# Patient Record
Sex: MALE | Race: BLACK OR AFRICAN AMERICAN | Employment: FULL TIME | URBAN - METROPOLITAN AREA
[De-identification: names, ages, dates, MRNs, and addresses within clinical notes are randomized per-mention and may not be internally consistent; named-entity substitution may affect disease eponyms.]

---

## 2020-11-03 ENCOUNTER — HOSPITAL ENCOUNTER (EMERGENCY)
Age: 45
Discharge: HOME | End: 2020-11-03
Attending: EMERGENCY MEDICINE

## 2020-11-03 ENCOUNTER — APPOINTMENT (OUTPATIENT)
Dept: GENERAL RADIOLOGY | Age: 45
End: 2020-11-03

## 2020-11-03 VITALS
WEIGHT: 150 LBS | OXYGEN SATURATION: 100 % | BODY MASS INDEX: 20.32 KG/M2 | HEIGHT: 72 IN | SYSTOLIC BLOOD PRESSURE: 160 MMHG | RESPIRATION RATE: 21 BRPM | HEART RATE: 68 BPM | TEMPERATURE: 98.4 F | DIASTOLIC BLOOD PRESSURE: 111 MMHG

## 2020-11-03 LAB
A/G RATIO: 1.8 (ref 1.1–2.2)
ALBUMIN SERPL-MCNC: 4.6 G/DL (ref 3.4–5)
ALP BLD-CCNC: 74 U/L (ref 40–129)
ALT SERPL-CCNC: 9 U/L (ref 10–40)
ANION GAP SERPL CALCULATED.3IONS-SCNC: 7 MMOL/L (ref 3–16)
AST SERPL-CCNC: 14 U/L (ref 15–37)
BASOPHILS ABSOLUTE: 0 K/UL (ref 0–0.2)
BASOPHILS RELATIVE PERCENT: 0.6 %
BILIRUB SERPL-MCNC: 0.9 MG/DL (ref 0–1)
BILIRUBIN URINE: NEGATIVE
BLOOD, URINE: NEGATIVE
BUN BLDV-MCNC: 17 MG/DL (ref 7–20)
CALCIUM SERPL-MCNC: 9.1 MG/DL (ref 8.3–10.6)
CHLORIDE BLD-SCNC: 103 MMOL/L (ref 99–110)
CLARITY: CLEAR
CO2: 24 MMOL/L (ref 21–32)
COLOR: YELLOW
CREAT SERPL-MCNC: 0.9 MG/DL (ref 0.9–1.3)
EOSINOPHILS ABSOLUTE: 0.1 K/UL (ref 0–0.6)
EOSINOPHILS RELATIVE PERCENT: 1.8 %
GFR AFRICAN AMERICAN: >60
GFR NON-AFRICAN AMERICAN: >60
GLOBULIN: 2.5 G/DL
GLUCOSE BLD-MCNC: 103 MG/DL (ref 70–99)
GLUCOSE URINE: NEGATIVE MG/DL
HCT VFR BLD CALC: 40.6 % (ref 40.5–52.5)
HEMOGLOBIN: 13.4 G/DL (ref 13.5–17.5)
KETONES, URINE: NEGATIVE MG/DL
LEUKOCYTE ESTERASE, URINE: NEGATIVE
LYMPHOCYTES ABSOLUTE: 1.2 K/UL (ref 1–5.1)
LYMPHOCYTES RELATIVE PERCENT: 26.5 %
MCH RBC QN AUTO: 30.7 PG (ref 26–34)
MCHC RBC AUTO-ENTMCNC: 33.1 G/DL (ref 31–36)
MCV RBC AUTO: 92.7 FL (ref 80–100)
MICROSCOPIC EXAMINATION: NORMAL
MONOCYTES ABSOLUTE: 0.4 K/UL (ref 0–1.3)
MONOCYTES RELATIVE PERCENT: 8.4 %
NEUTROPHILS ABSOLUTE: 2.8 K/UL (ref 1.7–7.7)
NEUTROPHILS RELATIVE PERCENT: 62.7 %
NITRITE, URINE: NEGATIVE
PDW BLD-RTO: 12.8 % (ref 12.4–15.4)
PH UA: 7 (ref 5–8)
PLATELET # BLD: 200 K/UL (ref 135–450)
PMV BLD AUTO: 9.2 FL (ref 5–10.5)
POTASSIUM SERPL-SCNC: 4.5 MMOL/L (ref 3.5–5.1)
PROTEIN UA: NEGATIVE MG/DL
RBC # BLD: 4.38 M/UL (ref 4.2–5.9)
SODIUM BLD-SCNC: 134 MMOL/L (ref 136–145)
SPECIFIC GRAVITY UA: 1.02 (ref 1–1.03)
TOTAL PROTEIN: 7.1 G/DL (ref 6.4–8.2)
TROPONIN: <0.01 NG/ML
TROPONIN: <0.01 NG/ML
URINE REFLEX TO CULTURE: NORMAL
URINE TYPE: NORMAL
UROBILINOGEN, URINE: 0.2 E.U./DL
WBC # BLD: 4.5 K/UL (ref 4–11)

## 2020-11-03 PROCEDURE — 85025 COMPLETE CBC W/AUTO DIFF WBC: CPT

## 2020-11-03 PROCEDURE — 80053 COMPREHEN METABOLIC PANEL: CPT

## 2020-11-03 PROCEDURE — 71045 X-RAY EXAM CHEST 1 VIEW: CPT

## 2020-11-03 PROCEDURE — 99285 EMERGENCY DEPT VISIT HI MDM: CPT

## 2020-11-03 PROCEDURE — 81003 URINALYSIS AUTO W/O SCOPE: CPT

## 2020-11-03 PROCEDURE — 84484 ASSAY OF TROPONIN QUANT: CPT

## 2020-11-03 PROCEDURE — 93005 ELECTROCARDIOGRAM TRACING: CPT | Performed by: EMERGENCY MEDICINE

## 2020-11-03 ASSESSMENT — PAIN DESCRIPTION - DESCRIPTORS: DESCRIPTORS: ACHING

## 2020-11-03 ASSESSMENT — PAIN DESCRIPTION - DIRECTION: RADIATING_TOWARDS: NON RADIATING

## 2020-11-03 ASSESSMENT — ENCOUNTER SYMPTOMS
RHINORRHEA: 0
WHEEZING: 0
COUGH: 0
PHOTOPHOBIA: 0
BACK PAIN: 0
DIARRHEA: 0
VOMITING: 0
NAUSEA: 0
ABDOMINAL PAIN: 0
SHORTNESS OF BREATH: 0

## 2020-11-03 ASSESSMENT — PAIN DESCRIPTION - FREQUENCY: FREQUENCY: CONTINUOUS

## 2020-11-03 ASSESSMENT — PAIN SCALES - GENERAL
PAINLEVEL_OUTOF10: 3
PAINLEVEL_OUTOF10: 4
PAINLEVEL_OUTOF10: 5
PAINLEVEL_OUTOF10: 5

## 2020-11-03 ASSESSMENT — PAIN DESCRIPTION - LOCATION: LOCATION: CHEST

## 2020-11-03 ASSESSMENT — PAIN DESCRIPTION - ORIENTATION: ORIENTATION: RIGHT;UPPER

## 2020-11-03 ASSESSMENT — PAIN DESCRIPTION - PAIN TYPE: TYPE: ACUTE PAIN

## 2020-11-03 NOTE — ED NOTES
Findings and plan of care discussed at bedside by provider. Pt verbalized understanding of plan of care, pt discharged with all instructions reviewed and any prescriptions as applicable. All belongings in hand including discharge instructions, prescriptions, and personal belongings. Pt in no acute distress.      Junaid Davidson RN  11/03/20 8015

## 2020-11-03 NOTE — ED PROVIDER NOTES
Emergency Department Provider Note  Location: [address] [place] [address]  ED  11/3/2020     Patient Identification  Yaquelin Joyner is a 39 y.o. male    Chief Complaint  Chest Pain (right sided chest pain at 0700, constant aching, nonradiating 3/10, worse with breathing)          HPI  (History provided by patient)  Patient is a 61-year-old male smoker no medical history who presents with right-sided chest pain episode started around 7 AM now resolved. Patient states he felt that he may have slept weird on his side and woke up with symptoms. Feels like \"pain in the wall of my chest\". No exacerbating or alleviating symptoms. Is not associated with any shoulder pain shortness of breath lightheadedness diaphoresis nausea or other autonomic symptoms. No cough sputum reduction fever chills known sick contacts or exposures to COVID-19. I have reviewed the following nursing documentation:  Allergies: No Known Allergies    Past medical history:  has no past medical history on file. Past surgical history:  has no past surgical history on file. Home medications:   Prior to Admission medications    Not on File       Social history:      Family history:  No family history on file. ROS  Review of Systems   Constitutional: Negative for chills and fever. HENT: Negative for congestion and rhinorrhea. Eyes: Negative for photophobia and visual disturbance. Respiratory: Negative for cough, shortness of breath and wheezing. Cardiovascular: Positive for chest pain. Negative for palpitations. Gastrointestinal: Negative for abdominal pain, diarrhea, nausea and vomiting. Genitourinary: Negative for dysuria and hematuria. Musculoskeletal: Negative for back pain and neck pain. Skin: Negative for rash and wound. Neurological: Negative for syncope and weakness. Psychiatric/Behavioral: Negative for agitation and confusion.          Exam  ED Triage Vitals [11/03/20 1010]   BP Temp Temp src Pulse Resp SpO2 Height Weight   (!) 152/100 98.4 °F (36.9 °C) -- 62 15 100 % 6' (1.829 m) 150 lb (68 kg)       Physical Exam  Vitals signs and nursing note reviewed. Constitutional:       General: He is not in acute distress. Appearance: He is well-developed. HENT:      Head: Normocephalic and atraumatic. Nose: Nose normal. No congestion. Eyes:      Extraocular Movements: Extraocular movements intact. Pupils: Pupils are equal, round, and reactive to light. Neck:      Musculoskeletal: Normal range of motion and neck supple. Cardiovascular:      Rate and Rhythm: Normal rate and regular rhythm. Heart sounds: No murmur. Comments: Equal radial pulses  Pulmonary:      Effort: Pulmonary effort is normal.      Breath sounds: Normal breath sounds. Comments: No chest wall tenderness or crepitus  Abdominal:      General: There is no distension. Palpations: Abdomen is soft. Tenderness: There is no abdominal tenderness. Musculoskeletal: Normal range of motion. General: No deformity. Skin:     General: Skin is warm. Findings: No rash. Neurological:      Mental Status: He is alert and oriented to person, place, and time. Motor: No abnormal muscle tone. Coordination: Coordination normal.   Psychiatric:         Mood and Affect: Mood normal.         Behavior: Behavior normal.           ED Course    ED Medication Orders (From admission, onward)    None          EKG  Sinus bradycardic rhythm rate 55, normal axis, normal intervals, no evidence of conduction abnormalities, LVH criteria, no diagnostic ischemic changes noted, J-point elevation noted septal and lateral leads consistent with early repolarization. QTc 369. Radiology  Xr Chest Portable    Result Date: 11/3/2020  EXAMINATION: ONE XRAY VIEW OF THE CHEST 11/3/2020 10:47 am COMPARISON: None.  HISTORY: ORDERING SYSTEM PROVIDED HISTORY: chest pain TECHNOLOGIST PROVIDED HISTORY: Reason for exam:->chest pain Reason for Exam: CHEST PAIN Acuity: Acute Type of Exam: Initial FINDINGS: The cardiomediastinal silhouette is unremarkable. The lungs are clear. No infiltrate, pleural fluid or focal process is identified. No acute osseous findings. No acute cardiopulmonary disease.          Labs  Results for orders placed or performed during the hospital encounter of 11/03/20   Comprehensive metabolic panel   Result Value Ref Range    Sodium 134 (L) 136 - 145 mmol/L    Potassium 4.5 3.5 - 5.1 mmol/L    Chloride 103 99 - 110 mmol/L    CO2 24 21 - 32 mmol/L    Anion Gap 7 3 - 16    Glucose 103 (H) 70 - 99 mg/dL    BUN 17 7 - 20 mg/dL    CREATININE 0.9 0.9 - 1.3 mg/dL    GFR Non-African American >60 >60    GFR African American >60 >60    Calcium 9.1 8.3 - 10.6 mg/dL    Total Protein 7.1 6.4 - 8.2 g/dL    Alb 4.6 3.4 - 5.0 g/dL    Albumin/Globulin Ratio 1.8 1.1 - 2.2    Total Bilirubin 0.9 0.0 - 1.0 mg/dL    Alkaline Phosphatase 74 40 - 129 U/L    ALT 9 (L) 10 - 40 U/L    AST 14 (L) 15 - 37 U/L    Globulin 2.5 g/dL   CBC auto differential   Result Value Ref Range    WBC 4.5 4.0 - 11.0 K/uL    RBC 4.38 4.20 - 5.90 M/uL    Hemoglobin 13.4 (L) 13.5 - 17.5 g/dL    Hematocrit 40.6 40.5 - 52.5 %    MCV 92.7 80.0 - 100.0 fL    MCH 30.7 26.0 - 34.0 pg    MCHC 33.1 31.0 - 36.0 g/dL    RDW 12.8 12.4 - 15.4 %    Platelets 031 579 - 959 K/uL    MPV 9.2 5.0 - 10.5 fL    Neutrophils % 62.7 %    Lymphocytes % 26.5 %    Monocytes % 8.4 %    Eosinophils % 1.8 %    Basophils % 0.6 %    Neutrophils Absolute 2.8 1.7 - 7.7 K/uL    Lymphocytes Absolute 1.2 1.0 - 5.1 K/uL    Monocytes Absolute 0.4 0.0 - 1.3 K/uL    Eosinophils Absolute 0.1 0.0 - 0.6 K/uL    Basophils Absolute 0.0 0.0 - 0.2 K/uL   Troponin   Result Value Ref Range    Troponin <0.01 <0.01 ng/mL   Urinalysis Reflex to Culture    Specimen: Urine, clean catch   Result Value Ref Range    Color, UA Yellow Straw/Yellow    Clarity, UA Clear Clear    Glucose, Ur Negative Negative mg/dL Bilirubin Urine Negative Negative    Ketones, Urine Negative Negative mg/dL    Specific Gravity, UA 1.025 1.005 - 1.030    Blood, Urine Negative Negative    pH, UA 7.0 5.0 - 8.0    Protein, UA Negative Negative mg/dL    Urobilinogen, Urine 0.2 <2.0 E.U./dL    Nitrite, Urine Negative Negative    Leukocyte Esterase, Urine Negative Negative    Microscopic Examination Not Indicated     Urine Type NotGiven     Urine Reflex to Culture Not Indicated    Troponin   Result Value Ref Range    Troponin <0.01 <0.01 ng/mL   EKG 12 Lead   Result Value Ref Range    Ventricular Rate 54 BPM    Atrial Rate 54 BPM    P-R Interval 188 ms    QRS Duration 86 ms    Q-T Interval 390 ms    QTc Calculation (Bazett) 369 ms    P Axis 71 degrees    R Axis 13 degrees    T Axis 39 degrees    Diagnosis       Sinus bradycardiaMinimal voltage criteria for LVH, may be normal variantBorderline ECGNo previous ECGs available   EKG 12 Lead   Result Value Ref Range    Ventricular Rate 55 BPM    Atrial Rate 55 BPM    P-R Interval 196 ms    QRS Duration 72 ms    Q-T Interval 386 ms    QTc Calculation (Bazett) 369 ms    P Axis 68 degrees    R Axis -4 degrees    T Axis 45 degrees    Diagnosis       Sinus bradycardiaRSR' or QR pattern in V1 suggests right ventricular conduction delayNonspecific ST abnormalityAbnormal ECGWhen compared with ECG of 03-NOV-2020 10:04,RSR' pattern in V1 is now PresentT wave amplitude has decreased in Anterior leads         MDM  Patient seen and evaluated. Relevant records reviewed. 44-year-old male presents with episode of right-sided chest pain now resolved. On exam is well-appearing no acute distress, vitals notable for hypertension otherwise within normal limits. He has a reassuring and unremarkable physical exam.  His EKG shows evidence of LVH though no clear ischemic pattern noted. Serial troponins are negative. He is a low heart score, 2. At this point low concern for ACS.     I completed a structured, evidence-based clinical evaluation to screen for acute emergencies for the above complaints. Available evidence indicate that the patient is low risk and this is consistent with my clinical intuition. The risk of further workup or hospitalization is likely higher than the likelihood of the patient having a dangerous emergent condition. It is, therefore, in the patients best interest not to do additional emergent testing. At this point I do not feel the patient requires further work up and it is reasonable to discharge the patient. I had a discussion with the patient and/or their surrogate regarding diagnosis, diagnostic testing results, treatment/ plan of care, and follow up. There was shared decision-making between myself as well as the patient and/or their surrogate and we are all in agreement with discharge home. There was an opportunity for questions and all questions were answered to the best of my ability and to the satisfaction of the patient and/or patient family. Patient agreed to follow up with PCP for further evaluation/treatment. The patient was given strict return precautions as we discussed symptoms that would necessitate return to the ED. Patient will return to ED for new/worsening symptoms. The patient verbalized their understanding and agreement with the above plan. Please refer to AVS for further details regarding discharge instructions. Clinical Impression:  1. Chest wall pain    2. Atypical chest pain          Disposition:  Discharge to home in good condition. Blood pressure 124/83, pulse 61, temperature 98.4 °F (36.9 °C), resp. rate 21, height 6' (1.829 m), weight 150 lb (68 kg), SpO2 100 %. Patient was given scripts for the following medications. I counseled patient how to take these medications.    New Prescriptions    No medications on file       Disposition referral (if applicable):  [address] [place] [address]. Tasia  [address] [place] [address]  [phone]    Schedule an

## 2020-11-04 LAB
EKG ATRIAL RATE: 54 BPM
EKG ATRIAL RATE: 55 BPM
EKG DIAGNOSIS: NORMAL
EKG DIAGNOSIS: NORMAL
EKG P AXIS: 68 DEGREES
EKG P AXIS: 71 DEGREES
EKG P-R INTERVAL: 188 MS
EKG P-R INTERVAL: 196 MS
EKG Q-T INTERVAL: 386 MS
EKG Q-T INTERVAL: 390 MS
EKG QRS DURATION: 72 MS
EKG QRS DURATION: 86 MS
EKG QTC CALCULATION (BAZETT): 369 MS
EKG QTC CALCULATION (BAZETT): 369 MS
EKG R AXIS: -4 DEGREES
EKG R AXIS: 13 DEGREES
EKG T AXIS: 39 DEGREES
EKG T AXIS: 45 DEGREES
EKG VENTRICULAR RATE: 54 BPM
EKG VENTRICULAR RATE: 55 BPM

## 2020-11-04 PROCEDURE — 93010 ELECTROCARDIOGRAM REPORT: CPT | Performed by: INTERNAL MEDICINE

## 2021-07-29 ENCOUNTER — APPOINTMENT (OUTPATIENT)
Dept: GENERAL RADIOLOGY | Age: 46
End: 2021-07-29

## 2021-07-29 ENCOUNTER — HOSPITAL ENCOUNTER (EMERGENCY)
Age: 46
Discharge: HOME | End: 2021-07-29

## 2021-07-29 VITALS
SYSTOLIC BLOOD PRESSURE: 130 MMHG | RESPIRATION RATE: 18 BRPM | WEIGHT: 160 LBS | HEART RATE: 60 BPM | BODY MASS INDEX: 22.4 KG/M2 | TEMPERATURE: 98.5 F | OXYGEN SATURATION: 98 % | DIASTOLIC BLOOD PRESSURE: 70 MMHG | HEIGHT: 71 IN

## 2021-07-29 DIAGNOSIS — R42 DIZZINESS: Primary | ICD-10-CM

## 2021-07-29 LAB
A/G RATIO: 1.6 (ref 1.1–2.2)
ALBUMIN SERPL-MCNC: 4.7 G/DL (ref 3.4–5)
ALP BLD-CCNC: 80 U/L (ref 40–129)
ALT SERPL-CCNC: 10 U/L (ref 10–40)
ANION GAP SERPL CALCULATED.3IONS-SCNC: 25 MMOL/L (ref 3–16)
AST SERPL-CCNC: 17 U/L (ref 15–37)
BASOPHILS ABSOLUTE: 0 K/UL (ref 0–0.2)
BASOPHILS RELATIVE PERCENT: 0.5 %
BILIRUB SERPL-MCNC: 0.4 MG/DL (ref 0–1)
BUN BLDV-MCNC: 12 MG/DL (ref 7–20)
CALCIUM SERPL-MCNC: 9.7 MG/DL (ref 8.3–10.6)
CHLORIDE BLD-SCNC: 103 MMOL/L (ref 99–110)
CO2: 14 MMOL/L (ref 21–32)
CREAT SERPL-MCNC: 1.2 MG/DL (ref 0.9–1.3)
EOSINOPHILS ABSOLUTE: 0 K/UL (ref 0–0.6)
EOSINOPHILS RELATIVE PERCENT: 0.8 %
GFR AFRICAN AMERICAN: >60
GFR NON-AFRICAN AMERICAN: >60
GLOBULIN: 3 G/DL
GLUCOSE BLD-MCNC: 70 MG/DL (ref 70–99)
HCT VFR BLD CALC: 43.4 % (ref 40.5–52.5)
HEMOGLOBIN: 14.3 G/DL (ref 13.5–17.5)
LYMPHOCYTES ABSOLUTE: 2.6 K/UL (ref 1–5.1)
LYMPHOCYTES RELATIVE PERCENT: 42.6 %
MCH RBC QN AUTO: 31.4 PG (ref 26–34)
MCHC RBC AUTO-ENTMCNC: 32.9 G/DL (ref 31–36)
MCV RBC AUTO: 95.4 FL (ref 80–100)
MONOCYTES ABSOLUTE: 0.2 K/UL (ref 0–1.3)
MONOCYTES RELATIVE PERCENT: 4.1 %
NEUTROPHILS ABSOLUTE: 3.1 K/UL (ref 1.7–7.7)
NEUTROPHILS RELATIVE PERCENT: 52 %
PDW BLD-RTO: 12.9 % (ref 12.4–15.4)
PLATELET # BLD: 203 K/UL (ref 135–450)
PMV BLD AUTO: 9.7 FL (ref 5–10.5)
POTASSIUM SERPL-SCNC: 4.2 MMOL/L (ref 3.5–5.1)
RBC # BLD: 4.55 M/UL (ref 4.2–5.9)
SODIUM BLD-SCNC: 142 MMOL/L (ref 136–145)
TOTAL PROTEIN: 7.7 G/DL (ref 6.4–8.2)
TROPONIN: <0.01 NG/ML
WBC # BLD: 6 K/UL (ref 4–11)

## 2021-07-29 PROCEDURE — 93005 ELECTROCARDIOGRAM TRACING: CPT | Performed by: EMERGENCY MEDICINE

## 2021-07-29 PROCEDURE — 80053 COMPREHEN METABOLIC PANEL: CPT

## 2021-07-29 PROCEDURE — 85025 COMPLETE CBC W/AUTO DIFF WBC: CPT

## 2021-07-29 PROCEDURE — 84484 ASSAY OF TROPONIN QUANT: CPT

## 2021-07-29 PROCEDURE — 71046 X-RAY EXAM CHEST 2 VIEWS: CPT

## 2021-07-29 PROCEDURE — 99284 EMERGENCY DEPT VISIT MOD MDM: CPT

## 2021-07-29 ASSESSMENT — PAIN DESCRIPTION - DESCRIPTORS: DESCRIPTORS: HEADACHE

## 2021-07-29 ASSESSMENT — PAIN SCALES - GENERAL: PAINLEVEL_OUTOF10: 7

## 2021-07-29 ASSESSMENT — PAIN DESCRIPTION - PAIN TYPE: TYPE: ACUTE PAIN

## 2021-07-29 ASSESSMENT — PAIN DESCRIPTION - LOCATION: LOCATION: HEAD

## 2021-07-29 NOTE — ED PROVIDER NOTES
EKG interpretation:  Normal sinus rhythm, rate 81, normal axis, QTC within normal limits, probable LVH with nonspecific anterolateral ST repolarization abnormality, overall no significant changes from prior on 11/3/2020     Jessica Miller MD  07/29/21 2985

## 2021-07-29 NOTE — ED NOTES
Bed: 09  Expected date:   Expected time:   Means of arrival:   Comments:  [id number] [place] Martin, RN  07/29/21 1735

## 2021-07-30 LAB
EKG ATRIAL RATE: 81 BPM
EKG DIAGNOSIS: NORMAL
EKG P AXIS: 79 DEGREES
EKG P-R INTERVAL: 186 MS
EKG Q-T INTERVAL: 354 MS
EKG QRS DURATION: 78 MS
EKG QTC CALCULATION (BAZETT): 411 MS
EKG R AXIS: 23 DEGREES
EKG T AXIS: 60 DEGREES
EKG VENTRICULAR RATE: 81 BPM

## 2021-07-30 PROCEDURE — 93010 ELECTROCARDIOGRAM REPORT: CPT | Performed by: INTERNAL MEDICINE

## 2021-08-01 NOTE — ED PROVIDER NOTES
[address] [place] [address]  ED  EMERGENCY DEPARTMENT ENCOUNTER      This patient was not seen and evaluated by the attending physician. Pt Name: Paige Oliveira  MRN: [id number]  [place] 1975  Date of evaluation: 7/29/2021  Provider: Saint Carbo, APRN - CNP-C  PCP: No primary care provider on file. History provided by the patient. CHIEFCOMPLAINT:     Chief Complaint   Patient presents with    Dizziness     attempted to have a BM and became dizzy and laid down on the floor. Pt states he has been constipated and was trying to hurry       HISTORY OF PRESENT ILLNESS:      Paige Oliveira is a 42 y.o. male who presents to [address] [place] [address]  ED with complaints of dizziness. Patient states that he was trying to have a bowel movement and became really dizzy, states that he had to lay down on the floor, states that he feels much better currently. Patient states that he lives in a house with 7 people so when he has to go have a bowel movement he has to hurry up so he was straining to go. Denies any chest pain. Never had any shortness of breath. Is here for the evaluation. LOCATION:-  QUALITY:-  SEVERITY:-  DURATION:-  MODIFYING FACTORS:-    Nursing Notes were reviewed     REVIEW OF SYSTEMS:     Review of Systems  All systems, a total of 10, are reviewed and negative except for those that were just noted in history present illness. PAST MEDICAL HISTORY:   History reviewed. No pertinent past medical history. SURGICAL HISTORY:      Past Surgical History:   Procedure Laterality Date    FRACTURE SURGERY           CURRENT MEDICATIONS:     There are no discharge medications for this patient. ALLERGIES:    Patient has no known allergies. FAMILY HISTORY:     History reviewed. No pertinent family history.        SOCIAL HISTORY:     Social History     Socioeconomic History    Marital status: Single     Spouse name: None    Number of children: None    Years of education: None    Highest education level: None   Occupational History    None   Tobacco Use    Smoking status: Current Every Day Smoker     Packs/day: 1.00     Types: Cigarettes   Substance and Sexual Activity    Alcohol use: None    Drug use: None    Sexual activity: None   Other Topics Concern    None   Social History Narrative    None     Social Determinants of Health     Financial Resource Strain:     Difficulty of Paying Living Expenses:    Food Insecurity:     Worried About Running Out of Food in the Last Year:     Ran Out of Food in the Last Year:    Transportation Needs:     Lack of Transportation (Medical):  Lack of Transportation (Non-Medical):    Physical Activity:     Days of Exercise per Week:     Minutes of Exercise per Session:    Stress:     Feeling of Stress :    Social Connections:     Frequency of Communication with Friends and Family:     Frequency of Social Gatherings with Friends and Family:     Attends Denominational Services:     Active Member of Clubs or Organizations:     Attends Club or Organization Meetings:     Marital Status:    Intimate Partner Violence:     Fear of Current or Ex-Partner:     Emotionally Abused:     Physically Abused:     Sexually Abused:        SCREENINGS:             PHYSICAL EXAM:       ED Triage Vitals   BP Temp Temp Source Pulse Resp SpO2 Height Weight   07/29/21 1736 07/29/21 1853 07/29/21 1736 07/29/21 1736 07/29/21 1736 07/29/21 1736 07/29/21 1736 07/29/21 1736   (!) 152/89 98.5 °F (36.9 °C) Oral 82 16 100 % 5' 11\" (1.803 m) 160 lb (72.6 kg)       Physical Exam    CONSTITUTIONAL: Awake and alert. Cooperative. Well-developed. Well-nourished. Vitals:    07/29/21 1836 07/29/21 1853 07/29/21 1907 07/29/21 2128   BP: (!) 142/94  132/76 130/70   Pulse: 73  64 60   Resp: 19  17 18   Temp:  98.5 °F (36.9 °C)     TempSrc:  Oral     SpO2: 99%  100% 98%   Weight:       Height:         HENT: Normocephalic. Atraumatic.  External ears normal, without discharge. TMs clear bilaterally. Nonasal discharge. Oropharynx clear, no erythema. Mucous membranes moist.  EYES: Conjunctiva non-injected, nolid abnormalities noted. No scleral icterus. PERRL. EOM's grossly intact. Anterior chambers clear. NECK: Supple. Normal ROM. No meningismus. No thyroid tenderness or swelling noted. CARDIOVASCULAR: RRR. No Murmer. No carotid bruits. PULMONARY/CHEST WALL: Effort normal. No tachypnea. Lungs clear to ausculation. ABDOMEN: Normal BS. Soft. Nondistended. No tenderness to palpation. No guarding. No hernias noted. No splenomegaly. Back: Spine is midline. No ecchymosis. No crepituson palpation. No obvious subluxation of vertebral column. No saddle anesthesia or evidence of cauda equina. /ANORECTAL: Not assessed  MUSKULOSKELETAL: Normal ROM. No acute deformities. No edema. No tenderness to palpate. SKIN: Warm and dry. NEUROLOGICAL:  GCS 15. CN II-XII grossly intact. Strength is 5/5 in all extremities and sensation is intact. PSYCHIATRIC: Normal affect, normal insight and judgement. Alert and oriented x 3.         DIAGNOSTIC RESULTS:     LABS:    Results for orders placed or performed during the hospital encounter of 07/29/21   CBC auto differential   Result Value Ref Range    WBC 6.0 4.0 - 11.0 K/uL    RBC 4.55 4.20 - 5.90 M/uL    Hemoglobin 14.3 13.5 - 17.5 g/dL    Hematocrit 43.4 40.5 - 52.5 %    MCV 95.4 80.0 - 100.0 fL    MCH 31.4 26.0 - 34.0 pg    MCHC 32.9 31.0 - 36.0 g/dL    RDW 12.9 12.4 - 15.4 %    Platelets 041 083 - 100 K/uL    MPV 9.7 5.0 - 10.5 fL    Neutrophils % 52.0 %    Lymphocytes % 42.6 %    Monocytes % 4.1 %    Eosinophils % 0.8 %    Basophils % 0.5 %    Neutrophils Absolute 3.1 1.7 - 7.7 K/uL    Lymphocytes Absolute 2.6 1.0 - 5.1 K/uL    Monocytes Absolute 0.2 0.0 - 1.3 K/uL    Eosinophils Absolute 0.0 0.0 - 0.6 K/uL    Basophils Absolute 0.0 0.0 - 0.2 K/uL   Comprehensive metabolic panel   Result Value Ref Range    Sodium 142 136 - 145

## 2021-08-13 ENCOUNTER — HOSPITAL ENCOUNTER (EMERGENCY)
Facility: CLINIC | Age: 46
Discharge: HOME | End: 2021-08-13
Attending: EMERGENCY MEDICINE | Admitting: EMERGENCY MEDICINE
Payer: MEDICAID

## 2021-08-13 ENCOUNTER — TELEPHONE (OUTPATIENT)
Dept: NEUROSURGERY | Facility: CLINIC | Age: 46
End: 2021-08-13

## 2021-08-13 VITALS
BODY MASS INDEX: 21.7 KG/M2 | DIASTOLIC BLOOD PRESSURE: 118 MMHG | TEMPERATURE: 98.4 F | RESPIRATION RATE: 16 BRPM | OXYGEN SATURATION: 99 % | SYSTOLIC BLOOD PRESSURE: 135 MMHG | WEIGHT: 155 LBS | HEART RATE: 76 BPM | HEIGHT: 71 IN

## 2021-08-13 DIAGNOSIS — D49.6 BRAIN TUMOR (H): ICD-10-CM

## 2021-08-13 DIAGNOSIS — Z87.898 HISTORY OF SEIZURE: ICD-10-CM

## 2021-08-13 DIAGNOSIS — U07.1 2019 NOVEL CORONAVIRUS DISEASE (COVID-19): ICD-10-CM

## 2021-08-13 LAB
ALBUMIN SERPL-MCNC: 3.7 G/DL (ref 3.4–5)
ALP SERPL-CCNC: 69 U/L (ref 40–150)
ALT SERPL W P-5'-P-CCNC: 26 U/L (ref 0–70)
ANION GAP SERPL CALCULATED.3IONS-SCNC: 4 MMOL/L (ref 3–14)
AST SERPL W P-5'-P-CCNC: 22 U/L (ref 0–45)
BILIRUB SERPL-MCNC: 0.8 MG/DL (ref 0.2–1.3)
BUN SERPL-MCNC: 10 MG/DL (ref 7–30)
CALCIUM SERPL-MCNC: 9.2 MG/DL (ref 8.5–10.1)
CHLORIDE BLD-SCNC: 108 MMOL/L (ref 94–109)
CO2 SERPL-SCNC: 28 MMOL/L (ref 20–32)
CREAT SERPL-MCNC: 0.94 MG/DL (ref 0.66–1.25)
ERYTHROCYTE [DISTWIDTH] IN BLOOD BY AUTOMATED COUNT: 11.7 % (ref 10–15)
GFR SERPL CREATININE-BSD FRML MDRD: >90 ML/MIN/1.73M2
GLUCOSE BLD-MCNC: 101 MG/DL (ref 70–99)
HCT VFR BLD AUTO: 43 % (ref 40–53)
HGB BLD-MCNC: 14.1 G/DL (ref 13.3–17.7)
MCH RBC QN AUTO: 30 PG (ref 26.5–33)
MCHC RBC AUTO-ENTMCNC: 32.8 G/DL (ref 31.5–36.5)
MCV RBC AUTO: 92 FL (ref 78–100)
NRBC # BLD AUTO: 0 10E3/UL
NRBC BLD AUTO-RTO: 0 /100
PLATELET # BLD AUTO: 171 10E3/UL (ref 150–450)
POTASSIUM BLD-SCNC: 4.3 MMOL/L (ref 3.4–5.3)
PROT SERPL-MCNC: 7.4 G/DL (ref 6.8–8.8)
RBC # BLD AUTO: 4.7 10E6/UL (ref 4.4–5.9)
SODIUM SERPL-SCNC: 140 MMOL/L (ref 133–144)
WBC # BLD AUTO: 4.2 10E3/UL (ref 4–11)

## 2021-08-13 PROCEDURE — 82040 ASSAY OF SERUM ALBUMIN: CPT | Performed by: EMERGENCY MEDICINE

## 2021-08-13 PROCEDURE — 85027 COMPLETE CBC AUTOMATED: CPT | Performed by: EMERGENCY MEDICINE

## 2021-08-13 PROCEDURE — 99283 EMERGENCY DEPT VISIT LOW MDM: CPT

## 2021-08-13 PROCEDURE — 36415 COLL VENOUS BLD VENIPUNCTURE: CPT | Performed by: EMERGENCY MEDICINE

## 2021-08-13 RX ORDER — LEVETIRACETAM 500 MG/1
500 TABLET ORAL 2 TIMES DAILY
Status: ON HOLD | COMMUNITY
Start: 2021-08-10 | End: 2021-11-19

## 2021-08-13 ASSESSMENT — MIFFLIN-ST. JEOR: SCORE: 1615.21

## 2021-08-13 NOTE — ED PROVIDER NOTES
"  History   Chief Complaint:  \"they told me to come\"       HPI history limited by unavailability of outside records, and supplemented by review of his MRI pictures  Lang Collins is a 46 year old male who presents for evaluation of recent seizure and recent diagnosis of a brain tumor.  Patient states that he was in [facility], seen at the [facility] this past weekend, at which time he had had his first ever generalized tonic-clonic seizure, with work-up ultimately revealed a brain tumor.  He was started on Keppra 500 mg twice daily, which she has been taking as prescribed, and has not had recurrent seizure activity.  He intended to move up to [place], and he arrived here several days ago.  His team in [place] had told him to come to the emergency department to find a team to take care of his brain tumor and manage his seizures.  He denies a history of hypertension.  No vomiting or diarrhea.  He has no fever or cough, though states that he was diagnosed with Covid while in [place].  He does not feel short of breath and has no chest pain.  No recent syncope.  He had a headache last week around the time of his seizure, and still does not feel completely normal, but has not had issues or new symptoms.    Review of the few printed records that he brings with him gives the suggestion that he left the hospital AMA, though incomplete documentation is available to me at this time, and despite repeated efforts, we were unable to obtain electronic records from the outside facilities during his emergency department course today.    Review of Systems  All other systems are reviewed negative except as above in HPI    Allergies:  The patient has no known allergies.     Medications:  The patient is not currently taking any prescribed medications.    Past Medical History:    The patient denies past medical history.     Social History:  He smokes both tobacco and marijuana.  He denies other " "illicit drug use.    Physical Exam     Patient Vitals for the past 24 hrs:   BP Temp Temp src Pulse Resp SpO2 Height Weight   08/13/21 1322 (!) 135/118 -- -- 76 -- -- -- --   08/13/21 0946 (!) 151/118 98.4  F (36.9  C) Oral 84 16 99 % 1.803 m (5' 11\") 70.3 kg (155 lb)       Physical Exam  General: Nontoxic-appearing male recumbent in room 2  HENT: mucous membranes moist, OP clear, TMs clear  Eyes: pupils normal without nystagmus, no photophobia  CV: regular rate as above, regular rhythm, no lower extremity edema   Resp: normal effort, speaks in full phrases, no stridor, no cough observed  GI: abdomen soft and nontender  MSK: no bony tenderness to face, skull, or cervical spine  Skin: appropriately warm and dry, no petechiae, no vesicles  Neuro: awake, alert, clear speech, fully oriented, face symmetric,  normal, finger-nose normal bilaterally, strength and sensation intact in all extr, no nuchal rigidity, ambulatory without ataxia  Psych: cooperative, pleasant    Emergency Department Course   Laboratory:  CMP: Glucose 101 (H) o/w WNL (Creatinine 0.94)   CBC: WBC 4.2, HGB 14.1,      Emergency Department Course:    Reviewed:  I reviewed nursing notes and vitals    Assessments:  0957 I obtained history and examined the patient as noted above.   1314 I rechecked the patient and explained findings.     Consults:   1239 I spoke with Chelsi Styles PA-C of neurosurgery regarding patient's presentation, findings, and plan of care.    Disposition:  The patient was discharged to home.       Impression & Plan   Medical Decision Making:  He brought the CD containing MRI images of the brain with him, and after significant effort, this was uploaded to our radiology program and reviewed by both me and the consulting neurosurgical team.  He has a nonfocal neurologic exam at this time.  Basic laboratory studies are reassuring.  He is noted to have somewhat elevated blood pressure of uncertain chronicity, but does not have " corresponding symptoms to suggest hypertensive emergency.  I do not think he requires hospitalization, given relative clinical stability in recent days, though do think it would be important for him to establish care with the neurosurgical service and primary care.  I spoke with the on-call neurosurgical team, who was able to review his images and agreed that outpatient follow-up was appropriate.  They will call him today to arrange an outpatient appointment next week.  With regard to his report of recent positive Covid diagnosis, he does not have signs or symptoms of serious illness at this time, though should maintain precautions according to standard protocols.  He should continue the Keppra recently prescribed.  He was discharged home to his satisfaction after discussion of and agreement upon this plan.    Covid-19  Lang Collins was evaluated during a global COVID-19 pandemic, which necessitated consideration that the patient might be at risk for infection with the SARS-CoV-2 virus that causes COVID-19.   Applicable protocols for evaluation were followed during the patient's care.   COVID-19 was considered as part of the patient's evaluation. The plan for testing is:  a test was obtained at a previous visit and considered today.    Diagnosis:    ICD-10-CM    1. Brain tumor (H)  D49.6    2. History of seizure  Z87.898    3. 2019 novel coronavirus disease (COVID-19)  U07.1      Scribe Disclosure:  I, Yanet Javed, am serving as a scribe at 9:57 AM on 8/13/2021 to document services personally performed by Arturo Bergman, based on my observations and the provider's statements to me.     This note was completed in part using Dragon voice recognition software. Although reviewed after completion, some word and grammatical errors may occur.       Arturo Bergman MD  08/13/21 2049

## 2021-08-13 NOTE — PROGRESS NOTES
46 year old first time seizure evaluated in [place] with CT and MRI w/wo contrast demonstrating brain lesion. He was sent with Keppra and told to follow up with NSGY team sooner rather than later    Presents to the ED to establish care. Has no new symptoms at this time. 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 No enhancement. Measures 2.3x1.5cm    Clinical history, imaging and plans reviewed myself as well as with Dr. Beltrán and EDMD Dr. Bergman    PLAN  - Discharge to home as there are no new neurologic symptoms   - Our office will call patient today to set up appointment next week with Dr. Beltrán   - Continue Keppra BID as instructed   - Advised to present sooner should there be new symptoms   - All in agreement with plans

## 2021-08-13 NOTE — TELEPHONE ENCOUNTER
Attempted to reach out to patient to schedule appointment with  for next week per Chelsi Styles, pt has new brain lesion, no answer. Left voice message for patient to call clinic back to further discuss.        Graft Cartilage Fenestration Text: The cartilage was fenestrated with a 2mm punch biopsy to help facilitate graft survival and healing.

## 2021-08-13 NOTE — ED TRIAGE NOTES
Pt is covid positive. Pt has seizure last Friday and is having a headache still. Pt has tumor in brain

## 2021-08-13 NOTE — DISCHARGE INSTRUCTIONS
Continue the medicine you were previously prescribed to minimize the risk of having more seizures.  I spoke with the neurosurgery team, who should call you today to make an appointment for recheck in clinic next week.  I recommend calling them, and mention that you were seen in the ER and your case was discussed with the on-call neurosurgeon, and they will get you in soon.

## 2021-08-16 ENCOUNTER — OFFICE VISIT (OUTPATIENT)
Dept: NEUROSURGERY | Facility: CLINIC | Age: 46
End: 2021-08-16
Attending: NEUROLOGICAL SURGERY
Payer: MEDICAID

## 2021-08-16 VITALS
DIASTOLIC BLOOD PRESSURE: 89 MMHG | TEMPERATURE: 98.6 F | OXYGEN SATURATION: 98 % | SYSTOLIC BLOOD PRESSURE: 131 MMHG | HEART RATE: 73 BPM

## 2021-08-16 DIAGNOSIS — C71.9 BRAIN TUMOR, GLIOMA (H): Primary | ICD-10-CM

## 2021-08-16 PROCEDURE — G0463 HOSPITAL OUTPT CLINIC VISIT: HCPCS

## 2021-08-16 PROCEDURE — 99203 OFFICE O/P NEW LOW 30 MIN: CPT | Performed by: NEUROLOGICAL SURGERY

## 2021-08-16 ASSESSMENT — PAIN SCALES - GENERAL: PAINLEVEL: MILD PAIN (2)

## 2021-08-16 NOTE — LETTER
8/16/2021         RE: Lang Collins  [address] [facility] [address]        Dear Colleague,    Thank you for referring your patient, Lang Collins, to the [facility] [place]. Please see a copy of my visit note below.    I was asked by Dr. 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  Suffered a new onset grand mal seizure on 8/8 in [place].  Was started on keppra, and has not suffered seizure since.  Low grade headaches, but he thinks this may be related to being tired.  No neurologic symptoms.  MR Brain shows a non-enhancing lesion in the left posterior superior frontal gyrus.       Past Medical History:   Diagnosis Date     Brain tumor (H)      Past Surgical History:   Procedure Laterality Date     ORTHOPEDIC SURGERY       Social History     Socioeconomic History     Marital status: Single     Spouse name: Not on file     Number of children: Not on file     Years of education: Not on file     Highest education level: Not on file   Occupational History     Not on file   Tobacco Use     Smoking status: Current Every Day Smoker     Packs/day: 1.00     Smokeless tobacco: Never Used   Substance and Sexual Activity     Alcohol use: Yes     Comment: socially     Drug use: Yes     Types: Marijuana     Sexual activity: Not on file   Other Topics Concern     Not on file   Social History Narrative     Not on file     Social Determinants of Health     Financial Resource Strain:      Difficulty of Paying Living Expenses:    Food Insecurity:      Worried About Running Out of Food in the Last Year:      Ran Out of Food in the Last Year:    Transportation Needs:      Lack of Transportation (Medical):      Lack of Transportation (Non-Medical):    Physical Activity:      Days of Exercise per Week:      Minutes of Exercise per Session:    Stress:      Feeling of Stress :    Social Connections:      Frequency  of Communication with Friends and Family:      Frequency of Social Gatherings with Friends and Family:      Attends Faith Services:      Active Member of Clubs or Organizations:      Attends Club or Organization Meetings:      Marital Status:    Intimate Partner Violence:      Fear of Current or Ex-Partner:      Emotionally Abused:      Physically Abused:      Sexually Abused:      No family history on file.     ROS: 10 point ROS neg other than the symptoms noted above in the HPI.    Physical Exam  /89   Pulse 73   Temp 98.6  F (37  C) (Oral)   SpO2 98%   HEENT:  Normocephalic, atraumatic.  PERRLA.  EOM s intact.  Visual fields full to gross exam  Neck:  Supple, non-tender, without lymphadenopathy.  Heart:  No peripheral edema  Lungs:  No SOB  Abdomen:  Non-distended.   Skin:  Warm and dry.  Extremities:  No edema, cyanosis or clubbing.  Psychiatric:  No apparent distress  Musculoskeletal:  Normal bulk and tone    NEUROLOGICAL EXAMINATION:     Mental status:  Alert and Oriented x 3, speech is fluent.  Cranial nerves:  II-XII intact.   Motor:    Shoulder Abduction:  Right:  5/5   Left:  5/5  Biceps:                      Right:  5/5   Left:  5/5  Triceps:                     Right:  5/5   Left:  5/5  Wrist Extensors:       Right:  5/5   Left:  5/5  Wrist Flexors:           Right:  5/5   Left:  5/5  interosseus :            Right:  5/5   Left:  5/5  Hip Flexion:                Right: 5/5  Left:  5/5  Quadriceps:             Right:  5/5  Left:  5/5  Hamstrings:             Right:  5/5  Left:  5/5  Gastroc Soleus:        Right:  5/5  Left:  5/5  Tib/Ant:                      Right:  5/5  Left:  5/5  EHL:                     Right:  5/5  Left:  5/5  Sensation:  Intact  Reflexes:  Negative Babinski.  Negative Clonus.  Negative Thompson's.  Coordination:  Smooth finger to nose testing.   Negative pronator drift.  Smooth tandem walking.    A/P:  46M w/ seizure, left frontal non-enhancing lesion concerning for low  grade glioma    I had a discussion with the patient, reviewing the history, symptoms, and imaging  Will have financial office help get the patient coverage  Will obtain MR Functional  Follow up after MR complete         Again, thank you for allowing me to participate in the care of your patient.        Sincerely,        Kuldeep Beltrán MD

## 2021-08-16 NOTE — PROGRESS NOTES
I was asked by Dr. 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  Suffered a new onset grand mal seizure on 8/8 in [place].  Was started on keppra, and has not suffered seizure since.  Low grade headaches, but he thinks this may be related to being tired.  No neurologic symptoms.  MR Brain shows a non-enhancing lesion in the left posterior superior frontal gyrus.       Past Medical History:   Diagnosis Date     Brain tumor (H)      Past Surgical History:   Procedure Laterality Date     ORTHOPEDIC SURGERY       Social History     Socioeconomic History     Marital status: Single     Spouse name: Not on file     Number of children: Not on file     Years of education: Not on file     Highest education level: Not on file   Occupational History     Not on file   Tobacco Use     Smoking status: Current Every Day Smoker     Packs/day: 1.00     Smokeless tobacco: Never Used   Substance and Sexual Activity     Alcohol use: Yes     Comment: socially     Drug use: Yes     Types: Marijuana     Sexual activity: Not on file   Other Topics Concern     Not on file   Social History Narrative     Not on file     Social Determinants of Health     Financial Resource Strain:      Difficulty of Paying Living Expenses:    Food Insecurity:      Worried About Running Out of Food in the Last Year:      Ran Out of Food in the Last Year:    Transportation Needs:      Lack of Transportation (Medical):      Lack of Transportation (Non-Medical):    Physical Activity:      Days of Exercise per Week:      Minutes of Exercise per Session:    Stress:      Feeling of Stress :    Social Connections:      Frequency of Communication with Friends and Family:      Frequency of Social Gatherings with Friends and Family:      Attends Oriental orthodox Services:      Active Member of Clubs or Organizations:      Attends Club or Organization Meetings:      Marital Status:     Intimate Partner Violence:      Fear of Current or Ex-Partner:      Emotionally Abused:      Physically Abused:      Sexually Abused:      No family history on file.     ROS: 10 point ROS neg other than the symptoms noted above in the HPI.    Physical Exam  /89   Pulse 73   Temp 98.6  F (37  C) (Oral)   SpO2 98%   HEENT:  Normocephalic, atraumatic.  PERRLA.  EOM s intact.  Visual fields full to gross exam  Neck:  Supple, non-tender, without lymphadenopathy.  Heart:  No peripheral edema  Lungs:  No SOB  Abdomen:  Non-distended.   Skin:  Warm and dry.  Extremities:  No edema, cyanosis or clubbing.  Psychiatric:  No apparent distress  Musculoskeletal:  Normal bulk and tone    NEUROLOGICAL EXAMINATION:     Mental status:  Alert and Oriented x 3, speech is fluent.  Cranial nerves:  II-XII intact.   Motor:    Shoulder Abduction:  Right:  5/5   Left:  5/5  Biceps:                      Right:  5/5   Left:  5/5  Triceps:                     Right:  5/5   Left:  5/5  Wrist Extensors:       Right:  5/5   Left:  5/5  Wrist Flexors:           Right:  5/5   Left:  5/5  interosseus :            Right:  5/5   Left:  5/5  Hip Flexion:                Right: 5/5  Left:  5/5  Quadriceps:             Right:  5/5  Left:  5/5  Hamstrings:             Right:  5/5  Left:  5/5  Gastroc Soleus:        Right:  5/5  Left:  5/5  Tib/Ant:                      Right:  5/5  Left:  5/5  EHL:                     Right:  5/5  Left:  5/5  Sensation:  Intact  Reflexes:  Negative Babinski.  Negative Clonus.  Negative Thompson's.  Coordination:  Smooth finger to nose testing.   Negative pronator drift.  Smooth tandem walking.    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

## 2021-08-16 NOTE — PATIENT INSTRUCTIONS
Patient Next Steps:            Order placed for MRI. You can schedule at our  today, or you can call [place] at   [phone] ([facility])  We will call you with the results and next steps once imaging is completed.    Please call  [facility] [phone]          Please call us if you have any further questions or concerns.    [facility]   Phone: [phone]  Fax: [phone]

## 2021-09-08 ENCOUNTER — HOSPITAL ENCOUNTER (OUTPATIENT)
Dept: MRI IMAGING | Facility: CLINIC | Age: 46
End: 2021-09-08
Attending: NEUROLOGICAL SURGERY
Payer: MEDICAID

## 2021-09-08 DIAGNOSIS — C71.9 BRAIN TUMOR, GLIOMA (H): ICD-10-CM

## 2021-09-08 PROCEDURE — 255N000002 HC RX 255 OP 636: Performed by: NEUROLOGICAL SURGERY

## 2021-09-08 PROCEDURE — A9585 GADOBUTROL INJECTION: HCPCS | Performed by: NEUROLOGICAL SURGERY

## 2021-09-08 PROCEDURE — 70553 MRI BRAIN STEM W/O & W/DYE: CPT | Mod: 26 | Performed by: RADIOLOGY

## 2021-09-08 PROCEDURE — 70554 FMRI BRAIN BY TECH: CPT

## 2021-09-08 PROCEDURE — 70554 FMRI BRAIN BY TECH: CPT | Mod: 26 | Performed by: RADIOLOGY

## 2021-09-08 PROCEDURE — 70553 MRI BRAIN STEM W/O & W/DYE: CPT

## 2021-09-08 RX ORDER — GADOBUTROL 604.72 MG/ML
0.1 INJECTION INTRAVENOUS ONCE
Status: COMPLETED | OUTPATIENT
Start: 2021-09-08 | End: 2021-09-08

## 2021-09-08 RX ADMIN — GADOBUTROL 7 ML: 604.72 INJECTION INTRAVENOUS at 09:24

## 2021-09-15 ENCOUNTER — HOSPITAL ENCOUNTER (OUTPATIENT)
Dept: MRI IMAGING | Facility: CLINIC | Age: 46
Discharge: HOME | End: 2021-09-15
Attending: NEUROLOGICAL SURGERY | Admitting: NEUROLOGICAL SURGERY
Payer: MEDICAID

## 2021-09-15 DIAGNOSIS — C71.9 BRAIN TUMOR, GLIOMA (H): ICD-10-CM

## 2021-09-15 PROCEDURE — 70554 FMRI BRAIN BY TECH: CPT | Mod: 52

## 2021-09-15 PROCEDURE — 70551 MRI BRAIN STEM W/O DYE: CPT | Mod: 26 | Performed by: RADIOLOGY

## 2021-09-15 PROCEDURE — 70554 FMRI BRAIN BY TECH: CPT | Mod: 26 | Performed by: RADIOLOGY

## 2021-09-16 ENCOUNTER — TELEPHONE (OUTPATIENT)
Dept: NEUROSURGERY | Facility: CLINIC | Age: 46
End: 2021-09-16

## 2021-09-16 NOTE — TELEPHONE ENCOUNTER
would like to see pt in clinic to discuss MRI results.    Attempted to reach out to patient, no answer. Left voice message for patient to call clinic back to further discuss.

## 2021-09-20 ENCOUNTER — VIRTUAL VISIT (OUTPATIENT)
Dept: NEUROSURGERY | Facility: CLINIC | Age: 46
End: 2021-09-20
Attending: NEUROLOGICAL SURGERY
Payer: MEDICAID

## 2021-09-20 DIAGNOSIS — C71.9 GLIOMA (H): Primary | ICD-10-CM

## 2021-09-20 PROCEDURE — 99442 PR PHYSICIAN TELEPHONE EVALUATION 11-20 MIN: CPT | Mod: 95 | Performed by: NEUROLOGICAL SURGERY

## 2021-09-20 NOTE — LETTER
9/20/2021         RE: Lagn Collins  [address] [facility] [address]        Dear Colleague,    Thank you for referring your patient, Lang Collins, to the [facility] [place]. Please see a copy of my visit note below.    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  Suffered a new onset grand mal seizure on 8/8 in [place].  Was started on keppra, and has not suffered seizure since.  Low grade headaches, but he thinks this may be related to being tired.  No neurologic symptoms.  MR Brain shows a non-enhancing lesion in the left posterior superior frontal gyrus.    Telephone encounter for follow up.  MR Functional shows the lesion to be in the left supplementary motor area, a gyrus in front of the motor strip.       Past Medical History:   Diagnosis Date     Brain tumor (H)      Past Surgical History:   Procedure Laterality Date     ORTHOPEDIC SURGERY       Social History     Socioeconomic History     Marital status: Single     Spouse name: Not on file     Number of children: Not on file     Years of education: Not on file     Highest education level: Not on file   Occupational History     Not on file   Tobacco Use     Smoking status: Current Every Day Smoker     Packs/day: 1.00     Smokeless tobacco: Never Used   Substance and Sexual Activity     Alcohol use: Yes     Comment: socially     Drug use: Yes     Types: Marijuana     Sexual activity: Not on file   Other Topics Concern     Not on file   Social History Narrative     Not on file     Social Determinants of Health     Financial Resource Strain:      Difficulty of Paying Living Expenses:    Food Insecurity:      Worried About Running Out of Food in the Last Year:      Ran Out of Food in the Last Year:    Transportation Needs:      Lack of Transportation (Medical):      Lack of Transportation (Non-Medical):    Physical Activity:      Days of Exercise per Week:      Minutes of Exercise per  Session:    Stress:      Feeling of Stress :    Social Connections:      Frequency of Communication with Friends and Family:      Frequency of Social Gatherings with Friends and Family:      Attends Zoroastrian Services:      Active Member of Clubs or Organizations:      Attends Club or Organization Meetings:      Marital Status:    Intimate Partner Violence:      Fear of Current or Ex-Partner:      Emotionally Abused:      Physically Abused:      Sexually Abused:      No family history on file.     ROS: 10 point ROS neg other than the symptoms noted above in the HPI.    Physical Exam  There were no vitals taken for this visit.  HEENT:  Normocephalic, atraumatic.  PERRLA.  EOM s intact.  Visual fields full to gross exam  Neck:  Supple, non-tender, without lymphadenopathy.  Heart:  No peripheral edema  Lungs:  No SOB  Abdomen:  Non-distended.   Skin:  Warm and dry.  Extremities:  No edema, cyanosis or clubbing.  Psychiatric:  No apparent distress  Musculoskeletal:  Normal bulk and tone    NEUROLOGICAL EXAMINATION:     Mental status:  Alert and Oriented x 3, speech is fluent.  Cranial nerves:  II-XII intact.   Motor:    Shoulder Abduction:  Right:  5/5   Left:  5/5  Biceps:                      Right:  5/5   Left:  5/5  Triceps:                     Right:  5/5   Left:  5/5  Wrist Extensors:       Right:  5/5   Left:  5/5  Wrist Flexors:           Right:  5/5   Left:  5/5  interosseus :            Right:  5/5   Left:  5/5  Hip Flexion:                Right: 5/5  Left:  5/5  Quadriceps:             Right:  5/5  Left:  5/5  Hamstrings:             Right:  5/5  Left:  5/5  Gastroc Soleus:        Right:  5/5  Left:  5/5  Tib/Ant:                      Right:  5/5  Left:  5/5  EHL:                     Right:  5/5  Left:  5/5  Sensation:  Intact  Reflexes:  Negative Babinski.  Negative Clonus.  Negative Thompson's.  Coordination:  Smooth finger to nose testing.   Negative pronator drift.  Smooth tandem walking.    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        Sincerely,        Kuldeep Beltrán MD

## 2021-09-20 NOTE — PROGRESS NOTES
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  Suffered a new onset grand mal seizure on 8/8 in [place].  Was started on keppra, and has not suffered seizure since.  Low grade headaches, but he thinks this may be related to being tired.  No neurologic symptoms.  MR Brain shows a non-enhancing lesion in the left posterior superior frontal gyrus.    Telephone encounter for follow up.  MR Functional shows the lesion to be in the left supplementary motor area, a gyrus in front of the motor strip.       Past Medical History:   Diagnosis Date     Brain tumor (H)      Past Surgical History:   Procedure Laterality Date     ORTHOPEDIC SURGERY       Social History     Socioeconomic History     Marital status: Single     Spouse name: Not on file     Number of children: Not on file     Years of education: Not on file     Highest education level: Not on file   Occupational History     Not on file   Tobacco Use     Smoking status: Current Every Day Smoker     Packs/day: 1.00     Smokeless tobacco: Never Used   Substance and Sexual Activity     Alcohol use: Yes     Comment: socially     Drug use: Yes     Types: Marijuana     Sexual activity: Not on file   Other Topics Concern     Not on file   Social History Narrative     Not on file     Social Determinants of Health     Financial Resource Strain:      Difficulty of Paying Living Expenses:    Food Insecurity:      Worried About Running Out of Food in the Last Year:      Ran Out of Food in the Last Year:    Transportation Needs:      Lack of Transportation (Medical):      Lack of Transportation (Non-Medical):    Physical Activity:      Days of Exercise per Week:      Minutes of Exercise per Session:    Stress:      Feeling of Stress :    Social Connections:      Frequency of Communication with Friends and Family:      Frequency of Social Gatherings with Friends and Family:      Attends Christian Services:      Active Member of Clubs or  Organizations:      Attends Club or Organization Meetings:      Marital Status:    Intimate Partner Violence:      Fear of Current or Ex-Partner:      Emotionally Abused:      Physically Abused:      Sexually Abused:      No family history on file.     ROS: 10 point ROS neg other than the symptoms noted above in the HPI.    Physical Exam  There were no vitals taken for this visit.  HEENT:  Normocephalic, atraumatic.  PERRLA.  EOM s intact.  Visual fields full to gross exam  Neck:  Supple, non-tender, without lymphadenopathy.  Heart:  No peripheral edema  Lungs:  No SOB  Abdomen:  Non-distended.   Skin:  Warm and dry.  Extremities:  No edema, cyanosis or clubbing.  Psychiatric:  No apparent distress  Musculoskeletal:  Normal bulk and tone    NEUROLOGICAL EXAMINATION:     Mental status:  Alert and Oriented x 3, speech is fluent.  Cranial nerves:  II-XII intact.   Motor:    Shoulder Abduction:  Right:  5/5   Left:  5/5  Biceps:                      Right:  5/5   Left:  5/5  Triceps:                     Right:  5/5   Left:  5/5  Wrist Extensors:       Right:  5/5   Left:  5/5  Wrist Flexors:           Right:  5/5   Left:  5/5  interosseus :            Right:  5/5   Left:  5/5  Hip Flexion:                Right: 5/5  Left:  5/5  Quadriceps:             Right:  5/5  Left:  5/5  Hamstrings:             Right:  5/5  Left:  5/5  Gastroc Soleus:        Right:  5/5  Left:  5/5  Tib/Ant:                      Right:  5/5  Left:  5/5  EHL:                     Right:  5/5  Left:  5/5  Sensation:  Intact  Reflexes:  Negative Babinski.  Negative Clonus.  Negative Thompson's.  Coordination:  Smooth finger to nose testing.   Negative pronator drift.  Smooth tandem walking.    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

## 2021-10-04 ENCOUNTER — OFFICE VISIT (OUTPATIENT)
Dept: NEUROSURGERY | Facility: CLINIC | Age: 46
End: 2021-10-04
Attending: NEUROLOGICAL SURGERY
Payer: COMMERCIAL

## 2021-10-04 VITALS
HEART RATE: 87 BPM | DIASTOLIC BLOOD PRESSURE: 92 MMHG | OXYGEN SATURATION: 100 % | SYSTOLIC BLOOD PRESSURE: 155 MMHG | WEIGHT: 155 LBS | HEIGHT: 71 IN | BODY MASS INDEX: 21.7 KG/M2

## 2021-10-04 DIAGNOSIS — C71.1: Primary | ICD-10-CM

## 2021-10-04 DIAGNOSIS — Z01.818 PREOP TESTING: ICD-10-CM

## 2021-10-04 DIAGNOSIS — C71.9 BRAIN TUMOR, GLIOMA (H): Primary | ICD-10-CM

## 2021-10-04 PROCEDURE — G0463 HOSPITAL OUTPT CLINIC VISIT: HCPCS

## 2021-10-04 PROCEDURE — 99214 OFFICE O/P EST MOD 30 MIN: CPT | Performed by: NEUROLOGICAL SURGERY

## 2021-10-04 ASSESSMENT — MIFFLIN-ST. JEOR: SCORE: 1615.21

## 2021-10-04 NOTE — PATIENT INSTRUCTIONS
Patient Instructions    Surgery scheduled at  [facility] for Left frontal craniotomy for tumor resection with asleep motor mapping with Dr. Beltrán    Pre-Operative    Ordered a Stealth MRI scan to be completed pre-op. Our Surgery scheduler, Perla will schedule this for you when she calls to schedule surgery. Please, do not schedule with central imaging scheduling.    Surgical risks: blood clots in the leg or lung, problems urinating, nerve damage, drainage from the incision, infection,stiffness    Pre-operative physical with primary care physician within 30 days of surgical date.     You will spend 2-3 days in hospital     Eating/Drinking  o Stop all solid foods 8 hours before surgery.  o Keep drinking clear liquids until 4 hours before surgery. Clear liquids include water, clear juice, black coffee, or clear tea without milk, Gatorade, clear soda    Medications  o Discontinue Aspirin, NSAIDs (Advil/Ibuprofen, Naproxen,Nuprin,Relafen/Nabumetone, Diclofenac,Meloxicam, Aleve, Celebrex) x 7 days prior to surgical date.  o May try tylenol for pain 1000 mg three times per day for pain    As part of preparation for your upcoming procedure you are required to have a test for the novel Coronavirus, COVID-19  o Please call the drive-up testing number to schedule your appointment. The test needs to be completed within 4 days (96) hours of surgery.   o Scheduling Number: [phone]   o You may NOT receive the COVID-19 vaccine 72 hours before or after surgery.    Post-Operative    Incision Care  o You may get your incision wet in the shower. Use a gentle shampoo with no fragrance, such as baby shampoo, until incision is completely healed. Allow soap and water to run over the incision, and gently pat it dry.   o Do not soak in a bath, hot tub, or pool until the incision is completely healed (4-6 weeks after surgery)    Pain Management    Dealing with pain  o As your body heals, you might feel a stabbing,  burning, or aching pain. You may also have some numbness.  o Everyone feels pain differently, we may ask you to rate your pain using a pain scale. This will let us know how much pain you feel.   o Keep in mind that medicine won't take away all of your pain. It helps to try other ways to relax and ease pain.     Things to help with pain  o After surgery, we will give you medicine for your pain. These medications work well, but they can make you drowsy, itchy, or sick to your stomach. If we give you narcotics for pain, try to take the pills with food.   o For mild to moderate pain, you can take medication such as Tylenol. These can be used with narcotics, but make sure that your narcotic does not contain Tylenol.   - Do NOT drive while taking narcotic pain medication  - Do NOT drink alcohol while using any pain medication  o You can utilize ice as needed (no longer than 20 minutes at one time)    Refills: please call a few days before you run out.    Activity/Restrictions  o We encourage short frequent walks, increasing as tolerated  o No driving until you are seen in clinic and cleared by your neurosurgeon or while on narcotics.  If you have had a seizure, you may not drive for at least 3 months according to [place] law.  o No strenuous activity  o Post operative activity limitations: 4 weeks, -No bending forward, no lifting anything greater than 10 pounds (a gallon of milk weighs approximately 8 pounds)     Follow-Up  o If you have not received the results of the pathology (diagnosis) at the time of discharge, our office will call you with these results as soon as they become available, or we will discuss them with you during your clinic visit, whichever is first.   o If you are on a steroid medication (decadron or dexamethasone) please follow the detailed instructions with the prescription to slowly decrease the amount you are taking.     Post-Op Follow Up Appointments    You will need to schedule an appointment  with one of our nurses for suture/staple removal at one of our clinic locations 2 weeks after your surgery. If you live far away, you may see your primary care doctor for a wound check at 2 weeks.     Follow up in clinic in 3 months. You will need to obtain imaging prior to appointment.    Please call our clinic at [phone] to schedule an appointment with the Neurosurgery teams.     Resources    If you are currently employed, you will need to be off work 4 weeks for recovery and healing. Please fax any FMLA/short term disability paperwork to [phone]. You may call our clinic when you'd like to return to work and we can provide a work letter.      Go to the nearest Emergency Room for Evaluation if you develop:  o Acute changes in the level of consciousness (increased confusion, memory loss, speech abnormalities)  o Any change in hearing or vision  o Increased headaches  o New onset of seizures.    Please call if you have:  o Increased pain, redness, drainage, swelling at your incision  o Fevers > 101.5 F degrees    Please call the clinic at [phone] and ask for the NURSE LINE    Follow up appointments  o 2 week post- op for staple removal with a nurse  o 4-6 weeks post-op with nurse practitioner or physician assistant  o 3 month post-op  o Will need MRI completed at 3 months post-op appointment  o Please call to schedule follow up appointment at [phone]    JJ Bah  --  [facility]  Phone: [phone]  Fax: [phone]    Ways to Quit Smoking     It's not too late to quit smoking.   Quitting smoking helps your circulation, your stamina, your skin, and your general health. Your risk for coronary heart disease, a common cause of death and disability, is halved after only a year without smoking. Quitting smoking also reduces the likelihood of your getting respiratory problems and lung cancer.   Studies have shown that your smoke affects others as well as yourself. Children  "of parents who smoke around the house are more prone to respiratory infections than children from nonsmoking homes.   Smoking is an addictive habit. Most former smokers make several attempts to quit before they are finally successful. So, never say, \"I can't.\" Just keep trying.   Set a quit date.   Set a date for when you will stop smoking. Don't buy cigarettes to carry you beyond your last day. Tell your family and friends you plan to quit, and ask for their support and encouragement. Ask them not to offer you cigarettes.   Throw your cigarettes away.   If you keep cigarettes around, sooner or later you'll break down and smoke one, then another, then another, and so on. Throw them away. Make it less easy to start again.   Try chewing gum is as a substitute for cigarettes.   Spend time with nonsmokers rather than with smokers.   Think of yourself and identify yourself as a nonsmoker (for example, in restaurants). Stay away from \"smokers' lo,\" such as bars. Avoid spending time with smokers. You can't tell others not to smoke, but you don't have to sit with them while they do. Old habits die hard and one of your old smoking buddies is sure to offer you a cigarette. Plan on walking away from cigarette smoke. Spend time with nonsmokers and sit in the nonsmoking section of restaurants.   Be prepared for relapse or difficult situations.   Most relapses occur within the first 3 months after quitting. Many people try 5 or more times before they successfully quit. Avoid drinking alcohol, because it lowers your chances of success. Don't be distracted by weight gain, which is usually less than 10 pounds. Learn new ways to improve your mood and overcome depression.   Start an exercise program.   As you become more fit, you will not want the nicotine effects in your body. Regular exercise will also help keep you from gaining weight when you quit smoking.   Keep your hands busy.   You may not know what to do with your hands " for a while.  a book or a magazine. Try knitting, needlework, pottery, drawing, making a plastic model, or doing a jigsaw puzzle. Join special interest groups that keep you involved in your hobby.   Take on new activities.   Take on new activities that don't include smoking. Join an exercise group and work out regularly. Sign up for an evening class or a join a study group at your place of Confucianism. Go on more outings with your family or friends. Learn ways to relax and manage stress.   Join quit-smoking programs if it helps.   Some people do better in groups, or with a set of instructions to follow. That's fine, too. Remember, the aim is to quit smoking. It doesn't matter how you do it.   Consider using nicotine replacement therapy.   Nicotine is the drug that is in tobacco. You can use nicotine patches or gum, available without a prescription at your local pharmacy, to quit smoking. It is a two-step process. First you learn to live without smoking, but not without nicotine. Then, as you graduate to patches with less nicotine, or chew less of the nicotine gum, you wean yourself off the nicotine.   Your health care provider can prescribe nicotine substitutes that can almost double your chances of quitting for good. They are:   Zyban (bupropion HCL)   nicotine inhaler   nicotine lozenge   nicotine nasal spray   nicotine patch.   You may prefer to be involved in an organized quit-smoking program while you are using nicotine patches or gum or other medicine to help you quit. None of these treatments is a miracle cure. You still need to learn to live without cigarettes in your daily life.     Developed by Gabrielle Charles MD, for [facility].   Published by [facility].   This content is reviewed periodically and is subject to change as new health information becomes available. The information is intended to inform and educate and is not a replacement for medical evaluation, advice,  diagnosis or treatment by a healthcare professional.   Adult Health Advisor 2003.2 Index  Adult Health Advisor 2003.2 Credits   Copyright   2003 [employer]. All rights reserved.     More information can also be obtained from the [employer]:  Telephone: 9-290-8-CANCER (1-448.921.1805)   TTY: 1-373.880.1221   (for deaf or hard of hearing callers)

## 2021-10-04 NOTE — PROGRESS NOTES
"Lang Collins is a 46 year old male who presents for:  Chief Complaint   Patient presents with     Consult     discuss surgery for glioma        Vitals:    Vitals:    10/04/21 1442   BP: (!) 155/92   Pulse: 87   SpO2: 100%   Weight: 155 lb (70.3 kg)   Height: 5' 11\" (1.803 m)       BMI:  Estimated body mass index is 21.62 kg/m  as calculated from the following:    Height as of this encounter: 5' 11\" (1.803 m).    Weight as of this encounter: 155 lb (70.3 kg).    Pain Score:  Data Unavailable        Amendo Phorn      "

## 2021-10-04 NOTE — PROGRESS NOTES
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  Suffered a new onset grand mal seizure on 8/8 in [place].  Was started on keppra, and has not suffered seizure since.  Low grade headaches, but he thinks this may be related to being tired.  No neurologic symptoms.  MR Brain shows a non-enhancing lesion in the left posterior superior frontal gyrus.    Returns for follow up.  MR Functional shows the lesion to be in the left supplementary motor area, a gyrus in front of the motor strip.  Clinically stable.       Past Medical History:   Diagnosis Date     Brain tumor (H)      Past Surgical History:   Procedure Laterality Date     ORTHOPEDIC SURGERY       Social History     Socioeconomic History     Marital status: Single     Spouse name: Not on file     Number of children: Not on file     Years of education: Not on file     Highest education level: Not on file   Occupational History     Not on file   Tobacco Use     Smoking status: Current Every Day Smoker     Packs/day: 1.00     Smokeless tobacco: Never Used   Substance and Sexual Activity     Alcohol use: Yes     Comment: socially     Drug use: Yes     Types: Marijuana     Sexual activity: Not on file   Other Topics Concern     Not on file   Social History Narrative     Not on file     Social Determinants of Health     Financial Resource Strain:      Difficulty of Paying Living Expenses:    Food Insecurity:      Worried About Running Out of Food in the Last Year:      Ran Out of Food in the Last Year:    Transportation Needs:      Lack of Transportation (Medical):      Lack of Transportation (Non-Medical):    Physical Activity:      Days of Exercise per Week:      Minutes of Exercise per Session:    Stress:      Feeling of Stress :    Social Connections:      Frequency of Communication with Friends and Family:      Frequency of Social Gatherings with Friends and Family:      Attends Alevism Services:      Active Member of Clubs  "or Organizations:      Attends Club or Organization Meetings:      Marital Status:    Intimate Partner Violence:      Fear of Current or Ex-Partner:      Emotionally Abused:      Physically Abused:      Sexually Abused:      No family history on file.     ROS: 10 point ROS neg other than the symptoms noted above in the HPI.    Physical Exam  BP (!) 155/92   Pulse 87   Ht 1.803 m (5' 11\")   Wt 70.3 kg (155 lb)   SpO2 100%   BMI 21.62 kg/m    HEENT:  Normocephalic, atraumatic.  PERRLA.  EOM s intact.  Visual fields full to gross exam  Neck:  Supple, non-tender, without lymphadenopathy.  Heart:  No peripheral edema  Lungs:  No SOB  Abdomen:  Non-distended.   Skin:  Warm and dry.  Extremities:  No edema, cyanosis or clubbing.  Psychiatric:  No apparent distress  Musculoskeletal:  Normal bulk and tone    NEUROLOGICAL EXAMINATION:     Mental status:  Alert and Oriented x 3, speech is fluent.  Cranial nerves:  II-XII intact.   Motor:    Shoulder Abduction:  Right:  5/5   Left:  5/5  Biceps:                      Right:  5/5   Left:  5/5  Triceps:                     Right:  5/5   Left:  5/5  Wrist Extensors:       Right:  5/5   Left:  5/5  Wrist Flexors:           Right:  5/5   Left:  5/5  interosseus :            Right:  5/5   Left:  5/5  Hip Flexion:                Right: 5/5  Left:  5/5  Quadriceps:             Right:  5/5  Left:  5/5  Hamstrings:             Right:  5/5  Left:  5/5  Gastroc Soleus:        Right:  5/5  Left:  5/5  Tib/Ant:                      Right:  5/5  Left:  5/5  EHL:                     Right:  5/5  Left:  5/5  Sensation:  Intact  Reflexes:  Negative Babinski.  Negative Clonus.  Negative Thompson's.  Coordination:  Smooth finger to nose testing.   Negative pronator drift.  Smooth tandem walking.    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

## 2021-10-04 NOTE — LETTER
"    10/4/2021         RE: Lang Collins  [address] [facility] [address]        Dear Colleague,    Thank you for referring your patient, Lang Collins, to the [facility] [place]. Please see a copy of my visit note below.    Lang Collins is a 46 year old male who presents for:  Chief Complaint   Patient presents with     Consult     discuss surgery for glioma        Vitals:    Vitals:    10/04/21 1442   BP: (!) 155/92   Pulse: 87   SpO2: 100%   Weight: 155 lb (70.3 kg)   Height: 5' 11\" (1.803 m)       BMI:  Estimated body mass index is 21.62 kg/m  as calculated from the following:    Height as of this encounter: 5' 11\" (1.803 m).    Weight as of this encounter: 155 lb (70.3 kg).    Pain Score:  Data Unavailable        Dannielle Tirado        Reviewed pre- and post-operative instructions as outlined in the After Visit Summary/Patient Instructions with patient.   Surgery folder was given to patient    Patient Education Topic: Procedure with Dr. Beltrán     Learner(s): Patient    Knowledge Level: Basic    Readiness to Learn: Ready    Method:  Verbal explanation and Written material     Outcome: Able to verbalize instructions    Barriers to Learning: No barrier    Covid Testing: patient educated they will need to have a test for the novel Coronavirus, COVID-19.The test needs to be completed within 4 days (96) hours of surgery. Order Placed.    Scheduling Number: [phone]    Patient had the opportunity for questions to be answered. Advised Patient to call clinic with any questions/concerns. Gave patient antibacterial soap for pre-surgery skin preparation.     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  Suffered a new onset grand mal seizure on 8/8 in [place].  Was started on keppra, and has not suffered seizure since.  Low grade headaches, but he thinks this may be related to being tired.  No " neurologic symptoms.  MR Brain shows a non-enhancing lesion in the left posterior superior frontal gyrus.    Returns for follow up.  MR Functional shows the lesion to be in the left supplementary motor area, a gyrus in front of the motor strip.  Clinically stable.       Past Medical History:   Diagnosis Date     Brain tumor (H)      Past Surgical History:   Procedure Laterality Date     ORTHOPEDIC SURGERY       Social History     Socioeconomic History     Marital status: Single     Spouse name: Not on file     Number of children: Not on file     Years of education: Not on file     Highest education level: Not on file   Occupational History     Not on file   Tobacco Use     Smoking status: Current Every Day Smoker     Packs/day: 1.00     Smokeless tobacco: Never Used   Substance and Sexual Activity     Alcohol use: Yes     Comment: socially     Drug use: Yes     Types: Marijuana     Sexual activity: Not on file   Other Topics Concern     Not on file   Social History Narrative     Not on file     Social Determinants of Health     Financial Resource Strain:      Difficulty of Paying Living Expenses:    Food Insecurity:      Worried About Running Out of Food in the Last Year:      Ran Out of Food in the Last Year:    Transportation Needs:      Lack of Transportation (Medical):      Lack of Transportation (Non-Medical):    Physical Activity:      Days of Exercise per Week:      Minutes of Exercise per Session:    Stress:      Feeling of Stress :    Social Connections:      Frequency of Communication with Friends and Family:      Frequency of Social Gatherings with Friends and Family:      Attends Voodoo Services:      Active Member of Clubs or Organizations:      Attends Club or Organization Meetings:      Marital Status:    Intimate Partner Violence:      Fear of Current or Ex-Partner:      Emotionally Abused:      Physically Abused:      Sexually Abused:      No family history on file.     ROS: 10 point ROS neg  "other than the symptoms noted above in the HPI.    Physical Exam  BP (!) 155/92   Pulse 87   Ht 1.803 m (5' 11\")   Wt 70.3 kg (155 lb)   SpO2 100%   BMI 21.62 kg/m    HEENT:  Normocephalic, atraumatic.  PERRLA.  EOM s intact.  Visual fields full to gross exam  Neck:  Supple, non-tender, without lymphadenopathy.  Heart:  No peripheral edema  Lungs:  No SOB  Abdomen:  Non-distended.   Skin:  Warm and dry.  Extremities:  No edema, cyanosis or clubbing.  Psychiatric:  No apparent distress  Musculoskeletal:  Normal bulk and tone    NEUROLOGICAL EXAMINATION:     Mental status:  Alert and Oriented x 3, speech is fluent.  Cranial nerves:  II-XII intact.   Motor:    Shoulder Abduction:  Right:  5/5   Left:  5/5  Biceps:                      Right:  5/5   Left:  5/5  Triceps:                     Right:  5/5   Left:  5/5  Wrist Extensors:       Right:  5/5   Left:  5/5  Wrist Flexors:           Right:  5/5   Left:  5/5  interosseus :            Right:  5/5   Left:  5/5  Hip Flexion:                Right: 5/5  Left:  5/5  Quadriceps:             Right:  5/5  Left:  5/5  Hamstrings:             Right:  5/5  Left:  5/5  Gastroc Soleus:        Right:  5/5  Left:  5/5  Tib/Ant:                      Right:  5/5  Left:  5/5  EHL:                     Right:  5/5  Left:  5/5  Sensation:  Intact  Reflexes:  Negative Babinski.  Negative Clonus.  Negative Thompson's.  Coordination:  Smooth finger to nose testing.   Negative pronator drift.  Smooth tandem walking.    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        Sincerely,        Kuldeep Beltrán MD

## 2021-10-04 NOTE — PROGRESS NOTES
Reviewed pre- and post-operative instructions as outlined in the After Visit Summary/Patient Instructions with patient.   Surgery folder was given to patient    Patient Education Topic: Procedure with Dr. Beltrán     Learner(s): Patient    Knowledge Level: Basic    Readiness to Learn: Ready    Method:  Verbal explanation and Written material     Outcome: Able to verbalize instructions    Barriers to Learning: No barrier    Covid Testing: patient educated they will need to have a test for the novel Coronavirus, COVID-19.The test needs to be completed within 4 days (96) hours of surgery. Order Placed.    Scheduling Number: [phone]    Patient had the opportunity for questions to be answered. Advised Patient to call clinic with any questions/concerns. Gave patient antibacterial soap for pre-surgery skin preparation.     Niurka Kee RN on 10/4/2021 at 3:33 PM

## 2021-10-13 DIAGNOSIS — Z11.59 ENCOUNTER FOR SCREENING FOR OTHER VIRAL DISEASES: ICD-10-CM

## 2021-10-25 ENCOUNTER — TELEPHONE (OUTPATIENT)
Dept: NEUROSURGERY | Facility: CLINIC | Age: 46
End: 2021-10-25

## 2021-10-25 NOTE — LETTER
[facility]   Neurosurgery Clinic   [address] [place] [address]  [id number]      October 25, 2021    To Whom it May Concern,      Lang Collins is being seen at our clinic. Due to necessary post-operative healing and recovery, please excuse him from work for 6 weeks following his surgery on 11/12/21.     He will be re-evaluated at the next follow up visit and further restrictions will be determined at that time. Please call our clinic with questions or concerns: [phone]      Sincerely,        Dr. Kuldeep Beltrán   [facility] Neurosurgery  [address] [place] [address]  Tel [phone]

## 2021-10-25 NOTE — TELEPHONE ENCOUNTER
Patient called, requesting paperwork for his employer that states surgery date (11/12) and expectations for recovery.

## 2021-10-25 NOTE — TELEPHONE ENCOUNTER
Called and spoke to the patient. He is scheduled for surgery with Dr. Beltrán on 11/12. He is requesting a letter stating that he will need to be off work for 6 weeks post surgery. 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 He is going to come and  the form today.     Letter left at the  for patient to .

## 2021-10-25 NOTE — TELEPHONE ENCOUNTER
Lang called in to request a letter for his employer for his upcoming surgery. Please reach him at: [phone]

## 2021-11-09 ENCOUNTER — LAB (OUTPATIENT)
Dept: URGENT CARE | Facility: URGENT CARE | Age: 46
End: 2021-11-09
Payer: COMMERCIAL

## 2021-11-09 DIAGNOSIS — Z11.59 ENCOUNTER FOR SCREENING FOR OTHER VIRAL DISEASES: ICD-10-CM

## 2021-11-09 LAB — SARS-COV-2 RNA RESP QL NAA+PROBE: NEGATIVE

## 2021-11-09 PROCEDURE — U0003 INFECTIOUS AGENT DETECTION BY NUCLEIC ACID (DNA OR RNA); SEVERE ACUTE RESPIRATORY SYNDROME CORONAVIRUS 2 (SARS-COV-2) (CORONAVIRUS DISEASE [COVID-19]), AMPLIFIED PROBE TECHNIQUE, MAKING USE OF HIGH THROUGHPUT TECHNOLOGIES AS DESCRIBED BY CMS-2020-01-R: HCPCS

## 2021-11-09 PROCEDURE — U0005 INFEC AGEN DETEC AMPLI PROBE: HCPCS

## 2021-11-11 ENCOUNTER — TELEPHONE (OUTPATIENT)
Dept: NEUROSURGERY | Facility: CLINIC | Age: 46
End: 2021-11-11
Payer: COMMERCIAL

## 2021-11-11 ENCOUNTER — HOSPITAL ENCOUNTER (OUTPATIENT)
Dept: MRI IMAGING | Facility: CLINIC | Age: 46
Discharge: HOME | End: 2021-11-11
Attending: NEUROLOGICAL SURGERY | Admitting: NEUROLOGICAL SURGERY
Payer: COMMERCIAL

## 2021-11-11 DIAGNOSIS — Z01.818 PREOP TESTING: ICD-10-CM

## 2021-11-11 DIAGNOSIS — C71.9 BRAIN TUMOR, GLIOMA (H): ICD-10-CM

## 2021-11-11 PROCEDURE — A9585 GADOBUTROL INJECTION: HCPCS

## 2021-11-11 PROCEDURE — 70552 MRI BRAIN STEM W/DYE: CPT | Mod: 26 | Performed by: RADIOLOGY

## 2021-11-11 PROCEDURE — 255N000002 HC RX 255 OP 636

## 2021-11-11 PROCEDURE — 70552 MRI BRAIN STEM W/DYE: CPT

## 2021-11-11 RX ORDER — LISINOPRIL 10 MG/1
10 TABLET ORAL EVERY MORNING
Status: ON HOLD | COMMUNITY
End: 2021-11-19

## 2021-11-11 RX ORDER — GADOBUTROL 604.72 MG/ML
0.1 INJECTION INTRAVENOUS ONCE
Status: COMPLETED | OUTPATIENT
Start: 2021-11-11 | End: 2021-11-11

## 2021-11-11 RX ADMIN — GADOBUTROL 7 ML: 604.72 INJECTION INTRAVENOUS at 13:09

## 2021-11-11 NOTE — PROGRESS NOTES
PTA medications updated by Medication Scribe prior to surgery via phone call with patient (last doses completed by Nurse)     Medication history sources: Patient, Surescripts and H&P  In the past week, patient estimated taking medication this percent of the time: Greater than 90%  Adherence assessment: N/A Not Observed    Significant changes made to the medication list:  None      Additional medication history information:   None    Medication reconciliation completed by provider prior to medication history? No    Time spent in this activity: 20 minutes    The information provided in this note is only as accurate as the sources available at the time of update(s)      Prior to Admission medications    Medication Sig Last Dose Taking? Auth Provider   levETIRAcetam (KEPPRA) 500 MG tablet Take 500 mg by mouth 2 times daily  at AM Yes Reported, Patient   lisinopril (ZESTRIL) 10 MG tablet Take 10 mg by mouth every morning 11/11/2021 at AM Yes Reported, Patient   nicotine (NICODERM CQ) 7 MG/24HR 24 hr patch Place 1 patch onto the skin 11/11/2021 at AM Yes Reported, Patient   nicotine (NICORETTE) 2 MG gum Take 2 mg by mouth daily as needed for smoking cessation   at PRN Yes Reported, Patient       Medication history completed by:    Van Jack CPhT  Medication Scribe  [facility]

## 2021-11-11 NOTE — TELEPHONE ENCOUNTER
Connected with patient. Correct phone number updated in chart. Patient ready for a call back to get stealth MRI today.    Patient scheduled with [facility] imaging for the scan. Perla will update patient.    Niurka Kee RN on 11/11/2021 at 9:19 AM

## 2021-11-11 NOTE — TELEPHONE ENCOUNTER
PREM with patient to coordinate pre-surgical Stealth MRI (surgery tomorrow).  Niurka Kee RN on 11/11/2021 at 9:13 AM

## 2021-11-12 ENCOUNTER — APPOINTMENT (OUTPATIENT)
Dept: CT IMAGING | Facility: CLINIC | Age: 46
End: 2021-11-12
Attending: PHYSICIAN ASSISTANT
Payer: COMMERCIAL

## 2021-11-12 ENCOUNTER — ANESTHESIA (OUTPATIENT)
Dept: SURGERY | Facility: CLINIC | Age: 46
End: 2021-11-12
Payer: COMMERCIAL

## 2021-11-12 ENCOUNTER — ANESTHESIA EVENT (OUTPATIENT)
Dept: SURGERY | Facility: CLINIC | Age: 46
End: 2021-11-12
Payer: COMMERCIAL

## 2021-11-12 ENCOUNTER — HOSPITAL ENCOUNTER (INPATIENT)
Facility: CLINIC | Age: 46
LOS: 4 days | Discharge: NURSING, REHAB OR LONG-TERM CARE | End: 2021-11-16
Attending: NEUROLOGICAL SURGERY | Admitting: NEUROLOGICAL SURGERY
Payer: COMMERCIAL

## 2021-11-12 DIAGNOSIS — Z98.890 S/P CRANIOTOMY: Primary | ICD-10-CM

## 2021-11-12 DIAGNOSIS — C71.1: ICD-10-CM

## 2021-11-12 LAB
ABO/RH(D): NORMAL
ANTIBODY SCREEN: NEGATIVE
CREAT SERPL-MCNC: 0.94 MG/DL (ref 0.66–1.25)
GFR SERPL CREATININE-BSD FRML MDRD: >90 ML/MIN/1.73M2
GLUCOSE BLDC GLUCOMTR-MCNC: 149 MG/DL (ref 70–99)
HGB BLD-MCNC: 11.7 G/DL (ref 13.3–17.7)
POTASSIUM BLD-SCNC: 4.3 MMOL/L (ref 3.4–5.3)
SPECIMEN EXPIRATION DATE: NORMAL

## 2021-11-12 PROCEDURE — 250N000013 HC RX MED GY IP 250 OP 250 PS 637: Performed by: NURSE ANESTHETIST, CERTIFIED REGISTERED

## 2021-11-12 PROCEDURE — 250N000011 HC RX IP 250 OP 636: Performed by: NEUROLOGICAL SURGERY

## 2021-11-12 PROCEDURE — 999N000141 HC STATISTIC PRE-PROCEDURE NURSING ASSESSMENT: Performed by: NEUROLOGICAL SURGERY

## 2021-11-12 PROCEDURE — 36415 COLL VENOUS BLD VENIPUNCTURE: CPT | Performed by: ANESTHESIOLOGY

## 2021-11-12 PROCEDURE — 250N000011 HC RX IP 250 OP 636: Performed by: NURSE PRACTITIONER

## 2021-11-12 PROCEDURE — 84132 ASSAY OF SERUM POTASSIUM: CPT | Performed by: ANESTHESIOLOGY

## 2021-11-12 PROCEDURE — 250N000011 HC RX IP 250 OP 636: Performed by: NURSE ANESTHETIST, CERTIFIED REGISTERED

## 2021-11-12 PROCEDURE — C1713 ANCHOR/SCREW BN/BN,TIS/BN: HCPCS | Performed by: NEUROLOGICAL SURGERY

## 2021-11-12 PROCEDURE — 88331 PATH CONSLTJ SURG 1 BLK 1SPC: CPT | Mod: TC | Performed by: NEUROLOGICAL SURGERY

## 2021-11-12 PROCEDURE — 88275 CYTOGENETICS 100-300: CPT | Performed by: NEUROLOGICAL SURGERY

## 2021-11-12 PROCEDURE — 00D70ZZ EXTRACTION OF CEREBRAL HEMISPHERE, OPEN APPROACH: ICD-10-PCS | Performed by: NEUROLOGICAL SURGERY

## 2021-11-12 PROCEDURE — 8E09XBH COMPUTER ASSISTED PROCEDURE OF HEAD AND NECK REGION, WITH MAGNETIC RESONANCE IMAGING: ICD-10-PCS | Performed by: NEUROLOGICAL SURGERY

## 2021-11-12 PROCEDURE — 272N000282 HC OR IOM SUPPLIES OPNP: Performed by: NEUROLOGICAL SURGERY

## 2021-11-12 PROCEDURE — 250N000009 HC RX 250: Performed by: NEUROLOGICAL SURGERY

## 2021-11-12 PROCEDURE — 922N000001 HC NEURO MONITORING SERVICE, UP TO 7 HOURS (T1FEE): Performed by: NEUROLOGICAL SURGERY

## 2021-11-12 PROCEDURE — 4A1034Z MONITORING OF CENTRAL NERVOUS ELECTRICAL ACTIVITY, PERCUTANEOUS APPROACH: ICD-10-PCS | Performed by: NEUROLOGICAL SURGERY

## 2021-11-12 PROCEDURE — 85018 HEMOGLOBIN: CPT | Performed by: ANESTHESIOLOGY

## 2021-11-12 PROCEDURE — 200N000001 HC R&B ICU

## 2021-11-12 PROCEDURE — 88341 IMHCHEM/IMCYTCHM EA ADD ANTB: CPT | Mod: 26 | Performed by: PATHOLOGY

## 2021-11-12 PROCEDURE — 86900 BLOOD TYPING SEROLOGIC ABO: CPT | Performed by: ANESTHESIOLOGY

## 2021-11-12 PROCEDURE — 250N000025 HC SEVOFLURANE, PER MIN: Performed by: NEUROLOGICAL SURGERY

## 2021-11-12 PROCEDURE — 360N000079 HC SURGERY LEVEL 6, PER MIN: Performed by: NEUROLOGICAL SURGERY

## 2021-11-12 PROCEDURE — 370N000017 HC ANESTHESIA TECHNICAL FEE, PER MIN: Performed by: NEUROLOGICAL SURGERY

## 2021-11-12 PROCEDURE — 250N000009 HC RX 250: Performed by: NURSE ANESTHETIST, CERTIFIED REGISTERED

## 2021-11-12 PROCEDURE — 250N000011 HC RX IP 250 OP 636: Performed by: PHYSICIAN ASSISTANT

## 2021-11-12 PROCEDURE — 272N000002 HC OR SUPPLY OTHER OPNP: Performed by: NEUROLOGICAL SURGERY

## 2021-11-12 PROCEDURE — 00B70ZZ EXCISION OF CEREBRAL HEMISPHERE, OPEN APPROACH: ICD-10-PCS | Performed by: NEUROLOGICAL SURGERY

## 2021-11-12 PROCEDURE — 61510 CRNEC TREPH EXC BRN TUM STTL: CPT | Performed by: NEUROLOGICAL SURGERY

## 2021-11-12 PROCEDURE — 710N000009 HC RECOVERY PHASE 1, LEVEL 1, PER MIN: Performed by: NEUROLOGICAL SURGERY

## 2021-11-12 PROCEDURE — 258N000003 HC RX IP 258 OP 636: Performed by: NURSE ANESTHETIST, CERTIFIED REGISTERED

## 2021-11-12 PROCEDURE — 61510 CRNEC TREPH EXC BRN TUM STTL: CPT | Mod: AS | Performed by: NURSE PRACTITIONER

## 2021-11-12 PROCEDURE — 272N000004 HC RX 272: Performed by: NEUROLOGICAL SURGERY

## 2021-11-12 PROCEDURE — 88305 TISSUE EXAM BY PATHOLOGIST: CPT | Mod: 26 | Performed by: PATHOLOGY

## 2021-11-12 PROCEDURE — 250N000011 HC RX IP 250 OP 636: Performed by: ANESTHESIOLOGY

## 2021-11-12 PROCEDURE — 88271 CYTOGENETICS DNA PROBE: CPT | Performed by: NEUROLOGICAL SURGERY

## 2021-11-12 PROCEDURE — 70450 CT HEAD/BRAIN W/O DYE: CPT

## 2021-11-12 PROCEDURE — 88331 PATH CONSLTJ SURG 1 BLK 1SPC: CPT | Mod: 26 | Performed by: PATHOLOGY

## 2021-11-12 PROCEDURE — 258N000003 HC RX IP 258 OP 636: Performed by: ANESTHESIOLOGY

## 2021-11-12 PROCEDURE — 250N000013 HC RX MED GY IP 250 OP 250 PS 637: Performed by: NURSE PRACTITIONER

## 2021-11-12 PROCEDURE — 61781 SCAN PROC CRANIAL INTRA: CPT | Performed by: NEUROLOGICAL SURGERY

## 2021-11-12 PROCEDURE — 88342 IMHCHEM/IMCYTCHM 1ST ANTB: CPT | Mod: 26 | Performed by: PATHOLOGY

## 2021-11-12 PROCEDURE — 88360 TUMOR IMMUNOHISTOCHEM/MANUAL: CPT | Mod: 26 | Performed by: PATHOLOGY

## 2021-11-12 PROCEDURE — 272N000001 HC OR GENERAL SUPPLY STERILE: Performed by: NEUROLOGICAL SURGERY

## 2021-11-12 PROCEDURE — 258N000003 HC RX IP 258 OP 636: Performed by: NURSE PRACTITIONER

## 2021-11-12 PROCEDURE — 88307 TISSUE EXAM BY PATHOLOGIST: CPT | Mod: 26 | Performed by: PATHOLOGY

## 2021-11-12 PROCEDURE — 82565 ASSAY OF CREATININE: CPT | Performed by: ANESTHESIOLOGY

## 2021-11-12 PROCEDURE — 258N000003 HC RX IP 258 OP 636: Performed by: NEUROLOGICAL SURGERY

## 2021-11-12 DEVICE — IMP PLATE SYN MATRIXNEURO STR 2 HOLE 12MM  04.503.062: Type: IMPLANTABLE DEVICE | Site: CRANIAL | Status: FUNCTIONAL

## 2021-11-12 DEVICE — IMP PLATE SYN BURR HOLE COVER 17MM 04.503.023: Type: IMPLANTABLE DEVICE | Site: CRANIAL | Status: FUNCTIONAL

## 2021-11-12 DEVICE — IMP SCR SYN MATRIX LOW PRO 1.5X04MM SELF DRILL 04.503.104.01: Type: IMPLANTABLE DEVICE | Site: CRANIAL | Status: FUNCTIONAL

## 2021-11-12 RX ORDER — HYDROMORPHONE HCL IN WATER/PF 6 MG/30 ML
0.4 PATIENT CONTROLLED ANALGESIA SYRINGE INTRAVENOUS
Status: DISCONTINUED | OUTPATIENT
Start: 2021-11-12 | End: 2021-11-16 | Stop reason: HOSPADM

## 2021-11-12 RX ORDER — SODIUM CHLORIDE, SODIUM LACTATE, POTASSIUM CHLORIDE, CALCIUM CHLORIDE 600; 310; 30; 20 MG/100ML; MG/100ML; MG/100ML; MG/100ML
INJECTION, SOLUTION INTRAVENOUS CONTINUOUS PRN
Status: DISCONTINUED | OUTPATIENT
Start: 2021-11-12 | End: 2021-11-12

## 2021-11-12 RX ORDER — ONDANSETRON 2 MG/ML
4 INJECTION INTRAMUSCULAR; INTRAVENOUS EVERY 6 HOURS PRN
Status: DISCONTINUED | OUTPATIENT
Start: 2021-11-12 | End: 2021-11-16 | Stop reason: HOSPADM

## 2021-11-12 RX ORDER — ONDANSETRON 2 MG/ML
4 INJECTION INTRAMUSCULAR; INTRAVENOUS EVERY 30 MIN PRN
Status: DISCONTINUED | OUTPATIENT
Start: 2021-11-12 | End: 2021-11-12 | Stop reason: HOSPADM

## 2021-11-12 RX ORDER — MEPERIDINE HYDROCHLORIDE 25 MG/ML
12.5 INJECTION INTRAMUSCULAR; INTRAVENOUS; SUBCUTANEOUS EVERY 5 MIN PRN
Status: DISCONTINUED | OUTPATIENT
Start: 2021-11-12 | End: 2021-11-12 | Stop reason: HOSPADM

## 2021-11-12 RX ORDER — ONDANSETRON 4 MG/1
4 TABLET, ORALLY DISINTEGRATING ORAL EVERY 30 MIN PRN
Status: DISCONTINUED | OUTPATIENT
Start: 2021-11-12 | End: 2021-11-12 | Stop reason: HOSPADM

## 2021-11-12 RX ORDER — DEXAMETHASONE SODIUM PHOSPHATE 4 MG/ML
4 INJECTION, SOLUTION INTRA-ARTICULAR; INTRALESIONAL; INTRAMUSCULAR; INTRAVENOUS; SOFT TISSUE EVERY 6 HOURS SCHEDULED
Status: DISCONTINUED | OUTPATIENT
Start: 2021-11-13 | End: 2021-11-15

## 2021-11-12 RX ORDER — NALOXONE HYDROCHLORIDE 0.4 MG/ML
0.2 INJECTION, SOLUTION INTRAMUSCULAR; INTRAVENOUS; SUBCUTANEOUS
Status: DISCONTINUED | OUTPATIENT
Start: 2021-11-12 | End: 2021-11-16 | Stop reason: HOSPADM

## 2021-11-12 RX ORDER — HYDROMORPHONE HCL IN WATER/PF 6 MG/30 ML
0.2 PATIENT CONTROLLED ANALGESIA SYRINGE INTRAVENOUS
Status: DISCONTINUED | OUTPATIENT
Start: 2021-11-12 | End: 2021-11-16 | Stop reason: HOSPADM

## 2021-11-12 RX ORDER — DEXAMETHASONE SODIUM PHOSPHATE 4 MG/ML
INJECTION, SOLUTION INTRA-ARTICULAR; INTRALESIONAL; INTRAMUSCULAR; INTRAVENOUS; SOFT TISSUE PRN
Status: DISCONTINUED | OUTPATIENT
Start: 2021-11-12 | End: 2021-11-12

## 2021-11-12 RX ORDER — PROCHLORPERAZINE MALEATE 10 MG
10 TABLET ORAL EVERY 6 HOURS PRN
Status: DISCONTINUED | OUTPATIENT
Start: 2021-11-12 | End: 2021-11-16 | Stop reason: HOSPADM

## 2021-11-12 RX ORDER — CEFAZOLIN SODIUM 2 G/100ML
2 INJECTION, SOLUTION INTRAVENOUS
Status: COMPLETED | OUTPATIENT
Start: 2021-11-12 | End: 2021-11-12

## 2021-11-12 RX ORDER — LEVETIRACETAM 100 MG/ML
SOLUTION ORAL PRN
Status: DISCONTINUED | OUTPATIENT
Start: 2021-11-12 | End: 2021-11-12

## 2021-11-12 RX ORDER — POLYETHYLENE GLYCOL 3350 17 G/17G
17 POWDER, FOR SOLUTION ORAL DAILY
Status: DISCONTINUED | OUTPATIENT
Start: 2021-11-13 | End: 2021-11-16 | Stop reason: HOSPADM

## 2021-11-12 RX ORDER — CALCIUM CARBONATE 500 MG/1
500-1000 TABLET, CHEWABLE ORAL 4 TIMES DAILY PRN
Status: DISCONTINUED | OUTPATIENT
Start: 2021-11-12 | End: 2021-11-16 | Stop reason: HOSPADM

## 2021-11-12 RX ORDER — NICARDIPINE HYDROCHLORIDE 0.2 MG/ML
.5-15 INJECTION INTRAVENOUS CONTINUOUS
Status: DISCONTINUED | OUTPATIENT
Start: 2021-11-12 | End: 2021-11-16 | Stop reason: HOSPADM

## 2021-11-12 RX ORDER — NALOXONE HYDROCHLORIDE 0.4 MG/ML
0.4 INJECTION, SOLUTION INTRAMUSCULAR; INTRAVENOUS; SUBCUTANEOUS
Status: DISCONTINUED | OUTPATIENT
Start: 2021-11-12 | End: 2021-11-16 | Stop reason: HOSPADM

## 2021-11-12 RX ORDER — BISACODYL 10 MG
10 SUPPOSITORY, RECTAL RECTAL DAILY PRN
Status: DISCONTINUED | OUTPATIENT
Start: 2021-11-12 | End: 2021-11-16 | Stop reason: HOSPADM

## 2021-11-12 RX ORDER — ONDANSETRON 4 MG/1
4 TABLET, ORALLY DISINTEGRATING ORAL EVERY 6 HOURS PRN
Status: DISCONTINUED | OUTPATIENT
Start: 2021-11-12 | End: 2021-11-16 | Stop reason: HOSPADM

## 2021-11-12 RX ORDER — MAGNESIUM HYDROXIDE 1200 MG/15ML
LIQUID ORAL PRN
Status: DISCONTINUED | OUTPATIENT
Start: 2021-11-12 | End: 2021-11-12 | Stop reason: HOSPADM

## 2021-11-12 RX ORDER — LABETALOL HYDROCHLORIDE 5 MG/ML
10 INJECTION, SOLUTION INTRAVENOUS
Status: DISCONTINUED | OUTPATIENT
Start: 2021-11-12 | End: 2021-11-12

## 2021-11-12 RX ORDER — FENTANYL CITRATE 50 UG/ML
INJECTION, SOLUTION INTRAMUSCULAR; INTRAVENOUS PRN
Status: DISCONTINUED | OUTPATIENT
Start: 2021-11-12 | End: 2021-11-12

## 2021-11-12 RX ORDER — CEFAZOLIN SODIUM 1 G/3ML
INJECTION, POWDER, FOR SOLUTION INTRAMUSCULAR; INTRAVENOUS PRN
Status: DISCONTINUED | OUTPATIENT
Start: 2021-11-12 | End: 2021-11-12

## 2021-11-12 RX ORDER — ACETAMINOPHEN 325 MG/1
650 TABLET ORAL EVERY 4 HOURS PRN
Status: DISCONTINUED | OUTPATIENT
Start: 2021-11-15 | End: 2021-11-16 | Stop reason: HOSPADM

## 2021-11-12 RX ORDER — LABETALOL HYDROCHLORIDE 5 MG/ML
5-10 INJECTION, SOLUTION INTRAVENOUS
Status: COMPLETED | OUTPATIENT
Start: 2021-11-12 | End: 2021-11-12

## 2021-11-12 RX ORDER — OXYCODONE HYDROCHLORIDE 5 MG/1
5 TABLET ORAL EVERY 4 HOURS PRN
Status: DISCONTINUED | OUTPATIENT
Start: 2021-11-12 | End: 2021-11-16 | Stop reason: HOSPADM

## 2021-11-12 RX ORDER — METHOCARBAMOL 500 MG/1
1000 TABLET, FILM COATED ORAL EVERY 6 HOURS PRN
Status: DISCONTINUED | OUTPATIENT
Start: 2021-11-12 | End: 2021-11-16 | Stop reason: HOSPADM

## 2021-11-12 RX ORDER — PROPOFOL 10 MG/ML
INJECTION, EMULSION INTRAVENOUS PRN
Status: DISCONTINUED | OUTPATIENT
Start: 2021-11-12 | End: 2021-11-12

## 2021-11-12 RX ORDER — LIDOCAINE 40 MG/G
CREAM TOPICAL
Status: DISCONTINUED | OUTPATIENT
Start: 2021-11-12 | End: 2021-11-16 | Stop reason: HOSPADM

## 2021-11-12 RX ORDER — CALCIUM CARBONATE 500 MG/1
500 TABLET, CHEWABLE ORAL 4 TIMES DAILY PRN
Status: DISCONTINUED | OUTPATIENT
Start: 2021-11-12 | End: 2021-11-12

## 2021-11-12 RX ORDER — OXYCODONE HYDROCHLORIDE 5 MG/1
10 TABLET ORAL EVERY 4 HOURS PRN
Status: DISCONTINUED | OUTPATIENT
Start: 2021-11-12 | End: 2021-11-16 | Stop reason: HOSPADM

## 2021-11-12 RX ORDER — SODIUM CHLORIDE 9 MG/ML
INJECTION, SOLUTION INTRAVENOUS CONTINUOUS
Status: DISCONTINUED | OUTPATIENT
Start: 2021-11-12 | End: 2021-11-16 | Stop reason: HOSPADM

## 2021-11-12 RX ORDER — LEVETIRACETAM 500 MG/1
500 TABLET ORAL 2 TIMES DAILY
Status: DISCONTINUED | OUTPATIENT
Start: 2021-11-13 | End: 2021-11-16 | Stop reason: HOSPADM

## 2021-11-12 RX ORDER — MANNITOL 20 G/100ML
INJECTION, SOLUTION INTRAVENOUS PRN
Status: DISCONTINUED | OUTPATIENT
Start: 2021-11-12 | End: 2021-11-12

## 2021-11-12 RX ORDER — LISINOPRIL 10 MG/1
10 TABLET ORAL EVERY MORNING
Status: DISCONTINUED | OUTPATIENT
Start: 2021-11-13 | End: 2021-11-16 | Stop reason: HOSPADM

## 2021-11-12 RX ORDER — SODIUM CHLORIDE, SODIUM LACTATE, POTASSIUM CHLORIDE, CALCIUM CHLORIDE 600; 310; 30; 20 MG/100ML; MG/100ML; MG/100ML; MG/100ML
INJECTION, SOLUTION INTRAVENOUS CONTINUOUS
Status: DISCONTINUED | OUTPATIENT
Start: 2021-11-12 | End: 2021-11-12 | Stop reason: HOSPADM

## 2021-11-12 RX ORDER — FENTANYL CITRATE 0.05 MG/ML
50 INJECTION, SOLUTION INTRAMUSCULAR; INTRAVENOUS EVERY 5 MIN PRN
Status: DISCONTINUED | OUTPATIENT
Start: 2021-11-12 | End: 2021-11-12 | Stop reason: HOSPADM

## 2021-11-12 RX ORDER — NITROGLYCERIN 10 MG/100ML
INJECTION INTRAVENOUS PRN
Status: DISCONTINUED | OUTPATIENT
Start: 2021-11-12 | End: 2021-11-12

## 2021-11-12 RX ORDER — LABETALOL HYDROCHLORIDE 5 MG/ML
10-40 INJECTION, SOLUTION INTRAVENOUS EVERY 10 MIN PRN
Status: DISCONTINUED | OUTPATIENT
Start: 2021-11-12 | End: 2021-11-16 | Stop reason: HOSPADM

## 2021-11-12 RX ORDER — ONDANSETRON 2 MG/ML
INJECTION INTRAMUSCULAR; INTRAVENOUS PRN
Status: DISCONTINUED | OUTPATIENT
Start: 2021-11-12 | End: 2021-11-12

## 2021-11-12 RX ORDER — DIPHENHYDRAMINE HYDROCHLORIDE 50 MG/ML
25 INJECTION INTRAMUSCULAR; INTRAVENOUS EVERY 6 HOURS PRN
Status: DISCONTINUED | OUTPATIENT
Start: 2021-11-12 | End: 2021-11-16 | Stop reason: HOSPADM

## 2021-11-12 RX ORDER — OXYCODONE HYDROCHLORIDE 5 MG/1
5 TABLET ORAL EVERY 4 HOURS PRN
Status: DISCONTINUED | OUTPATIENT
Start: 2021-11-12 | End: 2021-11-12

## 2021-11-12 RX ORDER — HYDRALAZINE HYDROCHLORIDE 20 MG/ML
10-20 INJECTION INTRAMUSCULAR; INTRAVENOUS EVERY 30 MIN PRN
Status: DISCONTINUED | OUTPATIENT
Start: 2021-11-12 | End: 2021-11-16 | Stop reason: HOSPADM

## 2021-11-12 RX ORDER — HYDRALAZINE HYDROCHLORIDE 20 MG/ML
2.5-5 INJECTION INTRAMUSCULAR; INTRAVENOUS EVERY 10 MIN PRN
Status: DISCONTINUED | OUTPATIENT
Start: 2021-11-12 | End: 2021-11-12 | Stop reason: HOSPADM

## 2021-11-12 RX ORDER — CEFAZOLIN SODIUM 2 G/100ML
2 INJECTION, SOLUTION INTRAVENOUS SEE ADMIN INSTRUCTIONS
Status: DISCONTINUED | OUTPATIENT
Start: 2021-11-12 | End: 2021-11-12

## 2021-11-12 RX ORDER — AMOXICILLIN 250 MG
1 CAPSULE ORAL 2 TIMES DAILY
Status: DISCONTINUED | OUTPATIENT
Start: 2021-11-12 | End: 2021-11-16 | Stop reason: HOSPADM

## 2021-11-12 RX ORDER — LIDOCAINE HYDROCHLORIDE 20 MG/ML
INJECTION, SOLUTION INFILTRATION; PERINEURAL PRN
Status: DISCONTINUED | OUTPATIENT
Start: 2021-11-12 | End: 2021-11-12

## 2021-11-12 RX ORDER — PROPOFOL 10 MG/ML
INJECTION, EMULSION INTRAVENOUS CONTINUOUS PRN
Status: DISCONTINUED | OUTPATIENT
Start: 2021-11-12 | End: 2021-11-12

## 2021-11-12 RX ORDER — LEVETIRACETAM 10 MG/ML
1000 INJECTION INTRAVASCULAR ONCE
Status: COMPLETED | OUTPATIENT
Start: 2021-11-12 | End: 2021-11-12

## 2021-11-12 RX ORDER — ACETAMINOPHEN 325 MG/1
975 TABLET ORAL EVERY 8 HOURS
Status: COMPLETED | OUTPATIENT
Start: 2021-11-12 | End: 2021-11-15

## 2021-11-12 RX ORDER — SODIUM CHLORIDE, SODIUM LACTATE, POTASSIUM CHLORIDE, CALCIUM CHLORIDE 600; 310; 30; 20 MG/100ML; MG/100ML; MG/100ML; MG/100ML
INJECTION, SOLUTION INTRAVENOUS CONTINUOUS
Status: DISCONTINUED | OUTPATIENT
Start: 2021-11-12 | End: 2021-11-12 | Stop reason: ALTCHOICE

## 2021-11-12 RX ORDER — CEFAZOLIN SODIUM 1 G/3ML
1 INJECTION, POWDER, FOR SOLUTION INTRAMUSCULAR; INTRAVENOUS EVERY 8 HOURS
Status: COMPLETED | OUTPATIENT
Start: 2021-11-13 | End: 2021-11-13

## 2021-11-12 RX ORDER — EPHEDRINE SULFATE 50 MG/ML
INJECTION, SOLUTION INTRAMUSCULAR; INTRAVENOUS; SUBCUTANEOUS PRN
Status: DISCONTINUED | OUTPATIENT
Start: 2021-11-12 | End: 2021-11-12

## 2021-11-12 RX ORDER — BUPIVACAINE HYDROCHLORIDE AND EPINEPHRINE 5; 5 MG/ML; UG/ML
INJECTION, SOLUTION EPIDURAL; INTRACAUDAL; PERINEURAL PRN
Status: DISCONTINUED | OUTPATIENT
Start: 2021-11-12 | End: 2021-11-12 | Stop reason: HOSPADM

## 2021-11-12 RX ORDER — HYDROMORPHONE HCL IN WATER/PF 6 MG/30 ML
0.4 PATIENT CONTROLLED ANALGESIA SYRINGE INTRAVENOUS EVERY 5 MIN PRN
Status: DISCONTINUED | OUTPATIENT
Start: 2021-11-12 | End: 2021-11-12 | Stop reason: HOSPADM

## 2021-11-12 RX ORDER — DEXAMETHASONE 4 MG/1
4 TABLET ORAL EVERY 6 HOURS SCHEDULED
Status: DISCONTINUED | OUTPATIENT
Start: 2021-11-13 | End: 2021-11-15

## 2021-11-12 RX ORDER — DIPHENHYDRAMINE HCL 25 MG
25 CAPSULE ORAL EVERY 6 HOURS PRN
Status: DISCONTINUED | OUTPATIENT
Start: 2021-11-12 | End: 2021-11-16 | Stop reason: HOSPADM

## 2021-11-12 RX ORDER — BACITRACIN ZINC 500 [USP'U]/G
OINTMENT TOPICAL PRN
Status: DISCONTINUED | OUTPATIENT
Start: 2021-11-12 | End: 2021-11-12 | Stop reason: HOSPADM

## 2021-11-12 RX ADMIN — PHENYLEPHRINE HYDROCHLORIDE 100 MCG: 10 INJECTION INTRAVENOUS at 16:36

## 2021-11-12 RX ADMIN — FENTANYL CITRATE 50 MCG: 50 INJECTION, SOLUTION INTRAMUSCULAR; INTRAVENOUS at 13:30

## 2021-11-12 RX ADMIN — Medication 5 MG: at 16:45

## 2021-11-12 RX ADMIN — PHENYLEPHRINE HYDROCHLORIDE 100 MCG: 10 INJECTION INTRAVENOUS at 16:23

## 2021-11-12 RX ADMIN — FENTANYL CITRATE 100 MCG: 50 INJECTION, SOLUTION INTRAMUSCULAR; INTRAVENOUS at 13:22

## 2021-11-12 RX ADMIN — Medication 5 MG: at 16:44

## 2021-11-12 RX ADMIN — ONDANSETRON 4 MG: 2 INJECTION INTRAMUSCULAR; INTRAVENOUS at 14:28

## 2021-11-12 RX ADMIN — PROPOFOL 250 MG: 10 INJECTION, EMULSION INTRAVENOUS at 13:24

## 2021-11-12 RX ADMIN — Medication 5 MG: at 16:57

## 2021-11-12 RX ADMIN — MANNITOL 30 G: 20 INJECTION, SOLUTION INTRAVENOUS at 14:45

## 2021-11-12 RX ADMIN — CEFAZOLIN SODIUM 2 G: 2 INJECTION, SOLUTION INTRAVENOUS at 13:14

## 2021-11-12 RX ADMIN — SUCCINYLCHOLINE CHLORIDE 100 MG: 20 INJECTION, SOLUTION INTRAMUSCULAR; INTRAVENOUS; PARENTERAL at 13:24

## 2021-11-12 RX ADMIN — DEXMEDETOMIDINE HYDROCHLORIDE 8 MCG: 100 INJECTION, SOLUTION INTRAVENOUS at 14:19

## 2021-11-12 RX ADMIN — PHENYLEPHRINE HYDROCHLORIDE 100 MCG: 10 INJECTION INTRAVENOUS at 16:45

## 2021-11-12 RX ADMIN — REMIFENTANIL HYDROCHLORIDE 0.2 MCG/KG/MIN: 1 INJECTION, POWDER, LYOPHILIZED, FOR SOLUTION INTRAVENOUS at 13:29

## 2021-11-12 RX ADMIN — PROPOFOL 50 MG: 10 INJECTION, EMULSION INTRAVENOUS at 13:54

## 2021-11-12 RX ADMIN — NITROGLYCERIN 20 MCG: 10 INJECTION INTRAVENOUS at 14:32

## 2021-11-12 RX ADMIN — NITROGLYCERIN 20 MCG: 10 INJECTION INTRAVENOUS at 14:40

## 2021-11-12 RX ADMIN — NITROGLYCERIN 10 MCG: 10 INJECTION INTRAVENOUS at 14:21

## 2021-11-12 RX ADMIN — PROPOFOL 50 MG: 10 INJECTION, EMULSION INTRAVENOUS at 14:30

## 2021-11-12 RX ADMIN — SODIUM CHLORIDE, POTASSIUM CHLORIDE, SODIUM LACTATE AND CALCIUM CHLORIDE: 600; 310; 30; 20 INJECTION, SOLUTION INTRAVENOUS at 11:32

## 2021-11-12 RX ADMIN — DEXAMETHASONE SODIUM PHOSPHATE 10 MG: 4 INJECTION, SOLUTION INTRA-ARTICULAR; INTRALESIONAL; INTRAMUSCULAR; INTRAVENOUS; SOFT TISSUE at 13:28

## 2021-11-12 RX ADMIN — PHENYLEPHRINE HYDROCHLORIDE 100 MCG: 10 INJECTION INTRAVENOUS at 16:34

## 2021-11-12 RX ADMIN — SENNOSIDES AND DOCUSATE SODIUM 1 TABLET: 8.6; 5 TABLET ORAL at 21:06

## 2021-11-12 RX ADMIN — SODIUM CHLORIDE 10 MG/HR: 0.9 INJECTION, SOLUTION INTRAVENOUS at 20:10

## 2021-11-12 RX ADMIN — SODIUM CHLORIDE, POTASSIUM CHLORIDE, SODIUM LACTATE AND CALCIUM CHLORIDE: 600; 310; 30; 20 INJECTION, SOLUTION INTRAVENOUS at 13:33

## 2021-11-12 RX ADMIN — FENTANYL CITRATE 50 MCG: 50 INJECTION, SOLUTION INTRAMUSCULAR; INTRAVENOUS at 14:35

## 2021-11-12 RX ADMIN — HYDROMORPHONE HYDROCHLORIDE 0.5 MG: 1 INJECTION, SOLUTION INTRAMUSCULAR; INTRAVENOUS; SUBCUTANEOUS at 14:24

## 2021-11-12 RX ADMIN — SODIUM CHLORIDE: 9 INJECTION, SOLUTION INTRAVENOUS at 20:54

## 2021-11-12 RX ADMIN — PROPOFOL 160 MCG/KG/MIN: 10 INJECTION, EMULSION INTRAVENOUS at 13:26

## 2021-11-12 RX ADMIN — LIDOCAINE HYDROCHLORIDE 80 MG: 20 INJECTION, SOLUTION INFILTRATION; PERINEURAL at 13:24

## 2021-11-12 RX ADMIN — Medication 10 MG: at 13:40

## 2021-11-12 RX ADMIN — PHENYLEPHRINE HYDROCHLORIDE 0.2 MCG/KG/MIN: 10 INJECTION INTRAVENOUS at 15:00

## 2021-11-12 RX ADMIN — Medication 5 MG: at 13:41

## 2021-11-12 RX ADMIN — Medication 5 MG: at 17:06

## 2021-11-12 RX ADMIN — NICARDIPINE HYDROCHLORIDE 5 MCG/KG/MIN: 0.2 INJECTION, SOLUTION INTRAVENOUS at 14:39

## 2021-11-12 RX ADMIN — DEXMEDETOMIDINE HYDROCHLORIDE 12 MCG: 100 INJECTION, SOLUTION INTRAVENOUS at 14:42

## 2021-11-12 RX ADMIN — LABETALOL HYDROCHLORIDE 5 MG: 5 INJECTION, SOLUTION INTRAVENOUS at 18:26

## 2021-11-12 RX ADMIN — ONDANSETRON 4 MG: 2 INJECTION INTRAMUSCULAR; INTRAVENOUS at 16:52

## 2021-11-12 RX ADMIN — NITROGLYCERIN 20 MCG: 10 INJECTION INTRAVENOUS at 14:29

## 2021-11-12 RX ADMIN — LEVETIRACETAM 1000 MG: 100 SOLUTION ORAL at 13:26

## 2021-11-12 RX ADMIN — Medication 10 MG: at 16:24

## 2021-11-12 RX ADMIN — NITROGLYCERIN 10 MCG: 10 INJECTION INTRAVENOUS at 14:27

## 2021-11-12 RX ADMIN — NITROGLYCERIN 10 MCG: 10 INJECTION INTRAVENOUS at 14:24

## 2021-11-12 RX ADMIN — LEVETIRACETAM 1000 MG: 10 INJECTION INTRAVENOUS at 19:31

## 2021-11-12 RX ADMIN — ACETAMINOPHEN 975 MG: 325 TABLET, FILM COATED ORAL at 21:05

## 2021-11-12 RX ADMIN — CEFAZOLIN 2 G: 1 INJECTION, POWDER, FOR SOLUTION INTRAMUSCULAR; INTRAVENOUS at 17:05

## 2021-11-12 RX ADMIN — FENTANYL CITRATE 50 MCG: 50 INJECTION, SOLUTION INTRAMUSCULAR; INTRAVENOUS at 13:24

## 2021-11-12 RX ADMIN — HYDROMORPHONE HYDROCHLORIDE 0.5 MG: 1 INJECTION, SOLUTION INTRAMUSCULAR; INTRAVENOUS; SUBCUTANEOUS at 15:42

## 2021-11-12 ASSESSMENT — ACTIVITIES OF DAILY LIVING (ADL)
ADLS_ACUITY_SCORE: 5
ADLS_ACUITY_SCORE: 3
ADLS_ACUITY_SCORE: 5
ADLS_ACUITY_SCORE: 10
ADLS_ACUITY_SCORE: 3

## 2021-11-12 ASSESSMENT — MIFFLIN-ST. JEOR: SCORE: 1574.38

## 2021-11-12 ASSESSMENT — VISUAL ACUITY: OU: NORMAL ACUITY;BASELINE

## 2021-11-12 NOTE — ANESTHESIA PREPROCEDURE EVALUATION
Anesthesia Pre-Procedure Evaluation    Patient: Lang Collins Jr.   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]    Procedure : Procedure(s):  Left frontal craniotomy for tumor resection with asleep motor mapping          Past Medical History:   Diagnosis Date     Brain tumor (H)      Glioma (H)      Hypertension       Past Surgical History:   Procedure Laterality Date     ORTHOPEDIC SURGERY        No Known Allergies   Social History     Tobacco Use     Smoking status: Current Every Day Smoker     Packs/day: 1.00     Smokeless tobacco: Never Used   Substance Use Topics     Alcohol use: Yes     Comment: socially      Wt Readings from Last 1 Encounters:   21 66.2 kg (146 lb)        Anesthesia Evaluation   Pt has had prior anesthetic.     No history of anesthetic complications       ROS/MED HX  ENT/Pulmonary:    (-) sleep apnea   Neurologic: Comment: Glioma   (-) no CVA   Cardiovascular:     (+) hypertension----- (-) CAD   METS/Exercise Tolerance:     Hematologic:       Musculoskeletal:       GI/Hepatic:    (-) GERD   Renal/Genitourinary:       Endo:    (-) Type II DM   Psychiatric/Substance Use:     (+) psychiatric history anxiety     Infectious Disease:       Malignancy:       Other:               OUTSIDE LABS:  CBC:   Lab Results   Component Value Date    WBC 4.2 2021    HGB 14.1 2021    HCT 43.0 2021     2021     BMP:   Lab Results   Component Value Date     2021    POTASSIUM 4.3 2021    CHLORIDE 108 2021    CO2 28 2021    BUN 10 2021    CR 0.94 2021     (H) 2021     COAGS: No results found for: PTT, INR, FIBR  POC: No results found for: BGM, HCG, HCGS  HEPATIC:   Lab Results   Component Value Date    ALBUMIN 3.7 2021    PROTTOTAL 7.4 2021    ALT 26 2021    AST 22 2021    ALKPHOS 69 2021    BILITOTAL 0.8 2021     OTHER:   Lab Results   Component  Value Date    MARCY 9.2 08/13/2021       Anesthesia Plan    ASA Status:  2   NPO Status:  NPO Appropriate    Anesthesia Type: General.     - Airway: ETT   Induction: Propofol, Intravenous.   Maintenance: Balanced.   Techniques and Equipment:     - Lines/Monitors: Arterial Line, 2nd IV     - Drips/Meds: Remifentanil     Consents    Anesthesia Plan(s) and associated risks, benefits, and realistic alternatives discussed. Questions answered and patient/representative(s) expressed understanding.     - Discussed with:  Patient         Postoperative Care    Pain management: Multi-modal analgesia.   PONV prophylaxis: Ondansetron (or other 5HT-3), Dexamethasone or Solumedrol, Background Propofol Infusion     Comments:                Margarita Raymundo MD, MD

## 2021-11-12 NOTE — BRIEF OP NOTE
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

## 2021-11-12 NOTE — ANESTHESIA PROCEDURE NOTES
Airway       Patient location during procedure: OR       Procedure Start/Stop Times: 11/12/2021 1:25 PM  Staff -        Anesthesiologist:  Margarita Raymundo MD       CRNA: Peggy Walker APRN CRNA       Other Anesthesia Staff: Aranza Ramírez       Performed By: THERESA  Consent for Airway        Urgency: elective  Indications and Patient Condition       Indications for airway management: ntahan-procedural       Induction type:intravenous       Mask difficulty assessment: 1 - vent by mask    Final Airway Details       Final airway type: endotracheal airway       Successful airway: ETT - single and Oral  Endotracheal Airway Details        ETT size (mm): 8.0       Cuffed: yes       Successful intubation technique: video laryngoscopy       VL Blade Size: Glidescope 3       Grade View of Cords: 1       Adjucts: stylet       Position: Right       Measured from: lips       Secured at (cm): 24       Bite block used: Soft    Post intubation assessment        Placement verified by: capnometry, equal breath sounds and chest rise        Number of attempts at approach: 3       Number of other approaches attempted: 0       Secured with: pink tape       Ease of procedure: difficult       Dentition: Intact and Unchanged    Additional Comments       Pt neck stiff, anterior glottis. Initial attempts at DL by THERESA and TIM unsuccessful. Glidescope intubation with grade 1 view

## 2021-11-12 NOTE — OP NOTE
Date of surgery: 11/12/2021  Surgeon: Kuldeep Beltrán MD  Assistant:  Clementina Jordan NP  Note: Clementina Jordan was present for and assisted with the entire surgery, and his/her role as an assistant was crucial for aid in positioning, exposure, suctioning, retraction, and closure.      Preoperative diagnosis: Left frontal brain tumor  Postoperative diagnosis: Left frontal brain tumor      Procedure:  1.  Left frontal-parietal craniotomy for resection of intra-axial brain tumor  2.  Asleep motor mapping and neuro-monitoring  3.  Use of Med[employer]point Stealth intra-operative neuro-navigation with MRI guidance       EBL: 100 mL      Indications: 46-year-old male presented with seizure.  MRI showed a 2.1 cm left frontal non-enhancing mass concerning for low grade primary brain tumor, in the superior frontal gyrus in the expected region of the supplementary motor area.  We discussed the role for surgery to establish pathology and to resect the mass.  We also discussed the likelihood of post-op weakness related to surgical manipulation in the supplementary motor area.  Risks, benefits, indications, and alternatives were discussed with the patient's family in detail.  All their questions were answered, and they wished to proceed with surgery.      Description of surgery: The patient was positioned supine in Rain pins.  [employer] Stealth navigation was registered with MRI guidance.  Sterile prepping and draping procedures were performed.  Antibiotics were administered and timeout was performed.  The 10 blade was used to perform a C-shaped incision in the convexity.  A left frontal-parietal craniotomy was performed with sarah holes and the craniotome.  The dura was opened with the geralds and metzenbaum scissors.  An electrode strip was used to establish phase reversal.  Motor mapping was performed and the motor strip was identified.  The lesion was visualized and it's location confirmed with navigation.  The lesion was  removed with use of bipolar, ultrasonic aspiration, suction, and tumor forceps.  Specimen was sent to pathology.  Irrigation was performed, the brain was noted to be decompressed, and hemostasis was achieved.  The dura was closed with 4-0 Nurolon.  The bone flap was fixed back into place with the cranial plating system.  Antibiotic irrigation was performed, the galea was closed with 2-0 Vicryls, and the skin was closed with staples.

## 2021-11-13 ENCOUNTER — APPOINTMENT (OUTPATIENT)
Dept: MRI IMAGING | Facility: CLINIC | Age: 46
End: 2021-11-13
Attending: NURSE PRACTITIONER
Payer: COMMERCIAL

## 2021-11-13 PROCEDURE — 250N000011 HC RX IP 250 OP 636: Performed by: NURSE PRACTITIONER

## 2021-11-13 PROCEDURE — 258N000003 HC RX IP 258 OP 636: Performed by: NURSE PRACTITIONER

## 2021-11-13 PROCEDURE — 70553 MRI BRAIN STEM W/O & W/DYE: CPT

## 2021-11-13 PROCEDURE — 120N000001 HC R&B MED SURG/OB

## 2021-11-13 PROCEDURE — 250N000012 HC RX MED GY IP 250 OP 636 PS 637: Performed by: NURSE PRACTITIONER

## 2021-11-13 PROCEDURE — 250N000013 HC RX MED GY IP 250 OP 250 PS 637: Performed by: NURSE PRACTITIONER

## 2021-11-13 PROCEDURE — 255N000002 HC RX 255 OP 636: Performed by: NEUROLOGICAL SURGERY

## 2021-11-13 PROCEDURE — A9585 GADOBUTROL INJECTION: HCPCS | Performed by: NEUROLOGICAL SURGERY

## 2021-11-13 RX ORDER — GADOBUTROL 604.72 MG/ML
7 INJECTION INTRAVENOUS ONCE
Status: COMPLETED | OUTPATIENT
Start: 2021-11-13 | End: 2021-11-13

## 2021-11-13 RX ADMIN — SENNOSIDES AND DOCUSATE SODIUM 1 TABLET: 8.6; 5 TABLET ORAL at 21:48

## 2021-11-13 RX ADMIN — LISINOPRIL 10 MG: 10 TABLET ORAL at 08:16

## 2021-11-13 RX ADMIN — SODIUM CHLORIDE: 9 INJECTION, SOLUTION INTRAVENOUS at 13:48

## 2021-11-13 RX ADMIN — LEVETIRACETAM 500 MG: 500 TABLET, FILM COATED ORAL at 08:16

## 2021-11-13 RX ADMIN — DEXAMETHASONE SODIUM PHOSPHATE 4 MG: 4 INJECTION, SOLUTION INTRAMUSCULAR; INTRAVENOUS at 05:10

## 2021-11-13 RX ADMIN — ACETAMINOPHEN 975 MG: 325 TABLET, FILM COATED ORAL at 21:48

## 2021-11-13 RX ADMIN — GADOBUTROL 7 ML: 604.72 INJECTION INTRAVENOUS at 06:28

## 2021-11-13 RX ADMIN — DEXAMETHASONE 4 MG: 4 TABLET ORAL at 18:31

## 2021-11-13 RX ADMIN — LABETALOL HYDROCHLORIDE 20 MG: 5 INJECTION, SOLUTION INTRAVENOUS at 08:30

## 2021-11-13 RX ADMIN — DEXAMETHASONE 4 MG: 4 TABLET ORAL at 12:23

## 2021-11-13 RX ADMIN — OXYCODONE HYDROCHLORIDE 10 MG: 5 TABLET ORAL at 18:27

## 2021-11-13 RX ADMIN — DEXAMETHASONE SODIUM PHOSPHATE 4 MG: 4 INJECTION, SOLUTION INTRAMUSCULAR; INTRAVENOUS at 00:15

## 2021-11-13 RX ADMIN — CEFAZOLIN 1 G: 1 INJECTION, POWDER, FOR SOLUTION INTRAMUSCULAR; INTRAVENOUS at 08:09

## 2021-11-13 RX ADMIN — LEVETIRACETAM 500 MG: 500 TABLET, FILM COATED ORAL at 21:48

## 2021-11-13 RX ADMIN — CEFAZOLIN 1 G: 1 INJECTION, POWDER, FOR SOLUTION INTRAMUSCULAR; INTRAVENOUS at 00:15

## 2021-11-13 RX ADMIN — METHOCARBAMOL 1000 MG: 500 TABLET ORAL at 21:48

## 2021-11-13 RX ADMIN — ACETAMINOPHEN 975 MG: 325 TABLET, FILM COATED ORAL at 05:10

## 2021-11-13 RX ADMIN — LABETALOL HYDROCHLORIDE 20 MG: 5 INJECTION, SOLUTION INTRAVENOUS at 13:58

## 2021-11-13 RX ADMIN — OXYCODONE HYDROCHLORIDE 5 MG: 5 TABLET ORAL at 08:15

## 2021-11-13 RX ADMIN — SENNOSIDES AND DOCUSATE SODIUM 1 TABLET: 8.6; 5 TABLET ORAL at 08:16

## 2021-11-13 RX ADMIN — CEFAZOLIN 1 G: 1 INJECTION, POWDER, FOR SOLUTION INTRAMUSCULAR; INTRAVENOUS at 18:35

## 2021-11-13 RX ADMIN — LABETALOL HYDROCHLORIDE 20 MG: 5 INJECTION, SOLUTION INTRAVENOUS at 08:48

## 2021-11-13 RX ADMIN — LABETALOL HYDROCHLORIDE 20 MG: 5 INJECTION, SOLUTION INTRAVENOUS at 11:27

## 2021-11-13 RX ADMIN — OXYCODONE HYDROCHLORIDE 5 MG: 5 TABLET ORAL at 12:33

## 2021-11-13 RX ADMIN — OXYCODONE HYDROCHLORIDE 10 MG: 5 TABLET ORAL at 21:48

## 2021-11-13 RX ADMIN — ACETAMINOPHEN 975 MG: 325 TABLET, FILM COATED ORAL at 12:23

## 2021-11-13 ASSESSMENT — ACTIVITIES OF DAILY LIVING (ADL)
ADLS_ACUITY_SCORE: 7
ADLS_ACUITY_SCORE: 5
ADLS_ACUITY_SCORE: 7
ADLS_ACUITY_SCORE: 5
ADLS_ACUITY_SCORE: 7
ADLS_ACUITY_SCORE: 7
ADLS_ACUITY_SCORE: 5
ADLS_ACUITY_SCORE: 7
ADLS_ACUITY_SCORE: 5
ADLS_ACUITY_SCORE: 7
ADLS_ACUITY_SCORE: 5
ADLS_ACUITY_SCORE: 7
ADLS_ACUITY_SCORE: 5
ADLS_ACUITY_SCORE: 7
ADLS_ACUITY_SCORE: 7

## 2021-11-13 ASSESSMENT — MIFFLIN-ST. JEOR: SCORE: 1557.13

## 2021-11-13 NOTE — ANESTHESIA CARE TRANSFER NOTE
Patient: Lang Collins Jr.    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    Anesthesia Type:   General     Note:    Oropharynx: oropharynx clear of all foreign objects and spontaneously breathing  Level of Consciousness: awake and drowsy  Oxygen Supplementation: face mask  Level of Supplemental Oxygen (L/min / FiO2): 4  Independent Airway: airway patency satisfactory and stable  Dentition: dentition unchanged  Vital Signs Stable: post-procedure vital signs reviewed and stable  Report to RN Given: handoff report given  Patient transferred to: PACU  Comments: Neuromuscular blockade not used after succinylcholine for intubation, spontaneous return of TOF 4/4 with sustained tetany, spontaneous respirations, adequate tidal volumes, followed commands to voice, oropharynx suctioned with soft flexible catheter, extubated atraumatically, extubated with suction, airway patent after extubation.  Oxygen via facemask at 4 liters per minute to PACU. Oxygen tubing connected to wall O2 in PACU, SpO2, NiBP, and EKG monitors and alarms on and functioning, report on patient's clinical status given to PACU RN, RN questions answered.     Handoff Report: Identifed the Patient, Identified the Reponsible Provider, Reviewed the pertinent medical history, Discussed the surgical course, Reviewed Intra-OP anesthesia mangement and issues during anesthesia, Set expectations for post-procedure period and Allowed opportunity for questions and acknowledgement of understanding      Vitals:  Vitals Value Taken Time   /90 11/12/21 1806   Temp     Pulse 106 11/12/21 1811   Resp 16 11/12/21 1811   SpO2 100 % 11/12/21 1811   Vitals shown include unvalidated device data.    Electronically Signed By: TITA Rene CRNA  November 12, 2021  6:12 PM

## 2021-11-13 NOTE — PROGRESS NOTES
[facility]    Neurosurgery  Daily Note    Assessment & Plan   Procedure(s):  Left frontal craniotomy for tumor resection with asleep motor mapping   1 Day Post-Op   Overnight, PACU paged with concerns patient was fully awake and non verbal. He was sent for head CT and given 1g Keppra. After Head CT and keppra, patient was fully verbal again. Head CT negative for acute changes.     Patient denies headache, vision changes, n/v. Admits to right sided weakness that has been stable since post op and expected. Patient admits he will occasionally have difficulty with word findings. Mri this AM with expected post operative changes.     EXAM: MR BRAIN W/O and W CONTRAST  LOCATION: [facility]  DATE/TIME: 11/13/2021 5:43 AM     INDICATION: Craniotomy, post-op  COMPARISON: MRI brain 11/11/2021.  CONTRAST: 7 mL Gadavist  TECHNIQUE: Routine multiplanar multisequence head MRI without and with intravenous contrast.     FINDINGS:  INTRACRANIAL CONTENTS: Postsurgical changes interval left frontal craniotomy for resection of abnormal lesion within the left parafalcine frontal lobe. Pneumocephalus is demonstrated within this region. Susceptibility is demonstrated overlying the   craniotomy. Signal abnormality is demonstrated along the margins of the resection cavity which likely reflect evolving posttreatment related change and/or vasogenic edema. Brain signal morphology are otherwise normal. The ventricles and sulci are normal   for age. Few foci of susceptibility along the margins of the cavity which likely reflects blood products. No evidence of acute intracranial hemorrhage, mass effect, or extra-axial collection. No other evidence of abnormal brain parenchymal or   leptomeningeal enhancement. No cerebellar tonsillar ectopia.      SELLA: No abnormality accounting for technique.     OSSEOUS STRUCTURES/SOFT TISSUES: The visualized skull base and calvarium are unremarkable. Expected  signal voids within the distal vertebral, basilar, and bilateral internal carotid arteries.     ORBITS: No abnormality accounting for technique.      SINUSES/MASTOIDS: The partially imaged paranasal sinuses and mastoid air cells are unremarkable.                                                                       IMPRESSION:  1.  Postsurgical changes interval left frontal craniotomy for resection of abnormal lesion within the left parafalcine frontal lobe.  2.  No evidence of acute intracranial hemorrhage, mass effect, or infarction.     Plan:  -Transfer to floor with q2H neuro checks  -Consult hospitalist service   -Advance activity as tolerated  -Continue supportive and symptomatic treatment  -Start or continue physical therapy  -Pain control measures  -Decadron x 48 hours post op then wean over 2 weeks time  -Advance diet as tolerated  -Routine wound care  -Plans reviewed with Dr. Belrtán who was in agreement     Active Problems:    Frontal glioma (H)      Chelsi HAYNES Hindt    Interval History   Stable.    Physical Exam   Temp: 98.5  F (36.9  C) Temp src: Temporal BP: 125/74 Pulse: 96   Resp: (!) 64 SpO2: 100 % O2 Device: None (Room air) Oxygen Delivery: 8 LPM  Vitals:    11/12/21 1119 11/13/21 0600   Weight: 146 lb (66.2 kg) 142 lb 3.2 oz (64.5 kg)     Vital Signs with Ranges  Temp:  [97.5  F (36.4  C)-98.5  F (36.9  C)] 98.5  F (36.9  C)  Pulse:  [] 96  Resp:  [9-64] 64  BP: (105-151)/() 125/74  MAP:  [63 mmHg-108 mmHg] 100 mmHg  Arterial Line BP: ()/(42-86) 114/86  SpO2:  [92 %-100 %] 100 %  I/O last 3 completed shifts:  In: 3282.5 [P.O.:500; I.V.:2782.5]  Out: 3620 [Urine:3620]    Awake, alert, oriented x 3  Name 3/3 objects   Incision c/d/i with staples intact   II-XII grossly intact   LUE and LLE 5/5 motor strength   R deltoid 3/5, R tricep 2/5, R bicep 2/5, R hand grasp 5/5  R hip flexion 2/5, 0/5 knee ext and flexion, 0/5 PF/DF  Negative clonus   Negative prontator drift   Sensation intact      Medications     niCARdipine Stopped (11/12/21 2225)     sodium chloride 75 mL/hr at 11/12/21 2054        acetaminophen  975 mg Oral Q8H     ceFAZolin  1 g Intravenous Q8H     dexamethasone  4 mg Intravenous Q6H LAYA    Or     dexamethasone  4 mg Oral Q6H LAYA     levETIRAcetam  500 mg Oral BID     lisinopril  10 mg Oral QAM     polyethylene glycol  17 g Oral Daily     senna-docusate  1 tablet Oral BID     sodium chloride (PF)  3 mL Intracatheter Q8H       Plans discussed with Dr. Beltrán who was in agreement with plans    Chelsi HILL [facility] Neurosurgery  [address]  [address] [place] [address], [place] [address]    Tel [phone]

## 2021-11-13 NOTE — PLAN OF CARE
Pt transferred to CT at 1949. Once head CT completed and IV Keppra completed for approx 15 mn pt began speaking, clearly and logically. Able to state name and , endorse a headache and stated that he was hungry. Handoff completed with Андрей GARDNER in ICU. Will notify Chelsi NP with neurosurgery of change in pts condition and completion of head CT.

## 2021-11-13 NOTE — PLAN OF CARE
The patient is alert and oriented. No deficit on left side. His right arm is weak, and takes some focusing/effort for him to move it. Right leg has sensation, but does not move at all. Pupils react briskly to light.  Patient having some saniya horse cramps in right leg despite some passive range of motion by RN overnight.   Sinus rhythm on monitor. BP goal less than 140. Weaned off of Nicardipine overnight.   CT done last night before arrival to ICU. MRI done early this morning. Still hourly neuro assessments.  Patient passed bedside swallow evaluation and was able to take pills without difficulty. Gave him clear liquids overnight. Advanced to regular diet this AM as patient asking for solid food and is doing well with liquids.   Basurto in place. Good urine output. Basurto cares done this AM.   Андрей Owen RN 7:42 AM 11/13/21

## 2021-11-13 NOTE — OR NURSING
Notified ISABELLE Gagnon, regarding pt talking after Keppra and CT finished.  Speech clear.  PA will notify Dr eBltrán.

## 2021-11-13 NOTE — ANESTHESIA POSTPROCEDURE EVALUATION
Patient: Lang Collins Jr.    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    Anesthesia Type:  General    Note:  Disposition: ICU            ICU Sign Out: Anesthesiologist/ICU physician sign out WAS performed   Postop Pain Control: Uneventful            Sign Out: Well controlled pain   PONV: No   Neuro/Psych: Uneventful            Sign Out: Acceptable/Baseline neuro status   Airway/Respiratory: Uneventful            Sign Out: Acceptable/Baseline resp. status   CV/Hemodynamics: Uneventful            Sign Out: Acceptable CV status; No obvious hypovolemia; No obvious fluid overload   Other NRE: NONE   DID A NON-ROUTINE EVENT OCCUR? No           Last vitals:  Vitals Value Taken Time   /81 11/12/21 1840   Temp     Pulse 93 11/12/21 1849   Resp 16 11/12/21 1849   SpO2 100 % 11/12/21 1849   Vitals shown include unvalidated device data.    Electronically Signed By: Bulmaro Prado MD  November 12, 2021  6:50 PM

## 2021-11-13 NOTE — PROVIDER NOTIFICATION
"Pt more alert, follows many commands but yet to verbalize. Nods head and blinks to yes/no questions. Will notify surgeon before transfer.     Spoke with Chelsi, she will notify Dr Beltrán, will await orders.    Pt able to write first name with R hand when prompted, many \"squiggles\"after name.      1918: 1,000mg IV Keppra and Head CT wo contrast orders received from Chelsi Styles. \"Head CT doesn't need to be STAT\". Still ok to transfer Please contact surgeon or NP when results are in.   "

## 2021-11-13 NOTE — PLAN OF CARE
"Neuro: No movement in right leg. Slow and deliberate finger to nose on right side. Weaker grasp on right side. Slight drift on right side. Tongue is slightly to the left. Work finding issues at times \"I am saying yes when I really mean no.\"   Cardio: Labetolol PRN for BP over 140  Resp: Room air  Gi: Passing gas. Tolerating diet.  : Voiding without issue  Pain: 5mg PRN oxy for pain  Transfer to station 73 this shift.   "

## 2021-11-14 ENCOUNTER — APPOINTMENT (OUTPATIENT)
Dept: PHYSICAL THERAPY | Facility: CLINIC | Age: 46
End: 2021-11-14
Attending: PHYSICIAN ASSISTANT
Payer: COMMERCIAL

## 2021-11-14 ENCOUNTER — APPOINTMENT (OUTPATIENT)
Dept: OCCUPATIONAL THERAPY | Facility: CLINIC | Age: 46
End: 2021-11-14
Attending: PHYSICIAN ASSISTANT
Payer: COMMERCIAL

## 2021-11-14 LAB — GLUCOSE BLDC GLUCOMTR-MCNC: 125 MG/DL (ref 70–99)

## 2021-11-14 PROCEDURE — 258N000003 HC RX IP 258 OP 636: Performed by: NURSE PRACTITIONER

## 2021-11-14 PROCEDURE — 120N000001 HC R&B MED SURG/OB

## 2021-11-14 PROCEDURE — 97530 THERAPEUTIC ACTIVITIES: CPT | Mod: GO

## 2021-11-14 PROCEDURE — 250N000012 HC RX MED GY IP 250 OP 636 PS 637: Performed by: NURSE PRACTITIONER

## 2021-11-14 PROCEDURE — 97166 OT EVAL MOD COMPLEX 45 MIN: CPT | Mod: GO

## 2021-11-14 PROCEDURE — 97530 THERAPEUTIC ACTIVITIES: CPT | Mod: GP

## 2021-11-14 PROCEDURE — 97162 PT EVAL MOD COMPLEX 30 MIN: CPT | Mod: GP

## 2021-11-14 PROCEDURE — 97110 THERAPEUTIC EXERCISES: CPT | Mod: GP

## 2021-11-14 PROCEDURE — 250N000013 HC RX MED GY IP 250 OP 250 PS 637: Performed by: NURSE PRACTITIONER

## 2021-11-14 PROCEDURE — 97112 NEUROMUSCULAR REEDUCATION: CPT | Mod: GP

## 2021-11-14 RX ADMIN — ACETAMINOPHEN 975 MG: 325 TABLET, FILM COATED ORAL at 13:28

## 2021-11-14 RX ADMIN — OXYCODONE HYDROCHLORIDE 10 MG: 5 TABLET ORAL at 17:28

## 2021-11-14 RX ADMIN — METHOCARBAMOL 1000 MG: 500 TABLET ORAL at 20:59

## 2021-11-14 RX ADMIN — DEXAMETHASONE 4 MG: 4 TABLET ORAL at 18:38

## 2021-11-14 RX ADMIN — SENNOSIDES AND DOCUSATE SODIUM 1 TABLET: 8.6; 5 TABLET ORAL at 09:36

## 2021-11-14 RX ADMIN — OXYCODONE HYDROCHLORIDE 10 MG: 5 TABLET ORAL at 13:36

## 2021-11-14 RX ADMIN — OXYCODONE HYDROCHLORIDE 10 MG: 5 TABLET ORAL at 21:00

## 2021-11-14 RX ADMIN — LEVETIRACETAM 500 MG: 500 TABLET, FILM COATED ORAL at 09:36

## 2021-11-14 RX ADMIN — POLYETHYLENE GLYCOL 3350 17 G: 17 POWDER, FOR SOLUTION ORAL at 09:35

## 2021-11-14 RX ADMIN — SENNOSIDES AND DOCUSATE SODIUM 1 TABLET: 8.6; 5 TABLET ORAL at 20:59

## 2021-11-14 RX ADMIN — DEXAMETHASONE 4 MG: 4 TABLET ORAL at 00:08

## 2021-11-14 RX ADMIN — LEVETIRACETAM 500 MG: 500 TABLET, FILM COATED ORAL at 20:59

## 2021-11-14 RX ADMIN — ACETAMINOPHEN 975 MG: 325 TABLET, FILM COATED ORAL at 05:15

## 2021-11-14 RX ADMIN — DEXAMETHASONE 4 MG: 4 TABLET ORAL at 13:29

## 2021-11-14 RX ADMIN — SODIUM CHLORIDE: 9 INJECTION, SOLUTION INTRAVENOUS at 05:15

## 2021-11-14 RX ADMIN — LISINOPRIL 10 MG: 10 TABLET ORAL at 09:36

## 2021-11-14 RX ADMIN — OXYCODONE HYDROCHLORIDE 10 MG: 5 TABLET ORAL at 09:36

## 2021-11-14 RX ADMIN — DEXAMETHASONE 4 MG: 4 TABLET ORAL at 05:14

## 2021-11-14 RX ADMIN — ACETAMINOPHEN 975 MG: 325 TABLET, FILM COATED ORAL at 20:59

## 2021-11-14 ASSESSMENT — ACTIVITIES OF DAILY LIVING (ADL)
ADLS_ACUITY_SCORE: 7
ADLS_ACUITY_SCORE: 5
ADLS_ACUITY_SCORE: 7
ADLS_ACUITY_SCORE: 5
ADLS_ACUITY_SCORE: 7

## 2021-11-14 NOTE — PROGRESS NOTES
"Neurosurgery Progress     Dx:     POD #2 s/p left frontal craniotomy for tumor resection with asleep motor mapping.    Neuro stable.     TODAY'S PLAN:     -May change to q4H neuro checks.  -Hospitalist service consult placed.   - consult placed - ARU upon discharge.   -Continue physical therapy.  -Decadron x 48 hours post-op then wean over 2 weeks time starting on 11/15/2021.     In the event that patient's symptoms worsen or change we would appreciate being contacted. We did discuss signs of a worsening problem that he should seek being evaluated.     We did review the above information with the patient whom agrees with the plan and did verbalize understanding.   ________________________________________________________________     Mr. Collins overall feels well and denies any significant discomfort.    BP (!) 140/83 (BP Location: Left arm, Patient Position: Supine)   Pulse 64   Temp 98.4  F (36.9  C) (Oral)   Resp 18   Ht 5' 11\" (1.803 m)   Wt 142 lb 3.2 oz (64.5 kg)   SpO2 99%   BMI 19.83 kg/m       Pt in bed. He appears comfortable and in no apparent distress, moving all extremities.   CN II-XII intact, alert and appropriate with conversation and following commands. Aphasic.   Able to name 3/3 objects.   Finger to nose slow and accurate.   Rapid hand movements intact.   Absent for upper and lower extremity drift.   Left upper and lower extremities 5/5. Right side remains diffusely weak.  Sensation intact throughout.  Incision CDI.   Calves soft and non-tender.     All pertinent labs and updated imaging reviewed in EPIC.     Christiano Olguin PA-C   Neurosurgery   [phone] (P)         "

## 2021-11-14 NOTE — PROGRESS NOTES
"POD #2 Left frontal crani for tumor resection. A/O x4 and able to make needs known. No neuro changes this shift. Neuro checks q2hrs. Left side remain stronger than right side. LUE 5/5, LLE4/5, RUE 3/5, RLE 0. Sensation intact, denies numbness/tingling. Continues to c/o slight headache of 4/10. Prn oxy and robaxin x1. Uses urinal appropriately. Regular diet w/thin liquids.  SBP goal <150. A2/lift.     /76 (BP Location: Right arm, Patient Position: Supine)   Pulse 61   Temp 98.5  F (36.9  C) (Oral)   Resp 18   Ht 1.803 m (5' 11\")   Wt 64.5 kg (142 lb 3.2 oz)   SpO2 99%   BMI 19.83 kg/m      Kel Vernon RN    "

## 2021-11-14 NOTE — PROGRESS NOTES
11/14/21 1453   Quick Adds   Type of Visit Initial PT Evaluation   Living Environment   Living Environment Comments Pt lives in apartment, 3rd floors, has stairs   Self-Care   Usual Activity Tolerance good   Current Activity Tolerance poor   Activity/Exercise/Self-Care Comment Pt IND prior to admission   Disability/Function   Fall history within last six months no   Change in Functional Status Since Onset of Current Illness/Injury yes   General Information   Onset of Illness/Injury or Date of Surgery 11/12/21   Referring Physician Jacobo Olguin PA-C   Pertinent History of Current Problem (include personal factors and/or comorbidities that impact the POC) POD #2 s/p left frontal craniotomy for tumor resection with asleep motor mapping.   Existing Precautions/Restrictions fall   Cognition   Orientation Status (Cognition) person;place   Follows Commands (Cognition) 50-74% accuracy   Safety Deficit (Cognition) impulsivity   Cognitive Status Comments OT to evaluate. Pt demonstrates impaired motor planning, increased processing time   Posture    Posture Forward head position   Range of Motion (ROM)   ROM Comment BLE WFL, initially increased tone R HS    Strength   Strength Comments RUE and LE weakness. Pt reports he feels strength  is improve daily, trace RLE ms activation    Bed Mobility   Comment (Bed Mobility) CGA supine > sitting    Transfers   Transfer Safety Comments STS mod A, pt able to place RUE on walker with extra time    Gait/Stairs (Locomotion)   Comment (Gait/Stairs) Pt unable to ambulate at this time due to RLE weakness and impaired coordination   Balance   Balance Comments Good static sitting balance. Improvement from yesterday per pt    Sensory Examination   Sensory Perception Comments Intact to light touch, same side to side   Coordination   Coordination Comments impaired RUE/LE coordination   Clinical Impression   Criteria for Skilled Therapeutic Intervention yes, treatment indicated   PT  Diagnosis (PT) impaired IND wiht mobility from baseline   Influenced by the following impairments weakness, balance, activity tolerance    Functional limitations due to impairments see above   Clinical Presentation Evolving/Changing   Clinical Presentation Rationale clinical judgement   Clinical Decision Making (Complexity) moderate complexity   Therapy Frequency (PT) 2x/day   Predicted Duration of Therapy Intervention (days/wks) 1 week   Planned Therapy Interventions (PT) balance training;bed mobility training;gait training;patient/family education;strengthening;ROM (range of motion);stretching;transfer training   Risk & Benefits of therapy have been explained evaluation/treatment results reviewed;care plan/treatment goals reviewed;risks/benefits reviewed;patient   PT Discharge Planning    PT Discharge Recommendation (DC Rec) Acute Rehab Center-Motivated patient will benefit from intensive, interdisciplinary therapy.  Anticipate will be able to tolerate 3 hours of therapy per day   PT Rationale for DC Rec Pt will require ARU at [place] as pt currently A x 2 for mobility at this time, RLE weakness > RUE. Pt eager to participate. Would benefit from ARU for intense therapies to improve strength, balance, functional mobility as pt below baseline    Total Evaluation Time   Total Evaluation Time (Minutes) 15

## 2021-11-14 NOTE — PROGRESS NOTES
Notified MD at 2235 PM regarding order clarification and consult.      Spoke with: Sharif Alvarado    Orders No code status and  clarify SBP goal. if <140 or <150.    Comments: Per MD SBP goal <150, Code status to be obtained in AM.

## 2021-11-14 NOTE — PLAN OF CARE
[id number]: Pt here POD1 of left frontal parietal crani for tumor (glioma) resection by Dr. Beltrán. A&O4. Neuros q2h: slight WFD, left tongue deviation, RUE drift, finger/nose slow/deliberate on RUE, difficulty raising right shoulder, and no movement to RLE. VSS; SBP <140. No PRNs needed this shift. Attempted to stand at bedside; RLE unable to support weight, unsteady when standing. Assisted back onto edge of bed. Leans to right side when sitting up in bed. Pillows utilized to support patient to sit straight. Tolerating regular diet. Ate 75% dinner. Patient reporting 10/10 pain to anterior head, radiating to bilateral temporal areas. Decreased to 6/10 after cold packs, repositoining, and PRN oxycodone 10mg. Takes meds orally. Discharge plan pending.

## 2021-11-14 NOTE — PROGRESS NOTES
11/14/21 1527   Quick Adds   Type of Visit Initial Occupational Therapy Evaluation   Living Environment   People in home sibling(s)   Living Environment Comments Visiting sister in [place], lives in [place] normally. Plans to stay in [place] post-recovery. Pt reports he has many family members that he can stay with post recovery if needed.    Self-Care   Usual Activity Tolerance excellent   Current Activity Tolerance poor   Activity/Exercise/Self-Care Comment Pt reports indep prior to admission. Working full time in retail.    Disability/Function   Fall history within last six months no   General Information   Onset of Illness/Injury or Date of Surgery 11/12/21   Referring Physician Jacobo Olguin PA-C   Patient/Family Therapy Goal Statement (OT) increase strength and return home   Additional Occupational Profile Info/Pertinent History of Current Problem POD #2 s/p left frontal craniotomy for tumor resection   Existing Precautions/Restrictions fall;seizures   Cognitive Status Examination   Orientation Status orientation to person, place and time   Follows Commands follows one-step commands;over 90% accuracy;delayed response/completion;increased processing time needed   Cognitive Status Comments Increased processing needed at times, flat affect, converses appropriately, pt reports some word finding difficulties.    Visual Perception   Visual Impairment/Limitations corrective lenses full-time   Sensory   Sensory Quick Adds No deficits were identified   Posture   Posture protracted shoulders   Range of Motion Comprehensive   General Range of Motion upper extremity range of motion deficits identified   Comment, General Range of Motion L UE WFL, R UE good AROM with slow and incoordinated movements   Strength Comprehensive (MMT)   General Manual Muscle Testing (MMT) Assessment upper extremity strength deficits identified   Comment, General Manual Muscle Testing (MMT) Assessment L UE 5/5; R UE variable responses depending  on initiation, cn have 5/5 strength and other times 3/5    Coordination   Upper Extremity Coordination Right UE impaired   Bed Mobility   Bed Mobility supine-sit   Supine-Sit [place] (Bed Mobility) supervision   Comment (Bed Mobility) SBA into long sittin gin bed, min A needed to move R LE off bed to sit on EOB   Transfers   Transfers bed-chair transfer   Transfer Comments mod A x1 with total block of R LE   Activities of Daily Living   BADL Assessment grooming;feeding   Grooming Assessment   [place] Level (Grooming) supervision;set up   Comment (Grooming) Only uses L UE   Eating/Self Feeding   [place] Level (Feeding) supervision;set up   Comment (Feeding) Only uses L UE   Clinical Impression   Criteria for Skilled Therapeutic Interventions Met (OT) yes;meets criteria;skilled treatment is necessary   OT Diagnosis impaired ADLs and funcitonal mobility   OT Problem List-Impairments impacting ADL problems related to;activity tolerance impaired;balance;communication;coordination;mobility;range of motion (ROM);motor control;strength   Assessment of Occupational Performance 3-5 Performance Deficits   Identified Performance Deficits dressing, toileting, grooming, functional mobility, home management   Planned Therapy Interventions (OT) ADL retraining;balance training;cognition;motor coordination training;neuromuscular re-education;ROM;strengthening;transfer training;home program guidelines;progressive activity/exercise   Clinical Decision Making Complexity (OT) moderate complexity   Therapy Frequency (OT) 2x/day   Predicted Duration of Therapy 5   Risk & Benefits of therapy have been explained evaluation/treatment results reviewed;care plan/treatment goals reviewed   OT Discharge Planning    OT Discharge Recommendation (DC Rec) Acute Rehab Center-Motivated patient will benefit from intensive, interdisciplinary therapy.  Anticipate will be able to tolerate 3 hours of therapy per day   OT Rationale for DC Rec  Pt very motivated to return to baseline indep. Pt currently needing mod-max A x1 for transfers, only trace movement in R LE. Pt reports has good family support in [place] and can stay with any of them at d/c. Pt would benefit from a skilled 3 hours of therapy a day and would tolerate well.    Total Evaluation Time (Minutes)   Total Evaluation Time (Minutes) 10

## 2021-11-14 NOTE — PROVIDER NOTIFICATION
"MD Notification    Notified Person: MD    Notified Person Name: Christiano gaona     Notification Date/Time: 11/14/21 at 0930    Notification Interaction: amcom    Purpose of Notification: \"741-2 SM - Can you please place order for Physical therapy eval and also Full Code status? thank you! Karol GARDNER *[id number]\"    Orders Received:    Comments:    "

## 2021-11-14 NOTE — PROVIDER NOTIFICATION
MD Notification    Notified Person: MD    Notified Person Name: Chelsi Styles    Notification Date/Time: 11/13/21 at 1846    Notification Interaction: amcom    Purpose of Notification: [facility] 741-2 - SM - Patient reporting 10/10 pain to anterior to temporal headache. given 10 oxy with cold packs/reposition and will re-eval. Neuros uncahnged VSS and SBP < 140. Thanks! Karol GARDNER [phone]/*[phone]    Orders Received:    Comments:

## 2021-11-15 ENCOUNTER — APPOINTMENT (OUTPATIENT)
Dept: OCCUPATIONAL THERAPY | Facility: CLINIC | Age: 46
End: 2021-11-15
Attending: NEUROLOGICAL SURGERY
Payer: COMMERCIAL

## 2021-11-15 ENCOUNTER — APPOINTMENT (OUTPATIENT)
Dept: PHYSICAL THERAPY | Facility: CLINIC | Age: 46
End: 2021-11-15
Attending: NEUROLOGICAL SURGERY
Payer: COMMERCIAL

## 2021-11-15 LAB — GLUCOSE BLDC GLUCOMTR-MCNC: 115 MG/DL (ref 70–99)

## 2021-11-15 PROCEDURE — 97530 THERAPEUTIC ACTIVITIES: CPT | Mod: GP

## 2021-11-15 PROCEDURE — 120N000001 HC R&B MED SURG/OB

## 2021-11-15 PROCEDURE — 97530 THERAPEUTIC ACTIVITIES: CPT | Mod: GO

## 2021-11-15 PROCEDURE — 97112 NEUROMUSCULAR REEDUCATION: CPT | Mod: GP

## 2021-11-15 PROCEDURE — 97110 THERAPEUTIC EXERCISES: CPT | Mod: GP

## 2021-11-15 PROCEDURE — 250N000013 HC RX MED GY IP 250 OP 250 PS 637: Performed by: NURSE PRACTITIONER

## 2021-11-15 PROCEDURE — 97112 NEUROMUSCULAR REEDUCATION: CPT | Mod: GO

## 2021-11-15 PROCEDURE — 250N000012 HC RX MED GY IP 250 OP 636 PS 637: Performed by: NURSE PRACTITIONER

## 2021-11-15 PROCEDURE — 97116 GAIT TRAINING THERAPY: CPT | Mod: GP

## 2021-11-15 RX ORDER — OXYCODONE HYDROCHLORIDE 10 MG/1
10 TABLET ORAL EVERY 4 HOURS PRN
Status: ON HOLD | DISCHARGE
Start: 2021-11-15 | End: 2021-11-19

## 2021-11-15 RX ORDER — ACETAMINOPHEN 325 MG/1
650 TABLET ORAL EVERY 4 HOURS PRN
Status: ON HOLD | DISCHARGE
Start: 2021-11-15 | End: 2021-11-19

## 2021-11-15 RX ORDER — DEXAMETHASONE 2 MG/1
2 TABLET ORAL DAILY
Status: DISCONTINUED | OUTPATIENT
Start: 2021-11-26 | End: 2021-11-16 | Stop reason: HOSPADM

## 2021-11-15 RX ORDER — DEXAMETHASONE 4 MG/1
4 TABLET ORAL EVERY 12 HOURS
Status: ON HOLD | DISCHARGE
Start: 2021-11-19 | End: 2021-11-19

## 2021-11-15 RX ORDER — OXYCODONE HYDROCHLORIDE 5 MG/1
5 TABLET ORAL EVERY 4 HOURS PRN
Status: ON HOLD | DISCHARGE
Start: 2021-11-15 | End: 2021-11-19

## 2021-11-15 RX ORDER — AMOXICILLIN 250 MG
1 CAPSULE ORAL 2 TIMES DAILY
Status: ON HOLD | DISCHARGE
Start: 2021-11-15 | End: 2021-11-19

## 2021-11-15 RX ORDER — DEXAMETHASONE 4 MG/1
4 TABLET ORAL EVERY 8 HOURS
Status: ON HOLD | DISCHARGE
Start: 2021-11-15 | End: 2021-11-19

## 2021-11-15 RX ORDER — DEXAMETHASONE 4 MG/1
4 TABLET ORAL EVERY 12 HOURS SCHEDULED
Status: DISCONTINUED | OUTPATIENT
Start: 2021-11-19 | End: 2021-11-16 | Stop reason: HOSPADM

## 2021-11-15 RX ORDER — DEXAMETHASONE 4 MG/1
4 TABLET ORAL DAILY
Status: DISCONTINUED | OUTPATIENT
Start: 2021-11-23 | End: 2021-11-16 | Stop reason: HOSPADM

## 2021-11-15 RX ORDER — DEXAMETHASONE 4 MG/1
4 TABLET ORAL EVERY 8 HOURS SCHEDULED
Status: DISCONTINUED | OUTPATIENT
Start: 2021-11-15 | End: 2021-11-16 | Stop reason: HOSPADM

## 2021-11-15 RX ORDER — DEXAMETHASONE 4 MG/1
4 TABLET ORAL DAILY
Status: ON HOLD | DISCHARGE
Start: 2021-11-23 | End: 2021-11-19

## 2021-11-15 RX ORDER — DEXAMETHASONE 2 MG/1
2 TABLET ORAL DAILY
Status: ON HOLD | DISCHARGE
Start: 2021-11-26 | End: 2021-11-19

## 2021-11-15 RX ADMIN — DEXAMETHASONE 4 MG: 4 TABLET ORAL at 00:34

## 2021-11-15 RX ADMIN — LEVETIRACETAM 500 MG: 500 TABLET, FILM COATED ORAL at 21:09

## 2021-11-15 RX ADMIN — ACETAMINOPHEN 975 MG: 325 TABLET, FILM COATED ORAL at 05:20

## 2021-11-15 RX ADMIN — OXYCODONE HYDROCHLORIDE 5 MG: 5 TABLET ORAL at 08:23

## 2021-11-15 RX ADMIN — ACETAMINOPHEN 975 MG: 325 TABLET, FILM COATED ORAL at 12:26

## 2021-11-15 RX ADMIN — OXYCODONE HYDROCHLORIDE 5 MG: 5 TABLET ORAL at 08:19

## 2021-11-15 RX ADMIN — LISINOPRIL 10 MG: 10 TABLET ORAL at 08:19

## 2021-11-15 RX ADMIN — DEXAMETHASONE 4 MG: 4 TABLET ORAL at 13:32

## 2021-11-15 RX ADMIN — METHOCARBAMOL 1000 MG: 500 TABLET ORAL at 13:32

## 2021-11-15 RX ADMIN — LEVETIRACETAM 500 MG: 500 TABLET, FILM COATED ORAL at 08:19

## 2021-11-15 RX ADMIN — DEXAMETHASONE 4 MG: 4 TABLET ORAL at 21:10

## 2021-11-15 RX ADMIN — DEXAMETHASONE 4 MG: 4 TABLET ORAL at 05:20

## 2021-11-15 RX ADMIN — SENNOSIDES AND DOCUSATE SODIUM 1 TABLET: 8.6; 5 TABLET ORAL at 21:09

## 2021-11-15 RX ADMIN — OXYCODONE HYDROCHLORIDE 10 MG: 5 TABLET ORAL at 17:24

## 2021-11-15 RX ADMIN — SENNOSIDES AND DOCUSATE SODIUM 1 TABLET: 8.6; 5 TABLET ORAL at 08:19

## 2021-11-15 RX ADMIN — OXYCODONE HYDROCHLORIDE 10 MG: 5 TABLET ORAL at 12:26

## 2021-11-15 RX ADMIN — POLYETHYLENE GLYCOL 3350 17 G: 17 POWDER, FOR SOLUTION ORAL at 08:20

## 2021-11-15 ASSESSMENT — ACTIVITIES OF DAILY LIVING (ADL)
ADLS_ACUITY_SCORE: 5
ADLS_ACUITY_SCORE: 7
ADLS_ACUITY_SCORE: 5
ADLS_ACUITY_SCORE: 7
ADLS_ACUITY_SCORE: 5
ADLS_ACUITY_SCORE: 7
ADLS_ACUITY_SCORE: 5
ADLS_ACUITY_SCORE: 7
ADLS_ACUITY_SCORE: 5

## 2021-11-15 NOTE — PLAN OF CARE
Pt POD2 of left frontal parietal crani from a glioma resection by Dr. Beltrán. Neuros unchanged from yesterday: A&Ox4, intermittent WFD, RUE weakness 3/5 with positive drift and 0/5 ROM in RLE. VSS with SBP < 150. Worked with PT and OT today; able to stand well with support to right knee. Up with assist x2 w/ isabel steady. Utilizing call light appropriately. Seizure and fall precautions; no witnessed or reported seizure this shift. Tolerating regular diet. Reporting intermittent pain to incision site; decreased with rest, repositioning, and PRN oxycodone. Using bedside urinal appropriately and independently. Plan to discharge to ARU pending, continue with current POC.

## 2021-11-15 NOTE — PLAN OF CARE
"BP (!) 141/85 (BP Location: Left arm, Patient Position: Chair)   Pulse 55   Temp 98  F (36.7  C) (Oral)   Resp 16   Ht 1.803 m (5' 11\")   Wt 64.5 kg (142 lb 3.2 oz)   SpO2 100%   BMI 19.83 kg/m      POD#3 with L. Frontal cranial tumor resection. A&O X4. Neuros weak on the RUE 3/5, RLE 0. VSS  on RA. Seizure precaution at bedside, no witness seizure throughout shift.Regular diet, thin liquids. Takes pills whole. Up with A2/isabel steady. C/O pain scheduled tylenol administered. Continent of B&B, uses urinal appropriately. Pt scoring green on the Aggression Stop Light Tool. Discharge to ARU pending.  "

## 2021-11-15 NOTE — PROGRESS NOTES
"[facility]    Neurosurgery Progress Note    Date of Service (when I saw the patient): 11/15/2021     Assessment & Plan   Lang Collins Jr. is a 46 year old male who was admitted on 11/12/2021. He is s/p left frontal craniotomy for tumor resection with asleep motor mapping with Dr. Beltrán on 11/12/2021. Today he was seen lying in bed. He reports a mild headache. He denies dizziness, nausea/vomiting, and vision changes. He reports improvement in right arm strength and some \"reistence\" in his right leg.     Active Problems:    Frontal glioma (H)    Assessment: s/p left frontal craniotomy for tumor resection with asleep motor mapping    Plan:   -Continue therapies  -Decadron taper   -Pain management as needed  -Appreciate assistance from other services  -Therapies recommending ARU at discharge, appreciate assistance from  regarding placement      I have discussed the following assessment and plan Dr. Beltrán who is in agreement with initial plan and will follow up with further consultation recommendations.    Clementina Jordan, STONE  [facility] Neurosurgery  [place] [address]    Tel [phone]  Pager [phone]      Interval History   Stable. Improving slowly.    Physical Exam   Temp: 98  F (36.7  C) Temp src: Oral BP: (!) 148/90 Pulse: 100   Resp: 16 SpO2: 99 % O2 Device: None (Room air)    Vitals:    11/12/21 1119 11/13/21 0600   Weight: 146 lb (66.2 kg) 142 lb 3.2 oz (64.5 kg)     Vital Signs with Ranges  Temp:  [98  F (36.7  C)-98.7  F (37.1  C)] 98  F (36.7  C)  Pulse:  [] 100  Resp:  [16-18] 16  BP: (133-148)/(78-94) 148/90  SpO2:  [97 %-100 %] 99 %  I/O last 3 completed shifts:  In: -   Out: 350 [Urine:350]     , Blood pressure (!) 148/90, pulse 100, temperature 98  F (36.7  C), temperature source Oral, resp. rate 16, height 5' 11\" (1.803 m), weight 142 lb 3.2 oz (64.5 kg), SpO2 99 %.  142 lbs " 3.15 oz  HEENT:  Normocephalic.  PERRLA.  EOM s intact.    Heart:  No peripheral edema  Lungs:  No SOB  Skin:  Warm and dry, good capillary refill. Incision intact and approximated with staples.  Extremities:  Good radial and dorsalis pedis pulses bilaterally, no edema, cyanosis or clubbing.    NEUROLOGICAL EXAMINATION:   Mental status:  Alert and Oriented x 3, speech is fluent.  Cranial nerves:  II-XII intact.   Motor:  Left side intact, right arm able to raise arm above head, right leg unable to elevate off bed.  Sensation:  intact    Medications     niCARdipine Stopped (11/12/21 2225)     sodium chloride Stopped (11/14/21 1300)       acetaminophen  975 mg Oral Q8H     dexamethasone  4 mg Intravenous Q6H LAYA    Or     dexamethasone  4 mg Oral Q6H LAYA     levETIRAcetam  500 mg Oral BID     lisinopril  10 mg Oral QAM     polyethylene glycol  17 g Oral Daily     senna-docusate  1 tablet Oral BID     sodium chloride (PF)  3 mL Intracatheter Q8H       Data     CBC RESULTS:   Recent Labs   Lab Test 11/12/21  1126 08/13/21  1031   WBC  --  4.2   RBC  --  4.70   HGB 11.7* 14.1   HCT  --  43.0   MCV  --  92   MCH  --  30.0   MCHC  --  32.8   RDW  --  11.7   PLT  --  171     Basic Metabolic Panel:  Lab Results   Component Value Date     08/13/2021      Lab Results   Component Value Date    POTASSIUM 4.3 11/12/2021     Lab Results   Component Value Date    CHLORIDE 108 08/13/2021     Lab Results   Component Value Date    MARCY 9.2 08/13/2021     Lab Results   Component Value Date    CO2 28 08/13/2021     Lab Results   Component Value Date    BUN 10 08/13/2021     Lab Results   Component Value Date    CR 0.94 11/12/2021     Lab Results   Component Value Date     11/15/2021     08/13/2021     INR:  No results found for: INR

## 2021-11-15 NOTE — PROVIDER NOTIFICATION
"Paged Clementina, \"SW just called d/c tomorrow now. Pt c/o consistent 9/10 head pain with no relief with 10mg oxycodone x2. /101. Could give IV but it will disqualify him tomorrow. Do you want to adjust PO BPs? \"    ADDENUM: Hold off on giving IV BP meds & treat pain first. Will reassess this afternoon.   "

## 2021-11-15 NOTE — INTERIM SUMMARY
[facility] Pre-Admission Screen    Referral Source:  [place] [address] [address]  Admit date to referring facility: 11/12/2021    Physical Medicine and Rehab Consult Completed: No    Rehab Diagnosis:    Brain Dysfunction 02.1 Non-Traumatic: s/p left frontal craniotomy for tumor resection     Justification for Acute Inpatient Rehabilitation  Joss Collins is a 46-year-old male who initially presented with seizure. MRI showed a 2.1 cm left frontal non-enhancing mass concerning for low grade primary brain tumor, in the superior frontal gyrus in the expected region of the supplementary motor area. Pt admitted for planned tumor resection on 11/12/21 and is now s/p left frontal craniotomy. Pt with right sided weakness that has been stable since post op and expected. Patient also reports he will occasionally have difficulty with word findings. MRI completed with expected post operative changes. Pathology remains pending but frozen section diagnosis of Glial tissue without high grade glioma or metastasis. Difficult to tell normal brain from low grade glioma. Patient is currently stable to transfer to inpatient acute rehab.   Patient requires an intensive inpatient rehab program to address the following acute impairments:impaired ROM, impaired strength, impaired activity tolerance, impaired tone, impaired balance, impaired coordination, impaired judgement and safety awareness, impulsiveness and aphasia    Current Active Medical Management Needs/Risks for Clinical Complications  The patient requires the high level of rehabilitation physician supervision that accompanies the provision of intensive rehabilitation therapy.  The patient needs the services of the rehabilitation physician to assess the patient medically and functionally and to modify the course of treatment as needed to maximize the patient's capacity to benefit from the rehabilitation process.  Patient requires ongoing medical management and assessment of the following:    Neuro status: patient requires ongoing assessment of neuro status s/p craniotomy for tumor resection    Steroid taper: patient will need ongoing management of Decadron taper (x48 hours post-op then wean over 2 weeks time starting 11/15/202) with education in the event that pt still requires it after discharge from [facility] to home    Craniotomy incision: patient will need ongoing assessment for signs of infection/healing    AEDs: patient will need ongoing management of keppra as pt at risk for further seizures post op    Pain: patient will need assessment of post op pain with management and adjustment of medications to allow for optimal therapy participation    Patient is at risk for falls with injury, further seizures, infection, and mood/sleep disturbances s/p craniotomy and diagnosis. Per clarification from sending team the pt should not be on any DVT ppx, so will be at risk for developing DVT as he has decreased mobility.     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 Plans to stay in [place] post-recovery. Pt reports he has many family members that he can stay with post recovery if needed.     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     Level of Function: GG Scale (Section GG Functional Ability and Goals; CMS's RODARTE Version 3.0 Manual effective 10.1.2019):  PT Current Function Goals for Rehab   Bed Rolling 3 Partial/moderate assistance 6  Independent   Supine to Sit 3 Partial/moderate assistance 6 Independent   Sit to Stand 1 Dependent (A of 2) 6 Independent   Transfer 1 Dependent (A of 2; isabel stedy) 6 Independent   Ambulation 1 Dependent (A of 2) 6 Independent   Stairs Not completed 4 Supervision or touching assitance     OT Current Function Goals for Rehab   Feeding 5 Setup or clean-up assistance 6 Independent   Grooming 5 Setup or clean-up assistance 6 Independent   Bathing Not completed 4 Supervision or touching assitance   Upper Body Dressing Not completed 6 Independent   Lower Body Dressing Not completed 6 Independent   Toileting 2 Substantial/maximal assistance 6 Independent   Toilet Transfer 1 Dependent 6 Independent   Tub/Shower Transfer Not completed 4 Supervision or touching assitance   Cognition Not Assessed Independent     SLP Current Function Goals for Rehab   Swallow Not Impaired Not applicable   Communication Impaired Communicate basic needs effectively       Current Diet:  0-Thin and 7-Regular/easy to chew    Summary Statement:  Patient is participating in daily PT and OT and is making progress. Currently, pt is able to ambulate 10' + 10' L rail R solid AFO & toe sock, WC follow. Mod Ax1 R LE knee flx then hip flx for swing assist, training on quad relaxation pre hip flx given hypertonicity and spasticity. Supine-sit Mayank with rail and AA R LE, Supervision with sittng balance EOB, SBA sit- Isabel Steady with R knee stable and foot grounded. Min Ax2 R pivot with aiden-walker to chair for alarm arming (PT progressing R LE, aide providing CGA at GB). AFO & toe sock to prevent ankle roll. Pt able to use GB around shoe, mod A for RLE positioning of RLE, SBA sit>stand to isabel cortezdy. Pt able to bridge in bed to reposition and self adjust in bed. Pt reports some R hand swelling and tightness at beginning of session. Engaged in RUE functional reach and grasp fine motor task with deck of cards. x20 grasp and flipping card, x25 functional  reach laterally, and forward reach. Pt reports word finding difficulty and will need Speech Language eval at Acute Rehab.     Expected Therapies and Services Required During Inpatient Rehab Admission  Intensity of Therapy: Patient requires intensive therapies not available in a lesser level of care. Patient is motivated, making gains, and can tolerate 3 hours of therapy a day.  Physical Therapy: 60 minutes per day, 7 days a week for 16 days  Occupational Therapy: 60 minutes per day, 7 days a week for 16 days  Speech and Language Therapy: 60 minutes per day, 7 days a week for 16 days  Rehabilitation Nursing Needs: Patient requires 24 hour Rehab Nursing to manage wound care, vitals, medication education, tone management, positioning, carryover of new rehab techniques, care coordination, skin integrity, assess neurologic status, pain management, provide safe environment for patient at falls risk and edema management.    Precautions/restrictions/special needs:   Precautions: fall precautions, seizure precautions and Craniotomy precautions   Restrictions: None   Special Needs: None    Expected Level of Improvement: mod I with mobility, transfers, and ADLs. Anticipate pt will need assist for IADLs.   Expected Length of time to achieve: 16 days    Anticipated Discharge Needs:  Anticipated Discharge Destination: Other home  Anticipated Discharge Support: Family member  24/7 support available : Yes  Identified caregiver(s):  Multiple family members  Anticipated Discharge Needs: Home with outpatient therapy    Identified challenges/barriers:  None    Liaison Signature/date/time:       Physician statement of review and agreement:  I have reviewed and am in agreement of the need for IRF stay to address above functional and medical needs. In addition to above statements address, Patient requires intensive active and ongoing therapeutic intervention and multiple therapies; Patient requires medical supervision; Expected to  actively participate in the intensive rehab program; Sufficiently stable to actively participate; Expectation for measurable improvement in functional capacity or adaption to impairments.    MD signature/date/time:

## 2021-11-15 NOTE — DISCHARGE SUMMARY
[facility]    Discharge Summary  Neurosurgery    Date of Admission:  11/12/2021  Date of Discharge:  11/15/2021  Discharging Provider: Clementina Jordan  Date of Service (when I saw the patient): 11/15/21    Discharge Diagnoses   Active Problems:    Frontal glioma (H)      History of Present Illness   Lang Collins Jr. is a 46 year old male who was admitted on 11/12/2021. He is s/p left frontal craniotomy for tumor resection with asleep motor mapping with Dr. Beltrán on 11/12/2021.    Hospital Course   Lang Collins Jr. was admitted on 11/12/2021.  The following problems were addressed during his hospitalization:    Active Problems:    Frontal glioma (H)    Assessment: s/p left frontal craniotomy for tumor resection with asleep motor mapping    Plan: Discharge to rehab with decadron taper and routine postoperative cares.    I have discussed the following assessment and plan with Dr. Beltrán who is in agreement with initial plan and will follow up with further consultation recommendations.    Clementina Jordan CNP  [facility]  [address] [address]    Tel [phone]  Pager [phone]        Significant Results and Procedures   S/p craniotomy    Pending Results   These results will be followed up by Dr. Kuldeep Beltrán  Unresulted Labs Ordered in the Past 30 Days of this Admission     Date and Time Order Name Status Description    11/12/2021  3:30 PM Surgical Pathology Exam Preliminary           Code Status   Full Code    Primary Care Physician   Physician No Ref-Primary    Physical Exam   Temp: 98  F (36.7  C) Temp src: Oral BP: (!) 155/101 Pulse: 99   Resp: 16 SpO2: 100 % O2 Device: None (Room air)    Vitals:    11/12/21 1119 11/13/21 0600   Weight: 146 lb (66.2 kg) 142 lb 3.2 oz (64.5 kg)     Vital Signs with Ranges  Temp:  [98  F (36.7  C)-98.7  F (37.1  C)] 98  F (36.7  C)  Pulse:  [] 99  Resp:   [16-18] 16  BP: (133-155)/() 155/101  SpO2:  [97 %-100 %] 100 %  I/O last 3 completed shifts:  In: -   Out: 350 [Urine:350]    Constitutional: Awake, alert, cooperative, no apparent distress, and appears stated age.  Eyes: Lids and lashes normal, pupils equal, round and reactive to light, extra ocular muscles intact  ENT: Normocephalic, without obvious abnormality, atraumatic  Respiratory: No increased work of breathing  Skin: No bruising or bleeding, normal skin color, texture, turgor, no redness, warmth, or swelling.  Incision with staples intact, minimal drainage, open to air.  Musculoskeletal: There is no redness, warmth, or swelling of the joints.  Full range of motion noted.    Neurologic: Awake, alert, oriented to name, place and time.  Cranial nerves II-XII are grossly intact.  Motor is 5 out of 5 on the left, right arm able to raise above head, right leg unable to elevate off bed.   Neuropsychiatric: Calm, normal eye contact, alert, normal affect, oriented to self, place, time and situation, memory for past and recent events intact and thought process normal.       Time Spent on this Encounter   I, Clementina Jordan NP, personally saw the patient today and spent less than or equal to 30 minutes discharging this patient.    Discharge Disposition   Discharged to rehabilitation facility  Condition at discharge: Stable    Consultations This Hospital Stay   PHYSICAL THERAPY ADULT IP CONSULT  SOCIAL WORK IP CONSULT  OCCUPATIONAL THERAPY ADULT IP CONSULT  CARE MANAGEMENT / SOCIAL WORK IP CONSULT  PHYSICAL THERAPY ADULT IP CONSULT  OCCUPATIONAL THERAPY ADULT IP CONSULT    Discharge Orders      General info for SNF    Length of Stay Estimate: Short Term Care: Estimated # of Days <30  Condition at Discharge: Stable  Level of care:skilled   Rehabilitation Potential: Good  Admission H&P remains valid and up-to-date: Yes  Recent Chemotherapy: N/A  Use Nursing Home Standing Orders: Yes     Gustavo  instructions    Give two-step Mantoux (PPD) Per Facility Policy Yes     Follow Up and recommended labs and tests    Please follow up at [facility] in 2 weeks for staple removal and in 6 weeks. Staple removal may be completed at rehab.  Please call the clinic at [phone] to schedule your appointment.     Reason for your hospital stay    Brain surgery     Wound care (specify)    Keep your incision clean and dry at all times.  OK to shower on postop day 3 and allow water to run over incision, pat dry after shower.  No bathing swimming or submerging in water.  Call immediately or come to ED if any drainage occurs, or you develop new pain, redness, or swelling.     Activity - Up with nursing assistance     Physical Therapy Adult Consult    Evaluate and treat as clinically indicated.    Reason:  s/p craniotomy for tumor     Occupational Therapy Adult Consult    Evaluate and treat as clinically indicated.    Reason:  s/p craniotomy for tumor     Diet    Follow this diet upon discharge: Orders Placed This Encounter      Regular Diet Adult     Discharge Medications   Current Discharge Medication List      START taking these medications    Details   acetaminophen (TYLENOL) 325 MG tablet Take 2 tablets (650 mg) by mouth every 4 hours as needed for other (For optimal non-opioid multimodal pain management to improve pain control.)    Associated Diagnoses: S/P craniotomy      !! dexamethasone (DECADRON) 2 MG tablet Take 1 tablet (2 mg) by mouth daily    Associated Diagnoses: S/P craniotomy      !! dexamethasone (DECADRON) 4 MG tablet Take 1 tablet (4 mg) by mouth every 8 hours    Associated Diagnoses: S/P craniotomy      !! dexamethasone (DECADRON) 4 MG tablet Take 1 tablet (4 mg) by mouth every 12 hours    Associated Diagnoses: S/P craniotomy      !! dexamethasone (DECADRON) 4 MG tablet Take 1 tablet (4 mg) by mouth daily    Associated Diagnoses: S/P craniotomy      !! oxyCODONE (ROXICODONE) 10 MG tablet  Take 1 tablet (10 mg) by mouth every 4 hours as needed for severe pain ((pain rating 7-10))    Associated Diagnoses: S/P craniotomy      !! oxyCODONE (ROXICODONE) 5 MG tablet Take 1 tablet (5 mg) by mouth every 4 hours as needed    Associated Diagnoses: S/P craniotomy      senna-docusate (SENOKOT-S/PERICOLACE) 8.6-50 MG tablet Take 1 tablet by mouth 2 times daily    Associated Diagnoses: S/P craniotomy       !! - Potential duplicate medications found. Please discuss with provider.      CONTINUE these medications which have NOT CHANGED    Details   levETIRAcetam (KEPPRA) 500 MG tablet Take 500 mg by mouth 2 times daily      lisinopril (ZESTRIL) 10 MG tablet Take 10 mg by mouth every morning         STOP taking these medications       nicotine (NICODERM CQ) 7 MG/24HR 24 hr patch Comments:   Reason for Stopping:         nicotine (NICORETTE) 2 MG gum Comments:   Reason for Stopping:             Allergies   No Known Allergies

## 2021-11-15 NOTE — CONSULTS
Care Management Initial Consult    General Information  Assessment completed with: Patient,    Type of CM/SW Visit: Initial Assessment    Primary Care Provider verified and updated as needed: Yes   Readmission within the last 30 days: no previous admission in last 30 days      Reason for Consult: discharge planning  Advance Care Planning:            Communication Assessment  Patient's communication style: spoken language (English or Bilingual)    Hearing Difficulty or Deaf: no   Wear Glasses or Blind: no    Cognitive  Cognitive/Neuro/Behavioral: .WDL except  Level of Consciousness: alert  Arousal Level: opens eyes spontaneously  Orientation: oriented x 4  Mood/Behavior: calm,cooperative  Best Language: 0 - No aphasia  Speech: spontaneous,clear    Living Environment:   People in home: child(jeremias), dependent,spouse     Current living Arrangements: apartment      Able to return to prior arrangements: yes       Family/Social Support:  Care provided by: self  Provides care for: child(jeremias)  Marital Status:   Sibling(s),Partner,Children    [place]     Description of Support System: Supportive,Involved    Support Assessment: Adequate family and caregiver support,Adequate social supports    Current Resources:   Patient receiving home care services:       Community Resources:    Equipment currently used at home: none  Supplies currently used at home:      Employment/Financial:  Employment Status:          Financial Concerns: No concerns identified   Referral to Financial Counselor: No       Lifestyle & Psychosocial Needs:  Social Determinants of Health     Tobacco Use: High Risk     Smoking Tobacco Use: Current Every Day Smoker     Smokeless Tobacco Use: Never Used   Alcohol Use: Not on file   Financial Resource Strain: Not on file   Food Insecurity: Not on file   Transportation Needs: Not on file   Physical Activity: Not on file   Stress: Not on file   Social Connections: Not on file   Intimate Partner Violence: Not on  file   Depression: Not on file   Housing Stability: Not on file       Functional Status:  Prior to admission patient needed assistance:              Mental Health Status:          Chemical Dependency Status:                Values/Beliefs:  Spiritual, Cultural Beliefs, Confucianist Practices, Values that affect care:                 Additional Information:  Writer received consult for discharge planning. Writer reviewed notes and patient would be a good candidate. Margarita from  ARU met with patient and accepted patient for rehab. Writer met with patient this afternoon and explained more of what ARU is and plan for discharge. Patient can discharge tomorrow once insurance auth is complete. Tentative ride set up for 12pm on 11/16.    ADELA Lind

## 2021-11-15 NOTE — PLAN OF CARE
Reason for Admission: POD# 3 crani resection    Cognitive/Mentation: A/Ox 4  Neuros/CMS: Intact ex RUE hemiparesis (3/5), RLE hemiplegia (0/5).  VS: stable ex HTN--treating pain & will reassess per neurosurgery.   GI: BS active, + flatus, last BM 11/12/21. Continent. Bowel meds given. Denies constipation.  : Voiding. Continent.  Pulmonary: LS clear.  Pain: severe head pain, managed with oxycodone, Robaxin, & tylenol. Declined ice.    Drains: none  Skin: Head incision with staples BENJI, WNL.  Activity: Assist x 2 with isabel steady.  Diet: Regular with thin liquids. Takes pills whole.     Therapies recs: ARU  Discharge: tomorrow at 1100    End of shift summary: monitor BP closely    ADDENDUM 7660-5523:  No neuro changes. BP improved--now WNL. Pain improved slightly--will continue to manage.

## 2021-11-16 ENCOUNTER — APPOINTMENT (OUTPATIENT)
Dept: PHYSICAL THERAPY | Facility: CLINIC | Age: 46
End: 2021-11-16
Attending: NEUROLOGICAL SURGERY
Payer: COMMERCIAL

## 2021-11-16 ENCOUNTER — APPOINTMENT (OUTPATIENT)
Dept: PHYSICAL THERAPY | Facility: CLINIC | Age: 46
End: 2021-11-16
Attending: PHYSICAL MEDICINE & REHABILITATION
Payer: COMMERCIAL

## 2021-11-16 ENCOUNTER — HOSPITAL ENCOUNTER (INPATIENT)
Facility: CLINIC | Age: 46
LOS: 4 days | Discharge: HOME | End: 2021-11-20
Attending: PHYSICAL MEDICINE & REHABILITATION | Admitting: PHYSICAL MEDICINE & REHABILITATION
Payer: COMMERCIAL

## 2021-11-16 VITALS
OXYGEN SATURATION: 98 % | DIASTOLIC BLOOD PRESSURE: 75 MMHG | SYSTOLIC BLOOD PRESSURE: 129 MMHG | WEIGHT: 142.2 LBS | RESPIRATION RATE: 16 BRPM | BODY MASS INDEX: 19.91 KG/M2 | HEIGHT: 71 IN | TEMPERATURE: 98.8 F | HEART RATE: 75 BPM

## 2021-11-16 DIAGNOSIS — C71.1: ICD-10-CM

## 2021-11-16 DIAGNOSIS — Z98.890 S/P CRANIOTOMY: ICD-10-CM

## 2021-11-16 DIAGNOSIS — I10 HYPERTENSION, UNSPECIFIED TYPE: ICD-10-CM

## 2021-11-16 DIAGNOSIS — R51.9 NONINTRACTABLE EPISODIC HEADACHE, UNSPECIFIED HEADACHE TYPE: Primary | ICD-10-CM

## 2021-11-16 DIAGNOSIS — K59.03 DRUG-INDUCED CONSTIPATION: ICD-10-CM

## 2021-11-16 PROCEDURE — 97116 GAIT TRAINING THERAPY: CPT | Mod: GP

## 2021-11-16 PROCEDURE — 97530 THERAPEUTIC ACTIVITIES: CPT | Mod: GP

## 2021-11-16 PROCEDURE — 250N000013 HC RX MED GY IP 250 OP 250 PS 637: Performed by: NURSE PRACTITIONER

## 2021-11-16 PROCEDURE — 97162 PT EVAL MOD COMPLEX 30 MIN: CPT | Mod: GP

## 2021-11-16 PROCEDURE — 128N000003 HC R&B REHAB

## 2021-11-16 PROCEDURE — 97112 NEUROMUSCULAR REEDUCATION: CPT | Mod: GP

## 2021-11-16 PROCEDURE — 250N000012 HC RX MED GY IP 250 OP 636 PS 637: Performed by: STUDENT IN AN ORGANIZED HEALTH CARE EDUCATION/TRAINING PROGRAM

## 2021-11-16 PROCEDURE — 250N000013 HC RX MED GY IP 250 OP 250 PS 637: Performed by: STUDENT IN AN ORGANIZED HEALTH CARE EDUCATION/TRAINING PROGRAM

## 2021-11-16 PROCEDURE — 99222 1ST HOSP IP/OBS MODERATE 55: CPT | Mod: GC | Performed by: PHYSICAL MEDICINE & REHABILITATION

## 2021-11-16 PROCEDURE — 250N000012 HC RX MED GY IP 250 OP 636 PS 637: Performed by: NURSE PRACTITIONER

## 2021-11-16 PROCEDURE — 97110 THERAPEUTIC EXERCISES: CPT | Mod: GP

## 2021-11-16 RX ORDER — DEXAMETHASONE 1 MG
1 TABLET ORAL 2 TIMES DAILY
Status: CANCELLED | OUTPATIENT
Start: 2021-11-16

## 2021-11-16 RX ORDER — DEXAMETHASONE 1 MG
4 TABLET ORAL EVERY 8 HOURS
Status: COMPLETED | OUTPATIENT
Start: 2021-11-16 | End: 2021-11-19

## 2021-11-16 RX ORDER — NALOXONE HYDROCHLORIDE 0.4 MG/ML
0.4 INJECTION, SOLUTION INTRAMUSCULAR; INTRAVENOUS; SUBCUTANEOUS
Status: DISCONTINUED | OUTPATIENT
Start: 2021-11-16 | End: 2021-11-20 | Stop reason: HOSPADM

## 2021-11-16 RX ORDER — BISACODYL 10 MG
10 SUPPOSITORY, RECTAL RECTAL DAILY PRN
Status: DISCONTINUED | OUTPATIENT
Start: 2021-11-16 | End: 2021-11-20 | Stop reason: HOSPADM

## 2021-11-16 RX ORDER — DEXAMETHASONE 1 MG
1 TABLET ORAL DAILY
Status: CANCELLED | OUTPATIENT
Start: 2021-11-16

## 2021-11-16 RX ORDER — POLYETHYLENE GLYCOL 3350 17 G/17G
17 POWDER, FOR SOLUTION ORAL DAILY
Status: DISCONTINUED | OUTPATIENT
Start: 2021-11-17 | End: 2021-11-20 | Stop reason: HOSPADM

## 2021-11-16 RX ORDER — ONDANSETRON 4 MG/1
4 TABLET, ORALLY DISINTEGRATING ORAL EVERY 6 HOURS PRN
Status: DISCONTINUED | OUTPATIENT
Start: 2021-11-16 | End: 2021-11-20 | Stop reason: HOSPADM

## 2021-11-16 RX ORDER — AMOXICILLIN 250 MG
1-2 CAPSULE ORAL 2 TIMES DAILY
Status: DISCONTINUED | OUTPATIENT
Start: 2021-11-16 | End: 2021-11-20 | Stop reason: HOSPADM

## 2021-11-16 RX ORDER — OXYCODONE HYDROCHLORIDE 10 MG/1
10 TABLET ORAL EVERY 4 HOURS PRN
Status: DISCONTINUED | OUTPATIENT
Start: 2021-11-16 | End: 2021-11-18

## 2021-11-16 RX ORDER — NALOXONE HYDROCHLORIDE 0.4 MG/ML
0.2 INJECTION, SOLUTION INTRAMUSCULAR; INTRAVENOUS; SUBCUTANEOUS
Status: DISCONTINUED | OUTPATIENT
Start: 2021-11-16 | End: 2021-11-20 | Stop reason: HOSPADM

## 2021-11-16 RX ORDER — DEXAMETHASONE 1 MG
4 TABLET ORAL DAILY
Status: DISCONTINUED | OUTPATIENT
Start: 2021-11-23 | End: 2021-11-20 | Stop reason: HOSPADM

## 2021-11-16 RX ORDER — DEXAMETHASONE 1 MG
2 TABLET ORAL DAILY
Status: DISCONTINUED | OUTPATIENT
Start: 2021-11-26 | End: 2021-11-20 | Stop reason: HOSPADM

## 2021-11-16 RX ORDER — METHOCARBAMOL 500 MG/1
1000 TABLET, FILM COATED ORAL EVERY 6 HOURS PRN
Status: DISCONTINUED | OUTPATIENT
Start: 2021-11-16 | End: 2021-11-20 | Stop reason: HOSPADM

## 2021-11-16 RX ORDER — DEXAMETHASONE SODIUM PHOSPHATE 4 MG/ML
2 INJECTION, SOLUTION INTRA-ARTICULAR; INTRALESIONAL; INTRAMUSCULAR; INTRAVENOUS; SOFT TISSUE DAILY
Status: DISCONTINUED | OUTPATIENT
Start: 2021-11-27 | End: 2021-11-16

## 2021-11-16 RX ORDER — DEXAMETHASONE SODIUM PHOSPHATE 4 MG/ML
4 INJECTION, SOLUTION INTRA-ARTICULAR; INTRALESIONAL; INTRAMUSCULAR; INTRAVENOUS; SOFT TISSUE EVERY 12 HOURS
Status: DISCONTINUED | OUTPATIENT
Start: 2021-11-20 | End: 2021-11-16

## 2021-11-16 RX ORDER — ACETAMINOPHEN 325 MG/1
325-975 TABLET ORAL EVERY 4 HOURS PRN
Status: DISCONTINUED | OUTPATIENT
Start: 2021-11-16 | End: 2021-11-20 | Stop reason: HOSPADM

## 2021-11-16 RX ORDER — DEXAMETHASONE 4 MG/1
4 TABLET ORAL EVERY 8 HOURS SCHEDULED
Status: CANCELLED | OUTPATIENT
Start: 2021-11-16 | End: 2021-11-21

## 2021-11-16 RX ORDER — LISINOPRIL 10 MG/1
10 TABLET ORAL EVERY MORNING
Status: DISCONTINUED | OUTPATIENT
Start: 2021-11-17 | End: 2021-11-18

## 2021-11-16 RX ORDER — DEXAMETHASONE SODIUM PHOSPHATE 4 MG/ML
4 INJECTION, SOLUTION INTRA-ARTICULAR; INTRALESIONAL; INTRAMUSCULAR; INTRAVENOUS; SOFT TISSUE DAILY
Status: DISCONTINUED | OUTPATIENT
Start: 2021-11-24 | End: 2021-11-16

## 2021-11-16 RX ORDER — DEXAMETHASONE 1 MG
4 TABLET ORAL EVERY 12 HOURS
Status: DISCONTINUED | OUTPATIENT
Start: 2021-11-19 | End: 2021-11-20 | Stop reason: HOSPADM

## 2021-11-16 RX ORDER — HYDRALAZINE HYDROCHLORIDE 10 MG/1
10 TABLET, FILM COATED ORAL 4 TIMES DAILY PRN
Status: DISCONTINUED | OUTPATIENT
Start: 2021-11-16 | End: 2021-11-20 | Stop reason: HOSPADM

## 2021-11-16 RX ORDER — DEXAMETHASONE 2 MG/1
2 TABLET ORAL 2 TIMES DAILY
Status: CANCELLED | OUTPATIENT
Start: 2021-11-21

## 2021-11-16 RX ORDER — AMOXICILLIN 250 MG
1 CAPSULE ORAL 2 TIMES DAILY
Status: DISCONTINUED | OUTPATIENT
Start: 2021-11-16 | End: 2021-11-16

## 2021-11-16 RX ORDER — LEVETIRACETAM 500 MG/1
500 TABLET ORAL 2 TIMES DAILY
Status: DISCONTINUED | OUTPATIENT
Start: 2021-11-16 | End: 2021-11-20 | Stop reason: HOSPADM

## 2021-11-16 RX ORDER — DEXAMETHASONE SODIUM PHOSPHATE 4 MG/ML
4 INJECTION, SOLUTION INTRA-ARTICULAR; INTRALESIONAL; INTRAMUSCULAR; INTRAVENOUS; SOFT TISSUE EVERY 8 HOURS
Status: DISCONTINUED | OUTPATIENT
Start: 2021-11-16 | End: 2021-11-16

## 2021-11-16 RX ORDER — CALCIUM CARBONATE 500 MG/1
500-1000 TABLET, CHEWABLE ORAL 4 TIMES DAILY PRN
Status: DISCONTINUED | OUTPATIENT
Start: 2021-11-16 | End: 2021-11-20 | Stop reason: HOSPADM

## 2021-11-16 RX ORDER — OXYCODONE HYDROCHLORIDE 5 MG/1
5 TABLET ORAL EVERY 4 HOURS PRN
Status: DISCONTINUED | OUTPATIENT
Start: 2021-11-16 | End: 2021-11-18

## 2021-11-16 RX ADMIN — LEVETIRACETAM 500 MG: 500 TABLET, FILM COATED ORAL at 08:57

## 2021-11-16 RX ADMIN — OXYCODONE HYDROCHLORIDE 10 MG: 5 TABLET ORAL at 08:57

## 2021-11-16 RX ADMIN — LEVETIRACETAM 500 MG: 500 TABLET, FILM COATED ORAL at 20:29

## 2021-11-16 RX ADMIN — SENNOSIDES AND DOCUSATE SODIUM 1 TABLET: 8.6; 5 TABLET ORAL at 08:57

## 2021-11-16 RX ADMIN — DEXAMETHASONE 4 MG: 1 TABLET ORAL at 16:42

## 2021-11-16 RX ADMIN — DOCUSATE SODIUM AND SENNOSIDES 2 TABLET: 8.6; 5 TABLET ORAL at 20:29

## 2021-11-16 RX ADMIN — OXYCODONE HYDROCHLORIDE 10 MG: 5 TABLET ORAL at 20:34

## 2021-11-16 RX ADMIN — LISINOPRIL 10 MG: 10 TABLET ORAL at 08:57

## 2021-11-16 RX ADMIN — DEXAMETHASONE 4 MG: 4 TABLET ORAL at 06:42

## 2021-11-16 RX ADMIN — DEXAMETHASONE 4 MG: 1 TABLET ORAL at 23:58

## 2021-11-16 ASSESSMENT — ACTIVITIES OF DAILY LIVING (ADL)
ADLS_ACUITY_SCORE: 7
ADLS_ACUITY_SCORE: 7
ADLS_ACUITY_SCORE: 12
ADLS_ACUITY_SCORE: 7
ADLS_ACUITY_SCORE: 12
ADLS_ACUITY_SCORE: 7
ADLS_ACUITY_SCORE: 7
ADLS_ACUITY_SCORE: 12
ADLS_ACUITY_SCORE: 12
ADLS_ACUITY_SCORE: 7
ADLS_ACUITY_SCORE: 7
ADLS_ACUITY_SCORE: 13
ADLS_ACUITY_SCORE: 13
ADLS_ACUITY_SCORE: 7
ADLS_ACUITY_SCORE: 13
ADLS_ACUITY_SCORE: 13
ADLS_ACUITY_SCORE: 7
ADLS_ACUITY_SCORE: 6
ADLS_ACUITY_SCORE: 7
ADLS_ACUITY_SCORE: 10
ADLS_ACUITY_SCORE: 13

## 2021-11-16 NOTE — PLAN OF CARE
Reason for Admission: POD 4 crani    Cognitive/Mentation: A/Ox 4  Neuros/CMS: Intact ex RUE 3/5, RLE no contraction, R drift  VS: stable. Tele: n/a.  GI: BS active x4, passing flatus, last BM 11/12. Continent.  : Voiding without difficulty. Continent.  Pulmonary: LS clear throughout.  Pain: reported 7/10, oxy given improvement in pain.     Drains: n/a  Skin: incision BENJI  Activity: Assist x 2 with sarasteady.  Diet: regular with thin liquids. Takes pills whole with water.       End of shift summary: Pt discharged to ARU. Called and report given to ARU. Pt stable at time of discharge.
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
## 2021-11-16 NOTE — LETTER
November 19, 2021    To whom it may concern,    Mr. Lang Collins underwent a brain tumor resection 11/12/2021, and will need to be off from work for an unclear duration, at least 3 months. He will need further evaluation as an outpatient to determine when he can return to work.    Rachel Howard MD  Physical Medicine & Rehabilitation

## 2021-11-16 NOTE — DISCHARGE SUMMARY
[facility]    Discharge Summary   [facility]    Date of Admission:  11/12/2021  Date of Discharge:  11/16/2021  Discharging Provider: Chelsi Styles  Date of Service (when I saw the patient): 11/16/21    Discharge Diagnoses   Active Problems:    Frontal glioma (H)      History of Present Illness    46-year-old male presented with seizure.  MRI showed a 2.1 cm left frontal non-enhancing mass concerning for low grade primary brain tumor, in the superior frontal gyrus in the expected region of the supplementary motor area.  We discussed the role for surgery to establish pathology and to resect the mass.  We also discussed the likelihood of post-op weakness related to surgical manipulation in the supplementary motor area.  Risks, benefits, indications, and alternatives were discussed with the patient's family in detail.  All their questions were answered, and they wished to proceed with surgery.    Hospital Course   Lang Collins Jr. was admitted on 11/12/2021. 46-year-old male presented with seizure.  MRI showed a 2.1 cm left frontal non-enhancing mass concerning for low grade primary brain tumor, in the superior frontal gyrus in the expected region of the supplementary motor area.  We discussed the role for surgery to establish pathology and to resect the mass.  We also discussed the likelihood of post-op weakness related to surgical manipulation in the supplementary motor area.  Risks, benefits, indications, and alternatives were discussed with the patient's family in detail.  All their questions were answered, and they wished to proceed with surgery.    On 11/12/2021, patient underwent left frontal-parietal craniotomy for resection of intra-axial brain tumor with Dr Beltrán. No intraoperative complications.  ml. Patient admitted for pain management, neurologic monitoring, wound care. Patient meeting all discharge to ARU on 11/16/2021. Patient was cleared by  Neurosurgery for discharge to ARU in stable condition.     I have discussed the following assessment and plan with Dr. Beltrán who is in agreement.    Chelsi HILL [facility]  [address] [address]    Tel [phone]    Significant Results and Procedures   Pending pathology     Pending Results   These results will be followed up by Dr. Beltrán and team   Unresulted Labs Ordered in the Past 30 Days of this Admission     Date and Time Order Name Status Description    11/12/2021  3:30 PM Surgical Pathology Exam Preliminary           Code Status   Full Code    Primary Care Physician   Physician No Ref-Primary    Physical Exam   Temp: 98.8  F (37.1  C) Temp src: Oral BP: 129/75 Pulse: 75   Resp: 16 SpO2: 98 % O2 Device: None (Room air)    Vitals:    11/12/21 1119 11/13/21 0600   Weight: 146 lb (66.2 kg) 142 lb 3.2 oz (64.5 kg)     Vital Signs with Ranges  Temp:  [98  F (36.7  C)-98.8  F (37.1  C)] 98.8  F (37.1  C)  Pulse:  [56-99] 75  Resp:  [14-16] 16  BP: (129-159)/() 129/75  SpO2:  [97 %-100 %] 98 %  I/O last 3 completed shifts:  In: -   Out: 450 [Urine:450]    Awake, alert, appropriate   II-XII grossly intact  Full strength LUE and LLE   Improved strength at right deltoid (3+/5), tricep (3/5) and bicep (3/5), hand grasp 5/5  Improved strength at right hip flexor with physical therapy during ambulating with assistance     Time Spent on this Encounter   IChelsi PA-C, personally saw the patient today and spent less than or equal to 30 minutes discharging this patient.    Discharge Disposition   Discharged to rehabilitation facility  Condition at discharge: Stable    Consultations This Hospital Stay   PHYSICAL THERAPY ADULT IP CONSULT  SOCIAL WORK IP CONSULT  OCCUPATIONAL THERAPY ADULT IP CONSULT  CARE MANAGEMENT / SOCIAL WORK IP CONSULT  PHYSICAL THERAPY ADULT IP CONSULT  OCCUPATIONAL THERAPY ADULT IP CONSULT    Discharge  Orders      General info for SNF    Length of Stay Estimate: Short Term Care: Estimated # of Days <30  Condition at Discharge: Stable  Level of care:skilled   Rehabilitation Potential: Good  Admission H&P remains valid and up-to-date: Yes  Recent Chemotherapy: N/A  Use Nursing Home Standing Orders: Yes     Mantoux instructions    Give two-step Mantoux (PPD) Per Facility Policy Yes     Follow Up and recommended labs and tests    Please follow up at [facility] in 2 weeks for staple removal and in 6 weeks. Staple removal may be completed at rehab.  Please call the clinic at [phone] to schedule your appointment.     Reason for your hospital stay    Brain surgery     Wound care (specify)    Keep your incision clean and dry at all times.  OK to shower on postop day 3 and allow water to run over incision, pat dry after shower.  No bathing swimming or submerging in water.  Call immediately or come to ED if any drainage occurs, or you develop new pain, redness, or swelling.     Activity - Up with nursing assistance     Physical Therapy Adult Consult    Evaluate and treat as clinically indicated.    Reason:  s/p craniotomy for tumor     Occupational Therapy Adult Consult    Evaluate and treat as clinically indicated.    Reason:  s/p craniotomy for tumor     Diet    Follow this diet upon discharge: Orders Placed This Encounter      Regular Diet Adult     Discharge Medications   Current Discharge Medication List      START taking these medications    Details   acetaminophen (TYLENOL) 325 MG tablet Take 2 tablets (650 mg) by mouth every 4 hours as needed for other (For optimal non-opioid multimodal pain management to improve pain control.)    Associated Diagnoses: S/P craniotomy      !! dexamethasone (DECADRON) 2 MG tablet Take 1 tablet (2 mg) by mouth daily    Associated Diagnoses: S/P craniotomy      !! dexamethasone (DECADRON) 4 MG tablet Take 1 tablet (4 mg) by mouth every 8 hours    Associated  Diagnoses: S/P craniotomy      !! dexamethasone (DECADRON) 4 MG tablet Take 1 tablet (4 mg) by mouth every 12 hours    Associated Diagnoses: S/P craniotomy      !! dexamethasone (DECADRON) 4 MG tablet Take 1 tablet (4 mg) by mouth daily    Associated Diagnoses: S/P craniotomy      !! oxyCODONE (ROXICODONE) 10 MG tablet Take 1 tablet (10 mg) by mouth every 4 hours as needed for severe pain ((pain rating 7-10))    Associated Diagnoses: S/P craniotomy      !! oxyCODONE (ROXICODONE) 5 MG tablet Take 1 tablet (5 mg) by mouth every 4 hours as needed    Associated Diagnoses: S/P craniotomy      senna-docusate (SENOKOT-S/PERICOLACE) 8.6-50 MG tablet Take 1 tablet by mouth 2 times daily    Associated Diagnoses: S/P craniotomy       !! - Potential duplicate medications found. Please discuss with provider.      CONTINUE these medications which have NOT CHANGED    Details   levETIRAcetam (KEPPRA) 500 MG tablet Take 500 mg by mouth 2 times daily      lisinopril (ZESTRIL) 10 MG tablet Take 10 mg by mouth every morning         STOP taking these medications       nicotine (NICODERM CQ) 7 MG/24HR 24 hr patch Comments:   Reason for Stopping:         nicotine (NICORETTE) 2 MG gum Comments:   Reason for Stopping:             Allergies   No Known Allergies

## 2021-11-16 NOTE — PLAN OF CARE
Occupational Therapy Discharge Summary    Reason for therapy discharge:    Discharged to acute rehabilitation facility.    Progress towards therapy goal(s). See goals on Care Plan in [facility] electronic health record for goal details.  Goals partially met.  Barriers to achieving goals:   discharge from facility.    Therapy recommendation(s):    Continued therapy is recommended.  Rationale/Recommendations:  to advance safety and indep with ADL/IADL.       Physical Therapy    Facility/Department: [address] NURSING  Initial Assessment    NAME: Gregorio Johnson  : 1927  MRN: [id number]    Date of Service: 2020    Discharge Recommendations:Shilpa Castañeda scored a  on the AM-PAC short mobility form. Current research shows that an AM-PAC score of 17 or less is typically not associated with a discharge to the patient's home setting. Based on the patients AM-PAC score and their current functional mobility deficits, it is recommended that the patient have 3-5 sessions per week of Physical Therapy at d/c to increase the patients independence. PT Equipment Recommendations  Equipment Needed: No    Assessment   Body structures, Functions, Activity limitations: Decreased functional mobility ; Decreased ADL status; Decreased strength;Decreased safe awareness;Decreased cognition;Decreased endurance;Decreased balance;Decreased posture  Assessment: Pt presents as below her baseline function. Pt would benefit from skilled PT services to promote safe return to PLOF. Prognosis: Good;Fair  Decision Making: Medium Complexity  PT Education: Goals;PT Role;Plan of Care  Patient Education: D/C recommendations  REQUIRES PT FOLLOW UP: Yes  Activity Tolerance  Activity Tolerance: Patient limited by cognitive status       Patient Diagnosis(es): The encounter diagnosis was Urinary tract infection without hematuria, site unspecified. has a past medical history of Arthritis, Cancer ([address] Hypercholesterolemia, Hypertension, Neuromuscular disorder ([address] Neuropathy, Psychiatric problem, Seizures ([address] Thyroid disease, and Unspecified cerebral artery occlusion with cerebral infarction. has a past surgical history that includes Tonsillectomy; Hysterectomy; Thyroid surgery; Cholecystectomy; fracture surgery; Colonoscopy; joint replacement (Left, 09); skin biopsy; Cataract removal (Bilateral, );  Inner ear surgery (Right, 2015); and Femur fracture surgery (Right, 07/17/2018). Restrictions  Restrictions/Precautions  Restrictions/Precautions: Fall Risk, Contact Precautions(High fall risk; MRSA)  Position Activity Restriction  Other position/activity restrictions: Javier Harper is a 82 y.o. female who presents with fatigue. Her daughter states that she has been so weak that she is unable to take care of her activities of daily life. Is hard for her to stand up and she is been in bed all day. She states that she has been a little bit confused as well. Vision/Hearing  Vision: Impaired  Hearing: Exceptions to [facility]  Hearing Exceptions: Hard of hearing/hearing concerns     Subjective  General  Chart Reviewed: Yes  Patient assessed for rehabilitation services?: Yes  Response To Previous Treatment: Not applicable  Family / Caregiver Present: No  Diagnosis: Complicated UTI  Follows Commands: Within Functional Limits  General Comment  Comments: Pt supine in bed upon arrival.  Subjective  Subjective: Pt agreeable to PT/OT eval  Pain Screening  Patient Currently in Pain: Denies  Vital Signs  Patient Currently in Pain: Denies       Orientation  Orientation  Overall Orientation Status: Within Functional Limits(Pt answers all orientation questions appropriately, however, during conversation, reveals confusion, asking where her children are or if therapists had seen them.  At end of session, pt heard speaking to \"Patrick\" which family reports as dog at home)  Social/Functional History  Social/Functional History  Lives With: Daughter  Type of Home: House  Home Layout: Multi-level, Bed/Bath upstairs  Home Access: Stairs to enter with rails  Entrance Stairs - Number of Steps: 12 DARIEN  Entrance Stairs - Rails: Right  Bathroom Shower/Tub: Tub/Shower unit  Bathroom Toilet: Handicap height  Bathroom Equipment: Grab bars in shower, Tub transfer bench, Hand-held shower, Grab bars around toilet  Bathroom Accessibility: Walker accessible  Home Equipment: 4 wheeled walker, Quad cane, Alert

## 2021-11-16 NOTE — PROGRESS NOTES
Care Management Discharge Note    Discharge Date: 11/16/2021       Discharge Disposition: Acute Rehab    Discharge Services: None    Discharge DME: None    Discharge Transportation: health plan transportation- [facility]     Private pay costs discussed: transportation costs    PAS Confirmation Code:    Patient/family educated on Medicare website which has current facility and service quality ratings:      Education Provided on the Discharge Plan:  Yes  Persons Notified of Discharge Plans: Patient  Patient/Family in Agreement with the Plan: yes    Handoff Referral Completed: No    Additional Information:  [facility]. Writer updated patient who is in agreement with the plan.         ADELA Lind

## 2021-11-16 NOTE — PLAN OF CARE
POD 4 with crani resection. A&Ox4. Neuros intact except RUE 3/5 hemiparesis and RLE 0/5 hemiparesis. VSS. Regular  diet, thin liquids. Takes pills whole with water. Up with A2/SS. Denies pain. Pt scoring green on the Aggression Stop Light Tool.  Discharge plan is today at ARU.

## 2021-11-16 NOTE — H&P
"    [facility]   Acute Rehabilitation Unit  Admission History and Physical    CHIEF COMPLAINT   \"Headache\"  S/p craniotomy for tumor resection    HISTORY OF PRESENT ILLNESS  Lang Collins Jr. is a 46 year old right hand dominant male w/ newly diagnosed HTN and previously COVID-19 positive (asymptomatic) who initially presented to ED in [place], [place] 8/8 s/p tonic clonic seizure, and was found to have a 2.1cm left frontal non-enhancing mass concerning for low grade primary brain tumor in the superior frontal gyrus in the expected region of the supplementary motor area. He was then started on Keppra 500mg BID, and told to f/u w/ neurosurgery once he arrived in [place]. Mr. Collins was admitted 11/12/21 to [facility] for planned tumor resection. 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 Beltrán, there was no intraoperative complications.     During his acute hospitalization, patient was seen and evaluated by PT, OT, and SLP consult service.  All specialties collectively recommended that patient would benefit from ongoing therapies in the acute inpatient rehabilitation setting.     In review of the therapy notes, patient is participating in daily PT and OT and is making progress. Currently, pt is able to ambulate 10' + 10' L rail R solid AFO & toe sock, WC follow. Mod Ax1 R LE knee flx then hip flx for swing assist, training on quad relaxation pre hip flx given hypertonicity and spasticity. Supine-sit Mayank with rail and AA R LE, Supervision with sittng balance EOB, SBA sit- Isabel Steady with R knee stable and foot grounded. Min Ax2 R pivot with aiden-walker to chair for alarm arming (PT progressing R LE, aide providing CGA at ). AFO & toe sock to prevent ankle roll. Pt able to use GB around shoe, mod A for RLE positioning of RLE, SBA sit>stand to isbael stedy. Pt able to bridge in bed to reposition " and self adjust in bed. Pt reports some R hand swelling and tightness at beginning of session. Engaged in RUE functional reach and grasp fine motor task with deck of cards. x20 grasp and flipping card, x25 functional reach laterally, and forward reach. Pt reports word finding difficulty and will need Speech Language eval at Acute Rehab.     Currently, the patient is medically appropriate and is assessed to have needs and will benefit from an inpatient acute rehabilitation comprehensive program working with PT, OT and SLP, and will also benefit from supervision and management of Rehab Nursing and Rehab MD.     Upon arrival to the rehab unit, Mr. Collins reports overall doing well, and is motivated to start working w/ therapists. He notes that he has a headache, but denies any visual changes, SOB, CP, abdominal pain, urinary symptoms, or numbness/tingling in extremities. LBM 11/12, declined suppository today, reports passing gas, he is open to trying a suppository tomorrow if still no bowel movement. Prior to arrival, was independent in all ADL/IADLs. Was working in retail prior to hospitalization, and is currently on medical leave. Currently lives w/ his sister, niece, and nephew, who all work during the day, but can assist in the evenings. Lives on the 1st floor of an apartment w/ no steps to enter.       PAST MEDICAL HISTORY  Past Medical History:   Diagnosis Date     Brain tumor (H)      Glioma (H)      Hypertension        SURGICAL HISTORY  Past Surgical History:   Procedure Laterality Date     OPTICAL TRACKING SYSTEM CRANIOTOMY, EXCISE TUMOR WITH MAPPING, COMBINED Left 11/12/2021    Procedure: Left frontal craniotomy for tumor resection with asleep motor mapping;  Surgeon: Kuldeep Beltrán MD;  Location:  OR     ORTHOPEDIC SURGERY         SOCIAL HISTORY  Living situation: Lives w/ sister, niece, and nephew. They all work during the day, but can assist in the evenings  Family support: Good family  support  Vocational History: Works in retail  Tobacco use: 1 pack per day  Alcohol use: Socially  Illicit drug use: Marijuana    Social History     Socioeconomic History     Marital status: Single     Spouse name: Not on file     Number of children: Not on file     Years of education: Not on file     Highest education level: Not on file   Occupational History     Not on file   Tobacco Use     Smoking status: Current Every Day Smoker     Packs/day: 1.00     Smokeless tobacco: Never Used   Substance and Sexual Activity     Alcohol use: Yes     Comment: socially     Drug use: Yes     Types: Marijuana     Sexual activity: Not on file   Other Topics Concern     Not on file   Social History Narrative     Not on file     Social Determinants of Health     Financial Resource Strain: Not on file   Food Insecurity: Not on file   Transportation Needs: Not on file   Physical Activity: Not on file   Stress: Not on file   Social Connections: Not on file   Intimate Partner Violence: Not on file   Housing Stability: Not on file       FAMILY HISTORY  No family history on file.      PRIOR FUNCTIONAL HISTORY   Pt was independent with all ADLs/IADLs, transfers, mobility and gait.      MEDICATIONS  Medications Prior to Admission   Medication Sig Dispense Refill Last Dose     acetaminophen (TYLENOL) 325 MG tablet Take 2 tablets (650 mg) by mouth every 4 hours as needed for other (For optimal non-opioid multimodal pain management to improve pain control.)        [START ON 11/26/2021] dexamethasone (DECADRON) 2 MG tablet Take 1 tablet (2 mg) by mouth daily        dexamethasone (DECADRON) 4 MG tablet Take 1 tablet (4 mg) by mouth every 8 hours        [START ON 11/19/2021] dexamethasone (DECADRON) 4 MG tablet Take 1 tablet (4 mg) by mouth every 12 hours        [START ON 11/23/2021] dexamethasone (DECADRON) 4 MG tablet Take 1 tablet (4 mg) by mouth daily        levETIRAcetam (KEPPRA) 500 MG tablet Take 500 mg by mouth 2 times daily         "lisinopril (ZESTRIL) 10 MG tablet Take 10 mg by mouth every morning        oxyCODONE (ROXICODONE) 10 MG tablet Take 1 tablet (10 mg) by mouth every 4 hours as needed for severe pain ((pain rating 7-10))        oxyCODONE (ROXICODONE) 5 MG tablet Take 1 tablet (5 mg) by mouth every 4 hours as needed        senna-docusate (SENOKOT-S/PERICOLACE) 8.6-50 MG tablet Take 1 tablet by mouth 2 times daily          ALLERGIES   No Known Allergies      REVIEW OF SYSTEMS  A 10 point ROS was performed and negative unless otherwise noted in HPI.       PHYSICAL EXAM  VITAL SIGNS:  BP (!) 157/83 (BP Location: Left arm)   Pulse 74   Temp 98.3  F (36.8  C) (Oral)   Resp 16   Ht 1.803 m (5' 11\")   SpO2 99%   BMI 19.83 kg/m    BMI:  Estimated body mass index is 19.83 kg/m  as calculated from the following:    Height as of this encounter: 1.803 m (5' 11\").    Weight as of 11/13/21: 64.5 kg (142 lb 3.2 oz).     General: NAD, pleasant and cooperative   HEENT: ATNC, EOMI, PERRL, no discharge from nares or ears, crani incision site w/ staples and dried blood, no drainage.  Pulmonary: clear breath sounds b/l, no rales/wheezing    Cardiovascular: RRR, radial pulses 2+ b/l, no murmur auscultated  Abdominal: soft, non-tender to palpation, bowel sounds present   Extremities: warm, well perfused, no edema in bilateral lower extremities, no tenderness in calves   MSK/neuro:   Mental Status:  alert and oriented x3    Cranial Nerves: grossly normal   1. 2nd CN: Pupils equal, round, reactive to light and accomodation.   2. 3rd,4th,6th CN:  EOMI, appropriate pupillary responses  3. 5th CN: facial sensation intact   4. 7th CN: face symmetrical   5. 8th CN: functional hearing bilaterally  6. 9th, 10th CN: palate elevates symmetrically   7. 11th CN: sternocleidomastoids and trapezii strong   8. 12th CN: tongue midline and without fasciculations     Sensory: Normal to light touch in bilateral upper and lower extremities; no lateral extinction "    Strength: (0 to 5 scale)              Shoulder     Elbow          Wrist             Finger             Abd Flex   Flex Ext   Flex Ext Sup Pron   Ext Abd  Right:    5     5        5     5       5     5     5      5       5     5     5    Left:      4     4        5     5       5     5     5      5       5     5     5  * Some slow movement noted in RUE                        Hip            Knee           Ankle                Abd Add Flex Ext  Flex Ext  DF  PF  Right:       0     0      0     0      0     0    0     0  Left:         5     5      5     5      5     5    5     5   Reflexes: (0 to 4 scale)                    Biceps  BR  Triceps  Knees   Ankle        Right:       2        2        2           2          2        Left:         2        2        2           2          2   Thompson's test: negative bilaterally    Babinski reflex: downgoing bilaterally    Tone per modified Félix Scale: 0   Abnormal movements: None    Coordination: No dysmetria on finger to nose b/l    Speech: Fluent   Cognition: Some subtle memory impairment/word finding difficulties, but AOx3   Gait: Deferred    Skin: normal skin color, texture, turgor. Crani incision site w/ staples and dried blood      LABS      Lab Results   Component Value Date    WBC 4.2 08/13/2021    HGB 11.7 (L) 11/12/2021    HCT 43.0 08/13/2021    MCV 92 08/13/2021     08/13/2021     Lab Results   Component Value Date     08/13/2021    POTASSIUM 4.3 11/12/2021    CHLORIDE 108 08/13/2021    CO2 28 08/13/2021     (H) 11/15/2021     Lab Results   Component Value Date    GFRESTIMATED >90 11/12/2021     Lab Results   Component Value Date    AST 22 08/13/2021    ALT 26 08/13/2021    ALKPHOS 69 08/13/2021    BILITOTAL 0.8 08/13/2021     No results found for: INR  Lab Results   Component Value Date    BUN 10 08/13/2021    CR 0.94 11/12/2021         ASSESSMENT/PLAN:  Lang Dennis Johns is a 46 year old right hand dominant male w/ newly diagnosed  HTN and previously COVID-19 positive (asymptomatic) who initially presented to ED in [place], [place] 8/8 s/p tonic clonic seizure, and was found to have a 2.1cm left frontal non-enhancing mass concerning for low grade primary brain tumor in the superior frontal gyrus in the expected region of the supplementary motor area. He was then started on Keppra 500mg BID, and told to f/u w/ neurosurgery once he arrived in [place]. Mr. Collins was admitted 11/12/21 to [facility] for planned tumor resection. 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 Beltrán, there was no intraoperative complications. Impairments include: impaired ROM, impaired strength, impaired activity tolerance, impaired tone, impaired balance, impaired coordination, impaired judgement and safety awareness, impulsiveness and aphasia.    Admission to acute inpatient rehab: 11/16/2021    Impairment group code: Brain Dysfunction 02.1 Non-Traumatic: s/p left frontal craniotomy for tumor resection       1. PT, OT and SLP 60 minutes of each on a daily basis, in addition to rehab nursing and close management of physiatrist.      2. Impairment of ADL's: OT for 60 minutes daily to work on ADL re-training such as grooming, self cares and bathing.      3. Impairment of mobility: PT for 60 minutes daily to work on neuromuscular re-education focusing on strength, balance, coordination, and endurance.      4. Impairment of cognition/language/swallow: SLP for 60 minutes daily to work on cognitive and linguistic deficits.    5. Rehab RN to administer medication, patient education on medication taking, VS monitoring, bowel regimen, glucose monitoring and wound care/surgical wound dressing changes and monitoring.       6. Medical Conditions  Neuro  s/p left frontal-parietal craniotomy for resection of intra-axial brain tumor w/ Dr. Beltrán 11/12  Headache  Initially presented to ED in [place], [place] 8/8  s/p tonic clonic seizure, and was found to have a 2.1cm left frontal non-enhancing mass concerning for low grade primary brain tumor in the superior frontal gyrus in the expected region of the supplementary motor area. No hx of seizures. He was then started on Keppra 500mg BID, no seizures since. Admitted for planned left frontal-parietal craniotomy for resection of intra-axial brain tumor w/ Dr. Beltrán 11/12. No post-op complications. S/p surgery, does have RUE weakness, and 0/5 strength in RLE. Also has headache, but denies visual changes.   - Continue w/ decadron taper  - Keppra 500mg BID  - Acetaminophen 325-975mg Q4H PRN  - Methocarbamol 1000mg Q6H PRN for muscle tension, unclear if patient is having muscle tension, may discontinue if not needed   - Oxycodone 5-10mg Q4H PRN for mod-severe pain       MSK  RUE weakness  Flaccid RLE  - PT, OT, SLP    Cardiovascular  Newly diagnosed HTN  Was not taking any BP medications prior to hospitalization.   - Lisinopril 10mg QDAY  - Hydralazine 10mg QID PRN      Skin  Craniotomy incision site w/ staples  - Staples to be removed in 2 weeks, can be done at rehab   - Keep incision clean and dry at all times. Okay to shower and allow water to run over incision, pat dry after shower.       Sleep  - Melatonin 3mg at bedtime PRN      FEN  - Regular diet  - Tums 500-1000 QID PRN      Bowel/Bladder  Bladder, continent  - PVR x2 on admission, SIC if greater than 400ml    Bowel, continent  Has had constipation while hospitalized.  - Senna-docusate 1 tablet BID  - Miralax QDAY   - Bisacodyl suppository 10mg QDAY PRN  - Milk of Mag QDAY PRN        7. Adjustment to disability:  Clinical psychology to eval and treat as indicated  8. FEN: Regular diet  9. Bowel: Continent  10. Bladder: Continent  11. DVT Prophylaxis: As per neurosurgery recs no blood thinners, mechanical DVT ppx  12. GI Prophylaxis: None  13. Code: Full code as per discussion w/ patient 11/16  14. Disposition: Home    15. ELOS: 16 days  16. Rehab prognosis:  Good  17. Follow up Appointments on Discharge:   - PCP 1-2 weeks following hospitalization  - f/u w/ neurosurgery in 2 weeks for staple removal and in 6 weeks.    Rachel Howard MD  PGY-2  Physical Medicine & Rehabilitation
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
## 2021-11-16 NOTE — PROGRESS NOTES
11/16/21 1608   Quick Adds   Type of Visit Initial PT Evaluation   Living Environment   People in home sibling(s)  (sister)   Current Living Arrangements apartment   Home Accessibility stairs to enter home   Number of Stairs, Main Entrance 3   Stair Railings, Main Entrance none   Transportation Anticipated family or friend will provide   Living Environment Comments back entrance to apartment is flat, on first floor apartment   Self-Care   Usual Activity Tolerance excellent   Current Activity Tolerance fair   Regular Exercise No   Equipment Currently Used at Home none   Activity/Exercise/Self-Care Comment Fully IND with ADL's, IADLs, was working in retail and driving.   Disability/Function   Wear Glasses or Blind yes   Vision Management glasses   Fall history within last six months no   Change in Functional Status Since Onset of Current Illness/Injury yes   General Information   Onset of Illness/Injury or Date of Surgery 11/12/21   Patient/Family Therapy Goals Statement (PT) to get my right side stronger   Pertinent History of Current Problem (include personal factors and/or comorbidities that impact the POC) Patient is a 45 yo male who presented to hospital in [place] s/p tonic clonic seizure on 8/8.  MRI indicating brain mass and he was started on Keppra and told to f/u with neurosurgery.  He was admitted to [facility] for planned tumor resction and underwent L frontal-parietal craniotomy on 11/12. PMH: HTN, COVID   Existing Precautions/Restrictions fall   Cognition   Orientation Status (Cognition) oriented x 4   Affect/Mental Status (Cognition) WFL   Follows Commands (Cognition) follows two-step commands;75-90% accuracy   Safety Deficit (Cognition) moderate deficit;impulsivity   Pain Assessment   Patient Currently in Pain No   Integumentary/Edema   Integumentary/Edema Comments incision to head with staples intact   Range of Motion (ROM)   ROM Comment LLE AROM WFL, RLE PROM WFL   Strength   Strength Comments LLE WNL,  slight trace activation at quads otherwise 0/0 RLE   ARC Assessment Only   Acute Rehab Functional Assessment See IP Rehab Daily Documentation Flowsheet for Functional Mobility/ADL Assessment   Balance   Balance Comments MIN A standing balance without UE support, good sitting balance EOB   Sensory Examination   Sensory Perception WFL   Coordination   Coordination Comments impaired RLE   Muscle Tone   Muscle Tone Comments slight increased tone noted in RLE during gait   Clinical Impression   Criteria for Skilled Therapeutic Intervention yes, treatment indicated   PT Diagnosis (PT) abnormality of gait   Influenced by the following impairments decreased ROM, strength, balance, activity tolerance   Functional limitations due to impairments all functional mobility impacted   Clinical Presentation Evolving/Changing   Clinical Decision Making (Complexity) moderate complexity   Therapy Frequency (PT) Daily   Predicted Duration of Therapy Intervention (days/wks) 14 days   Planned Therapy Interventions (PT) balance training;bed mobility training;E-stim;gait training;home exercise program;manual therapy techniques;motor coordination training;neuromuscular re-education;orthotic fitting/training;patient/family education;ROM (range of motion);stair training;strengthening;stretching;transfer training;progressive activity/exercise;risk factor education;home program guidelines   Anticipated Equipment Needs at Discharge (PT) cane, quad;cane, straight   Risk & Benefits of therapy have been explained evaluation/treatment results reviewed;care plan/treatment goals reviewed;risks/benefits reviewed;current/potential barriers reviewed;participants voiced agreement with care plan;patient   Clinical Impression Comments Patient is a 45 yo male who presents to ARU with PT orders to address functional mobility that is impacted after hospitalization following brain surgery.  Skilled therapy is indicated to address deficits in order to return to  IND PLOF as well as to facilitate safe DC plan.   Total Evaluation Time   Total Evaluation Time (Minutes) 20

## 2021-11-16 NOTE — PLAN OF CARE
Discharge Planner Post-Acute Rehab PT:     Discharge Plan: home with support, outpatient PT    Precautions: fall, RLE flaccid    Current Status:  Bed Mobility: MOD I rolling, supervision sup<>sit  Transfer: MIN A sit<>stand and pivot  Gait: 30' aiden-walker with MOD/MAX for balance, aiden-walker and RLE  Stairs: TBA  Balance: supervision sitting EOB, MIN/CGA standing     Assessment:  Good tolerance to session today, slight impulsivity noted, but motivated to work in therapy.  RLE flaccidity impacting safe mobility at this time, amb with therapy only.    Other Barriers to Discharge (DME, Family Training, etc):   DME: AFO, cane (straight vs quad)

## 2021-11-16 NOTE — PROGRESS NOTES
"Physical Therapy Discharge Summary    Reason for therapy discharge:    Discharged to acute rehabilitation facility.    Progress towards therapy goal(s). See goals on Care Plan in [facility] electronic health record for goal details.  Goals partially met.  Barriers to achieving goals:   discharge from facility.    Therapy recommendation(s):    Continued therapy is recommended.  Rationale/Recommendations:  per below.       11/16/21 0940   Signing Clinician's Name / Credentials   Signing clinician's name / credentials Brittany Dressler, PT   Quick Adds   Rehab Discipline PT   Gait Training   Minutes of Treatment (Gait Training) 25   Symptoms Noted During/After Treatment (Gait Training) fatigue;shortness of breath   Distance in Feet (Required for LE Total Joints) 40' + 100' + 50' + 50' hemiwalker solid AFO toe sock Mayank for R LE progression and placement, cues when vaulting L or engaging hypertonicity for least effort movement. Improving hip flexor engagement today and relaxing quad. Brace comfortable, skin check fine.   Treatment Detail After NMR   Therapeutic Activity   Treatment Detail Pre-gait: Bed mob Mayank, stands & pivots AFO toe sock hemiwalker Mayank R LE progression, balance, safety cues. Finished in chair alarm armed.    Therapeutic Exercise   Minutes of Treatment 10   Symptoms Noted During/After Treatment none   Treatment Detail R gastroc stretching in long-sitting    Neuromuscular Re-Education   Minutes of Treatment 10   Symptoms Noted During/After Treatment fatigue   Treatment Detail PT motor control training: WC walking with Mayank for R leg propulsion then Max+A assist R LE progression - intermittent hip ext > hamstring resistance during swing phase with fatigue - better with \"kick out\" cues.   PT Discharge Planning    PT Discharge Recommendation (DC Rec) Acute Rehab Center-Motivated patient will benefit from intensive, interdisciplinary therapy.  Anticipate will be able to tolerate 3 hours of therapy per " day;Transitional Care Facility   PT Rationale for DC Rec Joss is an excellent rehab candidate, with strong gains, highly motivated, good social support. He has bracing and tone management needs (at least oral dosing, if spasticity continues as such may need to consider botox or pump - physiatry involvement indicated).   Pt is currently [facility] nursing, cannot do stairs nor gait outside of therapy yet safely - if returned to community (not recommended) wouild need 24hr Ax2, [facility], handicap accessibility, custom AFO, WC, cushion and further PT.    PT Brief overview of current status  Bed mob Mayank, sit balance supervision, transfers Mayank AFO & AD, ' bouts hemiwalker AFO toe sock - spasticity and motor timing dyscontrol although more active hip flexion this PM.   Additional Documentation   PT Plan PT: stretch gastroc pre-mobility, gait & tx progressions, motor control timing (relaxing quads to progress R LE - step taps with mirror, gait, etc).    Total Session Time   Total Session Time (minutes) 45 minutes

## 2021-11-17 ENCOUNTER — APPOINTMENT (OUTPATIENT)
Dept: OCCUPATIONAL THERAPY | Facility: CLINIC | Age: 46
End: 2021-11-17
Attending: PHYSICAL MEDICINE & REHABILITATION
Payer: COMMERCIAL

## 2021-11-17 ENCOUNTER — APPOINTMENT (OUTPATIENT)
Dept: PHYSICAL THERAPY | Facility: CLINIC | Age: 46
End: 2021-11-17
Attending: PHYSICAL MEDICINE & REHABILITATION
Payer: COMMERCIAL

## 2021-11-17 ENCOUNTER — APPOINTMENT (OUTPATIENT)
Dept: SPEECH THERAPY | Facility: CLINIC | Age: 46
End: 2021-11-17
Attending: PHYSICAL MEDICINE & REHABILITATION
Payer: COMMERCIAL

## 2021-11-17 LAB
ANION GAP SERPL CALCULATED.3IONS-SCNC: 1 MMOL/L (ref 3–14)
BASOPHILS # BLD AUTO: 0 10E3/UL (ref 0–0.2)
BASOPHILS NFR BLD AUTO: 0 %
BUN SERPL-MCNC: 20 MG/DL (ref 7–30)
CALCIUM SERPL-MCNC: 8.5 MG/DL (ref 8.5–10.1)
CHLORIDE BLD-SCNC: 106 MMOL/L (ref 94–109)
CHOLEST SERPL-MCNC: 165 MG/DL
CO2 SERPL-SCNC: 29 MMOL/L (ref 20–32)
CREAT SERPL-MCNC: 0.84 MG/DL (ref 0.66–1.25)
EOSINOPHIL # BLD AUTO: 0 10E3/UL (ref 0–0.7)
EOSINOPHIL NFR BLD AUTO: 0 %
ERYTHROCYTE [DISTWIDTH] IN BLOOD BY AUTOMATED COUNT: 12.3 % (ref 10–15)
GFR SERPL CREATININE-BSD FRML MDRD: >90 ML/MIN/1.73M2
GLUCOSE BLD-MCNC: 97 MG/DL (ref 70–99)
HBA1C MFR BLD: 4.9 % (ref 0–5.6)
HCT VFR BLD AUTO: 36 % (ref 40–53)
HDLC SERPL-MCNC: 45 MG/DL
HGB BLD-MCNC: 12 G/DL (ref 13.3–17.7)
IMM GRANULOCYTES # BLD: 0 10E3/UL
IMM GRANULOCYTES NFR BLD: 0 %
LDLC SERPL CALC-MCNC: 101 MG/DL
LYMPHOCYTES # BLD AUTO: 1.4 10E3/UL (ref 0.8–5.3)
LYMPHOCYTES NFR BLD AUTO: 24 %
MCH RBC QN AUTO: 30.6 PG (ref 26.5–33)
MCHC RBC AUTO-ENTMCNC: 33.3 G/DL (ref 31.5–36.5)
MCV RBC AUTO: 92 FL (ref 78–100)
MONOCYTES # BLD AUTO: 0.3 10E3/UL (ref 0–1.3)
MONOCYTES NFR BLD AUTO: 6 %
NEUTROPHILS # BLD AUTO: 4.2 10E3/UL (ref 1.6–8.3)
NEUTROPHILS NFR BLD AUTO: 70 %
NONHDLC SERPL-MCNC: 120 MG/DL
NRBC # BLD AUTO: 0 10E3/UL
NRBC BLD AUTO-RTO: 0 /100
PLATELET # BLD AUTO: 218 10E3/UL (ref 150–450)
POTASSIUM BLD-SCNC: 4 MMOL/L (ref 3.4–5.3)
RBC # BLD AUTO: 3.92 10E6/UL (ref 4.4–5.9)
SODIUM SERPL-SCNC: 136 MMOL/L (ref 133–144)
TRIGL SERPL-MCNC: 95 MG/DL
WBC # BLD AUTO: 6 10E3/UL (ref 4–11)

## 2021-11-17 PROCEDURE — 80048 BASIC METABOLIC PNL TOTAL CA: CPT | Performed by: STUDENT IN AN ORGANIZED HEALTH CARE EDUCATION/TRAINING PROGRAM

## 2021-11-17 PROCEDURE — 250N000013 HC RX MED GY IP 250 OP 250 PS 637: Performed by: STUDENT IN AN ORGANIZED HEALTH CARE EDUCATION/TRAINING PROGRAM

## 2021-11-17 PROCEDURE — 97535 SELF CARE MNGMENT TRAINING: CPT | Mod: GO

## 2021-11-17 PROCEDURE — 92523 SPEECH SOUND LANG COMPREHEN: CPT | Mod: GN

## 2021-11-17 PROCEDURE — 128N000003 HC R&B REHAB

## 2021-11-17 PROCEDURE — 36415 COLL VENOUS BLD VENIPUNCTURE: CPT | Performed by: STUDENT IN AN ORGANIZED HEALTH CARE EDUCATION/TRAINING PROGRAM

## 2021-11-17 PROCEDURE — 83036 HEMOGLOBIN GLYCOSYLATED A1C: CPT | Performed by: STUDENT IN AN ORGANIZED HEALTH CARE EDUCATION/TRAINING PROGRAM

## 2021-11-17 PROCEDURE — 85025 COMPLETE CBC W/AUTO DIFF WBC: CPT | Performed by: STUDENT IN AN ORGANIZED HEALTH CARE EDUCATION/TRAINING PROGRAM

## 2021-11-17 PROCEDURE — 97530 THERAPEUTIC ACTIVITIES: CPT | Mod: GP

## 2021-11-17 PROCEDURE — 82465 ASSAY BLD/SERUM CHOLESTEROL: CPT | Performed by: STUDENT IN AN ORGANIZED HEALTH CARE EDUCATION/TRAINING PROGRAM

## 2021-11-17 PROCEDURE — 250N000012 HC RX MED GY IP 250 OP 636 PS 637: Performed by: STUDENT IN AN ORGANIZED HEALTH CARE EDUCATION/TRAINING PROGRAM

## 2021-11-17 PROCEDURE — 97116 GAIT TRAINING THERAPY: CPT | Mod: GP

## 2021-11-17 PROCEDURE — 97112 NEUROMUSCULAR REEDUCATION: CPT | Mod: GP

## 2021-11-17 PROCEDURE — 99232 SBSQ HOSP IP/OBS MODERATE 35: CPT | Performed by: PHYSICAL MEDICINE & REHABILITATION

## 2021-11-17 PROCEDURE — 97166 OT EVAL MOD COMPLEX 45 MIN: CPT | Mod: GO

## 2021-11-17 RX ADMIN — LISINOPRIL 10 MG: 10 TABLET ORAL at 08:14

## 2021-11-17 RX ADMIN — LEVETIRACETAM 500 MG: 500 TABLET, FILM COATED ORAL at 08:14

## 2021-11-17 RX ADMIN — OXYCODONE HYDROCHLORIDE 10 MG: 5 TABLET ORAL at 18:25

## 2021-11-17 RX ADMIN — DEXAMETHASONE 4 MG: 1 TABLET ORAL at 16:57

## 2021-11-17 RX ADMIN — DOCUSATE SODIUM AND SENNOSIDES 1 TABLET: 8.6; 5 TABLET ORAL at 20:33

## 2021-11-17 RX ADMIN — LEVETIRACETAM 500 MG: 500 TABLET, FILM COATED ORAL at 20:33

## 2021-11-17 RX ADMIN — OXYCODONE HYDROCHLORIDE 10 MG: 5 TABLET ORAL at 12:09

## 2021-11-17 RX ADMIN — DEXAMETHASONE 4 MG: 1 TABLET ORAL at 08:18

## 2021-11-17 RX ADMIN — POLYETHYLENE GLYCOL 3350 17 G: 17 POWDER, FOR SOLUTION ORAL at 08:14

## 2021-11-17 RX ADMIN — DOCUSATE SODIUM AND SENNOSIDES 1 TABLET: 8.6; 5 TABLET ORAL at 08:14

## 2021-11-17 ASSESSMENT — ACTIVITIES OF DAILY LIVING (ADL)
ADLS_ACUITY_SCORE: 11
ADLS_ACUITY_SCORE: 12
ADLS_ACUITY_SCORE: 13
ADLS_ACUITY_SCORE: 11
ADLS_ACUITY_SCORE: 12
ADLS_ACUITY_SCORE: 11
ADLS_ACUITY_SCORE: 11
ADLS_ACUITY_SCORE: 13
ADLS_ACUITY_SCORE: 12
ADLS_ACUITY_SCORE: 13
ADLS_ACUITY_SCORE: 11
ADLS_ACUITY_SCORE: 13
ADLS_ACUITY_SCORE: 11
ADLS_ACUITY_SCORE: 13
ADLS_ACUITY_SCORE: 12
ADLS_ACUITY_SCORE: 13
ADLS_ACUITY_SCORE: 13

## 2021-11-17 NOTE — PLAN OF CARE
FOCUS/GOAL  Medical management    ASSESSMENT, INTERVENTIONS AND CONTINUING PLAN FOR GOAL:  Admitted yesterday, patient had a good night of sleep. VSS, voided using urinal once and toilet once. PVR 16. No BM reported tonight LBM 11/12. Cruzito reddy last PM w/o result. On coming Nurse informed. Denied pain. Will continue with POC.

## 2021-11-17 NOTE — PLAN OF CARE
"FOCUS/GOAL  Bowel management, Medication management, Pain management, and Safety management    ASSESSMENT, INTERVENTIONS AND CONTINUING PLAN FOR GOAL:  Pt reports occasional pain in head and describes it as \"pressure\". Educated on pain prevention strategies and demonstrated understanding to call for pain medication before pain is severe. Oxycodone 10mg given. Continent of bowel and bladder on toilet today. Pt is very excited that his right leg has significantly improved in mobility. Patient was changed to CGA with walker today. Using call light appropriately. Will continue with POC.   "

## 2021-11-17 NOTE — PROGRESS NOTES
[facility]   Acute Rehabilitation Unit  Daily progress note    INTERVAL HISTORY  Notes reviewed.  No acute events overnight.  This morning Mr. Collins was seen during PT where he was utilizing the FES bike.  He is complaining of a headache, but states he does not want to take any medications for it until he is done therapies so he is not drowsy.  I encouraged him to take some Tylenol if needed which should not make him drowsy.  He otherwise has no concerns or complaints.  Denies chest pain or shortness of breath.  Functionally, he was able to ambulate 600 ft today with a WW and CGA, and mild cognitive impairments noted.       MEDICATIONS  Scheduled:    dexamethasone  4 mg Oral Q8H    Followed by     [START ON 11/19/2021] dexamethasone  4 mg Oral Q12H    Followed by     [START ON 11/23/2021] dexamethasone  4 mg Oral Daily    Followed by     [START ON 11/26/2021] dexamethasone  2 mg Oral Daily     levETIRAcetam  500 mg Oral BID     lisinopril  10 mg Oral QAM     polyethylene glycol  17 g Oral Daily     senna-docusate  1-2 tablet Oral BID        PRN:  acetaminophen, bisacodyl, calcium carbonate, hydrALAZINE, magnesium hydroxide, methocarbamol, naloxone **OR** naloxone **OR** naloxone **OR** naloxone, ondansetron, oxyCODONE IR **OR** oxyCODONE       PHYSICAL EXAM  Patient Vitals for the past 24 hrs:   BP Temp Temp src Pulse Resp SpO2   11/17/21 1645 (!) 145/62 97.1  F (36.2  C) Oral 68 20 99 %   11/17/21 1215 (!) 145/86 -- -- -- -- --   11/17/21 0700 (!) 153/91 97.5  F (36.4  C) Oral 73 20 99 %   11/16/21 2340 133/79 97.7  F (36.5  C) Oral 52 20 98 %   11/16/21 2056 (!) 146/82 -- -- 55 -- --   11/16/21 2008 (!) 168/87 97.7  F (36.5  C) Oral 68 16 100 %       GEN: NAD, pleasant and cooperative, participating in PT  HEENT: L craniotomy incision closed with staples, c/d/i.  CVS: RRR, S1+S2, no m/r/g  PULM: CTA b/l, no w/r/r  ABD: Soft, NT, ND, bowel sounds present  EXT: No LE edema or  calf tenderness b/l  Neuro: Answers appropriately, follows commands    LABS  CBC RESULTS:   Recent Labs   Lab Test 11/17/21  0650   WBC 6.0   RBC 3.92*   HGB 12.0*   HCT 36.0*   MCV 92   MCH 30.6   MCHC 33.3   RDW 12.3          Last Comprehensive Metabolic Panel:  Sodium   Date Value Ref Range Status   11/17/2021 136 133 - 144 mmol/L Final     Potassium   Date Value Ref Range Status   11/17/2021 4.0 3.4 - 5.3 mmol/L Final     Chloride   Date Value Ref Range Status   11/17/2021 106 94 - 109 mmol/L Final     Carbon Dioxide (CO2)   Date Value Ref Range Status   11/17/2021 29 20 - 32 mmol/L Final     Anion Gap   Date Value Ref Range Status   11/17/2021 1 (L) 3 - 14 mmol/L Final     Glucose   Date Value Ref Range Status   11/17/2021 97 70 - 99 mg/dL Final     Urea Nitrogen   Date Value Ref Range Status   11/17/2021 20 7 - 30 mg/dL Final     Creatinine   Date Value Ref Range Status   11/17/2021 0.84 0.66 - 1.25 mg/dL Final     GFR Estimate   Date Value Ref Range Status   11/17/2021 >90 >60 mL/min/1.73m2 Final     Comment:     As of July 11, 2021, eGFR is calculated by the CKD-EPI creatinine equation, without race adjustment. eGFR can be influenced by muscle mass, exercise, and diet. The reported eGFR is an estimation only and is only applicable if the renal function is stable.     Calcium   Date Value Ref Range Status   11/17/2021 8.5 8.5 - 10.1 mg/dL Final       Recent Labs   Lab 11/17/21  0650 11/15/21  0630 11/14/21  0521 11/12/21 2012   GLC 97 115* 125* 149*       ASSESSMENT/PLAN:  Lang Collins Jr. is a 46 year old right hand dominant male w/ newly diagnosed HTN and previously COVID-19 positive (asymptomatic) who initially presented to ED in [place], [place] 8/8 s/p tonic clonic seizure, and was found to have a 2.1cm left frontal non-enhancing mass concerning for low grade primary brain tumor in the superior frontal gyrus in the expected region of the supplementary motor area. He was then started on Keppra  500mg BID, and told to f/u w/ neurosurgery once he arrived in [place]. Mr. Collins was admitted 11/12/21 to [facility] for planned tumor resection. 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 Beltrán, there was no intraoperative complications. Impairments include: impaired ROM, impaired strength, impaired activity tolerance, impaired tone, impaired balance, impaired coordination, impaired judgement and safety awareness, impulsiveness and aphasia.     Admission to acute inpatient rehab: 11/16/2021    Impairment group code: Brain Dysfunction 02.1 Non-Traumatic: s/p left frontal craniotomy for tumor resection         1. PT, OT and SLP 60 minutes of each on a daily basis, in addition to rehab nursing and close management of physiatrist.       2. Impairment of ADL's: OT for 60 minutes daily to work on ADL re-training such as grooming, self cares and bathing.       3. Impairment of mobility: PT for 60 minutes daily to work on neuromuscular re-education focusing on strength, balance, coordination, and endurance.       4. Impairment of cognition/language/swallow: SLP for 60 minutes daily to work on cognitive and linguistic deficits.     5. Rehab RN to administer medication, patient education on medication taking, VS monitoring, bowel regimen, glucose monitoring and wound care/surgical wound dressing changes and monitoring.         6. Medical Conditions  Neuro  s/p left frontal-parietal craniotomy for resection of intra-axial brain tumor w/ Dr. 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 No hx of seizures. He was then started on Keppra 500mg BID, no seizures since. Admitted for planned left frontal-parietal  craniotomy for resection of intra-axial brain tumor w/ Dr. Beltrán 11/12. No post-op complications. S/p surgery, does have RUE weakness, and 0/5 strength in RLE. Also has headache, but denies visual changes.   - Continue w/ decadron taper  - Keppra 500mg BID  - Acetaminophen 325-975mg Q4H PRN  - Methocarbamol 1000mg Q6H PRN for muscle tension, unclear if patient is having muscle tension, may discontinue if not needed   - Oxycodone 5-10mg Q4H PRN for mod-severe pain   -PT, OT, SLP      MSK  R hemiparesis  - PT, OT     Cardiovascular  Newly diagnosed HTN  Was not taking any BP medications prior to hospitalization.   - Lisinopril 10mg QDAY  - Hydralazine 10mg QID PRN        Skin  Craniotomy incision site w/ staples  - Staples to be removed in 2 weeks (11/26), can be done at rehab   - Keep incision clean and dry at all times. Okay to shower and allow water to run over incision, pat dry after shower.         Sleep  - Melatonin 3mg at bedtime PRN        FEN  - Regular diet  - Tums 500-1000 QID PRN        Bowel/Bladder  Bladder, continent  - PVRs x3 upon admission WNL, may check PRN only     Bowel, continent  Has had constipation while hospitalized.  - Senna-docusate 1 tablet BID  - Miralax QDAY   - Bisacodyl suppository 10mg QDAY PRN  - Milk of Mag QDAY PRN           7. Adjustment to disability:  Clinical psychology to eval and treat as indicated  8. FEN: Regular diet  9. Bowel: Continent  10. Bladder: Continent  11. DVT Prophylaxis: As per neurosurgery recs no blood thinners, mechanical DVT ppx  12. GI Prophylaxis: None  13. Code: Full code as per discussion w/ patient 11/16  14. Disposition: Home   15. ELOS: 16 days  16. Rehab prognosis:  Good  17. Follow up Appointments on Discharge:   - PCP 1-2 weeks following hospitalization  - f/u w/ neurosurgery in 2 weeks for staple removal and in 6 weeks.      Ezra Washington MD  Department of Rehabilitation Medicine      Time Spent on this Encounter   I, Ezra Washington, spent a total of  25 minutes face-to-face or managing the care of Lang Collins Jr.. Over 50% of my time on the unit was spent counseling the patient and coordinating care. See note for details.

## 2021-11-17 NOTE — PLAN OF CARE
Discharge Planner Post-Acute Rehab SLP:     Discharge Plan: Home w/ assistance, OP SLP pending rehab progress    Precautions: Fall    Current Status:  Communication: Mildly impaired expressive language for more complex language tasks   Cognition: Mildly impaired cognition in the areas of executive functions and memory  Swallow: Regular and thin. Dysphagia eval not warranted at this time    Assessment:     Cognitive-Linguistic Quick Test results    Severity Ratings Summary for Aphasia Administration (if applicable)    Index     Severity Rating  Non-Linguistic Cognition  Mildly impaired (3)  Linguistic/Aphasia   Moderately impaired (2)  Clock Drawing    Mildly impaired      Other Barriers to Discharge (Family Training, etc): None anticipated per SLP

## 2021-11-17 NOTE — PHARMACY-MEDICATION REGIMEN REVIEW
Pharmacy Medication Regimen Review  Lang Collins Jr. is a 46 year old male who is currently in the Acute Rehab Unit.    Assessment: All medications have an appropriate indications, durations and no unnecessary use was found    Plan:   Continue current medication regimen.    Attending provider will be sent this note for review.  If there are any emergent issues noted above, pharmacist will contact provider directly by phone.      Pharmacy will periodically review the resident's medication regimen for any PRN medications not administered in > 72 hours and discontinue them. The pharmacist will discuss gradual dose reductions of psychopharmacologic medications with interdisciplinary team on a regular basis.    Please contact pharmacy if the above does not answer specific medication questions/concerns.    Background:  A pharmacist has reviewed all medications and pertinent medical history today.  Medications were reviewed for appropriate use and any irregularities found are listed with recommendations.      Current Facility-Administered Medications:      acetaminophen (TYLENOL) tablet 325-975 mg, 325-975 mg, Oral, Q4H PRN, Rachel Howard MD     bisacodyl (DULCOLAX) Suppository 10 mg, 10 mg, Rectal, Daily PRN, Rachel Howard MD     calcium carbonate (TUMS) chewable tablet 500-1,000 mg, 500-1,000 mg, Oral, 4x Daily PRN, Rachel Howard MD     dexamethasone (DECADRON) tablet 4 mg, 4 mg, Oral, Q8H, 4 mg at 11/17/21 0818 **FOLLOWED BY** [START ON 11/19/2021] dexamethasone (DECADRON) tablet 4 mg, 4 mg, Oral, Q12H **FOLLOWED BY** [START ON 11/23/2021] dexamethasone (DECADRON) tablet 4 mg, 4 mg, Oral, Daily **FOLLOWED BY** [START ON 11/26/2021] dexamethasone (DECADRON) tablet 2 mg, 2 mg, Oral, Daily, Rachel Howard MD     hydrALAZINE (APRESOLINE) tablet 10 mg, 10 mg, Oral, 4x Daily PRN, Rachel Howard MD     levETIRAcetam (KEPPRA) tablet 500 mg, 500 mg, Oral, BID, Rachel Howard MD, 500 mg at 11/17/21 0814     lisinopril (ZESTRIL) tablet 10 mg,  10 mg, Oral, QAM, Rachel Howard MD, 10 mg at 11/17/21 0814     magnesium hydroxide (MILK OF MAGNESIA) suspension 30 mL, 30 mL, Oral, Daily PRN, Rachel Howard MD     methocarbamol (ROBAXIN) tablet 1,000 mg, 1,000 mg, Oral, Q6H PRN, Rachel Howard MD     naloxone (NARCAN) injection 0.2 mg, 0.2 mg, Intravenous, Q2 Min PRN **OR** naloxone (NARCAN) injection 0.4 mg, 0.4 mg, Intravenous, Q2 Min PRN **OR** naloxone (NARCAN) injection 0.2 mg, 0.2 mg, Intramuscular, Q2 Min PRN **OR** naloxone (NARCAN) injection 0.4 mg, 0.4 mg, Intramuscular, Q2 Min PRN, Ana Cristina Washington MD     ondansetron (ZOFRAN-ODT) ODT tab 4 mg, 4 mg, Oral, Q6H PRN, Rachel Howard MD     oxyCODONE (ROXICODONE) tablet 5 mg, 5 mg, Oral, Q4H PRN **OR** oxyCODONE IR (ROXICODONE) tablet 10 mg, 10 mg, Oral, Q4H PRN, Rachel Howard MD, 10 mg at 11/17/21 1209     polyethylene glycol (MIRALAX) Packet 17 g, 17 g, Oral, Daily, Rachel Howard MD, 17 g at 11/17/21 0814     senna-docusate (SENOKOT-S/PERICOLACE) 8.6-50 MG per tablet 1-2 tablet, 1-2 tablet, Oral, BID, Rachel Howard MD, 1 tablet at 11/17/21 0814  No current outpatient prescriptions on file.

## 2021-11-17 NOTE — PROGRESS NOTES
11/17/21 1116   General Information   Onset of Illness/Injury or Date of Surgery 11/16/21   Referring Physician Rachel Howard MD   Patient/Family Therapy Goal Statement (SLP) None stated   Pertinent History of Current Problem Lang Collins Jr. 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 He was then started on Keppra 500mg BID, and told to f/u w/ neurosurgery once he arrived in [place]. Mr. Collins was admitted 11/12/21 to [facility] for planned tumor resection. 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 Beltrán, there was no intraoperative complications.    General Observations Pt is alert and agreeable, able to provide an adequate case history. Pt reports mildly dysarthric speech. He had noted much more significant word finding immediately following his procedure. He has noticed great improvement but conitnues to feel below baseline. Pt also reports feeling his processing is slow.    Type of Evaluation   Type of Evaluation Speech, Language, Cognition   Speech   Comment, Motor Speech Assessment Mild dysarthria, slightly mumbled quality    Verbal Expression   Comment, Assessment (Verbal Expression) Moderate language deficits noted on standardized assessment during structured/moderately complex tasks. Daily communication/wants/needs is WFL.    Cognition   Cognitive Function executive function deficit;memory deficit   Cognitive Status Exam Comments Mild cognitive deficits noted in the areas of memory and executive functions   General Therapy Interventions   Planned Therapy Interventions Cognitive  Treatment;Language   SLP Therapy Assessment/Plan   Criteria for Skilled Therapeutic Interventions Met (SLP Eval) yes;treatment indicated   SLP Diagnosis MIld cognitive deficits, moderate language deficits   Rehab Potential (SLP Eval) good, to achieve stated therapy goals   Therapy Frequency (SLP Eval) daily   Predicted Duration of Therapy Intervention (SLP Eval) 2-4 weeks   Comment, Therapy Assessment/Plan (SLP) Pt seen for standardized cognitive/linguistic evaluation. He complete the Cognitive Linguistic Quick Test + and was given the aphasia administration d/t suspected cognitive and language deficits s/p left frontal-parietal craniotomy. Pt's functional communication and conversational skills are very appropriate and without word finding difficulty. Increased difficulty was noted during moderately complex language based tasks within standardized assessment. He also presented with mild cognitive deficits in the areas of executive functions and memory. Initiate SLP services for cognitive/linguistic deficits.    Therapy Plan Review/Discharge Plan (SLP)   Therapy Plan Review (SLP) evaluation/treatment results reviewed;care plan/treatment goals reviewed;participants voiced agreement with care plan;participants included;patient   SLP Discharge Planning    SLP Discharge Recommendation (DC Rec) home with assist;home with outpatient speech therapy   SLP Rationale for DC Rec Cognitive/linguistic deficits, below baseline    SLP Brief overview of current status   Initiate SLP services for cognitive/linguistic deficits. Mild cognitive deficits and mild-moderate language deficits identified for more complex language based tasks.     Total Evaluation Time   Total Evaluation Time (Minutes) 60

## 2021-11-17 NOTE — PLAN OF CARE
Skilled set up on :  Pt dependent for proper placement of electrodes on R quads, gastroc, and anterior tib for optimum muscle contraction, safe positioning on w/c within frame and cushion for prevention of skin breakdown, feet secured to foot pedals, w/c secured to  w/ Q-straints.  Passive motion assessed to ensure proper positioning.      Pt performed 11 minutes of active FES ergometry with 100% stimulation applied to above muscles at 35 rpm with 3.33 Nm resistance.  This PT adjusted e-stim and cycling parameters in real-time to ensure palpable muscle contractions throughout session.  Please see [email or web address] for further details on patient's stimulation parameters and ergometry outcomes.      Changes in parameters that were part of this treatment:   -resistance (initial setup)  -pulse width, frequency (initial setup)  -amplitude (initial setup)  -pedal speed (initial setup)      Functional outcomes from this intervention include:   -reduced spasticity  -improved sensory awareness and proprioception  -improved muscle strength  -improved motor coordination    Session Summary:   -Average Power: 11.7  -Asymmetry: R 15%  -Active Minutes: 10 mins

## 2021-11-17 NOTE — PLAN OF CARE
FOCUS/GOAL  Bowel management, Bladder management, Pain management, Wound care management, Medical management, Mobility, Skin integrity, and Safety management    ASSESSMENT, INTERVENTIONS AND CONTINUING PLAN FOR GOAL:  Patient is alert and oriented x 4. Uses call light appropriately and able to make needs known. A-1 SPT transfer w/c based. Seems anxious at a time, no impulsiveness noted. Denied headache, dizziness, CP, N/T or SOB. Complained of head/incision site pain rated at 10 and received 10 mg oxycodone and was effective. Regular/thin with good intake. Continent of bladder uses toilet. Patient report last BM 11/12 scheduled senna administered. We will continue to monitor. No care concern at this time. Call light is in reach alarm is on for safety. We will continue per POC.

## 2021-11-17 NOTE — PLAN OF CARE
Discharge Planner Post-Acute Rehab PT:     Discharge Plan: home with support, outpatient PT    Precautions: falls    Current Status:  Bed Mobility: IND   Transfer: CGA with WW  Gait: 600' with WW, CGA  Stairs: TBA  Balance: supervision sitting EOB, MIN/CGA standing     Assessment: AM session of FES well tolerated. In PM session, pt with significant improvement in RLE muscle activation. Able to generate consistent gait with WW up to 600 ft, CGA.     Other Barriers to Discharge (DME, Family Training, etc):   DME: will need WW  Family training

## 2021-11-18 ENCOUNTER — APPOINTMENT (OUTPATIENT)
Dept: OCCUPATIONAL THERAPY | Facility: CLINIC | Age: 46
End: 2021-11-18
Attending: PHYSICAL MEDICINE & REHABILITATION
Payer: COMMERCIAL

## 2021-11-18 ENCOUNTER — APPOINTMENT (OUTPATIENT)
Dept: PHYSICAL THERAPY | Facility: CLINIC | Age: 46
End: 2021-11-18
Attending: PHYSICAL MEDICINE & REHABILITATION
Payer: COMMERCIAL

## 2021-11-18 ENCOUNTER — APPOINTMENT (OUTPATIENT)
Dept: SPEECH THERAPY | Facility: CLINIC | Age: 46
End: 2021-11-18
Attending: PHYSICAL MEDICINE & REHABILITATION
Payer: COMMERCIAL

## 2021-11-18 PROCEDURE — 97530 THERAPEUTIC ACTIVITIES: CPT | Mod: GO | Performed by: OCCUPATIONAL THERAPIST

## 2021-11-18 PROCEDURE — 97130 THER IVNTJ EA ADDL 15 MIN: CPT | Mod: GN

## 2021-11-18 PROCEDURE — 97530 THERAPEUTIC ACTIVITIES: CPT | Mod: GP

## 2021-11-18 PROCEDURE — 97535 SELF CARE MNGMENT TRAINING: CPT | Mod: GO | Performed by: OCCUPATIONAL THERAPIST

## 2021-11-18 PROCEDURE — 128N000003 HC R&B REHAB

## 2021-11-18 PROCEDURE — 97110 THERAPEUTIC EXERCISES: CPT | Mod: GO | Performed by: OCCUPATIONAL THERAPIST

## 2021-11-18 PROCEDURE — 97129 THER IVNTJ 1ST 15 MIN: CPT | Mod: GN

## 2021-11-18 PROCEDURE — 250N000013 HC RX MED GY IP 250 OP 250 PS 637: Performed by: STUDENT IN AN ORGANIZED HEALTH CARE EDUCATION/TRAINING PROGRAM

## 2021-11-18 PROCEDURE — 99232 SBSQ HOSP IP/OBS MODERATE 35: CPT | Mod: GC | Performed by: PHYSICAL MEDICINE & REHABILITATION

## 2021-11-18 PROCEDURE — 250N000012 HC RX MED GY IP 250 OP 636 PS 637: Performed by: STUDENT IN AN ORGANIZED HEALTH CARE EDUCATION/TRAINING PROGRAM

## 2021-11-18 RX ORDER — LISINOPRIL 20 MG/1
20 TABLET ORAL EVERY MORNING
Status: DISCONTINUED | OUTPATIENT
Start: 2021-11-19 | End: 2021-11-20 | Stop reason: HOSPADM

## 2021-11-18 RX ORDER — OXYCODONE HYDROCHLORIDE 10 MG/1
10 TABLET ORAL EVERY 4 HOURS PRN
Status: DISCONTINUED | OUTPATIENT
Start: 2021-11-18 | End: 2021-11-20 | Stop reason: HOSPADM

## 2021-11-18 RX ADMIN — LEVETIRACETAM 500 MG: 500 TABLET, FILM COATED ORAL at 21:50

## 2021-11-18 RX ADMIN — ACETAMINOPHEN 975 MG: 325 TABLET, FILM COATED ORAL at 08:13

## 2021-11-18 RX ADMIN — DOCUSATE SODIUM AND SENNOSIDES 2 TABLET: 8.6; 5 TABLET ORAL at 21:50

## 2021-11-18 RX ADMIN — ACETAMINOPHEN 650 MG: 325 TABLET, FILM COATED ORAL at 22:43

## 2021-11-18 RX ADMIN — POLYETHYLENE GLYCOL 3350 17 G: 17 POWDER, FOR SOLUTION ORAL at 08:14

## 2021-11-18 RX ADMIN — ACETAMINOPHEN 650 MG: 325 TABLET, FILM COATED ORAL at 16:54

## 2021-11-18 RX ADMIN — OXYCODONE HYDROCHLORIDE 10 MG: 10 TABLET ORAL at 22:43

## 2021-11-18 RX ADMIN — DOCUSATE SODIUM AND SENNOSIDES 2 TABLET: 8.6; 5 TABLET ORAL at 08:14

## 2021-11-18 RX ADMIN — LEVETIRACETAM 500 MG: 500 TABLET, FILM COATED ORAL at 08:14

## 2021-11-18 RX ADMIN — LISINOPRIL 10 MG: 10 TABLET ORAL at 08:14

## 2021-11-18 RX ADMIN — OXYCODONE HYDROCHLORIDE 10 MG: 10 TABLET ORAL at 17:02

## 2021-11-18 RX ADMIN — DEXAMETHASONE 4 MG: 1 TABLET ORAL at 00:07

## 2021-11-18 RX ADMIN — DEXAMETHASONE 4 MG: 1 TABLET ORAL at 16:53

## 2021-11-18 RX ADMIN — DEXAMETHASONE 4 MG: 1 TABLET ORAL at 11:01

## 2021-11-18 ASSESSMENT — ACTIVITIES OF DAILY LIVING (ADL)
ADLS_ACUITY_SCORE: 11

## 2021-11-18 NOTE — PROGRESS NOTES
"   11/17/21 0800   Living Environment   People in home sibling(s)   Current Living Arrangements apartment   Home Accessibility wheelchair accessible  (w/c accessible front entrance that pt can use, stair in back)   Transportation Anticipated family or friend will provide   Living Environment Comments Lives on first floor apartment with sister, niece, and nephew. Living situation was intended to be temporary and has been in [place] for 4 months (pt formerly living in [place]). Sleeps on air mattress. Two bathrooms - 1) walk in shower but showerhead broken/flooding, 2) tub/shower - no grab bars. Pt's sister works full time out of home, but endorses lots of family in the area able to assist prn. 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 Has 3 children and grandchild in [place])   Instrumental Activities of Daily Living (IADL)   Previous Responsibilities meal prep;housekeeping;laundry;shopping;finances;driving;work;   Disability/Function   Hearing Difficulty or Deaf no   Wear Glasses or Blind yes   Vision Management glassess   Concentrating, Remembering or Making Decisions Difficulty no   Difficulty Communicating no   Walking or Climbing Stairs Difficulty no   Dressing/Bathing Difficulty no   Toileting issues no   Doing Errands Independently Difficulty (such as shopping) no   Fall history within last six months no   Change in Functional Status Since Onset of Current Illness/Injury yes   General Information   Onset of Illness/Injury or Date of Surgery 11/12/21   Referring Physician Rachel Howard MD   Patient/Family Therapy Goal Statement (OT) \"get my right side stronger\"   Additional Occupational Profile Info/Pertinent " History of Current Problem Lang Collins Jr. is a 46 year old right hand dominant male w/ newly diagnosed HTN and previously COVID-19 positive (asymptomatic) who initially presented to ED in [place], [place] 8/8 s/p tonic clonic seizure, and was found to have a 2.1cm left frontal non-enhancing mass concerning for low grade primary brain tumor in the superior frontal gyrus in the expected region of the supplementary motor area. He was then started on Keppra 500mg BID, and told to f/u w/ neurosurgery once he arrived in [place]. Mr. Collins was admitted 11/12/21 to [facility] for planned tumor resection. 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 Beltrán, there was no intraoperative complications.    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   Existing Precautions/Restrictions fall;other (see comments)  (craniotomy)   Limitations/Impairments other (see comments)  (RUE weakness, RLE flaccidity)   Heart Disease Risk Factors High blood pressure;Medical history;Gender;Race;Age   Cognitive Status Examination   Orientation Status orientation to person, place and time   Affect/Mental Status (Cognitive) WNL   Follows Commands follows multi-step commands   Memory Deficit minimal deficit   Attention Deficit distractible in noisy environment   Cognitive Status Comments Pt endorses word finding difficulties   Visual Perception   Visual Impairment/Limitations corrective lenses full-time   Sensory   Sensory Quick Adds No deficits were identified   Pain Assessment   Patient Currently in Pain Yes, see Vital Sign flowsheet  (7  on incision)   Integumentary/Edema   Integumentary/Edema Comments Craniotomy incision   Posture   Posture not impaired   Range of Motion Comprehensive   General Range of Motion bilateral upper  extremity ROM WNL   Strength Comprehensive (MMT)   Comment, General Manual Muscle Testing (MMT) Assessment WFL in observation, MMT not performed d/t precautions   Hand Strength   Left hand  (pounds) 90   Right hand  (pounds) 85   Muscle Tone Assessment   Muscle Tone Quick Adds RLE   Muscle Tone Assessment - RLE severely decreased tone   Coordination   Upper Extremity Coordination Right UE impaired   Left hand, nine hole peg test (seconds) 23   Right hand, nine hole peg test (seconds) 35   Functional Limitations Fine motor ADL performance impaired   ARC Assessment Only   Acute Rehab Functional Assessment See IP Rehab Daily Documentation Flowsheet for Functional Mobility/ADL Assessment   Clinical Impression   Criteria for Skilled Therapeutic Interventions Met (OT) yes;meets criteria;skilled treatment is necessary   OT Diagnosis Impaired I/ADLs and functional mobility   OT Problem List-Impairments impacting ADL problems related to;balance;cognition;mobility;motor control;muscle tone;strength;post-surgical precautions   Assessment of Occupational Performance 5 or more Performance Deficits   Identified Performance Deficits dressing, toileting, bathing, home mgmt, functional mobility   Planned Therapy Interventions (OT) ADL retraining;IADL retraining;balance training;cognition;E-stim;fine motor coordination training;neuromuscular re-education;orthoic fitting/training;strengthening;ROM;transfer training;home program guidelines   Clinical Decision Making Complexity (OT) moderate complexity   Therapy Frequency (OT) Daily   Predicted Duration of Therapy 10-14 days   Anticipated Equipment Needs Upon Discharge (OT) bathing equipment;cane, quad;dressing equipment;reacher;tub bench   Risk & Benefits of therapy have been explained evaluation/treatment results reviewed;care plan/treatment goals reviewed;risks/benefits reviewed;current/potential barriers reviewed;participants voiced agreement with care plan;participants  included;patient   Comment-Clinical Impression Pt is admitted to IP ARU following admission for tumor resection and right-sided hemiparesis. Pt performance with ADLs is below baseline, performance impacted by RUE weakness and coordination deficits, significant RLE weakness, and mild cognition deficits. Goals to advance to Mod IND in I/ADLs. Pt will benefit from skilled occupational therapy intervention to address goals in POC and maximize safety and independence at discharge environment.   Total Evaluation Time (Minutes)   Total Evaluation Time (Minutes) 35

## 2021-11-18 NOTE — PLAN OF CARE
Discharge Planner Post-Acute Rehab SLP:     Discharge Plan: Home w/ assistance, OP SLP pending rehab progress    Precautions: Fall    Current Status:  Communication: Mildly impaired expressive language for more complex language tasks   Cognition: Mildly impaired cognition in the areas of executive functions and memory  Swallow: Regular and thin. Dysphagia eval not warranted at this time    Assessment: Pt participated in higher level language task. Pt completed task with 80% accuracy min cues, 100% mod (semantic cues). Re direction to category required single instance. Anticipate pt may demonstrate increased difficulty w/o initial prompt given, will continue next session. Pt reported several instances of increased difficulty within large visual load (reading book/long paragraph) and consolidating thoughts into single sentence given a prompt.         Other Barriers to Discharge (Family Training, etc): None anticipated per SLP

## 2021-11-18 NOTE — PLAN OF CARE
"  VS: BP (!) 132/93 (BP Location: Right arm)   Pulse 69   Temp 97.2  F (36.2  C) (Oral)   Resp 16   Ht 1.803 m (5' 11\")   SpO2 100%   BMI 19.83 kg/m    Pt is A&O x 4, denies SOB or CP   O2: 100% at RA   Output: Adequate   Last BM: 11/18   Activity: CGA with a walker   Skin: Intact except the scalp incision   Pain: Tylenol 975 mg at 0813 with effect   CMS: intact   Dressing: None   Diet: Regular diet/thins, med's whole   LDA: None   Equipment: Walker and GB   Plan: Continue to monitor progress   Additional Info: Had a shower. Scalp incision is BENJI, well approximated .  No alarms set off this  shift.       "

## 2021-11-18 NOTE — PROGRESS NOTES
[facility]   Acute Rehabilitation Unit  Daily progress note    INTERVAL HISTORY  Nursing notes reviewed, no acute events overnight.     Lang Collins Jr. was seen and examined at bedside.     Mr. Collins reports that he continues to have headaches. Tylenol helps to alleviate some of the pain. He has been trying to hold off on asking for oxycodone during therapy session due to drowsiness. Discussed w/ him that he can try taking a low dose 2.5mg before therapies to help w/ headaches, he is agreeable with plan. HR has intermittently been in the 50s, asymptomatic, will continue to monitor.     A 10 point ROS was performed and negative unless otherwise noted in HPI.     Functionally  PT:  Bed Mobility: IND   Transfer: CGA with WW  Gait: 600' with WW, CGA  Stairs: TBA  Balance: supervision sitting EOB, MIN/CGA standing     OT:  ADLs:   LB: Toilet clothing mgmt standing min A with LUE grab bar. Alka cares max A thoroughness  Toilet transfer - wc SPT CGA with hands on grab rail and arm rest  Recommend w/c SPT min A nsg    SLP:  Communication: Mildly impaired expressive language for more complex language tasks   Cognition: Mildly impaired cognition in the areas of executive functions and memory  Swallow: Regular and thin. Dysphagia eval not warranted at this time    Bowel/Bladder: Continent of bowel and bladder, LBM 11/17.    New from today:  - Added 2.5mg oxycodone dose PRN     MEDICATIONS  Scheduled meds    dexamethasone  4 mg Oral Q8H    Followed by     [START ON 11/19/2021] dexamethasone  4 mg Oral Q12H    Followed by     [START ON 11/23/2021] dexamethasone  4 mg Oral Daily    Followed by     [START ON 11/26/2021] dexamethasone  2 mg Oral Daily     levETIRAcetam  500 mg Oral BID     lisinopril  10 mg Oral QAM     polyethylene glycol  17 g Oral Daily     senna-docusate  1-2 tablet Oral BID       PRN meds:  acetaminophen, bisacodyl, calcium carbonate, hydrALAZINE, magnesium hydroxide,  "methocarbamol, naloxone **OR** naloxone **OR** naloxone **OR** naloxone, ondansetron, oxyCODONE IR **OR** oxyCODONE      PHYSICAL EXAM  BP (!) 169/98 (BP Location: Left arm)   Pulse 61   Temp 97.2  F (36.2  C) (Oral)   Resp 16   Ht 1.803 m (5' 11\")   SpO2 100%   BMI 19.83 kg/m    Gen: NAD, calm and pleasant, sitting up in bed  HEENT: MMM, crani incision site w/ staples, healing well, no surrounding erythema or drainage  Cardio: RRR  Pulm: CTAB  Abd: Soft, non-tender, non-distended, BS present   Ext: No b/l calf tenderness or edema  Neuro/MSK: AOx3. 5/5 motor strength LLE. 4/5 right hip flexors, 0/5 at right knee and ankle. Sensation to light touch intact throughout.     LABS  No results found for this or any previous visit (from the past 24 hour(s)).    ASSESSMENT AND PLAN  Lang Collins Jr. 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 He was then started on Keppra 500mg BID, and told to f/u w/ neurosurgery once he arrived in [place]. Mr. Collins was admitted 11/12/21 to [facility] for planned tumor resection. 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 Beltrán, there was no intraoperative complications. Impairments include: impaired ROM, impaired strength, impaired activity tolerance, impaired tone, impaired balance, impaired coordination, impaired judgement and safety awareness, impulsiveness and aphasia.     Admission to acute inpatient rehab: 11/16/2021    Impairment group code: Brain Dysfunction 02.1 Non-Traumatic: s/p left frontal craniotomy for tumor resection         1. PT, OT and SLP 60 minutes " of each on a daily basis, in addition to rehab nursing and close management of physiatrist.       2. Impairment of ADL's: OT for 60 minutes daily to work on ADL re-training such as grooming, self cares and bathing.       3. Impairment of mobility: PT for 60 minutes daily to work on neuromuscular re-education focusing on strength, balance, coordination, and endurance.       4. Impairment of cognition/language/swallow: SLP for 60 minutes daily to work on cognitive and linguistic deficits.     5. Rehab RN to administer medication, patient education on medication taking, VS monitoring, bowel regimen, glucose monitoring and wound care/surgical wound dressing changes and monitoring.         6. Medical Conditions  Neuro  s/p left frontal-parietal craniotomy for resection of intra-axial brain tumor w/ Dr. 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 No hx of seizures. He was then started on Keppra 500mg BID, no seizures since. Admitted for planned left frontal-parietal craniotomy for resection of intra-axial brain tumor w/ Dr. Beltrán 11/12. No post-op complications. S/p surgery, does have RUE weakness, and 0/5 strength in RLE. Also has headache, but denies visual changes.   - Continue w/ decadron taper  - Keppra 500mg BID  - Acetaminophen 325-975mg Q4H PRN  - Methocarbamol 1000mg Q6H PRN for muscle tension, unclear if patient is having muscle tension, may discontinue if not needed   - Oxycodone 2.5-10mg Q4H PRN for mod-severe pain   -PT, OT, SLP      MSK  R hemiparesis  - PT, OT     Cardiovascular  Newly diagnosed HTN  Was not taking any BP medications prior to hospitalization.   - Lisinopril 10mg QDAY  - Hydralazine 10mg QID PRN        Skin  Craniotomy incision site w/ staples  -  Staples to be removed in 2 weeks (11/26), can be done at rehab   - Keep incision clean and dry at all times. Okay to shower and allow water to run over incision, pat dry after shower.         Sleep  - Melatonin 3mg at bedtime PRN        FEN  - Regular diet  - Tums 500-1000 QID PRN        Bowel/Bladder  Bladder, continent  - PVRs x3 upon admission WNL, may check PRN only     Bowel, continent  Has had constipation while hospitalized.  - Senna-docusate 1 tablet BID  - Miralax QDAY   - Bisacodyl suppository 10mg QDAY PRN  - Milk of Mag QDAY PRN           7. Adjustment to disability:  Clinical psychology to eval and treat as indicated  8. FEN: Regular diet  9. Bowel: Continent  10. Bladder: Continent  11. DVT Prophylaxis: As per neurosurgery recs no blood thinners, mechanical DVT ppx  12. GI Prophylaxis: None  13. Code: Full code as per discussion w/ patient 11/16  14. Disposition: Home   15. ELOS: 16 days  16. Rehab prognosis:  Good  17. Follow up Appointments on Discharge:   - PCP 1-2 weeks following hospitalization  - f/u w/ neurosurgery in 2 weeks for staple removal and in 6 weeks.        Rachel Howard MD  PGY-2  Physical Medicine & Rehabilitation

## 2021-11-18 NOTE — PLAN OF CARE
FOCUS/GOAL  Medical management and Safety management    ASSESSMENT, INTERVENTIONS AND CONTINUING PLAN FOR GOAL:  Had a good night of sleep. Set of the alarm twice this shift ( was setting at the edge of the bed) and got up the take his phone. Denied pain. Ambulated to the [facility] twice and voided. No BM reported this shift. Will continue with POC.

## 2021-11-18 NOTE — PLAN OF CARE
Discharge Planner Post-Acute Rehab OT:     Discharge Plan: first floor apartment (wc accessible), temporary living arrangement with sister (works full time) and her two children    Precautions: falls, craniotomy    Current Status:  ADLs: SBA for bathing and total body dressing, able to identify compensatory techniques for dressing to increase independence  LB: Toilet clothing mgmt standing min A with LUE grab bar. Alka cares max A thoroughness  Toilet transfer - wc SPT CGA with hands on grab rail and arm rest  Recommend w/c SPT min A nsg    IADLs: IND PTA - works full-time ([employer]), childcare, driving    Vision/Cognition: Glasses. Mild word finding difficulties reported by pt and mild memory deficits noted in functional conversation. Monitor prn.    Assessment. Pt requiring reminders of precautions throughout sessions, requiring assistance for maintaining precautions during ADL and when picking up objects.  Otherwise, Pt able to complete dressing and bathing with only set-up with cues on ways to complete while maintaining precautions      Other Barriers to Discharge (DME, Family Training, etc): Temporary living situation impacting environmental set up. Sleeps on air mattress. Family training TBD. DME TBD pending progress - reacher, extended tub bench/shower chair.

## 2021-11-18 NOTE — PROGRESS NOTES
"Pt A&O x4. R sided weakness. Alarm set off x2 with pt sitting on EOB x1 and standing at bedside x1. Education provided regarding fall management and alarms activated and encouraged to use call light. Prn oxycodone effective foe incisional pain. Encouraged pt to try prn tylenol prior to therapy due to pt statements r/t not wanting to feel \"groggy\" from oxycodone use. Pt states that he will try tomorrow and oncoming nurse notified. Cont B&B; LBM 11/17. CGA with walker for transfers. HR 53; asymptomatic. oncall notified, no call back. Call light within reach.  "

## 2021-11-18 NOTE — PLAN OF CARE
Discharge Planner Post-Acute Rehab OT:     Discharge Plan: first floor apartment (wc accessible), temporary living arrangement with sister (works full time) and her two children    Precautions: falls, craniotomy    Current Status:  ADLs:   LB: Toilet clothing mgmt standing min A with LUE grab bar. Alka cares max A thoroughness  Toilet transfer - wc SPT CGA with hands on grab rail and arm rest  Recommend w/c SPT min A nsg    IADLs: IND PTA - works full-time ([employer]), childcare, driving    Vision/Cognition: Glasses. Mild word finding difficulties reported by pt and mild memory deficits noted in functional conversation. Monitor prn.    Assessment: Eval completed and tx initiated. Pt is admitted to IP ARU following admission for tumor resection and right-sided hemiparesis. Pt performance with ADLs is below baseline, performance impacted by RUE weakness and coordination deficits, significant RLE weakness, and mild cognition deficits. Discharge planning challenged by temporary living situation and environmental set up. Goals to advance to Mod IND in I/ADLs. Pt will benefit from skilled occupational therapy intervention to address goals in POC and maximize safety and independence at discharge environment.     Pt educated on precautions and functional implications, issued reacher for A in functional tasks. Pt performed toileting, SPT progress min A to CGA with education on hand placement.Continue POC.      Other Barriers to Discharge (DME, Family Training, etc): Temporary living situation impacting environmental set up. Sleeps on air mattress. Family training TBD. DME TBD pending progress - reacher, extended tub bench/shower chair.

## 2021-11-19 ENCOUNTER — APPOINTMENT (OUTPATIENT)
Dept: PHYSICAL THERAPY | Facility: CLINIC | Age: 46
End: 2021-11-19
Attending: PHYSICAL MEDICINE & REHABILITATION
Payer: COMMERCIAL

## 2021-11-19 ENCOUNTER — APPOINTMENT (OUTPATIENT)
Dept: SPEECH THERAPY | Facility: CLINIC | Age: 46
End: 2021-11-19
Attending: PHYSICAL MEDICINE & REHABILITATION
Payer: COMMERCIAL

## 2021-11-19 ENCOUNTER — APPOINTMENT (OUTPATIENT)
Dept: OCCUPATIONAL THERAPY | Facility: CLINIC | Age: 46
End: 2021-11-19
Attending: PHYSICAL MEDICINE & REHABILITATION
Payer: COMMERCIAL

## 2021-11-19 PROCEDURE — 97535 SELF CARE MNGMENT TRAINING: CPT | Mod: GO

## 2021-11-19 PROCEDURE — 128N000003 HC R&B REHAB

## 2021-11-19 PROCEDURE — 250N000012 HC RX MED GY IP 250 OP 636 PS 637: Performed by: STUDENT IN AN ORGANIZED HEALTH CARE EDUCATION/TRAINING PROGRAM

## 2021-11-19 PROCEDURE — 999N000150 HC STATISTIC PT MED CONFERENCE < 30 MIN

## 2021-11-19 PROCEDURE — 99233 SBSQ HOSP IP/OBS HIGH 50: CPT | Performed by: PHYSICAL MEDICINE & REHABILITATION

## 2021-11-19 PROCEDURE — 999N000125 HC STATISTIC PATIENT MED CONFERENCE < 30 MIN

## 2021-11-19 PROCEDURE — 97130 THER IVNTJ EA ADDL 15 MIN: CPT | Mod: GN | Performed by: SPEECH-LANGUAGE PATHOLOGIST

## 2021-11-19 PROCEDURE — 97530 THERAPEUTIC ACTIVITIES: CPT | Mod: GP

## 2021-11-19 PROCEDURE — 250N000013 HC RX MED GY IP 250 OP 250 PS 637: Performed by: STUDENT IN AN ORGANIZED HEALTH CARE EDUCATION/TRAINING PROGRAM

## 2021-11-19 PROCEDURE — 999N000125 HC STATISTIC PATIENT MED CONFERENCE < 30 MIN: Performed by: SPEECH-LANGUAGE PATHOLOGIST

## 2021-11-19 PROCEDURE — 97129 THER IVNTJ 1ST 15 MIN: CPT | Mod: GN | Performed by: SPEECH-LANGUAGE PATHOLOGIST

## 2021-11-19 RX ORDER — DEXAMETHASONE 4 MG/1
4 TABLET ORAL DAILY
Qty: 3 TABLET | Refills: 0 | Status: SHIPPED | OUTPATIENT
Start: 2021-11-23 | End: 2021-12-07

## 2021-11-19 RX ORDER — POLYETHYLENE GLYCOL 3350 17 G/17G
17 POWDER, FOR SOLUTION ORAL DAILY
Qty: 510 G | Refills: 0 | Status: SHIPPED | OUTPATIENT
Start: 2021-11-19 | End: 2021-12-07

## 2021-11-19 RX ORDER — OXYCODONE HYDROCHLORIDE 5 MG/1
2.5-1 TABLET ORAL EVERY 4 HOURS PRN
Qty: 25 TABLET | Refills: 0 | Status: SHIPPED | OUTPATIENT
Start: 2021-11-19 | End: 2021-11-29

## 2021-11-19 RX ORDER — DEXAMETHASONE 2 MG/1
2 TABLET ORAL DAILY
Qty: 3 TABLET | Refills: 0 | Status: SHIPPED | OUTPATIENT
Start: 2021-11-26 | End: 2021-12-07

## 2021-11-19 RX ORDER — LISINOPRIL 20 MG/1
20 TABLET ORAL EVERY MORNING
Qty: 30 TABLET | Refills: 0 | Status: SHIPPED | OUTPATIENT
Start: 2021-11-20 | End: 2022-06-07

## 2021-11-19 RX ORDER — AMOXICILLIN 250 MG
1-2 CAPSULE ORAL 2 TIMES DAILY
Qty: 120 TABLET | Refills: 0 | Status: SHIPPED | OUTPATIENT
Start: 2021-11-19 | End: 2021-12-19

## 2021-11-19 RX ORDER — ACETAMINOPHEN 325 MG/1
650-975 TABLET ORAL EVERY 4 HOURS PRN
Qty: 540 TABLET | Refills: 0 | Status: SHIPPED | OUTPATIENT
Start: 2021-11-19 | End: 2021-12-19

## 2021-11-19 RX ORDER — LEVETIRACETAM 500 MG/1
500 TABLET ORAL 2 TIMES DAILY
Qty: 60 TABLET | Refills: 0 | Status: SHIPPED | OUTPATIENT
Start: 2021-11-19 | End: 2022-03-08

## 2021-11-19 RX ORDER — DEXAMETHASONE 4 MG/1
4 TABLET ORAL EVERY 12 HOURS
Qty: 7 TABLET | Refills: 0 | Status: SHIPPED | OUTPATIENT
Start: 2021-11-19 | End: 2021-12-07

## 2021-11-19 RX ADMIN — DEXAMETHASONE 4 MG: 1 TABLET ORAL at 17:01

## 2021-11-19 RX ADMIN — LISINOPRIL 20 MG: 20 TABLET ORAL at 07:34

## 2021-11-19 RX ADMIN — DOCUSATE SODIUM AND SENNOSIDES 1 TABLET: 8.6; 5 TABLET ORAL at 07:34

## 2021-11-19 RX ADMIN — LEVETIRACETAM 500 MG: 500 TABLET, FILM COATED ORAL at 07:33

## 2021-11-19 RX ADMIN — DOCUSATE SODIUM AND SENNOSIDES 1 TABLET: 8.6; 5 TABLET ORAL at 21:39

## 2021-11-19 RX ADMIN — Medication 5 MG: at 21:39

## 2021-11-19 RX ADMIN — DEXAMETHASONE 4 MG: 1 TABLET ORAL at 00:38

## 2021-11-19 RX ADMIN — LEVETIRACETAM 500 MG: 500 TABLET, FILM COATED ORAL at 21:39

## 2021-11-19 RX ADMIN — DEXAMETHASONE 4 MG: 1 TABLET ORAL at 07:34

## 2021-11-19 RX ADMIN — POLYETHYLENE GLYCOL 3350 17 G: 17 POWDER, FOR SOLUTION ORAL at 07:33

## 2021-11-19 RX ADMIN — Medication 2.5 MG: at 07:33

## 2021-11-19 RX ADMIN — OXYCODONE HYDROCHLORIDE 10 MG: 10 TABLET ORAL at 17:02

## 2021-11-19 ASSESSMENT — ACTIVITIES OF DAILY LIVING (ADL)
ADLS_ACUITY_SCORE: 11

## 2021-11-19 NOTE — PLAN OF CARE
Discharge Planner Post-Acute Rehab PT:     Discharge Plan: home with support, outpatient PT    Precautions: falls    Current Status:  Bed Mobility: IND   Transfer: IND in room  Gait: 600' with WW, IND in room, SBA in halls  Stairs: 12 stairs with B rails, CGA  Balance: IND in room with WW, needs walker for support.    Assessment: Now safe for MOD I in room with WW - on track for discharge to home this weekend. Will setup OP PT near home. Rounds on Friday to finalize plan.     Other Barriers to Discharge (DME, Family Training, etc):   DME: will need WW

## 2021-11-19 NOTE — PROGRESS NOTES
"  [facility]   Acute Rehabilitation Unit  Daily progress note    INTERVAL HISTORY  Pt seen in team rounds with wife present via telephone.  No acute events overnight and this morning Joss has no new concerns or complaints.  HAs managed with current regimen and lower dose of Oxycodone 2.5 mg has been effective without making him drowsy.  BPs have remained elevated, will increase Lisinopril to 20 mg daily today.  For full functional updates, see team rounds note from today. Has made excellent progress in a short amount of time and have moved up discharge date to tomorrow after a full day of therapies.      MEDICATIONS  Scheduled meds    dexamethasone  4 mg Oral Q12H    Followed by     [START ON 11/23/2021] dexamethasone  4 mg Oral Daily    Followed by     [START ON 11/26/2021] dexamethasone  2 mg Oral Daily     levETIRAcetam  500 mg Oral BID     lisinopril  20 mg Oral QAM     polyethylene glycol  17 g Oral Daily     senna-docusate  1-2 tablet Oral BID       PRN meds:  acetaminophen, bisacodyl, calcium carbonate, hydrALAZINE, magnesium hydroxide, methocarbamol, naloxone **OR** naloxone **OR** naloxone **OR** naloxone, ondansetron, oxyCODONE IR **OR** oxyCODONE      PHYSICAL EXAM  BP (P) 131/77 (BP Location: Right arm)   Pulse 86   Temp 97.2  F (36.2  C) (Oral)   Resp 20   Ht 1.803 m (5' 11\")   SpO2 100%   BMI 19.83 kg/m    Gen: NAD, calm and pleasant, sitting up in bed  HEENT: MMM, crani incision site w/ staples, healing well, no surrounding erythema or drainage  Pulm: Non-labored breathing  Abd: Non-distended  Ext: No LE edema  Neuro/MSK: Answers appropriately, follows commands    LABS  No results found for this or any previous visit (from the past 24 hour(s)).    ASSESSMENT AND PLAN  Lang Collins Jr. is a 46 year old right hand dominant male w/ newly diagnosed HTN and previously COVID-19 positive (asymptomatic) who initially presented to ED in [place], [place] 8/8 s/p tonic " clonic seizure, and was found to have a 2.1cm left frontal non-enhancing mass concerning for low grade primary brain tumor in the superior frontal gyrus in the expected region of the supplementary motor area. He was then started on Keppra 500mg BID, and told to f/u w/ neurosurgery once he arrived in [place]. Mr. Collins was admitted 11/12/21 to [facility] for planned tumor resection. 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 Beltrán, there was no intraoperative complications. Impairments include: impaired ROM, impaired strength, impaired activity tolerance, impaired tone, impaired balance, impaired coordination, impaired judgement and safety awareness, impulsiveness and aphasia.     Admission to acute inpatient rehab: 11/16/2021    Impairment group code: Brain Dysfunction 02.1 Non-Traumatic: s/p left frontal craniotomy for tumor resection         1. PT, OT and SLP 60 minutes of each on a daily basis, in addition to rehab nursing and close management of physiatrist.       2. Impairment of ADL's: OT for 60 minutes daily to work on ADL re-training such as grooming, self cares and bathing.       3. Impairment of mobility: PT for 60 minutes daily to work on neuromuscular re-education focusing on strength, balance, coordination, and endurance.       4. Impairment of cognition/language/swallow: SLP for 60 minutes daily to work on cognitive and linguistic deficits.     5. Rehab RN to administer medication, patient education on medication taking, VS monitoring, bowel regimen, glucose monitoring and wound care/surgical wound dressing changes and monitoring.         6. Medical Conditions  Neuro  s/p left frontal-parietal craniotomy for resection of intra-axial brain tumor w/ Dr. Beltrán 11/12  Headache  Initially presented to ED in [place], [place] 8/8 s/p tonic clonic seizure, and was found to have a 2.1cm left frontal non-enhancing mass concerning for  low grade primary brain tumor in the superior frontal gyrus in the expected region of the supplementary motor area. No hx of seizures. He was then started on Keppra 500mg BID, no seizures since. Admitted for planned left frontal-parietal craniotomy for resection of intra-axial brain tumor w/ Dr. Beltrán 11/12. No post-op complications. S/p surgery, does have RUE weakness, and 0/5 strength in RLE. Also has headache, but denies visual changes.   - Continue w/ decadron taper  - Keppra 500mg BID  - Acetaminophen 325-975mg Q4H PRN  - Methocarbamol 1000mg Q6H PRN for muscle tension, unclear if patient is having muscle tension, may discontinue if not needed   - Oxycodone 2.5-10mg Q4H PRN for mod-severe pain   -PT, OT, SLP      MSK  R hemiparesis  - PT, OT     Cardiovascular  Newly diagnosed HTN  Was not taking any BP medications prior to hospitalization.   - Increase Lisinopril to 20 mg daily today  - Hydralazine 10mg QID PRN        Skin  Craniotomy incision site w/ staples  - Staples to be removed in 2 weeks (11/26), can be done at rehab   - Keep incision clean and dry at all times. Okay to shower and allow water to run over incision, pat dry after shower.   -Will need follow up with NSGY for staple removal        Sleep  - Melatonin 3mg at bedtime PRN        FEN  - Regular diet  - Tums 500-1000 QID PRN        Bowel/Bladder  Bladder, continent  - PVRs x3 upon admission WNL, may check PRN only     Bowel, continent  Has had constipation while hospitalized.  - Senna-docusate 1 tablet BID  - Miralax QDAY   - Bisacodyl suppository 10mg QDAY PRN  - Milk of Mag QDAY PRN           7. Adjustment to disability:  Clinical psychology to eval and treat as indicated  8. FEN: Regular diet  9. Bowel: Continent  10. Bladder: Continent  11. DVT Prophylaxis: As per neurosurgery recs no blood thinners, mechanical DVT ppx  12. GI Prophylaxis: None  13. Code: Full code as per discussion w/ patient 11/16  14. Disposition: Home   15. ELOS: Tentative  discharge date 11/20 after full day of therapies  16. Rehab prognosis:  Good  17. Follow up Appointments on Discharge:   - PCP 1-2 weeks following hospitalization  - f/u w/ neurosurgery in 2 weeks for staple removal and in 6 weeks.      Ezra Washington MD  Department of Rehabilitation Medicine    Time Spent on this Encounter   I, Ezra Washington, spent a total of 35 minutes face-to-face or managing the care of Lang Collins Jr.. Over 50% of my time on the unit was spent counseling the patient and coordinating care. See note for details.

## 2021-11-19 NOTE — PLAN OF CARE
Discharge Planner Post-Acute Rehab PT:     Discharge Plan: home with support after therapies on 11/20/21, outpatient PT at [facility]    Precautions: falls    Current Status:  Bed Mobility: IND   Transfer: IND in room  Gait: 600' with WW IN, SBA without assistive device x 15-20 ft  Stairs: 12 stairs with B rails, CGA  Balance: IND in room with WW, needs walker for support.    Assessment: FES again today - now able to consistently amb without assistive device for short distances. Ready for discharge to home with OP PT tomorrow. Will perform IND Day and issue WW tomorrow.     Other Barriers to Discharge (DME, Family Training, etc):   DME: will need WW  OP setup at [facility]

## 2021-11-19 NOTE — PLAN OF CARE
Skilled set up on :  Pt dependent for proper placement of electrodes on R quads, gastroc, hamstrings and anterior tib for optimum muscle contraction, safe positioning on w/c within frame and cushion for prevention of skin breakdown, feet secured to foot pedals, w/c secured to  w/ Q-straints.  Passive motion assessed to ensure proper positioning.      Pt performed 25 minutes of active FES ergometry with 100% stimulation applied to above muscles at 35 rpm with 4.94 Nm resistance.  This PT adjusted e-stim and cycling parameters in real-time to ensure palpable muscle contractions throughout session.  Please see [email or web address] for further details on patient's stimulation parameters and ergometry outcomes.      Changes in parameters that were part of this treatment:   -resistance increased to allow for increased power output without increasing speed.    Functional outcomes from this intervention include:   -reduced spasticity  -improved sensory awareness and proprioception  -improved muscle strength  -improved motor coordination    Session Summary:   -Average Power: 15.0W  -Asymmetry: R 22%  -Active Minutes: 25 minutes

## 2021-11-19 NOTE — CARE CONFERENCE
Acute Rehab Care Conference/Team Rounds      Type: Team Rounds    Present: Dr. Ana Cristina Washington, Rachel Howard Resident, Jayde Karimi RN, Kirstin LEESW, Felix Sol OT, Shawanda Napoles PT, Tisha Perez SLP, Virginie Spear Dietician, Patient Joss Collins      Discharge Barriers/Treatment/Education    Rehab Diagnosis: L frontal tumor s/p craniotomy for resection    Active Medical Co-morbidities/Prognosis: HTN, seizures    Safety: Modified Independent in the room, used call light appropriately    Pain: complained of pain to head, incision site    Medications, Skin, Tubes/Lines: takes pills whole, no lines/tubes    Swallowing/Nutrition: regular diet, thin fluids    Bowel/Bladder: Continent of Bowel and Bladder, used urinal at night, ambulates with walker to bathroom for toilet needs    Psychosocial: Originally from [place] but visiting/staying with sister, Arin, and plans to discharge to Arin's home in [place]. The home is an apartment and on the first floor. 0 KIRAN. Address: [address] [place] [address]. Depression self-reported. Good support. Works full-time PTA. [employer] insurance.     ADLs/IADLs: Current level of function - Mod I FB dressing, toileting FWW and grab bar, g/h and oral cares. Supervision bathing with extended tub bench. On track for discharge to home with OP OT for higher level IADLs, RTW, and functional cognition.     Mobility: On track for discharge to home with OP PT - functionally safe, but still with significant weakness in RLE. Compensates well and eager to return to home.   Bed Mobility: IND   Transfer: IND in room  Gait: 600' with WW, IND in room, SBA in halls  Stairs: 12 stairs with B rails, CGA  Balance: IND in room with WW, needs walker for support.    Cognition/Language:Mildly impaired expressive language for more complex language tasks    Mildly impaired cognition in the areas of executive functions and memory, likely further OP tx, level of supervision TBD      [facility]  Re-Entry:    Transportation: Not a  d/t RLE weakness - family to provide.     Plan of Care and goals reviewed and updated.    Discharge Plan/Recommendations    Fall Precautions: continue    Overall plan for the patient:   BPs elevated with Lisinopril titrated up.  Further monitoring and titration to be done as outpatient with PCP follow.  On Decadron taper which may be increasing BPs as well.  Still weak in the right arm and leg, but functionally has made excellent progress in a short period of time.  Now Mod I in the room, ambulating 600 ft, Mod I for transfers, and able to navigate stairs.  Will recommend walker upon discharge.  Recommending extended tub bench which he will order from [employer].  Mild to moderate cognitive impairments noted and some word finding difficulty.  Given progress, will move up tentative discharge date to tomorrow, 11/20, after a full day of therapies.  Will continue with outpatient PT, OT, and SLP.        Utilization Review and Continued Stay Justification    Medical Necessity Criteria:    For any criteria that is not met, please document reason and plan for discharge, transfer, or modification of plan of care to address.    Requires intensive rehabilitation program to treat functional deficits?: Yes    Requires 3x per week or greater involvement of rehabilitation physician to oversee rehabilitation program?: Yes    Requires rehabilitation nursing interventions?: Yes    Patient is making functional progress?: Yes    There is a potential for additional functional progress? Yes    Patient is participating in therapy 3 hours per day a minimum of 5 days per week or 15 hours per week in 7 day period?:Yes    Has discharge needs that require coordinated discharge planning approach?:Yes      Barriers/Concerns related to meeting medical necessity criteria:  None    Team Plan to Address Concern:  N/A      Final Physician Sign off    Statement of Approval: I have reviewed and agree with the  recommendations and documentation in this care conference note.       Patient Goals  Social Work Goals: Home tomorrow, Sat 11/20 after a full-day of therapy. OP and family A.        OT Frequency: daily/  minutes  OT goal: hygiene/grooming: modified independent,using adaptive equipment,within precautions  OT goal: upper body dressing: Modified independent,using adaptive equipment,within precautions,including set-up/clothing retrieval  OT goal: lower body dressing: Modified independent,within precautions,using adaptive equipment,including set-up/clothing retrieval  OT goal: upper body bathing: Modified independent,within precautions,using adaptive equipment  OT goal: lower body bathing: Modified independent,using adaptive equipment,with precautions  OT goal: bed mobility: Modified independent,within precautions,supine to/from sitting  OT Goal: transfer: Modified independent,with assistive device,within precautions  OT goal: toilet transfer/toileting: Modified independent,using adaptive equipment,cleaning and garment management,toilet transfer,within precautions  OT goal: meal preparation: Modified independent,with simple meal preparation,using adaptive equipment,within precautions  OT goal: home management: Modified independent,with light demand household tasks,using adaptive equipment,within precautions  OT goal: cognitive: Patient/caregiver will verbalize understanding of cognitive assessment results/recommendations as needed for safe discharge planning  OT goal 1: Pt will perform HEP mod IND using handouts for reference to demonstrate comprehension of HEP in order to promote carryover at discharge.                   PT Frequency: daily 60-90 minutes  PT goal: bed mobility: Modified independent  PT goal: transfers: Modified independent,Assistive device (including car)  PT goal: gait: Modified independent,Assistive device,150 feet  PT goal: stairs: Supervision/stand-by assist,Greater than 10 stairs (single  arlette  PT goal 1: Patient will be IND with HEP to maintain gains made in therapy  PT Goal 2: Patient will participate in balance assessement to determine and lessen risk of falls       SLP Frequency: daily  SLP goal 1: Pt will complete moderately complex expressive language tasks to improve communication with min cues and 90% accuracy  SLP goal 2: Pt will complete moderately complex level executive functions to improve safety with 90% accuracy and min cues  SLP goal 3: Pt will complete functional memory tasks with 90% accuracy and min cues            Patient Goal:  Pain Management: Patient will be knowledgeable about pain prevention strategies as evidenced by advocating for pain meds before it get severe by 11/30/21                    Patient/Family Goal: Medication Management: Patient will be knowledgeable about his medications as evidenced by participating and passing MAP by 11/30/22                             Goal: Safety Management: Patient will be knowledgeable about fall prevention strategies as evidenced by using the call light light appropriately by 11/30/21

## 2021-11-19 NOTE — PLAN OF CARE
Discharge Planner Post-Acute Rehab SLP:      Discharge Plan: Home w/ assistance, OP SLP      Precautions: Fall     Current Status:  Communication: Mildly impaired expressive language for more complex language tasks   Cognition: Mildly impaired cognition in the areas of executive functions and memory  Swallow: Regular and thin. Dysphagia eval not warranted at this time     Assessment: SLP: went through cognitive activities pt can work on at home (IPAD), Pt demonstrated mild/moderate difficulty with planning/reasoning and memory. Noted some improvement with repetition        Other Barriers to Discharge (Family Training, etc): None anticipated per SLP

## 2021-11-19 NOTE — PROGRESS NOTES
Plan to discharge home tomorrow with OP therapy. Family supportive, will transport home. Pt notified of transportation benefits with insurance. Pt not aware. SWer printed off copy of pt insurance card and information/phone number to call and schedule rides to apt with insurance. Pt expressed appreciation and denied questions or concerns. No further SW needs at this time.     ABRAHAM Bradshaw   [facility]   Direct Phone: [phone]  I   Pager: [phone]  I  Fax: [phone]

## 2021-11-19 NOTE — PLAN OF CARE
FOCUS/GOAL  Medical management    ASSESSMENT, INTERVENTIONS AND CONTINUING PLAN FOR GOAL:    0900 Lisinopril given earlier.  BP down to 131/77 at recheck. R repoted pain on temporal area. 2.5 mg oxycodone given  with good effect. Had round this morning. Patient is expected to discharge tomorrow. Mod I in his room, patient reported voiding. No BM reported. Will continue with POC.

## 2021-11-19 NOTE — PLAN OF CARE
Individualized Overall Plan Of Care (IOPOC)      Rehab diagnosis/Impairment Group Code: Brain dysfunction 02.1 non-traumatic: s/p left frontal craniotomy for tumor resection   S/p craniotomy       Expected functional outcome: Mod I for ambulation, mobility, and ADLs     Clinical Impression Comments: Making excellent improvements    Mobility: Patient is a 47 yo male who presents to ARU with PT orders to address functional mobility that is impacted after hospitalization following brain surgery.  Skilled therapy is indicated to address deficits in order to return to IND PLOF as well as to facilitate safe DC plan.    ADL: Pt is admitted to IP ARU following admission for tumor resection and right-sided hemiparesis. Pt performance with ADLs is below baseline, performance impacted by RUE weakness and coordination deficits, significant RLE weakness, and mild cognition deficits. Goals to advance to Mod IND in I/ADLs. Pt will benefit from skilled occupational therapy intervention to address goals in POC and maximize safety and independence at discharge environment.    Communication/Cognition/Swallow: Pt seen for standardized cognitive/linguistic evaluation. He complete the Cognitive Linguistic Quick Test + and was given the aphasia administration d/t suspected cognitive and language deficits s/p left frontal-parietal craniotomy. Pt's functional communication and conversational skills are very appropriate and without word finding difficulty. Increased difficulty was noted during moderately complex language based tasks within standardized assessment. He also presented with mild cognitive deficits in the areas of executive functions and memory. Initiate SLP services for cognitive/linguistic deficits.      Intensity of therapy:   PT 60 minutes, Daily, for 4 days  OT 60 minutes, Daily, for 4 days  SLP 60 minutes, daily, for 4 days    Orthotics None  Education wound care  Neuropsychology Testing: No  Other:  None      Medical  Prognosis: Good for stated rehab goals.        Physician summary statement: Progress has been much more rapid than anticipated, and therefore have moved up tentative discharge date but will continue with outpatient therapies.    Discharge destination: prior home  Discharge rehabilitation needs: PT, OT and SLP      Estimated length of stay: 4 days      Rehabilitation Physician Ana Cristina Washington MD

## 2021-11-19 NOTE — PLAN OF CARE
Orientation: A/Ox4  Bowel: continent of bm x1 using toilet in bathroom  Bladder: continent using toilet in bathroom  Pain: report pain and pressure in his head, not new, requested and received prn Tylenol and oxycodone x2  Ambulation/Transfers: Mod-I in room with walker  Diet/ Liquids: reg/thin/ takes pill whole        Alarms no longer indicated, all light within reach, Ok to continue with care plan.

## 2021-11-20 ENCOUNTER — APPOINTMENT (OUTPATIENT)
Dept: SPEECH THERAPY | Facility: CLINIC | Age: 46
End: 2021-11-20
Attending: PHYSICAL MEDICINE & REHABILITATION
Payer: COMMERCIAL

## 2021-11-20 ENCOUNTER — APPOINTMENT (OUTPATIENT)
Dept: PHYSICAL THERAPY | Facility: CLINIC | Age: 46
End: 2021-11-20
Attending: PHYSICAL MEDICINE & REHABILITATION
Payer: COMMERCIAL

## 2021-11-20 ENCOUNTER — APPOINTMENT (OUTPATIENT)
Dept: OCCUPATIONAL THERAPY | Facility: CLINIC | Age: 46
End: 2021-11-20
Attending: PHYSICAL MEDICINE & REHABILITATION
Payer: COMMERCIAL

## 2021-11-20 VITALS
DIASTOLIC BLOOD PRESSURE: 70 MMHG | RESPIRATION RATE: 18 BRPM | HEART RATE: 66 BPM | OXYGEN SATURATION: 100 % | HEIGHT: 71 IN | SYSTOLIC BLOOD PRESSURE: 136 MMHG | TEMPERATURE: 97.4 F | BODY MASS INDEX: 19.83 KG/M2

## 2021-11-20 PROCEDURE — 97535 SELF CARE MNGMENT TRAINING: CPT | Mod: GO

## 2021-11-20 PROCEDURE — 97129 THER IVNTJ 1ST 15 MIN: CPT | Mod: GN

## 2021-11-20 PROCEDURE — 99238 HOSP IP/OBS DSCHRG MGMT 30/<: CPT | Performed by: PHYSICAL MEDICINE & REHABILITATION

## 2021-11-20 PROCEDURE — 250N000013 HC RX MED GY IP 250 OP 250 PS 637: Performed by: STUDENT IN AN ORGANIZED HEALTH CARE EDUCATION/TRAINING PROGRAM

## 2021-11-20 PROCEDURE — 97130 THER IVNTJ EA ADDL 15 MIN: CPT | Mod: GN

## 2021-11-20 PROCEDURE — 250N000012 HC RX MED GY IP 250 OP 636 PS 637: Performed by: STUDENT IN AN ORGANIZED HEALTH CARE EDUCATION/TRAINING PROGRAM

## 2021-11-20 PROCEDURE — 97530 THERAPEUTIC ACTIVITIES: CPT | Mod: GO

## 2021-11-20 PROCEDURE — 97530 THERAPEUTIC ACTIVITIES: CPT | Mod: GP

## 2021-11-20 RX ADMIN — Medication 2.5 MG: at 08:10

## 2021-11-20 RX ADMIN — LEVETIRACETAM 500 MG: 500 TABLET, FILM COATED ORAL at 08:11

## 2021-11-20 RX ADMIN — LISINOPRIL 20 MG: 20 TABLET ORAL at 08:11

## 2021-11-20 RX ADMIN — DOCUSATE SODIUM AND SENNOSIDES 1 TABLET: 8.6; 5 TABLET ORAL at 08:10

## 2021-11-20 RX ADMIN — DEXAMETHASONE 4 MG: 1 TABLET ORAL at 05:07

## 2021-11-20 RX ADMIN — POLYETHYLENE GLYCOL 3350 17 G: 17 POWDER, FOR SOLUTION ORAL at 08:11

## 2021-11-20 ASSESSMENT — ACTIVITIES OF DAILY LIVING (ADL)
ADLS_ACUITY_SCORE: 11

## 2021-11-20 NOTE — DISCHARGE SUMMARY
[facility]   Acute Rehabilitation Unit  Discharge summary     Date of Admission: 11/16/2021  Date of Discharge: 11/20/2021  Disposition: Home  Primary Care Physician: No Ref-Primary, Physician  Attending physician: Ana Cristina Washington MD  Other significant physician provider(s): Rachel Howard MD      discharge diagnosis  S/p left frontal-parietal craniotomy for resection of intra-axial brain tumor  - Brain Dysfunction 02.1 Non-Traumatic: s/p left frontal craniotomy for tumor resection  1. Impaired functional mobility  2. Impaired ADLs  3. Impaired cognition     Other medical concerns:   - Headache  - R hemiparesis  - Newly diagnosed HTN      brief summary per hpi  Lang Collins Jr. is a 46 year old right hand dominant male w/ newly diagnosed HTN and previously COVID-19 positive (asymptomatic) who initially presented to ED in [place], [place] 8/8 s/p tonic clonic seizure, and was found to have a 2.1cm left frontal non-enhancing mass concerning for low grade primary brain tumor in the superior frontal gyrus in the expected region of the supplementary motor area. He was then started on Keppra 500mg BID, and told to f/u w/ neurosurgery once he arrived in [place]. Mr. Collins was admitted 11/12/21 to [facility] for planned tumor resection. 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 Beltrán, there was no intraoperative complications.    Admitted to ARU 11/16 w/ R hemiparesis, w/ ongoing PT, OT, and SLP needs. While in ARU, Mr. Collins made significant gains. On admission, he had RUE weakness, and RLE was 0/5 motor strength. After using the FES bike w/ PT, he had significant improvement in RLE muscle activation, and was able to ambulate up to 600' w/ WW, CGA. At the time of discharge, he was able to consistently ambulate w/o assistive device for short distances, and will be discharged home w/ a WW. Decadron  tapered while inpatient, and he will continue w/ taper s/p discharge. He has newly diagnosed HTN, and lisinopril was increased from 10mg to 20mg QDAY while in the ARU. New HTN could be 2/2 steroid taper. He will need to f/u w/ PCP s/p discharge for further management. Will be discharging home w/ ongoing outpatient PT, OT, and SLP needs.       rehabilitation course  Patient was admitted to the acute inpatient rehabilitation unit on 11/16/21 where he participated in physical, occupational, and speech language therapies for 60 minutes of each on a daily basis.     At the time of admission, his functionality was:  Patient is participating in daily PT and OT and is making progress. Currently, pt is able to ambulate 10' + 10' L rail R solid AFO & toe sock, WC follow. Mod Ax1 R LE knee flx then hip flx for swing assist, training on quad relaxation pre hip flx given hypertonicity and spasticity. Supine-sit Mayank with rail and AA R LE, Supervision with sittng balance EOB, SBA sit- Isabel Steady with R knee stable and foot grounded. Min Ax2 R pivot with aiden-walker to chair for alarm arming (PT progressing R LE, aide providing CGA at ). AFO & toe sock to prevent ankle roll. Pt able to use GB around shoe, mod A for RLE positioning of RLE, SBA sit>stand to isabel stedy. Pt able to bridge in bed to reposition and self adjust in bed. Pt reports some R hand swelling and tightness at beginning of session. Engaged in RUE functional reach and grasp fine motor task with deck of cards. x20 grasp and flipping card, x25 functional reach laterally, and forward reach. Pt reports word finding difficulty and will need Speech Language eval at Acute Rehab.     By the time of discharge his functionality improved to:    PT:   Bed Mobility: IND   Transfer: IND in room  Gait: 600' with WW IN, SBA without assistive device x 15-20 ft  Stairs: 12 stairs with B rails, CGA  Balance: IND in room with WW, needs walker for support.    OT:   ADLs: SBA  for bathing and total body dressing, able to identify compensatory techniques for dressing to increase independence  LB: Toilet clothing mgmt standing min A with LUE grab bar. Alka cares max A thoroughness  Toilet transfer - wc SPT CGA with hands on grab rail and arm rest  Recommend w/c SPT min A nsg    SLP:   Communication: Mildly impaired expressive language for more complex language tasks   Cognition: Mildly impaired cognition in the areas of executive functions and memory  Swallow: Regular and thin. Dysphagia eval not warranted at this time      medical COURSE    Neuro  s/p left frontal-parietal craniotomy for resection of intra-axial brain tumor w/ Dr. Beltrán 11/12  Headache  Initially presented to ED in [place], [place] 8/8 s/p tonic clonic seizure, and was found to have a 2.1cm left frontal non-enhancing mass concerning for low grade primary brain tumor in the superior frontal gyrus in the expected region of the supplementary motor area. No hx of seizures. He was then started on Keppra 500mg BID, no seizures since. Admitted for planned left frontal-parietal craniotomy for resection of intra-axial brain tumor w/ Dr. Beltrán 11/12. No post-op complications. S/p surgery, had RUE weakness, and 0/5 strength in RLE. Also has headache, but denied visual changes.  - Continue w/ decadron taper outpatient  - Keppra 500mg BID  - Acetaminophen 325-975mg Q4H PRN  - Methocarbamol 1000mg Q6H PRN for muscle tension, discontinued on discharge since patient has not needed in ARU  - Oxycodone 2.5-10mg Q4H PRN for mod-severe pain, continue to wean as able outpatient   - Outpatient PT, OT, SLP      GAYLA SOMMERS hemiparesis  On arrival to ARU, his gait was 30' aiden-walker w/ MOD/MAX for balance. After using FES bike w/ PT, on discharge, he improved to gait 600' w/ WW IN, SBA w/o assistive device [id number]. Will discharge home w/ a WW.  - Outpatient PT, OT     Cardiovascular  Newly diagnosed HTN  Was not taking any BP medications prior to  hospitalization. New HTN may be 2/2 steroid taper.   - Lisinopril 20mg QDAY        Skin  Craniotomy incision site w/ staples  - f/u w/ neurosurgery for staples removal  - Keep incision clean and dry at all times. Okay to shower and allow water to run over incision, pat dry after shower.         Sleep  - Melatonin 3mg at bedtime PRN        FEN  - Regular diet  - Tums 500-1000 QID PRN        Bowel/Bladder  Bladder, continent  - PVRs x3 upon admission WNL     Bowel, continent  Has had constipation while hospitalized.  - Senna-docusate 1 tablet BID  - Miralax QDAY   - Bisacodyl suppository 10mg QDAY PRN  - Milk of Mag QDAY PRN        dISCHARGE MEDICATIONS  Current Discharge Medication List      START taking these medications    Details   polyethylene glycol (MIRALAX) 17 GM/Dose powder Take 17 g by mouth daily  Qty: 510 g, Refills: 0    Associated Diagnoses: Drug-induced constipation         CONTINUE these medications which have CHANGED    Details   acetaminophen (TYLENOL) 325 MG tablet Take 2-3 tablets (650-975 mg) by mouth every 4 hours as needed for mild pain or other (For optimal non-opioid multimodal pain management to improve pain control.)  Qty: 540 tablet, Refills: 0    Associated Diagnoses: S/P craniotomy      !! dexamethasone (DECADRON) 2 MG tablet Take 1 tablet (2 mg) by mouth daily for 3 doses  Qty: 3 tablet, Refills: 0    Comments: Decadron taper 4mg Q12H starting at 11/19/21 at 1600, 4mg QDAY starting 11/23/21 1600 for 3 doses, 2mg QDAY starting 11/26 at 0900 for 3 doses, Then stop  Associated Diagnoses: S/P craniotomy      !! dexamethasone (DECADRON) 4 MG tablet Take 1 tablet (4 mg) by mouth every 12 hours for 4 days  Qty: 7 tablet, Refills: 0    Comments: Decadron taper 4mg Q12H starting at 11/19/21 at 1600, 4mg QDAY starting 11/23/21 1600 for 3 doses, 2mg QDAY starting 11/26 at 0900 for 3 doses, Then stop  Associated Diagnoses: S/P craniotomy      !! dexamethasone (DECADRON) 4 MG tablet Take 1 tablet (4  mg) by mouth daily for 3 doses  Qty: 3 tablet, Refills: 0    Comments: Decadron taper 4mg Q12H starting at 11/19/21 at 1600, 4mg QDAY starting 11/23/21 1600 for 3 doses, 2mg QDAY starting 11/26 at 0900 for 3 doses, Then stop  Associated Diagnoses: S/P craniotomy      levETIRAcetam (KEPPRA) 500 MG tablet Take 1 tablet (500 mg) by mouth 2 times daily  Qty: 60 tablet, Refills: 0    Associated Diagnoses: S/P craniotomy      lisinopril (ZESTRIL) 20 MG tablet Take 1 tablet (20 mg) by mouth every morning  Qty: 30 tablet, Refills: 0    Associated Diagnoses: Hypertension, unspecified type      oxyCODONE IR (ROXICODONE) 5 MG tablet Take 0.5-2 tablets (2.5-10 mg) by mouth every 4 hours as needed for moderate to severe pain  Qty: 25 tablet, Refills: 0    Associated Diagnoses: Nonintractable episodic headache, unspecified headache type; S/P craniotomy      senna-docusate (SENOKOT-S/PERICOLACE) 8.6-50 MG tablet Take 1-2 tablets by mouth 2 times daily  Qty: 120 tablet, Refills: 0    Associated Diagnoses: Drug-induced constipation       !! - Potential duplicate medications found. Please discuss with provider.            DISCHARGE INSTRUCTIONS AND FOLLOW UP  Discharge Procedure Orders   Physical Therapy Referral   Standing Status: Future   Referral Priority: Routine Referral Type: Rehab Therapy Physical Therapy   Number of Visits Requested: 1     Occupational Therapy Referral   Standing Status: Future   Referral Priority: Routine Referral Type: Occupational Therapy   Number of Visits Requested: 1     Speech Therapy Referral   Standing Status: Future   Referral Priority: Routine Referral Type: Therapeutic Services   Number of Visits Requested: 1     Reason for your hospital stay   Order Comments: You were in acute inpatient rehabilitation following hospitalization for brain tumor resection.     Activity   Order Comments: Your activity upon discharge: activity as tolerated, use walker if needed for stability during ambulation, no  driving until cleared by OT     Order Specific Question Answer Comments   Is discharge order? Yes      Adult [facility] Follow-up and recommended labs and tests   Order Comments: Follow-up Appointments  -Primary Care Provider; Please set up PCP for pt at [facility] (PH: [phone]).  You are scheduled to see Dr. Thomas on Friday, December 3rd, 2021 at 10:40am. Please arrive 15min early, and please bring your ID and insurance information, and wear a mask.     Address           [facility]                          [address] [place] [address]  Phone              [phone]        -Neurosurgery Clinic; (Surgeon: Dr. Kuldeep Beltrán) f/u w/ neurosurgery in 2 weeks for staple removal and in 6 weeks.  You are scheduled to see Christal Lehman PA-C on Tuesday, November 23, 2021 at 1:30pm.     Address           [facility]                          [address]                          [address]                          [place] [address]  Phone              [phone]    Appointments on [place] and/or [facility] (with [facility] or [facility] provider or service). Call [phone] if you haven't heard regarding these appointments within 7 days of discharge.     Wound care and dressings   Order Comments: Instructions to care for your wound at home:   Keep your incision clean and dry at all times.  OK to shower and allow water to run over incision, pat dry after shower.  No bathing swimming or submerging in water.  Call immediately or come to ED if any drainage occurs, or you develop new pain, redness, or swelling.     Diet   Order Comments: Follow this diet upon discharge: Diet Regular Diet Adult     Order Specific Question Answer Comments   Is discharge order? Yes           physical examination  VITAL SIGNS:  BP (!) 135/103 (BP Location: Left arm, Patient Position: Sitting)   Pulse 64   Temp 98  F (36.7  C) (Oral)    "Resp 20   Ht 1.803 m (5' 11\")   SpO2 100%   BMI 19.83 kg/m    BMI:  Estimated body mass index is 19.83 kg/m  as calculated from the following:    Height as of this encounter: 1.803 m (5' 11\").    Weight as of 11/13/21: 64.5 kg (142 lb 3.2 oz).     General: NAD, pleasant and cooperative   HEENT: staples on cranial incision intact   Pulmonary: clear breath sounds b/l, no rales/wheezing    Cardiovascular: RRR, radial pulses 2+ b/l, no murmur auscultated  Abdominal: soft, non-tender to palpation, bowel sounds present   Extremities: warm, well perfused, no edema in bilateral lower extremities, no tenderness in calves   MSK/neuro:   Mental Status:  alert and oriented x3    Cranial Nerves: grossly normal    Sensory: Normal to light touch in bilateral upper and lower extremities; no lateral extinction    Strength: R shoulder abd and EF was 4-/5, R HF and KE was 4+/5, KF was 3/5 and DF/EHL was 0/5. LUE and LLE intact  Reflexes: (0 to 4 scale)        It was our pleasure to care for Lang Collins JrShanita during this hospitalization. Please do not hesitate to contact me should there be questions regarding the hospital course or discharge plan.        Natacha Kapoor MD  Physical Medicine & Rehabilitation     "

## 2021-11-20 NOTE — PLAN OF CARE
FOCUS/GOAL  Pain management and Mobility    ASSESSMENT, INTERVENTIONS AND CONTINUING PLAN FOR GOAL:  Pt slept well. Denies pain. Mod I w/ walker for functional mobility. Pt ambulated in hallway with walker around 0530.   Plan is to discharge today after therapies.

## 2021-11-20 NOTE — PLAN OF CARE
Physical Therapy Discharge Summary    Reason for therapy discharge:    Discharged to home with outpatient therapy.    Progress towards therapy goal(s). See goals on Care Plan in [facility] electronic health record for goal details.  Goals met  Pt is IND with bed mobility, mod I with transfers and ambulation x600 ft with FWW, CGA for ascending/descending stairs. With improving strength, has been able to trial short distance ambulation without device, but does require SBA.    Therapy recommendation(s):    Pt has been issued a HEP, with plans to continue with OP PT at [facility] in order to further progress strength/endurance/balance to improve IND with mobility and reduce risk of future falls.

## 2021-11-20 NOTE — PLAN OF CARE
"Discharge Planner Post-Acute Rehab SLP:      Discharge Plan: Home w/ assistance, OP SLP      Precautions: Fall     Current Status:  Communication: Mildly impaired expressive language for more complex language tasks   Cognition: Mildly impaired cognition in the areas of executive functions and memory  Swallow: Regular and thin. Dysphagia eval not warranted at this time     Assessment: Pt educated extensively re: progress in therapy and recommendations for dc. Recommend pt continue with OP SLP in order to further address cognitive function via higher level cognitive intervention to assist in pt eventually returning to work. Pt verbalized understanding. Pt wiith reports of improvement within cognitive functioning, language and \"vocabulary\" within stay at ARU. Pt expressed insight into current ongoing deficits and how that might imapct independence and return to work and completion of higher level cognitive tasks. Pt does not endorse ongoing difficulty within new learning and paying bills. Pt educated re: strategies to implement at home to improve memory and problem solving . Hand out provided on memory strategies.     Other Barriers to Discharge (Family Training, etc): None anticipated per SLP     "

## 2021-11-20 NOTE — PROGRESS NOTES
Speech Language Therapy Discharge Summary    Reason for therapy discharge:    Discharged to home with outpatient therapy.    Progress towards therapy goal(s). See goals on Care Plan in [facility] electronic health record for goal details.  Goals met    Therapy recommendation(s):    Continued therapy is recommended.  Rationale/Recommendations:  in order to return to work.       Pt participated in skilled speech therapy intervention targeting higher level language, memory, and problem solving through structured and functional tasks. Pt with great application of learned strategies and has demonstrated improvement within language and memory. Pt also endorses improvement but still demonstrating mild ongoing deficits re: higher level cognitive function and problem solving. Pt would benefit from ongoing SLP at OP setting to further address cognition to prepare for eventual return to work .

## 2021-11-20 NOTE — PLAN OF CARE
FOCUS/GOAL  Medical management    ASSESSMENT, INTERVENTIONS AND CONTINUING PLAN FOR GOAL:  Vitals with patient's baseline.  Oxycodone given this morning prior therapy per patient's request for headache. The administration was effective. Discharge instructions given . Pain and opoid management discussed. Patient is discharging on Oxycodone. Corticoid tirtration and abrupt stop of the medication consequences explained.  No concerns  Raised. Patient left the unit walking with a walker accompanied by friend at 1430.

## 2021-11-20 NOTE — DISCHARGE INSTRUCTIONS
Follow-up Appointments    -Primary Care Provider; Please set up PCP for pt at [facility] (PH: [phone]).  You are scheduled to see Dr. Thomas on Friday, December 3rd, 2021 at 10:40am. Please arrive 15min early, and please bring your ID and insurance information, and wear a mask.    Address  [facility]                          [address] [place] [address]  Phone   [phone]  Fax                  [phone]    -Neurosurgery Clinic; (Surgeon: Dr. Kuldeep Beltrán) f/u w/ neurosurgery in 2 weeks for staple removal and in 6 weeks.  You are scheduled to see Christal Lehman PA-C on Tuesday, November 23, 2021 at 1:30pm.    Address  [facility]                          [address]                          [address]                          [place] [address]  Phone              [phone]      [place] [employer] [employer] [facility] [employer] [employer]  Additional resources and contact information online   Www.strokemn.org & www.braininjurymn.org    Types of services that Stroke/Brain Injury Resource Facilitation offers include:  Emotional support in coping with a  new normal   Finding mental health support and counselors who understand brain injury and stroke  Finding a support group  Finding or re-connecting to rehabilitation services  Finding where and how to get a brain injury assessed  Finding or re-connecting with a primary care physician  Supporting caregivers by connecting them with resources  Education about diagnosis and symptoms -  Is this normal   Helping educate family and loved ones of the survivor s  invisible  symptoms  Figuring out the logistics of returning to work, vocational rehab and understanding ADA rights  If not going back to work, support in finding meaningful ways to structure your day and exploring volunteer options  Coaching on navigation the federal, state and [facility] health and  disability service system with additional support and conference calls as needed  Help finding appropriate legal support for appealing and navigating Social Security Disability, providing advocacy on the individual s behalf as needed and connecting with cost effective alternatives to  as needed  Supporting parents/students with how to discuss return to school and sports with educators and connecting to a TBI specialist or parent advocate group if needed  Connecting to addiction (alcohol/drug/gambling/smoking) support and treatment services  Support with creative problem solving to life barriers.  *These are just a few examples of common things that Resource Facilitation can help with. They are not limited to what is listed here so if you are curious if they can help, just ask. Call us at [phone] or [phone].

## 2021-11-20 NOTE — PLAN OF CARE
FOCUS/GOAL  Medication management, Pain management, Medical management, and Safety management    ASSESSMENT, INTERVENTIONS AND CONTINUING PLAN FOR GOAL:    Alert and oriented x4, complained of headache/pressure, given PRN Oxycodone 2x for pm shift; one time 10mg at 1702H for pain of 10/10 which was effective and lowered pain to 4/10; then 2nd at 2139H for pain of 6/10 with 5mg. Pt education on using lower dose that effectively manage pain was done and understood by pt. On regular and thin liquids diet, ate 75%. Continent of bowel and bladder using the toilet independently. Had 2 bm  (1 medium, 1 large, formed) this morning as reported; LBM: 11/19. Pt walked outside his room without walker and was steady, reminded that recommendation was MOD I in room for safety reasons and accompanied to room; no signs/discomforts/ complaints during and after ambulation. Appears to be sleeping at end of shift.

## 2021-11-20 NOTE — PLAN OF CARE
Discharge Planner Post-Acute Rehab OT:     Discharge Plan: first floor apartment (wc accessible front entrance, steps at back entrance), temporary living arrangement with sister (works full time) and her two children; OP OT     Precautions: falls, craniotomy    Current Status:  ADLs: mod IND FWW    IADLs: IND PTA - works full-time ([employer]), childcare, driving    Vision/Cognition: Glasses. Mild word finding difficulties reported by pt and mild memory deficits noted in functional conversation. Monitor prn.    Assessment. Pt provided fall safety education in context of simple meal prep and bathing transfer, recommended and obtained extended tub bench. On track to discharge with OP OT for high-level IADLs, higher level cognition, and RTW. Continue POC, independence day tomorrow with discharge after therapies.      Other Barriers to Discharge (DME, Family Training, etc): Temporary living situation impacting environmental set up. Will sleep on couch at discharge.  DME - reacher, extended tub bench, FWW.

## 2021-11-22 ENCOUNTER — PATIENT OUTREACH (OUTPATIENT)
Dept: CARE COORDINATION | Facility: CLINIC | Age: 46
End: 2021-11-22
Payer: COMMERCIAL

## 2021-11-22 DIAGNOSIS — Z71.89 OTHER SPECIFIED COUNSELING: ICD-10-CM

## 2021-11-22 NOTE — PROGRESS NOTES
Clinic Care Coordination Contact  [facility]: Post-Discharge Note  SITUATION                                                      Admission:    Admission Date: 11/16/21   Reason for Admission: Impaired functional mobility/ Impaired ADLs/ Impaired cognition  Discharge:   Discharge Date: 11/20/21  Discharge Diagnosis: Impaired functional mobility/ Impaired ADLs/ Impaired cognition    BACKGROUND                                                      Lang Collins Jr. is a 46 year old right hand dominant male w/ newly diagnosed HTN and previously COVID-19 positive (asymptomatic) who initially presented to ED in [place], [place] 8/8 s/p tonic clonic seizure, and was found to have a 2.1cm left frontal non-enhancing mass concerning for low grade primary brain tumor in the superior frontal gyrus in the expected region of the supplementary motor area.    ASSESSMENT      Enrollment  Primary Care Care Coordination Status: Not a Candidate    Discharge Assessment  How are you doing now that you are home?: feeling ok.  How are your symptoms? (Red Flag symptoms escalate to triage hotline per guidelines): Improved  Do you feel your condition is stable enough to be safe at home until your provider visit?: Yes  Does the patient have their discharge instructions? : Yes  Does the patient have questions regarding their discharge instructions? : No  Were you started on any new medications or were there changes to any of your previous medications? : No  Does the patient have all of their medications?: Yes  Do you have questions regarding any of your medications? : No  Do you have all of your needed medical supplies or equipment (DME)?  (i.e. oxygen tank, CPAP, cane, etc.): Yes  Discharge follow-up appointment scheduled within 14 calendar days? : Yes  Discharge Follow Up Appointment Date: 11/23/21  Discharge Follow Up Appointment Scheduled with?: Specialty Care Provider              PLAN                                                       Outpatient Plan:       Adult [facility] Follow-up and recommended labs and tests   Order Comments: Follow-up Appointments  -Primary Care Provider; Please set up PCP for pt at [facility] (PH: [phone]).  You are scheduled to see Dr. Thomas on Friday, December 3rd, 2021 at 10:40am. Please arrive 15min early, and please bring your ID and insurance information, and wear a mask.     Address           [facility]                          [address] [place] [address]  Phone              [phone]        -Neurosurgery Clinic; (Surgeon: Dr. Kuldeep Beltrán) f/u w/ neurosurgery in 2 weeks for staple removal and in 6 weeks.  You are scheduled to see Christal Lehman PA-C on Tuesday, November 23, 2021 at 1:30pm.     Address           [facility]                          [address]                          [address]                          [place] [address]  Phone              [phone]     Appointments on [place] and/or [facility] (with [facility] or [facility] provider or service). Call [phone] if you haven't heard regarding these appointments within 7 days of discharge.           Future Appointments   Date Time Provider Department Center   11/23/2021  1:30 PM Christal Lehman PA-C [facility]   11/26/2021 11:00 AM Alda Couch, OT [facility] [address]   12/17/2021  7:30 AM Destiny Maza, PT [facility] [address]   12/27/2021  1:30 PM [facility]   2/11/2022  1:30 PM Eulalia Pedro NP [facility] [place] [facility]         For any urgent concerns, please contact our 24 hour nurse triage line: [phone] ([phone])           LUCAS Alvarez  [phone]  Connected Care Resource [facility]

## 2021-11-23 ENCOUNTER — TELEPHONE (OUTPATIENT)
Dept: NEUROSURGERY | Facility: CLINIC | Age: 46
End: 2021-11-23
Payer: COMMERCIAL

## 2021-11-23 ENCOUNTER — OFFICE VISIT (OUTPATIENT)
Dept: NEUROSURGERY | Facility: CLINIC | Age: 46
End: 2021-11-23
Attending: PHYSICIAN ASSISTANT
Payer: COMMERCIAL

## 2021-11-23 VITALS
OXYGEN SATURATION: 100 % | BODY MASS INDEX: 19.88 KG/M2 | SYSTOLIC BLOOD PRESSURE: 147 MMHG | HEIGHT: 71 IN | DIASTOLIC BLOOD PRESSURE: 101 MMHG | HEART RATE: 68 BPM | WEIGHT: 142 LBS

## 2021-11-23 DIAGNOSIS — C71.9 BRAIN TUMOR, GLIOMA (H): Primary | ICD-10-CM

## 2021-11-23 DIAGNOSIS — Z98.890 S/P CRANIOTOMY: Primary | ICD-10-CM

## 2021-11-23 DIAGNOSIS — C71.9 OLIGODENDROGLIOMA (H): ICD-10-CM

## 2021-11-23 DIAGNOSIS — Z98.890 S/P CRANIOTOMY: ICD-10-CM

## 2021-11-23 PROCEDURE — 99024 POSTOP FOLLOW-UP VISIT: CPT | Performed by: PHYSICIAN ASSISTANT

## 2021-11-23 PROCEDURE — G0463 HOSPITAL OUTPT CLINIC VISIT: HCPCS

## 2021-11-23 ASSESSMENT — MIFFLIN-ST. JEOR: SCORE: 1556.24

## 2021-11-23 NOTE — TELEPHONE ENCOUNTER
----- Message from Kuldeep Beltrán MD sent at 11/18/2021  9:50 PM CST -----  Let's set up a phone encounter to review path results.  Let's also arrange follow up with Dr. Park to discuss Onc treatments, thanks

## 2021-11-23 NOTE — LETTER
11/23/2021         RE: Lang Collins Jr.  [address] [facility] [address]        Dear Colleague,    Thank you for referring your patient, Lang Collins Jr., to the [facility] NEUROSURGERY CLINIC [place]. Please see a copy of my visit note below.    Neurosurgery 2-week follow-up    Mr. Dos Santos is a 46-year-old male who is 2 weeks status post left frontoparietal craniotomy for resection of intra-axial brain tumor.  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  The patient had right-sided weakness after surgery which is rapidly improving, he is better able to lift his arm and leg, he still is unable to dorsiflex or plantarflex his right foot.  He denies any issues with his incision.  He does report he has a dental abscess on the left lower molar, it was very painful yesterday, today is less denies any fever or chills.    Exam     Alert oriented no acute distress  Incision is well-healed  Staples removed  Left upper and lower extremities appropriate strength  Right lower extremity inability to dorsiflex or plantarflex, some difficulty with extension and flexion at the knee, patient has 3 out of 5 strength with hip flexion    Assessment    WHO grade 2 oligodendroglioma  2 weeks status post craniotomy      Plan    Gradually increase activity as tolerated.   Follow up as scheduled.   Patient will talk with Dr. Beltrán on Monday further about pathology results, referrals have been sent to oncology for further follow-up as well.  Patient should follow-up with his dentist regarding his tooth issue.  Decadron taper and provided the patient from the acute postoperative rehabilitation clinic.        Again, thank you for allowing me to participate in the care of your patient.        Sincerely,        Christal Lehman PA-C

## 2021-11-23 NOTE — NURSING NOTE
"Lang Collins Jr. is a 46 year old male who presents for:  Chief Complaint   Patient presents with     Surgical Followup     2 wk post op follow up; DOS 11/12/21        Initial Vitals:  BP (!) 147/101   Pulse 68   Ht 5' 11\" (1.803 m)   Wt 142 lb (64.4 kg)   SpO2 100%   BMI 19.80 kg/m   Estimated body mass index is 19.8 kg/m  as calculated from the following:    Height as of this encounter: 5' 11\" (1.803 m).    Weight as of this encounter: 142 lb (64.4 kg).. Body surface area is 1.8 meters squared. BP completed using cuff size: regular  Data Unavailable    Reji Rebollar MA    "

## 2021-11-23 NOTE — TELEPHONE ENCOUNTER
Called and updated patient: Dr Beltrán would like to set up a phone encounter to review path results.    Scheduled patient for 10:45 on monday 11/29.      Will order referral for patient to see Dr Elva Park for oncology treatments and update Ginna Sosa RN to contact patient after the telephone call from Dr Beltrán.

## 2021-11-23 NOTE — PROGRESS NOTES
Neurosurgery 2-week follow-up    Mr. Dos Santos is a 46-year-old male who is 2 weeks status post left frontoparietal craniotomy for resection of intra-axial brain tumor.  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  The patient had right-sided weakness after surgery which is rapidly improving, he is better able to lift his arm and leg, he still is unable to dorsiflex or plantarflex his right foot.  He denies any issues with his incision.  He does report he has a dental abscess on the left lower molar, it was very painful yesterday, today is less denies any fever or chills.    Exam     Alert oriented no acute distress  Incision is well-healed  Staples removed  Left upper and lower extremities appropriate strength  Right lower extremity inability to dorsiflex or plantarflex, some difficulty with extension and flexion at the knee, patient has 3 out of 5 strength with hip flexion    Assessment    WHO grade 2 oligodendroglioma  2 weeks status post craniotomy      Plan    Gradually increase activity as tolerated.   Follow up as scheduled.   Patient will talk with Dr. Beltrán on Monday further about pathology results, referrals have been sent to oncology for further follow-up as well.  Patient should follow-up with his dentist regarding his tooth issue.  Decadron taper and provided the patient from the acute postoperative rehabilitation clinic.

## 2021-11-24 ENCOUNTER — TELEPHONE (OUTPATIENT)
Dept: NEUROSURGERY | Facility: CLINIC | Age: 46
End: 2021-11-24
Payer: COMMERCIAL

## 2021-11-24 NOTE — TELEPHONE ENCOUNTER
patient called to clarify his steroid taper. Per patient, He was instructed to start taking the Pills on 11/23 by staff a the the rehab facility. However the dates on the bottles did not match so it was confusing to him. Instructed to continue to follow the administration instructions but disregard the dates. He started on the 11/23. He was able to verbally tell me how he would taper. Encouraged to call with any other questions.   Pt in agreement.

## 2021-11-24 NOTE — TELEPHONE ENCOUNTER
Reason for call: Pt would like a call back regarding his medication start dates and what he should doing. Please call him at [phone]. Thank you ~

## 2021-11-29 ENCOUNTER — PATIENT OUTREACH (OUTPATIENT)
Dept: ONCOLOGY | Facility: CLINIC | Age: 46
End: 2021-11-29

## 2021-11-29 ENCOUNTER — VIRTUAL VISIT (OUTPATIENT)
Dept: NEUROSURGERY | Facility: CLINIC | Age: 46
End: 2021-11-29
Attending: NEUROLOGICAL SURGERY
Payer: COMMERCIAL

## 2021-11-29 VITALS — BODY MASS INDEX: 21 KG/M2 | HEIGHT: 71 IN | WEIGHT: 150 LBS

## 2021-11-29 DIAGNOSIS — C71.9 OLIGODENDROGLIOMA (H): Primary | ICD-10-CM

## 2021-11-29 DIAGNOSIS — R51.9 NONINTRACTABLE EPISODIC HEADACHE, UNSPECIFIED HEADACHE TYPE: ICD-10-CM

## 2021-11-29 DIAGNOSIS — Z98.890 S/P CRANIOTOMY: ICD-10-CM

## 2021-11-29 PROCEDURE — 999N000103 HC STATISTIC NO CHARGE FACILITY FEE: Mod: TEL

## 2021-11-29 PROCEDURE — 99024 POSTOP FOLLOW-UP VISIT: CPT | Performed by: NEUROLOGICAL SURGERY

## 2021-11-29 RX ORDER — OXYCODONE HYDROCHLORIDE 5 MG/1
2.5-1 TABLET ORAL EVERY 4 HOURS PRN
Qty: 25 TABLET | Refills: 0 | Status: SHIPPED | OUTPATIENT
Start: 2021-11-29 | End: 2022-03-08

## 2021-11-29 ASSESSMENT — MIFFLIN-ST. JEOR: SCORE: 1592.53

## 2021-11-29 ASSESSMENT — PAIN SCALES - GENERAL: PAINLEVEL: SEVERE PAIN (7)

## 2021-11-29 NOTE — LETTER
11/29/2021         RE: Lang Collins Jr.  [address] [facility] [address]        Dear Colleague,    Thank you for referring your patient, Lang Collins Jr., to the [facility] NEUROSURGERY CLINIC [place]. Please see a copy of my visit note below.    Telephone encounter.  Doing well 2 weeks post-op.  Steady improvement in right sided strength, was able to walk a mile yesterday.  Outpatient therapy starting later this week.  Reviewed pathology of oligodendroglioma grade 2.  Patient being scheduled to see Dr. Park.  Continue outpatient follow up.      Again, thank you for allowing me to participate in the care of your patient.        Sincerely,        Kuldeep Beltrán MD

## 2021-11-29 NOTE — PROGRESS NOTES
Telephone encounter.  Doing well 2 weeks post-op.  Steady improvement in right sided strength, was able to walk a mile yesterday.  Outpatient therapy starting later this week.  Reviewed pathology of oligodendroglioma grade 2.  Patient being scheduled to see Dr. Park.  Continue outpatient follow up.

## 2021-11-29 NOTE — NURSING NOTE
"Lang Collins Jr. is a 46 year old male who presents for:  Chief Complaint   Patient presents with     Neurologic Problem     Review pathology results        Initial Vitals:  Ht 5' 11\" (1.803 m)   Wt 150 lb (68 kg)   BMI 20.92 kg/m   Estimated body mass index is 20.92 kg/m  as calculated from the following:    Height as of this encounter: 5' 11\" (1.803 m).    Weight as of this encounter: 150 lb (68 kg).. Body surface area is 1.85 meters squared. BP completed using cuff size: NA (Not Taken)  Severe Pain (7)    Aaron Jnug MA    "

## 2021-11-29 NOTE — PROGRESS NOTES
RECORDS STATUS - ALL OTHER DIAGNOSIS      RECORDS RECEIVED FROM: [facility]   DATE RECEIVED: 12/7/2021   NOTES STATUS DETAILS   OFFICE NOTE from referring provider Complete Epic   Kuldeep Beltrán MD   OFFICE NOTE from medical oncologist N/A    DISCHARGE SUMMARY from hospital Complete 11/16/2021 left frontal-parietal craniotomy for resection of intra-axial brain tumor   DISCHARGE REPORT from the ER     OPERATIVE REPORT Complete See Brain Biopsy in [facility] 11/12/2021   MEDICATION LIST Complete [facility]   CLINICAL TRIAL TREATMENTS TO DATE     LABS     PATHOLOGY REPORTS Complete 11/12/2021  A. Brain, left frontal mass, biopsy:  - Oligodendroglioma, IDH-mutant, WHO grade II. See comment.     B. Brain, left frontal mass, biopsy:  - Oligodendroglioma, IDH-mutant, WHO grade II. See comment.   ANYTHING RELATED TO DIAGNOSIS Complete Labs last updated on 11/17/2021   GENONOMIC TESTING     TYPE:     IMAGING (NEED IMAGES & REPORT)     CT SCANS Complete CT Head 11/12/2021   MRI Complete MRI Brain 11/13/2021, 11/11/2021   MAMMO     ULTRASOUND     PET

## 2021-11-29 NOTE — PROGRESS NOTES
I called Joss after seeing Dr Beltrán has been in contact and given Joss the results of his biopsy.  I let Joss know I have him scheduled per Dr. Beltrán's request with Dr. Elva Park on Tuesday, 12/7, 10:45 am check in at the [facility] location.  I told him one of our schedulers will be reaching out to him soon.  I also left my contact information if he has any further questions or concerns.    New Patient Oncology Nurse Navigator Note     Referring provider: Kuldeep Beltrán MD in  NEUROSURGERY CLINIC     Referred to (specialty): medical oncology    Requested provider (if applicable): Dr. Elva Park     Date Referral Received: 11/24/2021     Evaluation for : Oligodendroglioma, IDH-mutant, WHO grade II.     Clinical History (per Nurse review of records provided):    **Dr. Beltrán's notes have been BOOK MARKED**  8/8 to 8/9/2021:  Admitted to [facility] for new onset seizures & MR Brain showed a non-enhancing lesion in the left posterior superior frontal gyrus  MOVED TO [place]:  MR Functional shows the lesion to be in the left supplementary motor area, a gyrus in front of the motor strip  11/12/2021:  S/p left frontal-parietal craniotomy for resection of intra-axial brain tumor  Final Diagnosis   A. Brain, left frontal mass, biopsy:  - Oligodendroglioma, IDH-mutant, WHO grade II. See comment.     B. Brain, left frontal mass, biopsy:  - Oligodendroglioma, IDH-mutant, WHO grade II. See comment.   Electronically signed by Jimbo Benton MD on 11/18/2021 at  4:10 PM   Comments:   This is an appended report. These results have been appended to a previously preliminary verified report.      Comment  [facility]   Florescent in-situ hybridization studies for 1p/ 19q co-deletion are pending and will be reported in an addendum.  Case was reviewed by the following resident: SHERWIN ERAZO   Comment: This is an appended report. These results have been appended to a previously preliminary  verified report.           Clinical Assessment / Barriers to Care (Per Nurse): recently moved to [place]     Records Location (Care Everywhere, Media, etc.): [facility],  [facility]    Records Needed: [facility]--pls be sure to have imaging loaded in PACS.  Can we get CE for here?     Additional testing needed prior to consult: none

## 2021-12-03 ENCOUNTER — HOSPITAL ENCOUNTER (OUTPATIENT)
Dept: OCCUPATIONAL THERAPY | Facility: CLINIC | Age: 46
Setting detail: THERAPIES SERIES
End: 2021-12-03
Attending: STUDENT IN AN ORGANIZED HEALTH CARE EDUCATION/TRAINING PROGRAM
Payer: COMMERCIAL

## 2021-12-03 PROCEDURE — 97165 OT EVAL LOW COMPLEX 30 MIN: CPT | Mod: GO | Performed by: OCCUPATIONAL THERAPIST

## 2021-12-03 PROCEDURE — 97110 THERAPEUTIC EXERCISES: CPT | Mod: GO | Performed by: OCCUPATIONAL THERAPIST

## 2021-12-03 ASSESSMENT — ACTIVITIES OF DAILY LIVING (ADL): IADL_QUICK_ADDS: COMMUNITY MOBILITY

## 2021-12-06 ENCOUNTER — TUMOR CONFERENCE (OUTPATIENT)
Dept: ONCOLOGY | Facility: CLINIC | Age: 46
End: 2021-12-06
Payer: COMMERCIAL

## 2021-12-07 ENCOUNTER — ONCOLOGY VISIT (OUTPATIENT)
Dept: ONCOLOGY | Facility: CLINIC | Age: 46
End: 2021-12-07
Attending: NEUROLOGICAL SURGERY
Payer: COMMERCIAL

## 2021-12-07 ENCOUNTER — PRE VISIT (OUTPATIENT)
Dept: ONCOLOGY | Facility: CLINIC | Age: 46
End: 2021-12-07

## 2021-12-07 ENCOUNTER — PATIENT OUTREACH (OUTPATIENT)
Dept: ONCOLOGY | Facility: CLINIC | Age: 46
End: 2021-12-07

## 2021-12-07 VITALS
WEIGHT: 144 LBS | DIASTOLIC BLOOD PRESSURE: 87 MMHG | OXYGEN SATURATION: 99 % | SYSTOLIC BLOOD PRESSURE: 130 MMHG | HEIGHT: 70 IN | HEART RATE: 87 BPM | TEMPERATURE: 98.2 F | BODY MASS INDEX: 20.62 KG/M2 | RESPIRATION RATE: 18 BRPM

## 2021-12-07 DIAGNOSIS — G44.209 TENSION HEADACHE: ICD-10-CM

## 2021-12-07 DIAGNOSIS — C71.1: ICD-10-CM

## 2021-12-07 DIAGNOSIS — C71.9 OLIGODENDROGLIOMA, WHO GRADE II (H): Primary | ICD-10-CM

## 2021-12-07 DIAGNOSIS — R26.81 GAIT INSTABILITY: ICD-10-CM

## 2021-12-07 DIAGNOSIS — G40.909 SEIZURE DISORDER (H): ICD-10-CM

## 2021-12-07 DIAGNOSIS — C71.9 BRAIN TUMOR, GLIOMA (H): ICD-10-CM

## 2021-12-07 DIAGNOSIS — Z98.890 S/P CRANIOTOMY: ICD-10-CM

## 2021-12-07 PROCEDURE — 99205 OFFICE O/P NEW HI 60 MIN: CPT | Performed by: PSYCHIATRY & NEUROLOGY

## 2021-12-07 PROCEDURE — G0463 HOSPITAL OUTPT CLINIC VISIT: HCPCS

## 2021-12-07 RX ORDER — CYCLOBENZAPRINE HCL 10 MG
10 TABLET ORAL AT BEDTIME
Qty: 30 TABLET | Refills: 1 | Status: SHIPPED | OUTPATIENT
Start: 2021-12-07 | End: 2022-03-08

## 2021-12-07 ASSESSMENT — MIFFLIN-ST. JEOR: SCORE: 1549.43

## 2021-12-07 ASSESSMENT — PAIN SCALES - GENERAL: PAINLEVEL: NO PAIN (0)

## 2021-12-07 NOTE — PROGRESS NOTES
"   12/03/21 1300   Quick Adds   Quick Adds Certification   Type of Visit Initial Outpatient Occupational Therapy Evaluation       Present No   General Information   Start Of Care Date 12/03/21   Referring Physician Rachel Howard MD    Orders Evaluate and treat as indicated   Orders Date 11/19/21   Medical Diagnosis S/P craniotomy Z98.890    Onset of Illness/Injury or Date of Surgery 11/12/21   Precautions/Limitations Fall precautions   Surgical/Medical History Reviewed Yes   Additional Occupational Profile Info/Pertinent History of Current Problem Lang Beal"Yokasta" is a 46 year old right hand dominant male presenting for OP OT Evaluation s/p left frontal-parietal craniotomy on 11/12/21 for resection of intra-axial brain tumor found after a seizure with no post operative complications noted.  Admitted to [facility] on 11/[facility]/21 for oncoming rehab needs as patient demonstrated right sided weakness and incoordination.  Discharged from [facility] on 11/20/21 with OP rehab orders.      Role/Living Environment   Current Community Support Family/friend caregiver   Patient role/Employment history Employed  (Currently not working due to these circumstances )   Community/Avocational Activities Childcare    Current Living Environment House   Number of Stairs to Enter Home 0    Number of Stairs Within Home 0    Primary Bathroom Location/Comments Main Floor    Home/Community Accessibility Comments Patient reports the he lives in a first floor apartment with his sister, niece and nephew.  Indicates no environmental barriers within the home environment.  Currently utilizing a tub shower with no grab bars- has been having SBA for safety purposes.     Prior Level - Transfers Independent   Prior Level - Ambulation Independent   Prior Level - ADLS Independent   Prior Responsibilities - IADL Meal Preparation;Housekeeping;Laundry;Shopping;Medication management;Finances;Driving;Work;   Prior Level Comments Patient " reports complete independence including driving, working full time and caring for children at baseline, without the use of AE.     Current Assistive Devices - Mobility Walker   Patient/family Goals Statement Return to work and driving.    Pain   Patient currently in pain No;Denies   Fall Risk Screen   Fall screen completed by OT   Have you fallen 2 or more times in the past year? No   Have you fallen and had an injury in the past year? No   Is patient a fall risk? Department fall risk interventions implemented   Fall screen comments Recent surgery    Abuse Screen (yes response referral indicated)   Feels Unsafe at Home or Work/School no   Feels Threatened by Someone no   Does Anyone Try to Keep You From Having Contact with Others or Doing Things Outside Your Home? no   Physical Signs of Abuse Present no   System Outcome Measures   Outcome Measures   (FACIT: 47 )   Cognitive Status Examination   Orientation Orientation to person, place and time   Level of Consciousness Alert   Follows Commands and Answers Questions Able to follow multistep instructions;100% of the time   Personal Safety and Judgment Intact   Memory Intact   Attention No deficits were identified   Organization/Problem Solving No deficits were identified   Executive Function No deficits were identified   Cognitive Comment Will complete a cognitive screen as indicated, patient and S.O. indicate no overt changes in memory or processing skills.     Visual Perception   Visual Perception No deficits were identified   Visual Attention Dynavision- Mode A: 52, Mode B: 27 (attempting to utilize R hand only.)    Visual Perception Comments Will continue to assess, initiated with dynavision programming for general visual reaction time baseline.     Sensation   Upper Extremity Sensory Examination No deficits were identified   Posture   Posture Forward head position   Range of Motion (ROM)   ROM Quick Adds No deficits identified   ROM Comments Bilateral UE's WNL,  however patient indicates lag in reaction time from R to L.  Needing increased time to achieve symmetrical UE postures.     Strength   Strength Comments Generalized weakness and decreased activity tolerance consistent with post surgical status.    Hand Strength   Hand Dominance Right   Left Hand  (pounds) 90 pounds   Right Hand  (pounds) 84 pounds   Muscle Tone   Muscle Tone No deficits were identified   Muscle Tone Comments No evidence of increased or decreased tone noted through RUE.    Coordination   Upper Extremity Coordination Right UE impaired   Gross Motor Coordination 24    Fine Motor Coordination 29    Transfer Skill   Level of [place]: Transfers independent   Bathing   Level of [place] - Bathing stand-by assist   Physical Assist/Nonphysical Assist - Bathing supervision   Upper Body Dressing   Level of [place]: Dress Upper Body independent   Lower Body Dressing   Level of [place]: Dress Lower Body independent   Toileting   Level of [place]: Toilet independent   Grooming   Level of [place]: Grooming independent   Eating/Self-Feeding   Level of [place]: Eating independent   Activity Tolerance   Activity Tolerance Fair activity tolerance, needing more rest breaks.    Instrumental Activities of Daily Living Assessment   IADL Quick Adds Community Mobility   Community Mobility Patient is not currently driving, would like to return to independent employment and driving.  Return to baseline functioning.     Planned Therapy Interventions   Planned Therapy Interventions Cognitive skills;Cognitive performance testing;Community/work reintegration;Coordination training;Neuromuscular re-education;Self care/Home management;Strengthening;Therapeutic activities;Visual perception   Adult OT Eval Goals   OT Eval Goals (Adult) 1;2;3    OT Goal 1   Goal Identifier Pre-driving    Goal Description Patient will score WNL on all pre-driving assessments presented in therapy session as  evidence of reaction time, visual attention, problem solving and motor planning necessary for safe and efficient community mobility.     Target Date 03/03/22    OT Goal 2   Goal Identifier Home Programming    Goal Description Patient will demonstrate compliance with home programming recommendations as indicated by an increase in R  strength by 5+ pounds and reduction of score on the 9 hole peg test through R by 5 seconds.    Target Date 03/03/22    OT Goal 3   Goal Identifier Gross Motor Coordination    Goal Description Patient will demonstrate smooth and symmetrical gross motor UE posturing and strength in completion of functional tasks such as folding laundry, reaching and placing of items in cupboards etc.     Target Date 03/03/22   Clinical Impression   Criteria for Skilled Therapeutic Interventions Met Yes, treatment indicated   OT Diagnosis Decreased independence, ease and efficiency in IADL's    Influenced by the following impairments gross motor coordination, fine motor coordination, generalized weakness    Assessment of Occupational Performance 1-3 Performance Deficits   Identified Performance Deficits higher order IADL completion    Clinical Decision Making (Complexity) Low complexity   Therapy Frequency 1x/week    Predicted Duration of Therapy Intervention (days/wks) 8 weeks    Risks and Benefits of Treatment have been explained. Yes   Patient, Family & other staff in agreement with plan of care Yes   Clinical Impression Comments Patient would benefit from skilled OT intervention to progress independence in IADL's such as driving and return to work.     Education Assessment   Barriers To Learning No Barriers   Preferred Learning Style Listening;Demonstration;Pictures/video   Therapy Certification   Certification date from 12/03/21   Certification date to 03/03/22   Certification I certify the need for these services furnished under this plan of treatment and while under my care.  (Physician  co-signature of this document indicates review and certification of the therapy plan)   Total Evaluation Time   OT Eval, Low Complexity Minutes (26893) 15       Thank you for referring Joss to Occupational TherapyAlda/LUCY

## 2021-12-07 NOTE — PROGRESS NOTES
[facility]          OUTPATIENT OCCUPATIONAL THERAPY  EVALUATION  PLAN OF TREATMENT FOR OUTPATIENT REHABILITATION  (COMPLETE FOR INITIAL CLAIMS ONLY)  Patient's Last Name, First Name, M.I.  YOB: 1975  Lang Collins                           Provider's Name  [facility] Medical Record No.  [id number]                               Onset Date:     11/12/21   Start of Care Date:     12/03/21   Type:     ___PT   _X_OT   ___SLP Medical Diagnosis:     S/P craniotomy Z98.890                           OT Diagnosis:     Decreased independence, ease and efficiency in IADL's  Visits from SOC:  1   _________________________________________________________________________________  Plan of Treatment/Functional Goals:  Cognitive skills,Cognitive performance testing,Community/work reintegration,Coordination training,Neuromuscular re-education,Self care/Home management,Strengthening,Therapeutic activities,Visual perception                    Goals  Goal Identifier: Pre-driving   Goal Description: Patient will score WNL on all pre-driving assessments presented in therapy session as evidence of reaction time, visual attention, problem solving and motor planning necessary for safe and efficient community mobility.    Target Date: 03/03/22     Goal Identifier: Home Programming   Goal Description: Patient will demonstrate compliance with home programming recommendations as indicated by an increase in R  strength by 5+ pounds and reduction of score on the 9 hole peg test through R by 5 seconds.   Target Date: 03/03/22     Goal Identifier: Gross Motor Coordination   Goal Description: Patient will demonstrate smooth and symmetrical gross motor UE posturing and strength in completion of functional tasks such as folding laundry, reaching and placing of items in cupboards etc.    Target  Date: 03/03/22                                                                      Therapy Frequency: 1x/week      Predicted Duration of Therapy Intervention (days/wks): 8 weeks   Alda Couch OT          I CERTIFY THE NEED FOR THESE SERVICES FURNISHED UNDER        THIS PLAN OF TREATMENT AND WHILE UNDER MY CARE     (Physician co-signature of this document indicates review and certification of the therapy plan).                 ,    Certification date from: 12/03/21, Certification date to: 03/03/22               Referring Physician: Rachel Howard MD      Initial Assessment        See Epic Evaluation      Start Of Care Date: 12/03/21

## 2021-12-07 NOTE — PROGRESS NOTES
"Oncology Rooming Note    December 7, 2021 11:24 AM   Lang Collins Jr. is a 46 year old male who presents for:    No chief complaint on file.    Initial Vitals: /87 (BP Location: Right arm, Patient Position: Sitting, Cuff Size: Adult Regular)   Pulse 87   Temp 98.2  F (36.8  C) (Oral)   Resp 18   Ht 1.778 m (5' 10\")   Wt 65.3 kg (144 lb)   SpO2 99%   BMI 20.66 kg/m   Estimated body mass index is 20.66 kg/m  as calculated from the following:    Height as of this encounter: 1.778 m (5' 10\").    Weight as of this encounter: 65.3 kg (144 lb). Body surface area is 1.8 meters squared.  No Pain (0) Comment: Data Unavailable   No LMP for male patient.  Allergies reviewed: Yes  Medications reviewed: Yes    Medications: Medication refills not needed today.  Pharmacy name entered into [employer]: [facility] [address] [place] [address]    Clinical concerns: no     Debra Lockett CMA                "

## 2021-12-07 NOTE — PROGRESS NOTES
"NEURO-ONCOLOGY INITIAL VISIT  Dec 7, 2021    CHIEF COMPLAINT: Mr. 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 He is presenting to this initial clinic visit as referred by Dr. Beltrán for evaluation and recommendations on treatment.     HISTORY OF PRESENT ILLNESS  A summary of the patient s oncologic history is as follows;   -8/8/2021 PRESENTATION: New onset seizure; generalized event. Started on Keppra.  -8/2021 MR brain imaging with a 2.1cm left frontal non-enhancing mass in the superior frontal gyrus in the expected region of the supplementary motor area.  -9/8/2021 MR brain imaging with no significant change in the nonenhancing T2 hyperintense mass.  -11/12/21 SURGERY: Left frontal-parietal craniotomy for mass resection.   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  Post-operative imaging with interval left frontal craniotomy for resection of abnormal lesion within the left parafalcine frontal lobe.  Hemiparesis, discharged to ARU.  -12/7/2021 NEURO-ONC: Recommending imaging surveillance given low risk factors and need to continue to focus on ongoing rehab. Referral to Dr. Chiang for optimizing rehab. Referral to [facility]/ epilepsy for seizure risk management in relation to future employment. Trial of flexeril 10mg at bedtime for headaches.     Today in clinic;   -Joss is doing well today.   -Since surgery, has seen much improvement in right-sided weakness. Able to use his right hand well. Able to walk without assistance. No falls.   -Noting frequent headaches; awakening from sleep every night or present every AM. Infrequently experiencing headaches in the afternoon/ evening. Pain is in the crown of the head and then, radiates to behind the right ear. Nature of pain is throbbing and " 7-8/10 in severity. Muscle tension in the neck and back of head. Resolves oxycodone 10mg.   -Denies any changes in sensation or abnormalities with vision.  -No recurrent episodes concerning for a seizure since his initial one in August. On Keppra.   -Off all steroids.  -Fertility preservation was discussed and Joss is not interested.   -Currently trying very hard to stop smoking.     REVIEW OF SYSTEMS  A comprehensive ROS negative except as in HPI.      MEDICATIONS   Current Outpatient Medications   Medication Sig Dispense Refill     acetaminophen (TYLENOL) 325 MG tablet Take 2-3 tablets (650-975 mg) by mouth every 4 hours as needed for mild pain or other (For optimal non-opioid multimodal pain management to improve pain control.) 540 tablet 0     levETIRAcetam (KEPPRA) 500 MG tablet Take 1 tablet (500 mg) by mouth 2 times daily 60 tablet 0     lisinopril (ZESTRIL) 20 MG tablet Take 1 tablet (20 mg) by mouth every morning 30 tablet 0     oxyCODONE (ROXICODONE) 5 MG tablet Take 0.5-2 tablets (2.5-10 mg) by mouth every 4 hours as needed for moderate to severe pain 25 tablet 0     senna-docusate (SENOKOT-S/PERICOLACE) 8.6-50 MG tablet Take 1-2 tablets by mouth 2 times daily 120 tablet 0     DRUG ALLERGIES No Known Allergies       IMMUNIZATIONS   Immunization History   Administered Date(s) Administered     COVID-19,PF,Pfizer (12+ Yrs) 11/05/2021     PAST MEDICAL HISTORY   Past Medical History:   Diagnosis Date     Brain tumor (H)      Glioma (H)      Hypertension      PAST SURGICAL HISTORY   Past Surgical History:   Procedure Laterality Date     OPTICAL TRACKING SYSTEM CRANIOTOMY, EXCISE TUMOR WITH MAPPING, COMBINED Left 11/12/2021    Procedure: Left frontal craniotomy for tumor resection with asleep motor mapping;  Surgeon: Kuldeep Beltrán MD;  Location:  OR     ORTHOPEDIC SURGERY       SOCIAL HISTORY   History   Smoking Status     Current Every Day Smoker     Packs/day: 1.00   Smokeless Tobacco     Never Used     "Social History    Substance and Sexual Activity      Alcohol use: Yes        Comment: socially     History   Drug Use     Types: Marijuana   Not , 5 children.     FAMILY HISTORY   PGF with lung cancer that metastasized to the brain  No family history of seizures.       PHYSICAL EXAMINATION  /87 (BP Location: Right arm, Patient Position: Sitting, Cuff Size: Adult Regular)   Pulse 87   Temp 98.2  F (36.8  C) (Oral)   Resp 18   Ht 1.778 m (5' 10\")   Wt 65.3 kg (144 lb)   SpO2 99%   BMI 20.66 kg/m     Wt Readings from Last 2 Encounters:   12/07/21 65.3 kg (144 lb)   11/29/21 68 kg (150 lb)      Ht Readings from Last 2 Encounters:   12/07/21 1.778 m (5' 10\")   11/29/21 1.803 m (5' 11\")     KPS: 90    -Generally well appearing.  -Respiratory: Normal breath sounds, no audible wheezing.   -Skin: No rashes. Healed head incision.  -Hematologic/ lymphatic: No abnormal bruising. No leg swelling.  -Psychiatric: Normal mood and affect. Pleasant, talkative.  -Neurologic:   MENTAL STATUS:     Alert, oriented to date.    Recall: Intact.    Speech fluent.    Comprehension intact to multi-step commands.   Good right-left orientation.     CRANIAL NERVES:     Pupils are equal, round.    Extraocular movements full, denies diplopia.     Visual fields full.     Facial sensation intact to light touch.   Symmetric facial movements.   Hearing intact.  MOTOR:    Normal and symmetric tone in arm/ hand.   No pronation or drift.  SENSATION:    Intact to light touch throughout.   Muscle tension and discomfort in neck and occipital ridge.   COORDINATION: Slightly off on finger-nose with eyes closed bilaterally.    GAIT:  Walks without assistance.   Good speed.    Malpositioning of right leg in stride.    Not able to dorsiflex for heel walk on the right. Decreased clearance on right during toe walk.        MEDICAL RECORDS  Obtained and personally reviewed all available outside medical records in addition to reviewing any records " available in our electronic system.     LABS  Personally reviewed all available lab results.     IMAGING  Personally reviewed pre- and post-operative MR brain imaging. To my eye, there was an interval left frontal craniotomy for resection of abnormal lesion within the left parafalcine frontal lobe with a subtle amount of T2 FLAIR remaining about the posterior-medial margin.     Imaging was shown to and results were reviewed with Joss.     Imaging and case was reviewed at Brain Tumor Conference on 12/6/2021.       IMPRESSION  Clinic time for this high complexity encounter was spent discussing in detail the nature of his cancer, answering questions pertaining to my recommendations, and devising the treatment plan as outlined below. It was explained to Joss that he has a type of brain cancer for which there is currently no cure. Therefore, cancer-directed treatment strives to slow further growth and increase the time interval to recurrence.    With regard to cancer-directed therapy, a multimodal treatment approach first involves attempting a maximum safe surgical resection. In this case, the full extent of resection was acheived. Following surgery, there are no formal standards as to when treatment should be started for WHO grade 2 gliomas. Studies have attempted to stratify patients at greatest risk of early disease progression. In general, these algorithms take age (>40), original size (>4-6cm) and location of the tumor, astrocytoma histology subtype, presence of neurologic deficit before surgery/ KPS, and extent of surgical resection into consideration. As a result, the decision to observe with frequent imaging or to treat is a very individualized choice. For Joss, he has limited risk factors with the main one being age.    With regard to treatment, when the decision is made to initiate treatment, mature survival data from two Phase III trials provides strong evidence to support treatment with combination radiation  therapy and chemotherapy over radiation therapy alone. In terms of chemotherapy, there are two options; temozolomide or PCV (Procarbazine, CCNU, and Vincristine). Unfortunately, there is no definitive evidence or overall expert consensus to support one regimen over the other. Since temozolomide is generally well tolerated, has more manageable associated side effects, has proven efficacy in high grade gliomas, and has a less complicated, more convenient dosing regimen, I prescribe temozolomide over PCV.     Clinically, Joss is continuing to see improvement with his right-sided hemiparesis. He continues to work with outpatient therapies. I offered a referral to Dr. Chiang for further evaluation and the potential of optimizing his rehab plan, especially given the increased tone and malpositioning of his right leg in stride. Joss is wanting this referral. Of note, initiation of radiation and chemotherapy would most likely hamper any further improvements and due to treatment related brain swelling and fatigue, he could regress.     Risks and benefits of observation and of treatment were discussed in detail with Joss today. Based on his limited risk factors in combination with his goal to further improve his functional status, I am recommending continued observation and Joss is in agreement with the plan.     We discussed the potential need for fertility preservation and Joss is not interested.     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 In the meantime, continue Keppra.     Joss is experiencing daily, tension-type headaches. He is in agreement to a trial of flexeril 10mg at bedtime for headaches.     PROBLEM LIST  Oligodendroglioma  Hemiparesis, right  Tension headache  Seizure    PLAN  -CANCER-DIRECTED THERAPY-  -As above; repeat imaging in 3  months.     -STEROIDS-  -Currently off dexamethasone.    -SEIZURE MANAGEMENT-  -Given history of seizures, will continue current antiepileptic regimen of Keppra for the foreseeable future.  -Referral to [facility].     -Quality of life/ MOOD/ HEADACHE/ FATIGUE-  -Denies any mood issues.  -Continue to monitor mood as untreated/ undertreated depression can worsen fatigue, dysorexia, and quality of life.  -Trial of flexeril 10mg at bedtime for headaches and sleep.     -HEMIPARESIS-  -Continue with outpatient therapies.   -Referral to Dr. Chiang.     Return to clinic in 3/2022 + imaging.     In the meantime, Joss knows to call with questions or concerns or to report new complaints and can be seen sooner if needed.     Elva Park MD  Neuro-oncology

## 2021-12-07 NOTE — LETTER
"    12/7/2021         RE: Lang Collins Jr.  [address]  [facility] [address]        Dear Colleague,    Thank you for referring your patient, Lang Collins Jr., to the [facility]. Please see a copy of my visit note below.    Oncology Rooming Note    December 7, 2021 11:24 AM   Lang Collins Jr. is a 46 year old male who presents for:    No chief complaint on file.    Initial Vitals: /87 (BP Location: Right arm, Patient Position: Sitting, Cuff Size: Adult Regular)   Pulse 87   Temp 98.2  F (36.8  C) (Oral)   Resp 18   Ht 1.778 m (5' 10\")   Wt 65.3 kg (144 lb)   SpO2 99%   BMI 20.66 kg/m   Estimated body mass index is 20.66 kg/m  as calculated from the following:    Height as of this encounter: 1.778 m (5' 10\").    Weight as of this encounter: 65.3 kg (144 lb). Body surface area is 1.8 meters squared.  No Pain (0) Comment: Data Unavailable   No LMP for male patient.  Allergies reviewed: Yes  Medications reviewed: Yes    Medications: Medication refills not needed today.  Pharmacy name entered into [facility]: [facility] [address] [place] [address]    Clinical concerns: no     Debra Lockett CMA                  NEURO-ONCOLOGY INITIAL VISIT  Dec 7, 2021    CHIEF COMPLAINT: Mr. 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 He is presenting to this initial clinic visit as referred by Dr. Beltrán for evaluation and recommendations on treatment.     HISTORY OF PRESENT ILLNESS  A summary of the patient s oncologic history is as follows;   -8/8/2021 PRESENTATION: New onset seizure; generalized event. Started on Keppra.  -8/2021 MR brain imaging with a 2.1cm left frontal non-enhancing mass in the superior frontal gyrus in the expected region of the supplementary motor " area.  -9/8/2021 MR brain imaging with no significant change in the nonenhancing T2 hyperintense mass.  -11/12/21 SURGERY: Left frontal-parietal craniotomy for mass resection.   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  Post-operative imaging with interval left frontal craniotomy for resection of abnormal lesion within the left parafalcine frontal lobe.  Hemiparesis, discharged to ARU.  -12/7/2021 NEURO-ONC: Recommending imaging surveillance given low risk factors and need to continue to focus on ongoing rehab. Referral to Dr. Chiang for optimizing rehab. Referral to [facility]/ epilepsy for seizure risk management in relation to future employment. Trial of flexeril 10mg at bedtime for headaches.     Today in clinic;   -Joss is doing well today.   -Since surgery, has seen much improvement in right-sided weakness. Able to use his right hand well. Able to walk without assistance. No falls.   -Noting frequent headaches; awakening from sleep every night or present every AM. Infrequently experiencing headaches in the afternoon/ evening. Pain is in the crown of the head and then, radiates to behind the right ear. Nature of pain is throbbing and 7-8/10 in severity. Muscle tension in the neck and back of head. Resolves oxycodone 10mg.   -Denies any changes in sensation or abnormalities with vision.  -No recurrent episodes concerning for a seizure since his initial one in August. On Keppra.   -Off all steroids.  -Fertility preservation was discussed and Joss is not interested.   -Currently trying very hard to stop smoking.     REVIEW OF SYSTEMS  A comprehensive ROS negative except as in HPI.      MEDICATIONS   Current Outpatient Medications   Medication Sig Dispense Refill     acetaminophen (TYLENOL) 325 MG tablet Take 2-3 tablets (650-975 mg) by mouth every 4 hours as needed for mild pain or other (For optimal non-opioid multimodal pain management to improve pain  "control.) 540 tablet 0     levETIRAcetam (KEPPRA) 500 MG tablet Take 1 tablet (500 mg) by mouth 2 times daily 60 tablet 0     lisinopril (ZESTRIL) 20 MG tablet Take 1 tablet (20 mg) by mouth every morning 30 tablet 0     oxyCODONE (ROXICODONE) 5 MG tablet Take 0.5-2 tablets (2.5-10 mg) by mouth every 4 hours as needed for moderate to severe pain 25 tablet 0     senna-docusate (SENOKOT-S/PERICOLACE) 8.6-50 MG tablet Take 1-2 tablets by mouth 2 times daily 120 tablet 0     DRUG ALLERGIES No Known Allergies       IMMUNIZATIONS   Immunization History   Administered Date(s) Administered     COVID-19,PF,Pfizer (12+ Yrs) 11/05/2021     PAST MEDICAL HISTORY   Past Medical History:   Diagnosis Date     Brain tumor (H)      Glioma (H)      Hypertension      PAST SURGICAL HISTORY   Past Surgical History:   Procedure Laterality Date     OPTICAL TRACKING SYSTEM CRANIOTOMY, EXCISE TUMOR WITH MAPPING, COMBINED Left 11/12/2021    Procedure: Left frontal craniotomy for tumor resection with asleep motor mapping;  Surgeon: Kuldeep Beltrán MD;  Location:  OR     ORTHOPEDIC SURGERY       SOCIAL HISTORY   History   Smoking Status     Current Every Day Smoker     Packs/day: 1.00   Smokeless Tobacco     Never Used    Social History    Substance and Sexual Activity      Alcohol use: Yes        Comment: socially     History   Drug Use     Types: Marijuana   Not , 5 children.     FAMILY HISTORY   PGF with lung cancer that metastasized to the brain  No family history of seizures.       PHYSICAL EXAMINATION  /87 (BP Location: Right arm, Patient Position: Sitting, Cuff Size: Adult Regular)   Pulse 87   Temp 98.2  F (36.8  C) (Oral)   Resp 18   Ht 1.778 m (5' 10\")   Wt 65.3 kg (144 lb)   SpO2 99%   BMI 20.66 kg/m     Wt Readings from Last 2 Encounters:   12/07/21 65.3 kg (144 lb)   11/29/21 68 kg (150 lb)      Ht Readings from Last 2 Encounters:   12/07/21 1.778 m (5' 10\")   11/29/21 1.803 m (5' 11\")     KPS: " 90    -Generally well appearing.  -Respiratory: Normal breath sounds, no audible wheezing.   -Skin: No rashes. Healed head incision.  -Hematologic/ lymphatic: No abnormal bruising. No leg swelling.  -Psychiatric: Normal mood and affect. Pleasant, talkative.  -Neurologic:   MENTAL STATUS:     Alert, oriented to date.    Recall: Intact.    Speech fluent.    Comprehension intact to multi-step commands.   Good right-left orientation.     CRANIAL NERVES:     Pupils are equal, round.    Extraocular movements full, denies diplopia.     Visual fields full.     Facial sensation intact to light touch.   Symmetric facial movements.   Hearing intact.  MOTOR:    Normal and symmetric tone in arm/ hand.   No pronation or drift.  SENSATION:    Intact to light touch throughout.   Muscle tension and discomfort in neck and occipital ridge.   COORDINATION: Slightly off on finger-nose with eyes closed bilaterally.    GAIT:  Walks without assistance.   Good speed.    Malpositioning of right leg in stride.    Not able to dorsiflex for heel walk on the right. Decreased clearance on right during toe walk.        MEDICAL RECORDS  Obtained and personally reviewed all available outside medical records in addition to reviewing any records available in our electronic system.     LABS  Personally reviewed all available lab results.     IMAGING  Personally reviewed pre- and post-operative MR brain imaging. To my eye, there was an interval left frontal craniotomy for resection of abnormal lesion within the left parafalcine frontal lobe with a subtle amount of T2 FLAIR remaining about the posterior-medial margin.     Imaging was shown to and results were reviewed with Joss.     Imaging and case was reviewed at Brain Tumor Conference on 12/6/2021.       IMPRESSION  Clinic time for this high complexity encounter was spent discussing in detail the nature of his cancer, answering questions pertaining to my recommendations, and devising the treatment  plan as outlined below. It was explained to Joss that he has a type of brain cancer for which there is currently no cure. Therefore, cancer-directed treatment strives to slow further growth and increase the time interval to recurrence.    With regard to cancer-directed therapy, a multimodal treatment approach first involves attempting a maximum safe surgical resection. In this case, the full extent of resection was acheived. Following surgery, there are no formal standards as to when treatment should be started for WHO grade 2 gliomas. Studies have attempted to stratify patients at greatest risk of early disease progression. In general, these algorithms take age (>40), original size (>4-6cm) and location of the tumor, astrocytoma histology subtype, presence of neurologic deficit before surgery/ KPS, and extent of surgical resection into consideration. As a result, the decision to observe with frequent imaging or to treat is a very individualized choice. For Joss, he has limited risk factors with the main one being age.    With regard to treatment, when the decision is made to initiate treatment, mature survival data from two Phase III trials provides strong evidence to support treatment with combination radiation therapy and chemotherapy over radiation therapy alone. In terms of chemotherapy, there are two options; temozolomide or PCV (Procarbazine, CCNU, and Vincristine). Unfortunately, there is no definitive evidence or overall expert consensus to support one regimen over the other. Since temozolomide is generally well tolerated, has more manageable associated side effects, has proven efficacy in high grade gliomas, and has a less complicated, more convenient dosing regimen, I prescribe temozolomide over PCV.     Clinically, Joss is continuing to see improvement with his right-sided hemiparesis. He continues to work with outpatient therapies. I offered a referral to Dr. Chiang for further evaluation and the  potential of optimizing his rehab plan, especially given the increased tone and malpositioning of his right leg in stride. Joss is wanting this referral. Of note, initiation of radiation and chemotherapy would most likely hamper any further improvements and due to treatment related brain swelling and fatigue, he could regress.     Risks and benefits of observation and of treatment were discussed in detail with Joss today. Based on his limited risk factors in combination with his goal to further improve his functional status, I am recommending continued observation and Joss is in agreement with the plan.     We discussed the potential need for fertility preservation and Joss is not interested.     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 In the meantime, continue Keppra.     Joss is experiencing daily, tension-type headaches. He is in agreement to a trial of flexeril 10mg at bedtime for headaches.     PROBLEM LIST  Oligodendroglioma  Hemiparesis, right  Tension headache  Seizure    PLAN  -CANCER-DIRECTED THERAPY-  -As above; repeat imaging in 3 months.     -STEROIDS-  -Currently off dexamethasone.    -SEIZURE MANAGEMENT-  -Given history of seizures, will continue current antiepileptic regimen of Keppra for the foreseeable future.  -Referral to [facility].     -Quality of life/ MOOD/ HEADACHE/ FATIGUE-  -Denies any mood issues.  -Continue to monitor mood as untreated/ undertreated depression can worsen fatigue, dysorexia, and quality of life.  -Trial of flexeril 10mg at bedtime for headaches and sleep.     -HEMIPARESIS-  -Continue with outpatient therapies.   -Referral to Dr. Chiang.     Return to clinic in 3/2022 + imaging.     In the meantime, Joss knows to call with questions or concerns or to report new complaints and can be seen sooner if needed.      Elva Park MD  Neuro-oncology         Again, thank you for allowing me to participate in the care of your patient.        Sincerely,        Elva Park MD

## 2021-12-08 NOTE — PROGRESS NOTES
received referral for Dr Chiang in the PM&R clinic. Scheduling instructions updated and sent to New Patient Scheduling for completion.

## 2021-12-08 NOTE — PROGRESS NOTES
RECORDS STATUS - ALL OTHER DIAGNOSIS    Oligodendroglioma, WHO grade II (H) [C71.9]  Gait instability,  RECORDS RECEIVED FROM: Psychiatric   DATE RECEIVED: 12/14/2021   NOTES STATUS DETAILS   OFFICE NOTE from referring provider Complete Epic   Elva Park MD   OFFICE NOTE from medical oncologist Complete  12/7/2021- Oncology Visit- Oligodendroglioma, WHO grade II (H)     12/6/2021 Tumor Conference    DISCHARGE SUMMARY from hospital Complete 11/16/2021 Nonintractable episodic headache   DISCHARGE REPORT from the ER     OPERATIVE REPORT Complete See Brain Biopsy in EPIC   MEDICATION LIST Complete Psychiatric   CLINICAL TRIAL TREATMENTS TO DATE     LABS     PATHOLOGY REPORTS Complete- See Biopsy in EPIC 11/12/2021  A. Brain, left frontal mass, biopsy:  - Oligodendroglioma, IDH-mutant, WHO grade II. See comment.     B. Brain, left frontal mass, biopsy:  - Oligodendroglioma, IDH-mutant, WHO grade II. See comment.   ANYTHING RELATED TO DIAGNOSIS Complete Labs last updated on 11/17/2021   GENONOMIC TESTING     TYPE:     IMAGING (NEED IMAGES & REPORT)     CT SCANS Complete CT Head 11/12/2021   MRI     MAMMO Complete 11/13/2021, 11/11/2021 more in PACS   ULTRASOUND     PET

## 2021-12-14 ENCOUNTER — ONCOLOGY VISIT (OUTPATIENT)
Dept: ONCOLOGY | Facility: CLINIC | Age: 46
End: 2021-12-14
Attending: PSYCHIATRY & NEUROLOGY
Payer: COMMERCIAL

## 2021-12-14 ENCOUNTER — PRE VISIT (OUTPATIENT)
Dept: ONCOLOGY | Facility: CLINIC | Age: 46
End: 2021-12-14

## 2021-12-14 VITALS
DIASTOLIC BLOOD PRESSURE: 93 MMHG | WEIGHT: 145 LBS | RESPIRATION RATE: 18 BRPM | OXYGEN SATURATION: 100 % | SYSTOLIC BLOOD PRESSURE: 133 MMHG | TEMPERATURE: 98.9 F | BODY MASS INDEX: 20.81 KG/M2 | HEART RATE: 91 BPM

## 2021-12-14 DIAGNOSIS — G44.209 TENSION HEADACHE: ICD-10-CM

## 2021-12-14 DIAGNOSIS — C71.9 OLIGODENDROGLIOMA, WHO GRADE II (H): Primary | ICD-10-CM

## 2021-12-14 DIAGNOSIS — R26.81 GAIT INSTABILITY: ICD-10-CM

## 2021-12-14 PROCEDURE — G0463 HOSPITAL OUTPT CLINIC VISIT: HCPCS

## 2021-12-14 PROCEDURE — 99205 OFFICE O/P NEW HI 60 MIN: CPT | Mod: GC | Performed by: STUDENT IN AN ORGANIZED HEALTH CARE EDUCATION/TRAINING PROGRAM

## 2021-12-14 PROCEDURE — 99417 PROLNG OP E/M EACH 15 MIN: CPT | Mod: GC | Performed by: STUDENT IN AN ORGANIZED HEALTH CARE EDUCATION/TRAINING PROGRAM

## 2021-12-14 RX ORDER — CYCLOBENZAPRINE HCL 10 MG
10 TABLET ORAL 3 TIMES DAILY PRN
Qty: 60 TABLET | Refills: 1 | Status: SHIPPED | OUTPATIENT
Start: 2021-12-14 | End: 2022-03-08

## 2021-12-14 ASSESSMENT — PAIN SCALES - GENERAL: PAINLEVEL: SEVERE PAIN (7)

## 2021-12-14 NOTE — PROGRESS NOTES
"Oncology Rooming Note    December 14, 2021 2:49 PM   Lang Collins Jr. is a 46 year old male who presents for:    Chief Complaint   Patient presents with     Oncology Clinic Visit     Initial Vitals: BP (!) 133/93   Pulse 91   Temp 98.9  F (37.2  C) (Oral)   Resp 18   Wt 65.8 kg (145 lb)   SpO2 100%   BMI 20.81 kg/m   Estimated body mass index is 20.81 kg/m  as calculated from the following:    Height as of 12/7/21: 1.778 m (5' 10\").    Weight as of this encounter: 65.8 kg (145 lb). Body surface area is 1.8 meters squared.  Severe Pain (7) Comment: Data Unavailable   No LMP for male patient.  Allergies reviewed: Yes  Medications reviewed: Yes    Medications: MEDICATION REFILLS NEEDED TODAY. Provider was notified.   Oxycodone    Pharmacy name entered into [facility]: [facility]  [place], [place] - [address] [address]    Clinical concerns: right ankle weakness, can't keep it straight.  Has to use hand to plant it straight on floor.   Headaches Dr. Chiang was notified.      Shari J. Schoenberger, [facility]            "
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
## 2021-12-14 NOTE — PATIENT INSTRUCTIONS
1.  Continue outpatient physical therapy.  Dr. Chiang's team will reach out to your therapist to see if leg strengthening exercises can be incorporated into your regimen.  2.  A referral to our psychologist was placed for ongoing emotional support.  3.  Dr. Chiang placed a referral to our  to help connect you with any support groups and other resources.  4.  Flexeril was prescribed to her pharmacy today to help with your tension headaches.  5.  Return to clinic with Dr. Chiang in 3 months.
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
## 2021-12-15 ENCOUNTER — PATIENT OUTREACH (OUTPATIENT)
Dept: ONCOLOGY | Facility: CLINIC | Age: 46
End: 2021-12-15
Payer: COMMERCIAL

## 2021-12-15 NOTE — PROGRESS NOTES
This clinician received referral from Dr. Chiang for support group resources for Joss. This clinician attempted to reach Joss by phone, leaving detailed voicemail with social work contact information and availability.     This clinician sent [facility] additional resources.     SW will await return call and continue to be available as needed for ongoing psychosocial support.     SHAYY Sharma, LICSW  Phone: [phone]  [facility]: M, Thu  *every other Tue, 8am-4:30pm  [facility]: W, F, *every other Tue, 8am-4:30pm

## 2021-12-17 ENCOUNTER — HOSPITAL ENCOUNTER (OUTPATIENT)
Dept: PHYSICAL THERAPY | Facility: CLINIC | Age: 46
Setting detail: THERAPIES SERIES
End: 2021-12-17
Attending: STUDENT IN AN ORGANIZED HEALTH CARE EDUCATION/TRAINING PROGRAM
Payer: COMMERCIAL

## 2021-12-17 DIAGNOSIS — C71.1: ICD-10-CM

## 2021-12-17 DIAGNOSIS — Z98.890 S/P CRANIOTOMY: ICD-10-CM

## 2021-12-17 PROCEDURE — 97110 THERAPEUTIC EXERCISES: CPT | Mod: GP | Performed by: PHYSICAL THERAPIST

## 2021-12-17 PROCEDURE — 97162 PT EVAL MOD COMPLEX 30 MIN: CPT | Mod: GP | Performed by: PHYSICAL THERAPIST

## 2021-12-17 NOTE — PROGRESS NOTES
[facility]                                                                                   OUTPATIENT PHYSICAL THERAPY FUNCTIONAL EVALUATION  PLAN OF TREATMENT FOR OUTPATIENT REHABILITATION  (COMPLETE FOR INITIAL CLAIMS ONLY)  Patient's Last Name, First Name, M.I.  YOB: 1975  Lang Collins        Provider's Name   [facility]   Medical Record No.  [id number]     Start of Care Date:  12/17/21   Onset Date:  11/12/21   Type:     _X__PT   ____OT  ____SLP Medical Diagnosis:  S/P craniotomy; Frontal glioma     PT Diagnosis:  Impaired mobility Visits from SOC:  1                              __________________________________________________________________________________  Plan of Treatment/Functional Goals:  balance training,gait training,neuromuscular re-education,strengthening,ROM,stretching,transfer training,motor coordination training,manual therapy           GOALS  SL balance  The pt will maintain balance for 10 seconds on each LE, indicating improvement in static balance and ability to complete SL tasks such as dressing or stepping up/down curbs/stairs  03/16/22    FGA  The pt will improve score on FGA to >22/30 indicating a reduction in fall risk  03/16/22    HEP  The pt will be independent with a HEP focusing on improving LE strength and overall balance and coordination to assist with independent management of symptoms  03/16/22                                                           Therapy Frequency:  1 time/week   Predicted Duration of Therapy Intervention:  90 days    Destiny Maza, PT                                    I CERTIFY THE NEED FOR THESE SERVICES FURNISHED UNDER        THIS PLAN OF TREATMENT AND WHILE UNDER MY CARE     (Physician co-signature of this document indicates review and certification of the therapy plan).                 Certification Date From:  12/17/21   Certification Date To:  03/16/22    Referring Provider:  Rachel Howard MD     Initial Assessment  See Epic Evaluation- Start of Care Date: 12/17/21

## 2021-12-17 NOTE — PROGRESS NOTES
12/17/21 0700   Quick Adds   Quick Adds Certification   Type of Visit Initial OP PT Evaluation   General Information   Start of Care Date 12/17/21   Referring Physician Rachel Howard MD    Orders Evaluate and Treat as Indicated   Order Date 11/19/21   Medical Diagnosis S/P craniotomy; Frontal glioma   Onset of illness/injury or Date of Surgery 11/12/21   Surgical/Medical history reviewed Yes   Pertinent history of current problem (include personal factors and/or comorbidities that impact the POC) Joss presents to OP PT s/p left frontal-parietal craniotomy on 11/12/21 for resection of intra-axial brain tumor found after a seizure with no post operative complications noted.  Admitted to ARU on 11/16/21 for oncoming rehab needs as patient demonstrated right sided weakness and incoordination.  Discharged from ARU on 11/20/21. States that he is doing well other than his R ankle. Has a hard moving his R ankle, feels like his ankle is stuck. Has been doing a lot of walking, some biking.   Patient role/Employment history Disabled;Employed  ()   Living environment Apartment/condo   Home/Community Accessibility Comments with sister and her children   Current Assistive Devices   (has a walker but is no longer using)   Patient/Family Goals Statement Get ankle moving   Fall Risk Screen   Fall screen completed by PT   Have you fallen 2 or more times in the past year? No   Have you fallen and had an injury in the past year? No   Abuse Screen (yes response referral indicated)   Feels Unsafe at Home or Work/School no   Feels Threatened by Someone no   Does Anyone Try to Keep You From Having Contact with Others or Doing Things Outside Your Home? no   Physical Signs of Abuse Present no   Pain   Pain comments reports some HAs   Cognitive Status Examination   Orientation orientation to person, place and time   Integumentary   Integumentary No deficits were identified   Strength   Strength Comments R hip flexion 4/5, knee  extension 4+/5, dorsiflexion 4/5   Bed Mobility   Bed Mobility Comments Independent   Transfer Skills   Transfer Comments Able to complete without use of UEs but appears to shift weight to LLE   Gait Special Tests   Gait Special Tests FUNCTIONAL GAIT ASSESSMENT   Gait Special Tests Functional Gait Assessment Score out of 30   Score out of 30 16   Balance Special Tests   Balance Special Tests Sit to stand reps;Sharpened Romberg;Modified CTSIB Conditions;Single leg stance right;Single leg stance left   Balance Special Tests Single Leg Stance Right,   Right, seconds 8 Seconds   Balance Special Tests Single Leg Stance Left   Left, seconds 10 Seconds   Balance Special Tests Modified CTSIB Conditions   Condition 1, seconds 30 Seconds   Condition 2, seconds 30 Seconds   Condition 4, seconds 30 Seconds   Condition 5, seconds 30 Seconds   Balance Special Tests Sit to Stand Reps in 30 Seconds   Reps in 30 seconds 15   Sensory Examination   Sensory Perception no deficits were identified   Coordination   Coordination Comments Mild impairments with R LE rapid toe taps, toe taps into eversion   Planned Therapy Interventions   Planned Therapy Interventions balance training;gait training;neuromuscular re-education;strengthening;ROM;stretching;transfer training;motor coordination training;manual therapy   Clinical Impression   Criteria for Skilled Therapeutic Interventions Met yes, treatment indicated   PT Diagnosis Impaired mobility   Influenced by the following impairments R LE weakness, incoordination, instability, HAs/pain   Functional limitations due to impairments difficulty with high level balance tasks, gait in community, transfers   Clinical Presentation Evolving/Changing   Clinical Presentation Rationale fluctuating HAs   Clinical Decision Making (Complexity) Moderate complexity   Therapy Frequency 1 time/week   Predicted Duration of Therapy Intervention (days/wks) 90 days   Risk & Benefits of therapy have been explained  Yes   Patient, Family & other staff in agreement with plan of care Yes   GOALS   PT Eval Goals 1;2;3   Goal 1   Goal Identifier SL balance   Goal Description The pt will maintain balance for 10 seconds on each LE, indicating improvement in static balance and ability to complete SL tasks such as dressing or stepping up/down curbs/stairs   Target Date 03/16/22   Goal 2   Goal Identifier FGA   Goal Description The pt will improve score on FGA to >22/30 indicating a reduction in fall risk   Target Date 03/16/22   Goal 3   Goal Identifier HEP   Goal Description The pt will be independent with a HEP focusing on improving LE strength and overall balance and coordination to assist with independent management of symptoms   Target Date 03/16/22   Total Evaluation Time   PT Eval, Moderate Complexity Minutes (444[address]) 25   Therapy Certification   Certification date from 12/17/21   Certification date to 03/16/22   Medical Diagnosis S/P craniotomy; Frontal glioma

## 2021-12-22 ENCOUNTER — TELEPHONE (OUTPATIENT)
Dept: ONCOLOGY | Facility: CLINIC | Age: 46
End: 2021-12-22
Payer: COMMERCIAL

## 2021-12-22 NOTE — TELEPHONE ENCOUNTER
Writer called pt to inform of Dr. Park's recommendations.    Elva Park MD Barta, Cody, JJ  Cc: Ginna Sosa RN  Caller: Unspecified (Today,  8:05 AM)  Regina Shearer, please tell Joss the following;   Flexeril can be dosed TID, as a result the half-life is around 8-12 hours. For breakthrough pain, his options are;   Take Flexeril 5-10mg at dinner and then, 10mg at bedtime. However, cannot drive when taking this medication.   OR, take an additional Flexeril 5-10mg in the evening if/ when he wakes up with breakthrough pain.   Ginna, can you please follow-up in 2 weeks to see how the change in dosing is working. The final option would be to change to Flexeril extended release if either of the above is not helpful.    Pt understands and is in agreement with plan. Will call clinic with any other concerns.

## 2021-12-22 NOTE — TELEPHONE ENCOUNTER
Pt calling in to report that pt has been having headaches on and off. He had surgery back in November and was prescribed Flexeril for pain per pt. He states that the medication works well initially and helps him get to sleep at night, but then he wakes up with a headache later in the night.    Pt states that he has also attempted to use Tylenol but no relief.    Denies fevers, chills, night sweats, unexplained weight changes, dizziness, vision or hearing changes, new lumps or bumps, chest pain, shortness of breath, cough, abdominal pain, nausea, vomiting, changes to bowel or bladder, swelling of extremities, bleeding issues, or rash.    Pt wondering if there is anything additional he could use for headache relief when Flexeril wears off.    Will route to provider for recommendations

## 2021-12-23 LAB — INTERPRETATION: NORMAL

## 2021-12-24 ENCOUNTER — VIRTUAL VISIT (OUTPATIENT)
Dept: ONCOLOGY | Facility: CLINIC | Age: 46
End: 2021-12-24
Attending: STUDENT IN AN ORGANIZED HEALTH CARE EDUCATION/TRAINING PROGRAM
Payer: COMMERCIAL

## 2021-12-24 DIAGNOSIS — Z53.9 ERRONEOUS ENCOUNTER--DISREGARD: Primary | ICD-10-CM

## 2021-12-24 DIAGNOSIS — C71.9 OLIGODENDROGLIOMA, WHO GRADE II (H): ICD-10-CM

## 2021-12-24 DIAGNOSIS — G44.209 TENSION HEADACHE: ICD-10-CM

## 2021-12-24 DIAGNOSIS — R26.81 GAIT INSTABILITY: ICD-10-CM

## 2021-12-24 PROCEDURE — 999N001193 HC VIDEO/TELEPHONE VISIT; NO CHARGE

## 2021-12-24 NOTE — LETTER
12/24/2021         RE: Lang Collins Jr.  [address]  [facility] [address]        Dear Colleague,    Thank you for referring your patient, Lang Collins Jr., to the [facility]. Please see a copy of my visit note below.      This encounter was opened in error. Please disregard.      Again, thank you for allowing me to participate in the care of your patient.        Sincerely,        Arturo Ulloa PsyD

## 2021-12-24 NOTE — LETTER
Date:January 4, 2022      Provider requested that no letter be sent. Do not send.       [facility]

## 2021-12-27 ENCOUNTER — OFFICE VISIT (OUTPATIENT)
Dept: NEUROSURGERY | Facility: CLINIC | Age: 46
End: 2021-12-27
Attending: NURSE PRACTITIONER
Payer: COMMERCIAL

## 2021-12-27 VITALS — HEART RATE: 89 BPM | SYSTOLIC BLOOD PRESSURE: 148 MMHG | OXYGEN SATURATION: 100 % | DIASTOLIC BLOOD PRESSURE: 92 MMHG

## 2021-12-27 DIAGNOSIS — Z98.890 S/P CRANIOTOMY: Primary | ICD-10-CM

## 2021-12-27 DIAGNOSIS — C71.9 OLIGODENDROGLIOMA (H): ICD-10-CM

## 2021-12-27 PROCEDURE — G0463 HOSPITAL OUTPT CLINIC VISIT: HCPCS

## 2021-12-27 PROCEDURE — 99024 POSTOP FOLLOW-UP VISIT: CPT | Performed by: NURSE PRACTITIONER

## 2021-12-27 ASSESSMENT — PAIN SCALES - GENERAL: PAINLEVEL: SEVERE PAIN (7)

## 2021-12-27 NOTE — NURSING NOTE
"Lang Collins Jr. is a 46 year old male who presents for:  Chief Complaint   Patient presents with     Surgical Followup     6 week post op follow up        Initial Vitals:  BP (!) 148/92   Pulse 89   SpO2 100%  Estimated body mass index is 20.81 kg/m  as calculated from the following:    Height as of 12/7/21: 5' 10\" (1.778 m).    Weight as of 12/14/21: 145 lb (65.8 kg).. There is no height or weight on file to calculate BSA. BP completed using cuff size: regular  Severe Pain (7)    Reji Rebollar MA    "

## 2021-12-27 NOTE — PATIENT INSTRUCTIONS
- Continue with routine post op care plan and incision care  - Activity as tolerated  - Follow-up with Dr. Park in March with MRI prior as scheduled  - Follow-up with Dr. Chiang with PMR clinic as scheduled  - Follow up with our clinic in 6 weeks   - Call our clinic with any post op questions or concerns

## 2021-12-27 NOTE — PROGRESS NOTES
[facility]surgery [facility]   Follow Up Visit      HPI: Lang Collins Jr. is a 46 year old male who presents 6 weeks s/p left frontal craniotomy for tumor resection with Dr. Beltrán on 11/12/21. Pathology consistent with oligodendroglioma grade 2. He follows with oncologist Dr. Park. Today, patient reports feeling well overall. Improvement in right sided weakness after working with therapies and Dr. Chiang at PMR clinic. He was recently prescribed Flexeril for headaches, but this only makes him feel tired. Denies any dizziness, nausea, vision/speech changes, or other neurological changes. He continues with Keppra and was referred to [facility] for seizure risk management per Dr. Park. Patient has tapered off Decadron. Denies any incision concerns.     Current pain: 7    Past Medical History:   Diagnosis Date     Brain tumor (H)      Glioma (H)      Hypertension        Past Medical History reviewed with patient during visit.    Past Surgical History:   Procedure Laterality Date     OPTICAL TRACKING SYSTEM CRANIOTOMY, EXCISE TUMOR WITH MAPPING, COMBINED Left 11/12/2021    Procedure: Left frontal craniotomy for tumor resection with asleep motor mapping;  Surgeon: Kuldeep Beltrán MD;  Location:  OR     ORTHOPEDIC SURGERY       Past Surgical History reviewed with patient during visit.    Current Outpatient Medications   Medication     cyclobenzaprine (FLEXERIL) 10 MG tablet     cyclobenzaprine (FLEXERIL) 10 MG tablet     levETIRAcetam (KEPPRA) 500 MG tablet     lisinopril (ZESTRIL) 20 MG tablet     oxyCODONE (ROXICODONE) 5 MG tablet     No current facility-administered medications for this visit.       No Known Allergies    Social History     Socioeconomic History     Marital status: Single     Spouse name: Not on file     Number of children: Not on file     Years of education: Not on file     Highest education level: Not on file   Occupational History     Not on file   Tobacco Use     Smoking status: Current  Every Day Smoker     Packs/day: 1.00     Smokeless tobacco: Never Used   Substance and Sexual Activity     Alcohol use: Yes     Comment: socially     Drug use: Yes     Types: Marijuana     Sexual activity: Not on file   Other Topics Concern     Not on file   Social History Narrative     Not on file     Social Determinants of Health     Financial Resource Strain: Not on file   Food Insecurity: Not on file   Transportation Needs: Not on file   Physical Activity: Not on file   Stress: Not on file   Social Connections: Not on file   Intimate Partner Violence: Not on file   Housing Stability: Not on file       No family history on file.    ROS: 10 point ROS neg other than the symptoms noted above in the HPI.    Vital Signs: BP (!) 148/92   Pulse 89   SpO2 100%     Examination:  Constitutional:  Alert, well nourished, NAD.  HEENT: Normocephalic, atraumatic.   Pulm:  Without shortness of breath or audible adventitious respiratory sounds.  CV:  No pitting edema of BLE.  Brisk capillary refill, CMS intact.    Neurological:  Awake  Alert  Oriented x 3  Speech clear  Cranial nerves II - XII intact  PERRL  EOMI  Face symmetric  Tongue midline  Motor exam: Able to move all extremities equally and bilaterally, right side hemiparesis continues to improve   Sensation intact  Finger to Nose smooth  Pronator drift negative  Gait: Able to stand from a seated position. Normal non-antalgic, non-myelopathic gait.    Incision: Well healed     Assessment/Plan:   46 year old male who presents 6 weeks s/p left frontal craniotomy for tumor resection with Dr. Beltrán on 11/12/21. Pathology consistent with oligodendroglioma grade 2. Right side hemiparesis continues to improve. Patient was recently prescribed Flexeril for headaches but this does not provide relief; he plans to follow-up with Dr. Park for medication adjustment.     - Continue with routine post op care plan and incision care  - Activity as tolerated, continue with therapies   -  Continue Keppra and follow-up with [facility] for seizure risk management  - Follow-up in March with oncologist Dr. Park with MRI prior   - Follow-up with Dr. Chiang with PMR clinic as scheduled  - Follow up with our clinic in 6 weeks   - Call our clinic with any post op questions or concerns     Discussed red flag symptoms and advised to seek medical attention with any increased headaches, dizziness, nausea/vomiting, vision/speech changes, weakness, confusion, seizure activity, or other neurological changes. Patient voiced understanding and agreement.      Eulalia Pedro CNP  [facility] Neurosurgery  [address] [address]  Tel [phone]  Pager [phone]

## 2021-12-27 NOTE — LETTER
12/27/2021         RE: Lang Collins Jr.  [address]  [place] [address]        Dear Colleague,    Thank you for referring your patient, Lang Collins Jr., to the [facility] NEUROSURGERY CLINIC [place]. Please see a copy of my visit note below.    [facility]   Follow Up Visit      HPI: Lang Collins Jr. is a 46 year old male who presents 6 weeks s/p left frontal craniotomy for tumor resection with Dr. Beltrán on 11/12/21. Pathology consistent with oligodendroglioma grade 2. He follows with oncologist Dr. Park. Today, patient reports feeling well overall. Improvement in right sided weakness after working with therapies and Dr. Chiang at PMR clinic. He was recently prescribed Flexeril for headaches, but this only makes him feel tired. Denies any dizziness, nausea, vision/speech changes, or other neurological changes. He continues with Keppra and was referred to [facility] for seizure risk management per Dr. Park. Patient has tapered off Decadron. Denies any incision concerns.     Current pain: 7    Past Medical History:   Diagnosis Date     Brain tumor (H)      Glioma (H)      Hypertension        Past Medical History reviewed with patient during visit.    Past Surgical History:   Procedure Laterality Date     OPTICAL TRACKING SYSTEM CRANIOTOMY, EXCISE TUMOR WITH MAPPING, COMBINED Left 11/12/2021    Procedure: Left frontal craniotomy for tumor resection with asleep motor mapping;  Surgeon: Kuldeep Beltrán MD;  Location: [facility] OR     ORTHOPEDIC SURGERY       Past Surgical History reviewed with patient during visit.    Current Outpatient Medications   Medication     cyclobenzaprine (FLEXERIL) 10 MG tablet     cyclobenzaprine (FLEXERIL) 10 MG tablet     levETIRAcetam (KEPPRA) 500 MG tablet     lisinopril (ZESTRIL) 20 MG tablet     oxyCODONE (ROXICODONE) 5 MG tablet     No current facility-administered medications for this visit.       No Known Allergies    Social History      Socioeconomic History     Marital status: Single     Spouse name: Not on file     Number of children: Not on file     Years of education: Not on file     Highest education level: Not on file   Occupational History     Not on file   Tobacco Use     Smoking status: Current Every Day Smoker     Packs/day: 1.00     Smokeless tobacco: Never Used   Substance and Sexual Activity     Alcohol use: Yes     Comment: socially     Drug use: Yes     Types: Marijuana     Sexual activity: Not on file   Other Topics Concern     Not on file   Social History Narrative     Not on file     Social Determinants of Health     Financial Resource Strain: Not on file   Food Insecurity: Not on file   Transportation Needs: Not on file   Physical Activity: Not on file   Stress: Not on file   Social Connections: Not on file   Intimate Partner Violence: Not on file   Housing Stability: Not on file       No family history on file.    ROS: 10 point ROS neg other than the symptoms noted above in the HPI.    Vital Signs: BP (!) 148/92   Pulse 89   SpO2 100%     Examination:  Constitutional:  Alert, well nourished, NAD.  HEENT: Normocephalic, atraumatic.   Pulm:  Without shortness of breath or audible adventitious respiratory sounds.  CV:  No pitting edema of BLE.  Brisk capillary refill, CMS intact.    Neurological:  Awake  Alert  Oriented x 3  Speech clear  Cranial nerves II - XII intact  PERRL  EOMI  Face symmetric  Tongue midline  Motor exam: Able to move all extremities equally and bilaterally, right side hemiparesis continues to improve   Sensation intact  Finger to Nose smooth  Pronator drift negative  Gait: Able to stand from a seated position. Normal non-antalgic, non-myelopathic gait.    Incision: Well healed     Assessment/Plan:   46 year old male who presents 6 weeks s/p left frontal craniotomy for tumor resection with Dr. Beltrán on 11/12/21. Pathology consistent with oligodendroglioma grade 2. Right side hemiparesis continues to  improve. Patient was recently prescribed Flexeril for headaches but this does not provide relief; he plans to follow-up with Dr. Park for medication adjustment.     - Continue with routine post op care plan and incision care  - Activity as tolerated, continue with therapies   - Continue Keppra and follow-up with [facility] for seizure risk management  - Follow-up in March with oncologist Dr. Park with MRI prior   - Follow-up with Dr. Chiang with PMR clinic as scheduled  - Follow up with our clinic in 6 weeks   - Call our clinic with any post op questions or concerns     Discussed red flag symptoms and advised to seek medical attention with any increased headaches, dizziness, nausea/vomiting, vision/speech changes, weakness, confusion, seizure activity, or other neurological changes. Patient voiced understanding and agreement.      Eulalia Pedro CNP  [facility] Neurosurgery  [address] [address]  Tel [phone]  Pager [phone]        Again, thank you for allowing me to participate in the care of your patient.        Sincerely,        Eulalia Pedro NP

## 2021-12-28 ENCOUNTER — HOSPITAL ENCOUNTER (OUTPATIENT)
Dept: OCCUPATIONAL THERAPY | Facility: CLINIC | Age: 46
Setting detail: THERAPIES SERIES
End: 2021-12-28
Payer: COMMERCIAL

## 2021-12-28 PROCEDURE — 97110 THERAPEUTIC EXERCISES: CPT | Mod: GO | Performed by: OCCUPATIONAL THERAPIST

## 2022-01-04 NOTE — TELEPHONE ENCOUNTER
Contacted patient for an update on headaches. Patient reports his headaches are almost completely resolved. Has been using Flexeril PRN, usually at bedtime. No other symptoms or complaints at this time. Patient had questions on disability paperwork which he will drop off at clinic.     Ginna Sosa, JONASN, RN, PHN, OCN  Oncology Care Coordinator  [facility]

## 2022-01-11 ENCOUNTER — HOSPITAL ENCOUNTER (OUTPATIENT)
Dept: OCCUPATIONAL THERAPY | Facility: CLINIC | Age: 47
Setting detail: THERAPIES SERIES
End: 2022-01-11
Payer: COMMERCIAL

## 2022-01-11 PROCEDURE — 97537 COMMUNITY/WORK REINTEGRATION: CPT | Mod: GO | Performed by: OCCUPATIONAL THERAPIST

## 2022-01-18 ENCOUNTER — HOSPITAL ENCOUNTER (OUTPATIENT)
Dept: OCCUPATIONAL THERAPY | Facility: CLINIC | Age: 47
Setting detail: THERAPIES SERIES
End: 2022-01-18
Payer: COMMERCIAL

## 2022-01-18 PROCEDURE — 97110 THERAPEUTIC EXERCISES: CPT | Mod: GO | Performed by: OCCUPATIONAL THERAPIST

## 2022-01-25 ENCOUNTER — HOSPITAL ENCOUNTER (OUTPATIENT)
Dept: OCCUPATIONAL THERAPY | Facility: CLINIC | Age: 47
Setting detail: THERAPIES SERIES
End: 2022-01-25
Payer: COMMERCIAL

## 2022-01-25 DIAGNOSIS — M62.81 GENERALIZED MUSCLE WEAKNESS: ICD-10-CM

## 2022-01-25 DIAGNOSIS — Z98.890 S/P CRANIOTOMY: Primary | ICD-10-CM

## 2022-01-25 PROCEDURE — 97110 THERAPEUTIC EXERCISES: CPT | Mod: GO | Performed by: OCCUPATIONAL THERAPIST

## 2022-02-02 ENCOUNTER — TELEPHONE (OUTPATIENT)
Dept: NEUROSURGERY | Facility: CLINIC | Age: 47
End: 2022-02-02
Payer: COMMERCIAL

## 2022-02-02 NOTE — TELEPHONE ENCOUNTER
Received form (Seizure Functional Capacity Questionaire).  We have seen patient once and was to establish care with [facility], who should be the ones to fill out the form.     Attempted to reach patient to let him know that the forms should go to them, no answer - left message.     Yola Baird MA,[facility],1:11 PM

## 2022-02-02 NOTE — TELEPHONE ENCOUNTER
Patient returned call. He states that he is seeing a [facility] [place] [facility].   I faxed the forms over to her to fill out.   Yola Baird MA,CMA,2:41 PM

## 2022-02-06 ENCOUNTER — HEALTH MAINTENANCE LETTER (OUTPATIENT)
Age: 47
End: 2022-02-06

## 2022-02-14 ENCOUNTER — OFFICE VISIT (OUTPATIENT)
Dept: NEUROSURGERY | Facility: CLINIC | Age: 47
End: 2022-02-14
Payer: COMMERCIAL

## 2022-02-14 VITALS — SYSTOLIC BLOOD PRESSURE: 142 MMHG | HEART RATE: 75 BPM | DIASTOLIC BLOOD PRESSURE: 97 MMHG | OXYGEN SATURATION: 100 %

## 2022-02-14 DIAGNOSIS — Z98.890 S/P CRANIOTOMY: Primary | ICD-10-CM

## 2022-02-14 PROCEDURE — 99024 POSTOP FOLLOW-UP VISIT: CPT | Performed by: NURSE PRACTITIONER

## 2022-02-14 NOTE — PROGRESS NOTES
[facility] Neurosurgery Clinic   Follow Up Visit      HPI: aLng Collins Jr. is a 46 year old male who presents 3 months s/p left frontal craniotomy for tumor resection with Dr. Beltrán on 11/12/21. Pathology consistent with oligodendroglioma grade 2. He reports intermittent mild headaches and fatigue. Denies dizziness, nausea, vision/speech changes. Improvement in right sided weakness. He continues to work with therapies. He is scheduled to follow-up with oncologist Dr. Park in March with MRI prior to appt. He has not scheduled with Neurology for seizure risk management as previously recommended by Dr. Park. He denies any seizure activity. He states he ran out of his blood pressure medication but has not contacted his PCP for refills.     Current pain: 0    Past Medical History:   Diagnosis Date     Brain tumor (H)      Glioma (H)      Hypertension        Past Medical History reviewed with patient during visit.    Past Surgical History:   Procedure Laterality Date     OPTICAL TRACKING SYSTEM CRANIOTOMY, EXCISE TUMOR WITH MAPPING, COMBINED Left 11/12/2021    Procedure: Left frontal craniotomy for tumor resection with asleep motor mapping;  Surgeon: Kuldeep Beltrán MD;  Location: [facility] OR     ORTHOPEDIC SURGERY       Past Surgical History reviewed with patient during visit.    Current Outpatient Medications   Medication     cyclobenzaprine (FLEXERIL) 10 MG tablet     cyclobenzaprine (FLEXERIL) 10 MG tablet     levETIRAcetam (KEPPRA) 500 MG tablet     lisinopril (ZESTRIL) 20 MG tablet     oxyCODONE (ROXICODONE) 5 MG tablet     No current facility-administered medications for this visit.       No Known Allergies    Social History     Socioeconomic History     Marital status: Single     Spouse name: Not on file     Number of children: Not on file     Years of education: Not on file     Highest education level: Not on file   Occupational History     Not on file   Tobacco Use     Smoking status: Current Every Day  Smoker     Packs/day: 1.00     Smokeless tobacco: Never Used   Substance and Sexual Activity     Alcohol use: Yes     Comment: socially     Drug use: Yes     Types: Marijuana     Sexual activity: Not on file   Other Topics Concern     Not on file   Social History Narrative     Not on file     Social Determinants of Health     Financial Resource Strain: Not on file   Food Insecurity: Not on file   Transportation Needs: Not on file   Physical Activity: Not on file   Stress: Not on file   Social Connections: Not on file   Intimate Partner Violence: Not on file   Housing Stability: Not on file       No family history on file.    ROS: 10 point ROS neg other than the symptoms noted above in the HPI.    Vital Signs: BP (!) 142/97   Pulse 75   SpO2 100%     Examination:  Constitutional:  Alert, well nourished, NAD.  HEENT: Normocephalic, atraumatic.   Pulm:  Without shortness of breath or audible adventitious respiratory sounds.  CV:  No pitting edema of BLE.  Brisk capillary refill, CMS intact.    Neurological:  Awake  Alert  Oriented x 3  Speech clear  Cranial nerves II - XII intact  PERRL  EOMI  Face symmetric  Tongue midline  Motor exam: 5/5 strength in all four extremities.   Sensation intact  Finger to Nose smooth  Pronator drift negative  Gait: Able to stand from a seated position. Normal non-antalgic, non-myelopathic gait.  Able to heel/toe walk without loss of balance    Assessment/Plan:   46 year old male who presents 3 months s/p left frontal craniotomy for tumor resection with Dr. Beltrán on 11/12/21. Pathology consistent with oligodendroglioma grade 2.     -Continue follow up with oncologist Dr. Park. MRI scheduled for March.   -Follow up with Neurology clinic for seizure risk management as recommended by Dr. Park.  -Follow up with PCP regarding blood pressure medication.  -Follow-up with Dr. Chiang at PMR clinic in March as scheduled. Okay to continue with PT, activity as tolerated.   -Follow up with our [facility]  clinic as needed.     Discussed red flag symptoms and advised to seek medical attention with any increased headaches, dizziness, nausea/vomiting, vision/speech changes, weakness, confusion, seizure activity, or other neurological changes. Patient voiced understanding and agreement.      Eulalia Pedro [facility] Neurosurgery  [address] [address]  Tel [phone]  Pager [phone]

## 2022-02-14 NOTE — LETTER
2/14/2022         RE: Lang Collins Jr.  [address]  [place] [address]        Dear Colleague,    Thank you for referring your patient, Lang Collins Jr., to the [facility] NEUROLOGY CLINICS [facility]. Please see a copy of my visit note below.    [facility] Neurosurgery Clinic   Follow Up Visit      HPI: Lang Collins Jr. is a 46 year old male who presents 3 months s/p left frontal craniotomy for tumor resection with Dr. Beltrán on 11/12/21. Pathology consistent with oligodendroglioma grade 2. He reports intermittent mild headaches and fatigue. Denies dizziness, nausea, vision/speech changes. Improvement in right sided weakness. He continues to work with therapies. He is scheduled to follow-up with oncologist Dr. Park in March with MRI prior to appt. He has not scheduled with Neurology for seizure risk management as previously recommended by Dr. Park. He denies any seizure activity. He states he ran out of his blood pressure medication but has not contacted his PCP for refills.     Current pain: 0    Past Medical History:   Diagnosis Date     Brain tumor (H)      Glioma (H)      Hypertension        Past Medical History reviewed with patient during visit.    Past Surgical History:   Procedure Laterality Date     OPTICAL TRACKING SYSTEM CRANIOTOMY, EXCISE TUMOR WITH MAPPING, COMBINED Left 11/12/2021    Procedure: Left frontal craniotomy for tumor resection with asleep motor mapping;  Surgeon: Kuldeep Beltrán MD;  Location:  OR     ORTHOPEDIC SURGERY       Past Surgical History reviewed with patient during visit.    Current Outpatient Medications   Medication     cyclobenzaprine (FLEXERIL) 10 MG tablet     cyclobenzaprine (FLEXERIL) 10 MG tablet     levETIRAcetam (KEPPRA) 500 MG tablet     lisinopril (ZESTRIL) 20 MG tablet     oxyCODONE (ROXICODONE) 5 MG tablet     No current facility-administered medications for this visit.       No Known Allergies    Social History     Socioeconomic  History     Marital status: Single     Spouse name: Not on file     Number of children: Not on file     Years of education: Not on file     Highest education level: Not on file   Occupational History     Not on file   Tobacco Use     Smoking status: Current Every Day Smoker     Packs/day: 1.00     Smokeless tobacco: Never Used   Substance and Sexual Activity     Alcohol use: Yes     Comment: socially     Drug use: Yes     Types: Marijuana     Sexual activity: Not on file   Other Topics Concern     Not on file   Social History Narrative     Not on file     Social Determinants of Health     Financial Resource Strain: Not on file   Food Insecurity: Not on file   Transportation Needs: Not on file   Physical Activity: Not on file   Stress: Not on file   Social Connections: Not on file   Intimate Partner Violence: Not on file   Housing Stability: Not on file       No family history on file.    ROS: 10 point ROS neg other than the symptoms noted above in the HPI.    Vital Signs: BP (!) 142/97   Pulse 75   SpO2 100%     Examination:  Constitutional:  Alert, well nourished, NAD.  HEENT: Normocephalic, atraumatic.   Pulm:  Without shortness of breath or audible adventitious respiratory sounds.  CV:  No pitting edema of BLE.  Brisk capillary refill, CMS intact.    Neurological:  Awake  Alert  Oriented x 3  Speech clear  Cranial nerves II - XII intact  PERRL  EOMI  Face symmetric  Tongue midline  Motor exam: 5/5 strength in all four extremities.   Sensation intact  Finger to Nose smooth  Pronator drift negative  Gait: Able to stand from a seated position. Normal non-antalgic, non-myelopathic gait.  Able to heel/toe walk without loss of balance    Assessment/Plan:   46 year old male who presents 3 months s/p left frontal craniotomy for tumor resection with Dr. Beltrán on 11/12/21. Pathology consistent with oligodendroglioma grade 2.     -Continue follow up with oncologist Dr. Park. MRI scheduled for March.   -Follow up with  Neurology clinic for seizure risk management as recommended by Dr. Park.  -Follow up with PCP regarding blood pressure medication.  -Follow-up with Dr. Chiang at PMR clinic in March as scheduled. Okay to continue with PT, activity as tolerated.   -Follow up with our NSG clinic as needed.     Discussed red flag symptoms and advised to seek medical attention with any increased headaches, dizziness, nausea/vomiting, vision/speech changes, weakness, confusion, seizure activity, or other neurological changes. Patient voiced understanding and agreement.      Eulalia Pedro CNP  [facility] [place] [address]  Tel [phone]  Pager [phone]        Again, thank you for allowing me to participate in the care of your patient.        Sincerely,        Eulalia Pedro, KATIE

## 2022-02-14 NOTE — PATIENT INSTRUCTIONS
-Continue follow up with oncologist Dr. Park. MRI scheduled for March.   -Follow up with Neurology clinic as recommended by Dr. Park: [phone].  -Follow up with PCP regarding blood pressure medication.   -Follow up with our Neurosurgery clinic as needed.   -Activity as tolerated

## 2022-03-07 ENCOUNTER — HOSPITAL ENCOUNTER (OUTPATIENT)
Dept: MRI IMAGING | Facility: CLINIC | Age: 47
Discharge: HOME | End: 2022-03-07
Attending: PSYCHIATRY & NEUROLOGY | Admitting: PSYCHIATRY & NEUROLOGY
Payer: COMMERCIAL

## 2022-03-07 DIAGNOSIS — C71.9 OLIGODENDROGLIOMA, WHO GRADE II (H): ICD-10-CM

## 2022-03-07 PROCEDURE — 255N000002 HC RX 255 OP 636: Performed by: PSYCHIATRY & NEUROLOGY

## 2022-03-07 PROCEDURE — A9585 GADOBUTROL INJECTION: HCPCS | Performed by: PSYCHIATRY & NEUROLOGY

## 2022-03-07 PROCEDURE — 70553 MRI BRAIN STEM W/O & W/DYE: CPT

## 2022-03-07 RX ORDER — GADOBUTROL 604.72 MG/ML
7 INJECTION INTRAVENOUS ONCE
Status: COMPLETED | OUTPATIENT
Start: 2022-03-07 | End: 2022-03-07

## 2022-03-07 RX ADMIN — GADOBUTROL 7 ML: 604.72 INJECTION INTRAVENOUS at 12:45

## 2022-03-08 ENCOUNTER — ONCOLOGY VISIT (OUTPATIENT)
Dept: ONCOLOGY | Facility: CLINIC | Age: 47
End: 2022-03-08
Attending: PSYCHIATRY & NEUROLOGY
Payer: COMMERCIAL

## 2022-03-08 VITALS
SYSTOLIC BLOOD PRESSURE: 138 MMHG | WEIGHT: 151.8 LBS | HEART RATE: 81 BPM | TEMPERATURE: 98.1 F | DIASTOLIC BLOOD PRESSURE: 98 MMHG | BODY MASS INDEX: 21.78 KG/M2 | RESPIRATION RATE: 20 BRPM | OXYGEN SATURATION: 99 %

## 2022-03-08 DIAGNOSIS — C71.9 OLIGODENDROGLIOMA, WHO GRADE II (H): Primary | ICD-10-CM

## 2022-03-08 DIAGNOSIS — Z98.890 S/P CRANIOTOMY: ICD-10-CM

## 2022-03-08 PROCEDURE — 99214 OFFICE O/P EST MOD 30 MIN: CPT | Performed by: PSYCHIATRY & NEUROLOGY

## 2022-03-08 PROCEDURE — G0463 HOSPITAL OUTPT CLINIC VISIT: HCPCS

## 2022-03-08 RX ORDER — LEVETIRACETAM 500 MG/1
500 TABLET ORAL 2 TIMES DAILY
Qty: 60 TABLET | Refills: 0 | Status: SHIPPED | OUTPATIENT
Start: 2022-03-08 | End: 2022-03-21

## 2022-03-08 ASSESSMENT — PAIN SCALES - GENERAL: PAINLEVEL: NO PAIN (0)

## 2022-03-08 NOTE — PROGRESS NOTES
"NEURO-ONCOLOGY VISIT  Mar 8, 2022    CHIEF COMPLAINT: Mr. 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 He is currently managed on imaging surveillance as cancer-directed therapy has yet to be indicated.     I met with Joss in follow-up today.     HISTORY OF PRESENT ILLNESS  -Joss is doing well today.   -Since adjusting Flexeril to 10mg at bedtime a few months ago, his headaches improved. He is now off flexeril and has had no recurrence of head pain.   -Right-sided weakness has improved. Right arm is near baseline. Following with Dr. Chiang. Able to walk without assistance; periods of imbalance at times. No falls.    -Periodically, experiencing stiffness and numbness of the right hand and arm. No recurrent episodes concerning for a seizure since his initial one in August. Ran out of Keppra and is currently not on anti-seizure medications. Joss was never scheduled with [facility] as ordered in December.   -Denies any changes in vision.  -Mood is better, less \"mackenzie and irritated\". Spoke with Dr. Ulloa and it was helpful.   -Energy is good overall, but if sleep deprived, he experiences extreme fatigue, cognitive slowing, and generalized weakness.   -Off all steroids.  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   -Making strong efforts to stop smoking.     REVIEW OF SYSTEMS  A comprehensive ROS negative except as in HPI.      MEDICATIONS   Current Outpatient Medications   Medication Sig Dispense Refill     levETIRAcetam (KEPPRA) 500 MG tablet Take 1 tablet (500 mg) by mouth 2 times daily 60 tablet 0     lisinopril (ZESTRIL) 20 MG tablet Take 1 tablet (20 mg) by mouth every morning 30 tablet 0     DRUG ALLERGIES No Known Allergies       IMMUNIZATIONS   Immunization History   Administered Date(s) Administered     COVID-19,,Pfizer " "(12+ Yrs) 11/05/2021       ONCOLOGIC HISTORY  -8/8/2021 PRESENTATION: New onset seizure; generalized event. Started on Keppra.  -8/2021 MR brain imaging with a 2.1cm left frontal non-enhancing mass in the superior frontal gyrus in the expected region of the supplementary motor area.  -9/8/2021 MR brain imaging with no significant change in the nonenhancing T2 hyperintense mass.  -11/12/21 SURGERY: Left frontal-parietal craniotomy for mass resection.   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  Post-operative imaging with interval left frontal craniotomy for resection of abnormal lesion within the left parafalcine frontal lobe.  Hemiparesis, discharged to ARU.  -12/7/2021 NEURO-ONC: Recommending imaging surveillance given low risk factors and need to continue to focus on ongoing rehab. Referral to Dr. Chiang for optimizing rehab. Referral to [facility]/ epilepsy for seizure risk management in relation to future employment. Trial of flexeril 10mg at bedtime for headaches.   -3/8/2022 NEURO-ONC/ MRB: Clinically well; improved from prior. Restarting Keppra; second referral to [facility]. Imaging with post-surgical changes. Continue imaging surveillance.     SOCIAL HISTORY   History   Smoking Status     Current Every Day Smoker     Packs/day: 1.00   Smokeless Tobacco     Never Used    Social History    Substance and Sexual Activity      Alcohol use: Yes        Comment: socially     History   Drug Use     Types: Marijuana   Not , 5 children.       PHYSICAL EXAMINATION  BP (!) 138/98 (BP Location: Right arm, Patient Position: Sitting, Cuff Size: Adult Regular)   Pulse 81   Temp 98.1  F (36.7  C) (Oral)   Resp 20   Wt 68.9 kg (151 lb 12.8 oz)   SpO2 99%   BMI 21.78 kg/m     Wt Readings from Last 2 Encounters:   03/08/22 68.9 kg (151 lb 12.8 oz)   12/14/21 65.8 kg (145 lb)      Ht Readings from Last 2 Encounters:   12/07/21 1.778 m (5' 10\")   11/29/21 1.803 " "jon (5' 11\")     KPS: 90    -Generally well appearing.  -Respiratory: Normal breath sounds, no audible wheezing.   -Skin: No rashes. Healed head incision.  -Hematologic/ lymphatic: No abnormal bruising. No leg swelling.  -Psychiatric: Normal mood and affect. Pleasant, talkative.  -Neurologic:   MENTAL STATUS:     Alert, oriented to date.    Recall: Intact.    Speech fluent.    Comprehension intact to multi-step commands.   Good right-left orientation.     CRANIAL NERVES:     Pupils are equal, round.    Extraocular movements full, denies diplopia.     Visual fields full.     Facial sensation intact to light touch.   Symmetric facial movements.   Hearing intact.  MOTOR:    No pronation or drift.  SENSATION:    Intact to light touch throughout.   COORDINATION: Slightly off on finger-nose with eyes closed bilaterally.    GAIT:  Walks without assistance.   Good speed.    Malpositioning of right leg in stride at times.       MEDICAL RECORDS  Obtained and personally reviewed all available outside medical records in addition to reviewing any records available in our electronic system.     LABS  Personally reviewed all available lab results.     IMAGING  Personally reviewed MR brain imaging from yesterday and compared to prior imaging. To my eye, there has been an anticipated reduction in the size of the left parietal resection cavity with an area of linear enhancement, which likely represents post-operative scarring. There is also a focus of T2 FLAIR signal in the anterior aspect of the surgical cavity.    Imaging was shown to and results were reviewed with Joss.     Imaging and case was reviewed at Brain Tumor Conference yesterday.       IMPRESSION  Clinic time for this high complexity encounter was spent discussing in detail the nature of his cancer in the setting of his repeat imaging. This was in addition to answering questions pertaining to my recommendations and devising the plan as outlined below.     Clinically, Joss " has seen ongoing improvements in terms of right-sided hemiparesis and continues to work with outpatient therapies as well as with Dr. Chiang. In addition, his headaches have resolved. His mood is more bright and he is making excellent efforts to quit smoking.     Joss has not had any recurrent seizure events, even with having stopped taking Keppra in the setting of running out of this medication. A referral was placed to [facility] in December and he has yet to be scheduled. It is critical that Joss is evaluated by an epileptologist for guidance regarding work. 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 This all should be viewed with caution given his history of a generalized seizure. I will [facility] place [facility] in relation to his current and future employment as well as management of Keppra. In the meantime, will restart Keppra.      Imaging as detailed above with no concerning findings. As a result, with regard to cancer-directed therapy, Joss continues to have limited risk factors for starting treatment. His case and imaging was discussed at Brain Tumor Conference yesterday and the consensus was for continued imaging surveillance. Based on his limited risk factors in combination with stable imaging, I am recommending continued observation and Joss is in agreement with the plan.     PROBLEM LIST  Oligodendroglioma  Hemiparesis, right  Tension headache  Seizure    PLAN  -CANCER-DIRECTED THERAPY-  -As above; repeat imaging in 3 months.     -STEROIDS-  -Currently off dexamethasone.    -SEIZURE MANAGEMENT-  -Given history of seizures, will continue current antiepileptic regimen of Keppra for the foreseeable future.  -A second referral to [facility].     -Quality of life/ MOOD/ HEADACHE/ FATIGUE-  -Denies any mood issues.  -Continue to monitor mood as untreated/  undertreated depression can worsen fatigue, dysorexia, and quality of life.  -Headaches resolved; no longer taking flexeril.     -HEMIPARESIS-  -Following with Dr. Chiang.     -HYPERTENSION-  -Continue to monitor. Will need to follow with primary care for management.     -TOBACCO DEPENDENCE-  -Encouraged efforts to quit smoking.     Return to clinic in 6/2022 + imaging.     In the meantime, Joss knows to call with questions or concerns or to report new complaints and can be seen sooner if needed.     Elva Park MD  Neuro-oncology

## 2022-03-08 NOTE — LETTER
"    3/8/2022         RE: Lang Collins Jr.  [address]  [place] [address]        Dear Colleague,    Thank you for referring your patient, Lang Collins Jr., to the [facility] [place]. Please see a copy of my visit note below.    NEURO-ONCOLOGY VISIT  Mar 8, 2022    CHIEF COMPLAINT: Mr. 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 He is currently managed on imaging surveillance as cancer-directed therapy has yet to be indicated.     I met with Joss in follow-up today.     HISTORY OF PRESENT ILLNESS  -Joss is doing well today.   -Since adjusting Flexeril to 10mg at bedtime a few months ago, his headaches improved. He is now off flexeril and has had no recurrence of head pain.   -Right-sided weakness has improved. Right arm is near baseline. Following with Dr. Chiang. Able to walk without assistance; periods of imbalance at times. No falls.    -Periodically, experiencing stiffness and numbness of the right hand and arm. No recurrent episodes concerning for a seizure since his initial one in August. Ran out of Keppra and is currently not on anti-seizure medications. Joss was never scheduled with [facility] as ordered in December.   -Denies any changes in vision.  -Mood is better, less \"mackenzie and irritated\". Spoke with Dr. Ulloa and it was helpful.   -Energy is good overall, but if sleep deprived, he experiences extreme fatigue, cognitive slowing, and generalized weakness.   -Off all steroids.  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   -Making strong efforts to stop smoking.     REVIEW OF SYSTEMS  A comprehensive ROS negative except as in HPI.      MEDICATIONS   Current Outpatient Medications   Medication Sig Dispense Refill     levETIRAcetam (KEPPRA) 500 MG tablet Take 1 tablet (500 mg) by mouth " 2 times daily 60 tablet 0     lisinopril (ZESTRIL) 20 MG tablet Take 1 tablet (20 mg) by mouth every morning 30 tablet 0     DRUG ALLERGIES No Known Allergies       IMMUNIZATIONS   Immunization History   Administered Date(s) Administered     COVIDWINDY Gao,Pfizer (12+ Yrs) 11/05/2021       ONCOLOGIC HISTORY  -8/8/2021 PRESENTATION: New onset seizure; generalized event. Started on Keppra.  -8/2021 MR brain imaging with a 2.1cm left frontal non-enhancing mass in the superior frontal gyrus in the expected region of the supplementary motor area.  -9/8/2021 MR brain imaging with no significant change in the nonenhancing T2 hyperintense mass.  -11/12/21 SURGERY: Left frontal-parietal craniotomy for mass resection.   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  Post-operative imaging with interval left frontal craniotomy for resection of abnormal lesion within the left parafalcine frontal lobe.  Hemiparesis, discharged to ARU.  -12/7/2021 NEURO-ONC: Recommending imaging surveillance given low risk factors and need to continue to focus on ongoing rehab. Referral to Dr. Chiang for optimizing rehab. Referral to [facility]/ epilepsy for seizure risk management in relation to future employment. Trial of flexeril 10mg at bedtime for headaches.   -3/8/2022 NEURO-ONC/ MRB: Clinically well; improved from prior. Restarting Keppra; second referral to [facility]. Imaging with post-surgical changes. Continue imaging surveillance.     SOCIAL HISTORY   History   Smoking Status     Current Every Day Smoker     Packs/day: 1.00   Smokeless Tobacco     Never Used    Social History    Substance and Sexual Activity      Alcohol use: Yes        Comment: socially     History   Drug Use     Types: Marijuana   Not , 5 children.       PHYSICAL EXAMINATION  BP (!) 138/98 (BP Location: Right arm, Patient Position: Sitting, Cuff Size: Adult Regular)   Pulse 81   Temp 98.1  F (36.7  C) (Oral)   " Resp 20   Wt 68.9 kg (151 lb 12.8 oz)   SpO2 99%   BMI 21.78 kg/m     Wt Readings from Last 2 Encounters:   03/08/22 68.9 kg (151 lb 12.8 oz)   12/14/21 65.8 kg (145 lb)      Ht Readings from Last 2 Encounters:   12/07/21 1.778 m (5' 10\")   11/29/21 1.803 m (5' 11\")     KPS: 90    -Generally well appearing.  -Respiratory: Normal breath sounds, no audible wheezing.   -Skin: No rashes. Healed head incision.  -Hematologic/ lymphatic: No abnormal bruising. No leg swelling.  -Psychiatric: Normal mood and affect. Pleasant, talkative.  -Neurologic:   MENTAL STATUS:     Alert, oriented to date.    Recall: Intact.    Speech fluent.    Comprehension intact to multi-step commands.   Good right-left orientation.     CRANIAL NERVES:     Pupils are equal, round.    Extraocular movements full, denies diplopia.     Visual fields full.     Facial sensation intact to light touch.   Symmetric facial movements.   Hearing intact.  MOTOR:    No pronation or drift.  SENSATION:    Intact to light touch throughout.   COORDINATION: Slightly off on finger-nose with eyes closed bilaterally.    GAIT:  Walks without assistance.   Good speed.    Malpositioning of right leg in stride at times.       MEDICAL RECORDS  Obtained and personally reviewed all available outside medical records in addition to reviewing any records available in our electronic system.     LABS  Personally reviewed all available lab results.     IMAGING  Personally reviewed MR brain imaging from yesterday and compared to prior imaging. To my eye, there has been an anticipated reduction in the size of the left parietal resection cavity with an area of linear enhancement, which likely represents post-operative scarring. There is also a focus of T2 FLAIR signal in the anterior aspect of the surgical cavity.    Imaging was shown to and results were reviewed with Joss.     Imaging and case was reviewed at Brain Tumor Conference yesterday.       IMPRESSION  Clinic time for " this high complexity encounter was spent discussing in detail the nature of his cancer in the setting of his repeat imaging. This was in addition to answering questions pertaining to my recommendations and devising the plan as outlined below.     Clinically, Joss has seen ongoing improvements in terms of right-sided hemiparesis and continues to work with outpatient therapies as well as with Dr. Chiang. In addition, his headaches have resolved. His mood is more bright and he is making excellent efforts to quit smoking.     Joss has not had any recurrent seizure events, even with having stopped taking Keppra in the setting of running out of this medication. A referral was placed to [facility] in December and he has yet to be scheduled. It is critical that Joss is evaluated by an epileptologist for guidance regarding work. 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 This all should be viewed with caution given his history of a generalized seizure. I will again place a referral to [facility] for seizure risk management in relation to his current and future employment as well as management of Keppra. In the meantime, will restart Keppra.      Imaging as detailed above with no concerning findings. As a result, with regard to cancer-directed therapy, Joss continues to have limited risk factors for starting treatment. His case and imaging was discussed at Brain Tumor Conference yesterday and the consensus was for continued imaging surveillance. Based on his limited risk factors in combination with stable imaging, I am recommending continued observation and Joss is in agreement with the plan.     PROBLEM LIST  Oligodendroglioma  Hemiparesis, right  Tension headache  Seizure    PLAN  -CANCER-DIRECTED THERAPY-  -As above; repeat imaging in 3 months.     -STEROIDS-  -Currently off dexamethasone.    -SEIZURE  "MANAGEMENT-  -Given history of seizures, will continue current antiepileptic regimen of Keppra for the foreseeable future.  -[facility]     -Quality of life/ MOOD/ HEADACHE/ FATIGUE-  -Denies any mood issues.  -Continue to monitor mood as untreated/ undertreated depression can worsen fatigue, dysorexia, and quality of life.  -Headaches resolved; no longer taking flexeril.     -HEMIPARESIS-  -Following with Dr. Chiang.     -HYPERTENSION-  -Continue to monitor. Will need to follow with primary care for management.     -TOBACCO DEPENDENCE-  -Encouraged efforts to quit smoking.     Return to clinic in 6/2022 + imaging.     In the meantime, Joss knows to call with questions or concerns or to report new complaints and can be seen sooner if needed.     Elva Park MD  Neuro-oncology       Oncology Rooming Note    March 8, 2022 10:04 AM   Lang Collins Jr. is a 47 year old male who presents for:    Chief Complaint   Patient presents with     Oncology Clinic Visit     Oligodendroglioma, WHO grade II (H)     Initial Vitals: BP (!) 142/97 (BP Location: Right arm, Patient Position: Sitting, Cuff Size: Adult Regular)   Pulse 81   Temp 98.1  F (36.7  C) (Oral)   Resp 20   Wt 68.9 kg (151 lb 12.8 oz)   SpO2 99%   BMI 21.78 kg/m   Estimated body mass index is 21.78 kg/m  as calculated from the following:    Height as of 12/7/21: 1.778 m (5' 10\").    Weight as of this encounter: 68.9 kg (151 lb 12.8 oz). Body surface area is 1.84 meters squared.  No Pain (0) Comment: Data Unavailable   No LMP for male patient.  Allergies reviewed: Yes  Medications reviewed: Yes    Medications: Medication refills not needed today.  Pharmacy name entered into ECO-SAFE:    [facility] [place], [place] - [address] [place] [address] [address] [facility] [place], [place] - [address]    Clinical concerns: NO      Debra Lockett [facility]                  Again, thank you for allowing me to participate in the " care of your patient.        Sincerely,        Elva Park MD

## 2022-03-08 NOTE — PROGRESS NOTES
"Oncology Rooming Note    March 8, 2022 10:04 AM   Lang Collins Jr. is a 47 year old male who presents for:    Chief Complaint   Patient presents with     Oncology Clinic Visit     Oligodendroglioma, WHO grade II (H)     Initial Vitals: BP (!) 142/97 (BP Location: Right arm, Patient Position: Sitting, Cuff Size: Adult Regular)   Pulse 81   Temp 98.1  F (36.7  C) (Oral)   Resp 20   Wt 68.9 kg (151 lb 12.8 oz)   SpO2 99%   BMI 21.78 kg/m   Estimated body mass index is 21.78 kg/m  as calculated from the following:    Height as of 12/7/21: 1.778 m (5' 10\").    Weight as of this encounter: 68.9 kg (151 lb 12.8 oz). Body surface area is 1.84 meters squared.  No Pain (0) Comment: Data Unavailable   No LMP for male patient.  Allergies reviewed: Yes  Medications reviewed: Yes    Medications: Medication refills not needed today.  Pharmacy name entered into [employer]:    [facility] [place], [place] - [address] [facility]  [facility] [place], [place] - [address]    Clinical concerns: NO      Debra Lockett CMA              "

## 2022-03-21 ENCOUNTER — OFFICE VISIT (OUTPATIENT)
Dept: NEUROLOGY | Facility: CLINIC | Age: 47
End: 2022-03-21
Payer: COMMERCIAL

## 2022-03-21 VITALS
DIASTOLIC BLOOD PRESSURE: 100 MMHG | BODY MASS INDEX: 20.75 KG/M2 | HEART RATE: 83 BPM | HEIGHT: 71 IN | WEIGHT: 148.2 LBS | TEMPERATURE: 97.1 F | SYSTOLIC BLOOD PRESSURE: 138 MMHG

## 2022-03-21 DIAGNOSIS — G40.909 RECURRENT SEIZURES (H): Primary | ICD-10-CM

## 2022-03-21 DIAGNOSIS — Z98.890 S/P CRANIOTOMY: ICD-10-CM

## 2022-03-21 RX ORDER — LEVETIRACETAM 500 MG/1
500 TABLET ORAL 2 TIMES DAILY
Qty: 60 TABLET | Refills: 11 | Status: SHIPPED | OUTPATIENT
Start: 2022-03-21 | End: 2022-12-13

## 2022-03-21 NOTE — PATIENT INSTRUCTIONS
Times of Days am  pm        Medication Tablet Size Number of Tablets/Capsules Total Daily Dosage    Keppra 500 1 1       1000 mg                                                                                                                                   Carry this with you at all times.  CONTINUE TAKING YOUR OTHER MEDICATIONS AS PREVIOUSLY DIRECTED.      * * *Do not store medications in the bathroom.  Keep medications away from children!* * *
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
## 2022-06-07 ENCOUNTER — ONCOLOGY VISIT (OUTPATIENT)
Dept: ONCOLOGY | Facility: CLINIC | Age: 47
End: 2022-06-07
Attending: PSYCHIATRY & NEUROLOGY
Payer: COMMERCIAL

## 2022-06-07 ENCOUNTER — HOSPITAL ENCOUNTER (OUTPATIENT)
Dept: MRI IMAGING | Facility: CLINIC | Age: 47
Discharge: HOME | End: 2022-06-07
Attending: PSYCHIATRY & NEUROLOGY | Admitting: PSYCHIATRY & NEUROLOGY
Payer: COMMERCIAL

## 2022-06-07 ENCOUNTER — ONCOLOGY VISIT (OUTPATIENT)
Dept: ONCOLOGY | Facility: CLINIC | Age: 47
End: 2022-06-07
Attending: STUDENT IN AN ORGANIZED HEALTH CARE EDUCATION/TRAINING PROGRAM
Payer: COMMERCIAL

## 2022-06-07 VITALS
BODY MASS INDEX: 20.36 KG/M2 | DIASTOLIC BLOOD PRESSURE: 96 MMHG | OXYGEN SATURATION: 99 % | SYSTOLIC BLOOD PRESSURE: 146 MMHG | RESPIRATION RATE: 16 BRPM | WEIGHT: 146 LBS | HEART RATE: 79 BPM | TEMPERATURE: 97.9 F

## 2022-06-07 VITALS
TEMPERATURE: 97.9 F | RESPIRATION RATE: 16 BRPM | WEIGHT: 146 LBS | SYSTOLIC BLOOD PRESSURE: 146 MMHG | HEART RATE: 79 BPM | DIASTOLIC BLOOD PRESSURE: 96 MMHG | BODY MASS INDEX: 20.36 KG/M2 | OXYGEN SATURATION: 99 %

## 2022-06-07 DIAGNOSIS — Z98.890 S/P CRANIOTOMY: ICD-10-CM

## 2022-06-07 DIAGNOSIS — C71.9 OLIGODENDROGLIOMA, WHO GRADE II (H): Primary | ICD-10-CM

## 2022-06-07 DIAGNOSIS — R26.81 GAIT INSTABILITY: ICD-10-CM

## 2022-06-07 DIAGNOSIS — G44.209 TENSION HEADACHE: ICD-10-CM

## 2022-06-07 DIAGNOSIS — C71.9 OLIGODENDROGLIOMA, WHO GRADE II (H): ICD-10-CM

## 2022-06-07 PROCEDURE — 70553 MRI BRAIN STEM W/O & W/DYE: CPT

## 2022-06-07 PROCEDURE — G0463 HOSPITAL OUTPT CLINIC VISIT: HCPCS

## 2022-06-07 PROCEDURE — 99215 OFFICE O/P EST HI 40 MIN: CPT | Performed by: PSYCHIATRY & NEUROLOGY

## 2022-06-07 PROCEDURE — 255N000002 HC RX 255 OP 636: Performed by: PSYCHIATRY & NEUROLOGY

## 2022-06-07 PROCEDURE — A9585 GADOBUTROL INJECTION: HCPCS | Performed by: PSYCHIATRY & NEUROLOGY

## 2022-06-07 PROCEDURE — 99215 OFFICE O/P EST HI 40 MIN: CPT | Performed by: STUDENT IN AN ORGANIZED HEALTH CARE EDUCATION/TRAINING PROGRAM

## 2022-06-07 RX ORDER — GADOBUTROL 604.72 MG/ML
7 INJECTION INTRAVENOUS ONCE
Status: COMPLETED | OUTPATIENT
Start: 2022-06-07 | End: 2022-06-07

## 2022-06-07 RX ADMIN — GADOBUTROL 7 ML: 604.72 INJECTION INTRAVENOUS at 13:09

## 2022-06-07 ASSESSMENT — PAIN SCALES - GENERAL
PAINLEVEL: NO PAIN (0)
PAINLEVEL: NO PAIN (0)

## 2022-06-07 NOTE — PROGRESS NOTES
"NEURO-ONCOLOGY VISIT  Jun 7, 2022    CHIEF COMPLAINT: Mr. 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 He is currently managed on imaging surveillance as cancer-directed therapy has yet to be indicated.     I met with Joss in follow-up today.     HISTORY OF PRESENT ILLNESS  -Joss is doing well today.   -Reports fatigue. Gets up between and 4:30 and 5am each morning, he starts feeling tired around 10am. Goes to bed around 8-10pm. No nighttime awakenings. Previously was taking nap around 10am-1pm after resection, recently has discontinued napping.  -Looking into going back to work. 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  -He is continuing Keppra, he saw Dr. Panda with no changes in seizure medications. Has not had episodes of right arm stiffening for several months.  States he is cleared to drive by Dr. Panda and is aware that he may not drive for three months after any unanticipated episode of loss of awareness.  -Denies severe headache, has intermittent mild headaches that self-resolve within an hour.  -Right-arm strength is almost back to baseline, currently right arm is stronger than left.  -No falls since last appointment. Denies balance difficulty.  -Denies any changes in vision.  -No longer smoking as of two weeks ago.  -Off all steroids.  -Mood is unchanged, no current mood concerns.      MEDICATIONS   Current Outpatient Medications   Medication Sig Dispense Refill     levETIRAcetam (KEPPRA) 500 MG tablet Take 1 tablet (500 mg) by mouth 2 times daily 60 tablet 11     DRUG ALLERGIES No Known Allergies       IMMUNIZATIONS   Immunization History   Administered Date(s) Administered     COVID-19,WINDY,Pfizer (12+ Yrs) 11/05/2021     Tdap (Adacel,Boostrix) 12/03/2021       ONCOLOGIC " "HISTORY  -8/8/2021 PRESENTATION: New onset seizure; generalized event. Started on Keppra.  -8/2021 MR brain imaging with a 2.1cm left frontal non-enhancing mass in the superior frontal gyrus in the expected region of the supplementary motor area.  -9/8/2021 MR brain imaging with no significant change in the nonenhancing T2 hyperintense mass.  -11/12/21 SURGERY: Left frontal-parietal craniotomy for mass resection.   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  Post-operative imaging with interval left frontal craniotomy for resection of abnormal lesion within the left parafalcine frontal lobe.  Hemiparesis, discharged to ARU.  -12/7/2021 NEURO-ONC: Recommending imaging surveillance given low risk factors and need to continue to focus on ongoing rehab. Referral to Dr. Chiang for optimizing rehab. Referral to [facility]/ epilepsy for seizure risk management in relation to future employment. Trial of flexeril 10mg at bedtime for headaches.   -3/8/2022 NEURO-ONC/ MRB: Clinically well; improved from prior. Restarting Keppra; [facility] with post-surgical changes. Continue imaging surveillance.   -6/7/2022 NEURO-ONC/ MRB: Clinically well. Imaging with continued healing post-surgery. Continue imaging surveillance.     SOCIAL HISTORY   History   Smoking Status     Current Every Day Smoker     Packs/day: 1.00   Smokeless Tobacco     Never Used    Social History    Substance and Sexual Activity      Alcohol use: Yes        Comment: socially     History   Drug Use     Types: Marijuana   Not , 5 children.       PHYSICAL EXAMINATION  BP (!) 146/96   Pulse 79   Temp 97.9  F (36.6  C) (Oral)   Resp 16   Wt 66.2 kg (146 lb)   SpO2 99%   BMI 20.36 kg/m     Wt Readings from Last 2 Encounters:   06/07/22 66.2 kg (146 lb)   06/07/22 66.2 kg (146 lb)      Ht Readings from Last 2 Encounters:   03/21/22 1.803 m (5' 11\")   12/07/21 1.778 m (5' 10\") "     KPS: 90    -Generally well appearing.  -Respiratory: Normal breath sounds, no audible wheezing.   -Skin: No rashes. Healed head incision.  -Hematologic/ lymphatic: No abnormal bruising. No leg swelling.  -Psychiatric: Normal mood and affect. Pleasant, talkative.  -Neurologic:   MENTAL STATUS:     Alert, oriented to date.    Recall: Intact.    Speech fluent.    Comprehension intact to multi-step commands.   Good right-left orientation.     CRANIAL NERVES:     Pupils are equal, round.    Extraocular movements full, denies diplopia.     Visual fields full.     Facial sensation intact to light touch.   Symmetric facial movements.   Hearing intact.  MOTOR:    No pronation or drift.  SENSATION:    Intact to light touch throughout.   COORDINATION: Intact finger-nose with eyes closed bilaterally.    GAIT:  Walks without assistance.   Good speed.    Intact toe walk, heel walk, tandem gait.      MEDICAL RECORDS  Obtained and personally reviewed all available outside medical records in addition to reviewing any records available in our electronic system.     LABS  Personally reviewed all available lab results; 11/2021 lipid panel, HbA1c (4.9), CBC, CMP.     IMAGING  Personally reviewed MR brain imaging from today and compared to prior imaging. To my eye, there has been a reduction in the size of the left parietal resection cavity with no more associated linear enhancement. There is also a further reduction in size of T2 FLAIR signal in the anterior-superior aspect of the surgical cavity.    Imaging was shown to and results were reviewed with Joss.       IMPRESSION  Clinic time for this high complexity encounter was spent discussing in detail the nature of his cancer in the setting of his repeat imaging. This was in addition to answering questions pertaining to my recommendations and devising the plan as outlined below.     Clinically, Joss has seen further ongoing improvements in terms of right-sided hemiparesis. He  "continues to work with outpatient therapies as well as with Dr. Chiang. I have personally reviewed Dr. Chiang's clinic notes.  In addition, his headaches have resolved. His mood is more bright and he has quit smoking as of two weeks ago!    Joss has not had any recurrent seizure events in the interim. I referred him to [facility] for evaluation by an epileptologist for guidance regarding return to work. I have read Dr. Panda's note with the plan to continue Keppra and do a 3-hour EEG next month. He is pursuing a commercial license with less stringent conditions regarding seizure events. He is aware of driving precautions surrounding seizure with the various commercial driving licenses. Joss is looking for opportunities to return to work.    Imaging as detailed above with no concerning findings and continued changes related to post-operative healing. As a result, with regard to cancer-directed therapy, Joss continues to have limited risk factors for starting treatment. His age of > 40 does place him in the \"high risk\" group, however, he lacks additional risk factors; the initial small size of the lesion, successful surgical resection, oligodendroglioma cell lineage, lack of crossing the midline, and continued clinical and radiographic findings. So, based on this in combination with stable to improved imaging, I am recommending continued observation with an increased imaging interval of 6 months per NCCN guidelines, which recommends imaging every 3-6 months for 5 years (until 11/2026). Joss is in agreement with the plan and in the meantime, Joss knows to call the clinic with any new concerns and he can be seen sooner if needed.     PROBLEM LIST  Oligodendroglioma  Hemiparesis, right  Tension headache  Seizure    PLAN  -CANCER-DIRECTED THERAPY-  -As above; repeat imaging in 6 months.     -STEROIDS-  -Currently off dexamethasone.    -SEIZURE MANAGEMENT-  -Given history of seizures, will continue current antiepileptic regimen of " Keppra for the foreseeable future.  -Following with Dr. Panda.      -Quality of life/ MOOD/ HEADACHE/ FATIGUE-  -Denies any mood issues.  -Continue to monitor mood as untreated/ undertreated depression can worsen fatigue, dysorexia, and quality of life.  -Headaches resolved; no longer taking flexeril.     -HEMIPARESIS-  -Following with Dr. Chiang.     -HYPERTENSION-  -Continue to monitor. Consider referral to primary care for management in the future.    -TOBACCO DEPENDENCE-  -Reports no smoking for two weeks; encouraged continued abstinence from smoking.    Return to clinic in 12/2022 + imaging.    Patient was also seen and examined by Dr. Socorro Kebede, neurology resident, under my direct supervision.     Elva Park MD  Neuro-oncology

## 2022-06-07 NOTE — PATIENT INSTRUCTIONS
Continue your home exercise regimen as tolerated.  Contact Dr. Chiang if you have a decline in your mobility.  Return to Dr. Chiang as needed.

## 2022-06-07 NOTE — PROGRESS NOTES
"Oncology Rooming Note    June 7, 2022 2:37 PM   Lang Collins Jr. is a 47 year old male who presents for:    Chief Complaint   Patient presents with     Oncology Clinic Visit     Initial Vitals: BP (!) 146/96   Pulse 79   Temp 97.9  F (36.6  C) (Oral)   Resp 16   Wt 66.2 kg (146 lb)   SpO2 99%   BMI 20.36 kg/m   Estimated body mass index is 20.36 kg/m  as calculated from the following:    Height as of 3/21/22: 1.803 m (5' 11\").    Weight as of this encounter: 66.2 kg (146 lb). Body surface area is 1.82 meters squared.  No Pain (0) Comment: Data Unavailable   No LMP for male patient.  Allergies reviewed: Yes  Medications reviewed: Yes    Medications: Medication refills not needed today.  Pharmacy name entered into [facility]:    [facility] [place], [place] - [address]  [facility] [place], [place] - [address]    Clinical concerns: no      Alexandra Liu CMA            "

## 2022-06-07 NOTE — LETTER
"    6/7/2022         RE: Lang Collins Jr.  [address]  [facility] [address]        Dear Colleague,    Thank you for referring your patient, Lang Collins Jr., to the [facility] [place]. Please see a copy of my visit note below.    NEURO-ONCOLOGY VISIT  Jun 7, 2022    CHIEF COMPLAINT: Mr. 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 He is currently managed on imaging surveillance as cancer-directed therapy has yet to be indicated.     I met with Joss in follow-up today.     HISTORY OF PRESENT ILLNESS  -Joss is doing well today.   -Reports fatigue. Gets up between and 4:30 and 5am each morning, he starts feeling tired around 10am. Goes to bed around 8-10pm. No nighttime awakenings. Previously was taking nap around 10am-1pm after resection, recently has discontinued napping.  -Looking into going back to work. 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  -He is continuing Keppra, he saw Dr. Panda with no changes in seizure medications. Has not had episodes of right arm stiffening for several months.  States he is cleared to drive by Dr. Panda and is aware that he may not drive for three months after any unanticipated episode of loss of awareness.  -Denies severe headache, has intermittent mild headaches that self-resolve within an hour.  -Right-arm strength is almost back to baseline, currently right arm is stronger than left.  -No falls since last appointment. Denies balance difficulty.  -Denies any changes in vision.  -No longer smoking as of two weeks ago.  -Off all steroids.  -Mood is unchanged, no current mood concerns.      MEDICATIONS   Current Outpatient Medications   Medication Sig Dispense Refill     levETIRAcetam (KEPPRA) 500 MG tablet Take 1 tablet " (500 mg) by mouth 2 times daily 60 tablet 11     DRUG ALLERGIES No Known Allergies       IMMUNIZATIONS   Immunization History   Administered Date(s) Administered     COVID-19,PF,Pfizer (12+ Yrs) 11/05/2021     Tdap (Adacel,Boostrix) 12/03/2021       ONCOLOGIC HISTORY  -8/8/2021 PRESENTATION: New onset seizure; generalized event. Started on Keppra.  -8/2021 MR brain imaging with a 2.1cm left frontal non-enhancing mass in the superior frontal gyrus in the expected region of the supplementary motor area.  -9/8/2021 MR brain imaging with no significant change in the nonenhancing T2 hyperintense mass.  -11/12/21 SURGERY: Left frontal-parietal craniotomy for mass resection.   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  Post-operative imaging with interval left frontal craniotomy for resection of abnormal lesion within the left parafalcine frontal lobe.  Hemiparesis, discharged to ARU.  -12/7/2021 NEURO-ONC: Recommending imaging surveillance given low risk factors and need to continue to focus on ongoing rehab. Referral to Dr. Chiang for optimizing rehab. Referral to [facility]/ epilepsy for seizure risk management in relation to future employment. Trial of flexeril 10mg at bedtime for headaches.   -3/8/2022 NEURO-ONC/ MRB: Clinically well; improved from prior. Restarting Keppra; second referral to [facility]. Imaging with post-surgical changes. Continue imaging surveillance.   -6/7/2022 NEURO-ONC/ MRB: Clinically well. Imaging with continued healing post-surgery. Continue imaging surveillance.     SOCIAL HISTORY   History   Smoking Status     Current Every Day Smoker     Packs/day: 1.00   Smokeless Tobacco     Never Used    Social History    Substance and Sexual Activity      Alcohol use: Yes        Comment: socially     History   Drug Use     Types: Marijuana   Not , 5 children.       PHYSICAL EXAMINATION  BP (!) 146/96   Pulse 79   Temp 97.9  F (36.6  C)  "(Oral)   Resp 16   Wt 66.2 kg (146 lb)   SpO2 99%   BMI 20.36 kg/m     Wt Readings from Last 2 Encounters:   06/07/22 66.2 kg (146 lb)   06/07/22 66.2 kg (146 lb)      Ht Readings from Last 2 Encounters:   03/21/22 1.803 m (5' 11\")   12/07/21 1.778 m (5' 10\")     KPS: 90    -Generally well appearing.  -Respiratory: Normal breath sounds, no audible wheezing.   -Skin: No rashes. Healed head incision.  -Hematologic/ lymphatic: No abnormal bruising. No leg swelling.  -Psychiatric: Normal mood and affect. Pleasant, talkative.  -Neurologic:   MENTAL STATUS:     Alert, oriented to date.    Recall: Intact.    Speech fluent.    Comprehension intact to multi-step commands.   Good right-left orientation.     CRANIAL NERVES:     Pupils are equal, round.    Extraocular movements full, denies diplopia.     Visual fields full.     Facial sensation intact to light touch.   Symmetric facial movements.   Hearing intact.  MOTOR:    No pronation or drift.  SENSATION:    Intact to light touch throughout.   COORDINATION: Intact finger-nose with eyes closed bilaterally.    GAIT:  Walks without assistance.   Good speed.    Intact toe walk, heel walk, tandem gait.      MEDICAL RECORDS  Obtained and personally reviewed all available outside medical records in addition to reviewing any records available in our electronic system.     LABS  Personally reviewed all available lab results; 11/2021 lipid panel, HbA1c (4.9), CBC, CMP.     IMAGING  Personally reviewed MR brain imaging from today and compared to prior imaging. To my eye, there has been a reduction in the size of the left parietal resection cavity with no more associated linear enhancement. There is also a further reduction in size of T2 FLAIR signal in the anterior-superior aspect of the surgical cavity.    Imaging was shown to and results were reviewed with Joss.       IMPRESSION  Clinic time for this high complexity encounter was spent discussing in detail the nature of his " "cancer in the setting of his repeat imaging. This was in addition to answering questions pertaining to my recommendations and devising the plan as outlined below.     Clinically, Joss has seen further ongoing improvements in terms of right-sided hemiparesis. He continues to work with outpatient therapies as well as with Dr. Chiang. I have personally reviewed Dr. Chiang's clinic notes.  In addition, his headaches have resolved. His mood is more bright and he has quit smoking as of two weeks ago!    Jsos has not had any recurrent seizure events in the interim. I referred him to [facility] for evaluation by an epileptologist for guidance regarding return to work. I have read Dr. Panda's note with the plan to continue Keppra and do a 3-hour EEG next month. He is pursuing a commercial license with less stringent conditions regarding seizure events. He is aware of driving precautions surrounding seizure with the various commercial driving licenses. Joss is looking for opportunities to return to work.    Imaging as detailed above with no concerning findings and continued changes related to post-operative healing. As a result, with regard to cancer-directed therapy, Joss continues to have limited risk factors for starting treatment. His age of > 40 does place him in the \"high risk\" group, however, he lacks additional risk factors; the initial small size of the lesion, successful surgical resection, oligodendroglioma cell lineage, lack of crossing the midline, and continued clinical and radiographic findings. So, based on this in combination with stable to improved imaging, I am recommending continued observation with an increased imaging interval of 6 months per NCCN guidelines, which recommends imaging every 3-6 months for 5 years (until 11/2026). Joss is in agreement with the plan and in the meantime, Joss knows to call the clinic with any new concerns and he can be seen sooner if needed.     PROBLEM " "LIST  Oligodendroglioma  Hemiparesis, right  Tension headache  Seizure    PLAN  -CANCER-DIRECTED THERAPY-  -As above; repeat imaging in 6 months.     -STEROIDS-  -Currently off dexamethasone.    -SEIZURE MANAGEMENT-  -Given history of seizures, will continue current antiepileptic regimen of Keppra for the foreseeable future.  -Following with Dr. Panda.      -Quality of life/ MOOD/ HEADACHE/ FATIGUE-  -Denies any mood issues.  -Continue to monitor mood as untreated/ undertreated depression can worsen fatigue, dysorexia, and quality of life.  -Headaches resolved; no longer taking flexeril.     -HEMIPARESIS-  -Following with Dr. Chiang.     -HYPERTENSION-  -Continue to monitor. Consider referral to primary care for management in the future.    -TOBACCO DEPENDENCE-  -Reports no smoking for two weeks; encouraged continued abstinence from smoking.    Return to clinic in 12/2022 + imaging.    Patient was also seen and examined by Dr. Socorro Kebede, neurology resident, under my direct supervision.     Elva Park MD  Neuro-oncology       Oncology Rooming Note    June 7, 2022 2:37 PM   Lang Collins Jr. is a 47 year old male who presents for:    Chief Complaint   Patient presents with     Oncology Clinic Visit     Initial Vitals: BP (!) 146/96   Pulse 79   Temp 97.9  F (36.6  C) (Oral)   Resp 16   Wt 66.2 kg (146 lb)   SpO2 99%   BMI 20.36 kg/m   Estimated body mass index is 20.36 kg/m  as calculated from the following:    Height as of 3/21/22: 1.803 m (5' 11\").    Weight as of this encounter: 66.2 kg (146 lb). Body surface area is 1.82 meters squared.  No Pain (0) Comment: Data Unavailable   No LMP for male patient.  Allergies reviewed: Yes  Medications reviewed: Yes    Medications: Medication refills not needed today.  Pharmacy name entered into [employer]:    [facility] [place], [place] - [address] [place] [address] [address] [facility] [place], [place] - [address]    Clinical concerns: " no      Alexandra Liu, CMA                Again, thank you for allowing me to participate in the care of your patient.        Sincerely,        Elva Park MD

## 2022-06-07 NOTE — PROGRESS NOTES
"Oncology Rooming Note    June 7, 2022 2:31 PM   Lang Collins Jr. is a 47 year old male who presents for:    Chief Complaint   Patient presents with     Oncology Clinic Visit     Initial Vitals: BP (!) 146/96   Pulse 79   Temp 97.9  F (36.6  C) (Oral)   Resp 16   Wt 66.2 kg (146 lb)   SpO2 99%   BMI 20.36 kg/m   Estimated body mass index is 20.36 kg/m  as calculated from the following:    Height as of 3/21/22: 1.803 m (5' 11\").    Weight as of this encounter: 66.2 kg (146 lb). Body surface area is 1.82 meters squared.  No Pain (0) Comment: Data Unavailable   No LMP for male patient.  Allergies reviewed: Yes  Medications reviewed: Yes    Medications: Medication refills not needed today.  Pharmacy name entered into [facility]:    [facility] [place], [place] - [address] [facility]  [facility] [place], [place] - [address]    Clinical concerns: no      Alexandra Liu CMA            "
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
## 2022-06-15 ENCOUNTER — PATIENT OUTREACH (OUTPATIENT)
Dept: ONCOLOGY | Facility: CLINIC | Age: 47
End: 2022-06-15
Payer: COMMERCIAL

## 2022-06-15 NOTE — PROGRESS NOTES
I rec'd a vm from Lang about 8 am this am.  I sent his message onto his RNCC.  Apparently his license has been suspended because he didn't pay November child support,howver, he states he had surgery that month and couldn't work.  He is asking that Dr Park write a letter on his behalf.

## 2022-06-28 NOTE — ADDENDUM NOTE
Encounter addended by: Alda Couch OT on: 6/28/2022 8:49 AM   Actions taken: Clinical Note Signed, Flowsheet accepted, Episode resolved

## 2022-06-28 NOTE — PROGRESS NOTES
[facility]    Outpatient Occupational Therapy Discharge Note  Patient: Lang Collins Jr.  : 1975    Beginning/End Dates of Reporting Period:  21 to 22    Referring Provider: Rachel Howard MD     Therapy Diagnosis: Decreased independence, ease and efficiency in ADL's and IADL's     Client Self Report: Patient is in agreement with discharge recommendation.  Patient feels as though he is on an upward trajectory in regards to his recovery and is currently looking into employment options that do not involve operating or lifting heavy machinery.      Goals:   Refer below    Plan:  Discharge from therapy.    Discharge:    Reason for Discharge: Patient has met all goals.    Equipment Issued: None     Discharge Plan: Patient to continue home program.   22 1100   Signing Clinician's Name / Credentials   Signing clinician's name / credentials LUZ MARINA Jones    Session Number   Session Number 5/10    Authorization status [facility], [place] care    Progress/Recertification   Recertification Due 22   Adult OT Eval Goals   OT Eval Goals (Adult) 1;2;3    OT Goal 1   Goal Identifier Pre-driving    Goal Description Patient will score WNL on all pre-driving assessments presented in therapy session as evidence of reaction time, visual attention, problem solving and motor planning necessary for safe and efficient community mobility.     Target Date 22    OT Goal 2   Goal Identifier Home Programming    Goal Description Patient will demonstrate compliance with home programming recommendations as indicated by an increase in R  strength by 5+ pounds and reduction of score on the 9 hole peg test through R by 5 seconds.    Target Date 22    OT Goal 3   Goal Identifier Gross Motor Coordination    Goal Description Patient will demonstrate smooth and symmetrical gross motor UE posturing and  strength in completion of functional tasks such as folding laundry, reaching and placing of items in cupboards etc.     Target Date 03/03/22   Subjective Report   Subjective Report Patient is in agreement with discharge recommendation.  Patient feels as though he is on an upward trajectory in regards to his recovery and is currently looking into employment options that do not involve operating or lifting heavy machinery.     Therapeutic Interventions   Therapeutic Interventions Therapeutic Procedure/Exercise   Therapeutic Procedure/Exercise   Therapeutic Procedure: strength, endurance, ROM, flexibillity minutes (34586) 45   Skilled Intervention Skilled presentation, grading and cues for UE strengthening home programming.     Patient Response Agreeable and receptive to participate in all tasks presented    Treatment Detail Reviewed shoulder HEP recommendations from previous visit including 3 posture  shoulder stabilization exercises (forward shoulder flexion, forward shoulder flexion coupled with shoulder adduction and shoulder flexion coupled with shoulder abduction) of shoulder stability exercises with use of pilates ball against wall x10 reps in each direction of small circles (clockwise and counterclockwise with increased control noted in completion as well as participation in modified wall push ups to accommodate for weight bearing.  Completed session on SCIFIT with 2.0 resistance for 10 minutes.  Patient in agreement for discharge from OT at this time, indicating that he feels strong, encouraged to try and pursue employment in a safe environment with less occupational hazards than his current role.    Progress Goals have been met and patient is appropriate for discharge>    Education   Learner Patient   Readiness Eager   Method Explanation;Demonstration   Response Verbalizes understanding   Education Notes Refer above    Plan   Plan for next session Discharge appropriate    Total Session Time   Timed Code  Treatment Minutes 45    Total Treatment Time (sum of timed and untimed services) 45    AMBULATORY CLINICS ONLY-MEDICAL AND TREATMENT DIAGNOSIS   Medical Diagnosis S/P craniotomy Z98.890    OT Diagnosis Decreased independence, ease and efficiency in IADL's

## 2022-06-30 ENCOUNTER — PATIENT OUTREACH (OUTPATIENT)
Dept: ONCOLOGY | Facility: CLINIC | Age: 47
End: 2022-06-30

## 2022-06-30 NOTE — PROGRESS NOTES
Letter regarding patient's inability to work since surgery sent via email to [email or web address]      Mylene Qureshi, RN, BSN  Specialty Care Coordinator  [facility]  [phone]

## 2022-07-11 ENCOUNTER — OFFICE VISIT (OUTPATIENT)
Dept: NEUROLOGY | Facility: CLINIC | Age: 47
End: 2022-07-11
Payer: COMMERCIAL

## 2022-07-11 ENCOUNTER — ANCILLARY PROCEDURE (OUTPATIENT)
Dept: NEUROLOGY | Facility: CLINIC | Age: 47
End: 2022-07-11
Payer: COMMERCIAL

## 2022-07-11 VITALS
BODY MASS INDEX: 20.58 KG/M2 | DIASTOLIC BLOOD PRESSURE: 102 MMHG | WEIGHT: 147 LBS | HEART RATE: 69 BPM | HEIGHT: 71 IN | SYSTOLIC BLOOD PRESSURE: 183 MMHG | TEMPERATURE: 97.2 F

## 2022-07-11 DIAGNOSIS — G40.909 RECURRENT SEIZURES (H): Primary | ICD-10-CM
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
## 2022-08-25 ENCOUNTER — PATIENT OUTREACH (OUTPATIENT)
Dept: ONCOLOGY | Facility: CLINIC | Age: 47
End: 2022-08-25

## 2022-08-25 NOTE — PROGRESS NOTES
Joss had left messages with navigation team at the [facility]. Called Joss regarding messages left on their voicemail.     Joss states that he has had increased pain in his ribs. States that it feels like a pulled muscle. He states at times that he does have trouble breathing. Inquired if he has a fever, cough or if he has tested himself for covid. He denies any fever or cough and states that he has not tested himself for Covid.     Writer will route to Dr. Park for her recommendation. Joss also still needs to be scheduled for his Brain MRI and follow up 11/22 with Dr. Park. He was transferred to scheduling to schedule.

## 2022-08-25 NOTE — PROGRESS NOTES
Follow-up with primary care regarding rib pain.   Elva Park MD   Neuro-oncology   8/25/2022         Called Joss back, he is aware of Dr. Park's recommendation to follow up with his primary care physician. Perla Coley RN,BSN,OCN,CBCN
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
## 2022-10-03 ENCOUNTER — HEALTH MAINTENANCE LETTER (OUTPATIENT)
Age: 47
End: 2022-10-03

## 2022-12-02 ENCOUNTER — HOSPITAL ENCOUNTER (OUTPATIENT)
Dept: MRI IMAGING | Facility: CLINIC | Age: 47
Discharge: HOME | End: 2022-12-02
Attending: PSYCHIATRY & NEUROLOGY | Admitting: PSYCHIATRY & NEUROLOGY
Payer: COMMERCIAL

## 2022-12-02 DIAGNOSIS — C71.9 OLIGODENDROGLIOMA, WHO GRADE II (H): ICD-10-CM

## 2022-12-02 PROCEDURE — A9585 GADOBUTROL INJECTION: HCPCS | Performed by: PSYCHIATRY & NEUROLOGY

## 2022-12-02 PROCEDURE — 70553 MRI BRAIN STEM W/O & W/DYE: CPT

## 2022-12-02 PROCEDURE — 255N000002 HC RX 255 OP 636: Performed by: PSYCHIATRY & NEUROLOGY

## 2022-12-02 RX ORDER — GADOBUTROL 604.72 MG/ML
10 INJECTION INTRAVENOUS ONCE
Status: COMPLETED | OUTPATIENT
Start: 2022-12-02 | End: 2022-12-02

## 2022-12-02 RX ADMIN — GADOBUTROL 10 ML: 604.72 INJECTION INTRAVENOUS at 09:35

## 2022-12-05 ENCOUNTER — TUMOR CONFERENCE (OUTPATIENT)
Dept: ONCOLOGY | Facility: CLINIC | Age: 47
End: 2022-12-05
Payer: COMMERCIAL

## 2022-12-05 NOTE — TUMOR CONFERENCE
Tumor Conference Information  Tumor Conference: Neuro-Onc  Specialties Present: Medical oncology, Radiation oncology, Pathology, Radiology, Surgery  Patient Status: Prospective  Stage: oligodendroglioma  Treatment to Date: Surgery  Clinical Trials: Not discussed  Genetic Testing Discussed/Recommended?: No  Supportive Care Services Discussed/Recommended?: No  Recommended Plan: Follows evidence-based guidelines (Comment: reviewed imaging - changes seen; evaluate symptoms, determine plan based on current status - hold on adj txt, surgery, chemoRT)  Did the review exceed 30 minutes?: did not           Documentation / Disclaimer Cancer Tumor Board Note  Cancer tumor board recommendations do not override what is determined to be reasonable care and treatment, which is dependent on the circumstances of a patient's case; the patient's medical, social, and personal concerns; and the clinical judgment of the oncologist [physician].

## 2022-12-13 ENCOUNTER — ONCOLOGY VISIT (OUTPATIENT)
Dept: ONCOLOGY | Facility: CLINIC | Age: 47
End: 2022-12-13
Attending: PSYCHIATRY & NEUROLOGY
Payer: COMMERCIAL

## 2022-12-13 VITALS
DIASTOLIC BLOOD PRESSURE: 99 MMHG | SYSTOLIC BLOOD PRESSURE: 150 MMHG | RESPIRATION RATE: 20 BRPM | HEART RATE: 75 BPM | TEMPERATURE: 98.4 F | OXYGEN SATURATION: 100 %

## 2022-12-13 DIAGNOSIS — C71.9 OLIGODENDROGLIOMA, WHO GRADE II (H): Primary | ICD-10-CM

## 2022-12-13 PROCEDURE — G0463 HOSPITAL OUTPT CLINIC VISIT: HCPCS | Performed by: PSYCHIATRY & NEUROLOGY

## 2022-12-13 PROCEDURE — 99215 OFFICE O/P EST HI 40 MIN: CPT | Performed by: PSYCHIATRY & NEUROLOGY

## 2022-12-13 RX ORDER — MULTIPLE VITAMINS W/ MINERALS TAB 9MG-400MCG
1 TAB ORAL DAILY
COMMUNITY
End: 2023-05-30

## 2022-12-13 ASSESSMENT — PAIN SCALES - GENERAL: PAINLEVEL: NO PAIN (0)

## 2022-12-13 NOTE — PROGRESS NOTES
"Oncology Rooming Note    December 13, 2022 3:31 PM   Lang Collins Jr. is a 47 year old male who presents for:    Chief Complaint   Patient presents with     Oncology Clinic Visit     Oligodendroglioma, WHO grade II (H)     Initial Vitals: BP (!) 150/99 (BP Location: Left arm, Patient Position: Sitting, Cuff Size: Adult Large)   Pulse 75   Temp 98.4  F (36.9  C) (Oral)   Resp 20   SpO2 100%  Estimated body mass index is 20.5 kg/m  as calculated from the following:    Height as of 7/11/22: 1.803 m (5' 11\").    Weight as of 7/11/22: 66.7 kg (147 lb). There is no height or weight on file to calculate BSA.  No Pain (0) Comment: Data Unavailable   No LMP for male patient.  Allergies reviewed: Yes  Medications reviewed: Yes    Medications: Medication refills not needed today.  Pharmacy name entered into [employer]:    [facility] [place], [place] - [address] [facility] [place], [place] - [address]    Clinical concerns: no      Debra Lockett CMA              "

## 2022-12-13 NOTE — PROGRESS NOTES
"NEURO-ONCOLOGY VISIT  Dec 13, 2022    CHIEF COMPLAINT: Mr. 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     He is currently managed on imaging surveillance. Imaging in 12/2022 showed a subtle change concerning for the potential of cancer progression.     I met with Joss in follow-up today.     HISTORY OF PRESENT ILLNESS  -Joss is doing well today.   -Reports no issues with fatigue. He sleeps well at night and does not feel the need to rest during the day.  -No concerns with cognition. He is working long hours without difficulty keeping up.  -He is now off Keppra and denies any events concerning for a seizure.  -Denies headaches.  -Right-arm strength is almost back to baseline. No new weaknesses.  -No falls since last appointment. Denies balance difficulty.  -Denies any changes in vision.  -Off all steroids.  -Mood is unchanged, no current mood concerns.      MEDICATIONS   Current Outpatient Medications   Medication Sig Dispense Refill     multivitamin w/minerals (MULTI-VITAMIN) tablet Take 1 tablet by mouth daily       DRUG ALLERGIES No Known Allergies       IMMUNIZATIONS   Immunization History   Administered Date(s) Administered     COVID-19 Vaccine 12+ (Pfizer) 11/05/2021     Tdap (Adacel,Boostrix) 12/03/2021       ONCOLOGIC HISTORY  -8/8/2021 PRESENTATION: New onset seizure; generalized event. Started on Keppra.  -8/2021 MR brain imaging with a 2.1cm left frontal non-enhancing mass in the superior frontal gyrus in the expected region of the supplementary motor area.  -9/8/2021 MR brain imaging with no significant change in the nonenhancing T2 hyperintense mass.  -11/12/21 SURGERY: Left frontal-parietal craniotomy for mass resection.   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  Post-operative " "imaging with interval left frontal craniotomy for resection of abnormal lesion within the left parafalcine frontal lobe.  Hemiparesis, discharged to ARU.  -12/7/2021 NEURO-ONC: Recommending imaging surveillance given low risk factors and need to continue to focus on ongoing rehab. Referral to Dr. Chiang for optimizing rehab. Referral to [facility]/ epilepsy for seizure risk management in relation to future employment. Trial of flexeril 10mg at bedtime for headaches.   -3/8/2022 NEURO-ONC/ MRB: Clinically well; improved from prior. Restarting Keppra; second referral to [facility]. Imaging with post-surgical changes. Continue imaging surveillance.   -6/7/2022 NEURO-ONC/ MRB: Clinically well. Imaging with continued healing post-surgery. Continue imaging surveillance.   -12/6/2022 NEURO-ONC/ MRB: Clinically with no new/ progressive neurological symptoms. Imaging with a subtle increase in T2 FLAIR about the posterior aspect of the resection cavity. Continue imaging surveillance at a shortened interval of 3 months.       PHYSICAL EXAMINATION  BP (!) 150/99 (BP Location: Left arm, Patient Position: Sitting, Cuff Size: Adult Large)   Pulse 75   Temp 98.4  F (36.9  C) (Oral)   Resp 20   SpO2 100%    Wt Readings from Last 2 Encounters:   07/11/22 66.7 kg (147 lb)   06/07/22 66.2 kg (146 lb)      Ht Readings from Last 2 Encounters:   07/11/22 1.803 m (5' 11\")   03/21/22 1.803 m (5' 11\")     KPS: 100    -Generally well appearing.  -Respiratory: No acute respiratory distress.   -Hematologic/ lymphatic: No abnormal bruising. No leg swelling.  -Psychiatric: Normal mood and affect. Pleasant, talkative.  -Neurologic:   MENTAL STATUS:     Alert, oriented to date.    Recall: Intact.    Speech fluent.    Comprehension intact to multi-step commands.   Good right-left orientation.     CRANIAL NERVES:     Pupils are equal, round.    Extraocular movements full, denies diplopia.     Visual fields full.     Facial sensation intact to light " touch.   Symmetric facial movements.   Hearing intact.  MOTOR:    No pronation or drift.  SENSATION:    Intact to light touch throughout.   COORDINATION: Intact finger-nose with eyes closed bilaterally.    GAIT:  Walks without assistance.   Good speed.    Intact toe walk, heel walk, tandem gait.      MEDICAL RECORDS  Obtained and personally reviewed all available outside medical records in addition to reviewing any records available in our electronic system.   -Neurology notes.  -PM&R notes.    LABS  Personally reviewed all available lab results; none in the past 6 months.     IMAGING  Personally reviewed MR brain imaging from last week and compared to prior imaging. To my eye, there has been a increase in the size of a non-contrast enhancing nodular focus in the posterior aspect of the resection cavity (below). This could represent cancer progression.        Imaging was shown to and results were reviewed with Joss.       IMPRESSION  Clinic time for this high complexity encounter was spent discussing in detail the nature of his cancer in the setting of his repeat imaging. This was in addition to answering questions pertaining to my recommendations and devising the plan as outlined below.     Clinically, Joss doing excellent. He has seen further improvements in terms of right-sided hemiparesis. He continues to work with outpatient therapies as well as with Dr. Chiang. I have personally reviewed Dr. Chiang's clinic notes. In addition, his headaches have resolved and he feels his energy is good. His mood is more bright and he has not returned to smoking!    Joss is now off Keppra per Dr. Panda's direction in July and he has not had any recurrent seizure events in the interim. I have reviewed his clinic note.    Imaging as detailed above with a increase in the size of a non-contrast enhancing nodular focus in the posterior aspect of the resection cavity. I reviewed the imaging with Joss and told him that this could represent  cancer progression. I personally reviewed his imaging at Brain Tumor Conference. Additionally, prior to Joss's appointment today, I reviewed his case with Dr. Beltrán. A surgery in this region would be challenging as it is located within the motor strip. On HPI and examination today, Joss is showing no signs/ symptoms of worsening strength or coordination.     As a result, today I had a detailed conversation regarding all of the risks/ side effects and benefits of the options available to him at this juncture. As the radiographic change is minimal in size and he is clinically doing very well, repeat imaging in 3 months is a good option for him. Another option that we discussed was for neurosurgical intervention, however, this could be associated with worsening neurological function. Finally, there is an option to initiate chemoradiotherapy.     In the end, the resulting plan is to continue with imaging surveillance with a repeat MRI in three months. Joss is in agreement with this plan and in the meantime, he knows to call the clinic with any new concerns and he can be seen sooner if needed.     PROBLEM LIST  Oligodendroglioma  Hemiparesis, right  Tension headache  Seizure    PLAN  -CANCER-DIRECTED THERAPY-  -As above; Repeat imaging at a 3 month interval.    -STEROIDS-  -Currently off dexamethasone.    -SEIZURE MANAGEMENT-  -Off Keppra.   -Following with Dr. Panda.      -Quality of life/ MOOD/ HEADACHE/ FATIGUE-  -Denies any mood issues.  -Continue to monitor mood as untreated/ undertreated depression can worsen fatigue, dysorexia, and quality of life.    -HEMIPARESIS-  -Following with Dr. Chiang.     -HYPERTENSION-  -Blood pressure is evaluated today in clinic, but denies any chest pain, shortness of breath, vision changes, or headache.   -Primary care for management.    -TOBACCO DEPENDENCE-  -Encouraged continued abstinence from smoking.    Return to clinic with imaging in 3 months.     This patient was also seen and  examined by Coleen Castillo, MS4, under my guidance.     Elva Park MD  Neuro-oncology

## 2022-12-13 NOTE — LETTER
"    12/13/2022         RE: Lang Collins Jr.  [address]  [facility] [address]        Dear Colleague,    Thank you for referring your patient, Lang Collins Jr., to the [facility]. Please see a copy of my visit note below.    Oncology Rooming Note    December 13, 2022 3:31 PM   Lang Collins Jr. is a 47 year old male who presents for:    Chief Complaint   Patient presents with     Oncology Clinic Visit     Oligodendroglioma, WHO grade II (H)     Initial Vitals: BP (!) 150/99 (BP Location: Left arm, Patient Position: Sitting, Cuff Size: Adult Large)   Pulse 75   Temp 98.4  F (36.9  C) (Oral)   Resp 20   SpO2 100%  Estimated body mass index is 20.5 kg/m  as calculated from the following:    Height as of 7/11/22: 1.803 m (5' 11\").    Weight as of 7/11/22: 66.7 kg (147 lb). There is no height or weight on file to calculate BSA.  No Pain (0) Comment: Data Unavailable   No LMP for male patient.  Allergies reviewed: Yes  Medications reviewed: Yes    Medications: Medication refills not needed today.  Pharmacy name entered into [facility]:    [facility] [place], [place] - [address] [place] [address] [facility] - [place], [place] - [address]    Clinical concerns: no      Debra Lockett CMA                NEURO-ONCOLOGY VISIT  Dec 13, 2022    CHIEF COMPLAINT: Mr. 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     He is currently managed on imaging surveillance. Imaging in 12/2022 showed a subtle change concerning for the potential of cancer progression.     I met with Joss in follow-up today.     HISTORY OF PRESENT ILLNESS  -Joss is doing well today.   -Reports no issues with fatigue. He sleeps well at night and does not feel the need to rest during the day.  -No concerns with cognition. He " is working long hours without difficulty keeping up.  -He is now off Keppra and denies any events concerning for a seizure.  -Denies headaches.  -Right-arm strength is almost back to baseline. No new weaknesses.  -No falls since last appointment. Denies balance difficulty.  -Denies any changes in vision.  -Off all steroids.  -Mood is unchanged, no current mood concerns.      MEDICATIONS   Current Outpatient Medications   Medication Sig Dispense Refill     multivitamin w/minerals (MULTI-VITAMIN) tablet Take 1 tablet by mouth daily       DRUG ALLERGIES No Known Allergies       IMMUNIZATIONS   Immunization History   Administered Date(s) Administered     COVID-19 Vaccine 12+ (Pfizer) 11/05/2021     Tdap (Adacel,Boostrix) 12/03/2021       ONCOLOGIC HISTORY  -8/8/2021 PRESENTATION: New onset seizure; generalized event. Started on Keppra.  -8/2021 MR brain imaging with a 2.1cm left frontal non-enhancing mass in the superior frontal gyrus in the expected region of the supplementary motor area.  -9/8/2021 MR brain imaging with no significant change in the nonenhancing T2 hyperintense mass.  -11/12/21 SURGERY: Left frontal-parietal craniotomy for mass resection.   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  Post-operative imaging with interval left frontal craniotomy for resection of abnormal lesion within the left parafalcine frontal lobe.  Hemiparesis, discharged to ARU.  -12/7/2021 NEURO-ONC: Recommending imaging surveillance given low risk factors and need to continue to focus on ongoing rehab. Referral to Dr. Chiang for optimizing rehab. Referral to [facility]/ epilepsy for seizure risk management in relation to future employment. Trial of flexeril 10mg at bedtime for headaches.   -3/8/2022 NEURO-ONC/ MRB: Clinically well; improved from prior. Restarting Keppra; second referral to [facility]. Imaging with post-surgical changes. Continue imaging surveillance.  "  -6/7/2022 NEURO-ONC/ MRB: Clinically well. Imaging with continued healing post-surgery. Continue imaging surveillance.   -12/6/2022 NEURO-ONC/ MRB: Clinically with no new/ progressive neurological symptoms. Imaging with a subtle increase in T2 FLAIR about the posterior aspect of the resection cavity. Continue imaging surveillance at a shortened interval of 3 months.       PHYSICAL EXAMINATION  BP (!) 150/99 (BP Location: Left arm, Patient Position: Sitting, Cuff Size: Adult Large)   Pulse 75   Temp 98.4  F (36.9  C) (Oral)   Resp 20   SpO2 100%    Wt Readings from Last 2 Encounters:   07/11/22 66.7 kg (147 lb)   06/07/22 66.2 kg (146 lb)      Ht Readings from Last 2 Encounters:   07/11/22 1.803 m (5' 11\")   03/21/22 1.803 m (5' 11\")     KPS: 100    -Generally well appearing.  -Respiratory: No acute respiratory distress.   -Hematologic/ lymphatic: No abnormal bruising. No leg swelling.  -Psychiatric: Normal mood and affect. Pleasant, talkative.  -Neurologic:   MENTAL STATUS:     Alert, oriented to date.    Recall: Intact.    Speech fluent.    Comprehension intact to multi-step commands.   Good right-left orientation.     CRANIAL NERVES:     Pupils are equal, round.    Extraocular movements full, denies diplopia.     Visual fields full.     Facial sensation intact to light touch.   Symmetric facial movements.   Hearing intact.  MOTOR:    No pronation or drift.  SENSATION:    Intact to light touch throughout.   COORDINATION: Intact finger-nose with eyes closed bilaterally.    GAIT:  Walks without assistance.   Good speed.    Intact toe walk, heel walk, tandem gait.      MEDICAL RECORDS  Obtained and personally reviewed all available outside medical records in addition to reviewing any records available in our electronic system.   -Neurology notes.  -PM&R notes.    LABS  Personally reviewed all available lab results; none in the past 6 months.     IMAGING  Personally reviewed MR brain imaging from last week and " compared to prior imaging. To my eye, there has been a increase in the size of a non-contrast enhancing nodular focus in the posterior aspect of the resection cavity (below). This could represent cancer progression.        Imaging was shown to and results were reviewed with Joss.       IMPRESSION  Clinic time for this high complexity encounter was spent discussing in detail the nature of his cancer in the setting of his repeat imaging. This was in addition to answering questions pertaining to my recommendations and devising the plan as outlined below.     Clinically, Joss doing excellent. He has seen further improvements in terms of right-sided hemiparesis. He continues to work with outpatient therapies as well as with Dr. Chiang. I have personally reviewed Dr. Chiang's clinic notes. In addition, his headaches have resolved and he feels his energy is good. His mood is more bright and he has not returned to smoking!    Joss is now off Keppra per Dr. Panda's direction in July and he has not had any recurrent seizure events in the interim. I have reviewed his clinic note.    Imaging as detailed above with a increase in the size of a non-contrast enhancing nodular focus in the posterior aspect of the resection cavity. I reviewed the imaging with Joss and told him that this could represent cancer progression. I personally reviewed his imaging at [facility]. Additionally, prior to Joss's appointment today, I reviewed his case with Dr. Beltrán. A surgery in this region would be challenging as it is located within the motor strip. On HPI and examination today, Joss is showing no signs/ symptoms of worsening strength or coordination.     As a result, today I had a detailed conversation regarding all of the risks/ side effects and benefits of the options available to him at this juncture. As the radiographic change is minimal in size and he is clinically doing very well, repeat imaging in 3 months is a good option for him.  Another option that we discussed was for neurosurgical intervention, however, this could be associated with worsening neurological function. Finally, there is an option to initiate chemoradiotherapy.     In the end, the resulting plan is to continue with imaging surveillance with a repeat MRI in three months. Joss is in agreement with this plan and in the meantime, he knows to call the clinic with any new concerns and he can be seen sooner if needed.     PROBLEM LIST  Oligodendroglioma  Hemiparesis, right  Tension headache  Seizure    PLAN  -CANCER-DIRECTED THERAPY-  -As above; Repeat imaging at a 3 month interval.    -STEROIDS-  -Currently off dexamethasone.    -SEIZURE MANAGEMENT-  -Off Keppra.   -Following with Dr. Panda.      -Quality of life/ MOOD/ HEADACHE/ FATIGUE-  -Denies any mood issues.  -Continue to monitor mood as untreated/ undertreated depression can worsen fatigue, dysorexia, and quality of life.    -HEMIPARESIS-  -Following with Dr. Chiang.     -HYPERTENSION-  -Blood pressure is evaluated today in clinic, but denies any chest pain, shortness of breath, vision changes, or headache.   -Primary care for management.    -TOBACCO DEPENDENCE-  -Encouraged continued abstinence from smoking.    Return to clinic with imaging in 3 months.     This patient was also seen and examined by Coleen Castillo, MS4, under my guidance.     Elva Park MD  Neuro-oncology         Again, thank you for allowing me to participate in the care of your patient.        Sincerely,        Elva Park MD

## 2023-01-09 ENCOUNTER — OFFICE VISIT (OUTPATIENT)
Dept: NEUROLOGY | Facility: CLINIC | Age: 48
End: 2023-01-09
Payer: COMMERCIAL

## 2023-01-09 VITALS
DIASTOLIC BLOOD PRESSURE: 97 MMHG | BODY MASS INDEX: 21 KG/M2 | SYSTOLIC BLOOD PRESSURE: 166 MMHG | WEIGHT: 150 LBS | HEIGHT: 71 IN | TEMPERATURE: 97.7 F | HEART RATE: 61 BPM

## 2023-01-09 DIAGNOSIS — G40.89 OTHER SEIZURES (H): Primary | ICD-10-CM

## 2023-01-09 NOTE — LETTER
Date:January 10, 2023      Patient was self referred, no letter generated. Do not send.        [facility] Health Information
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
## 2023-02-11 ENCOUNTER — HEALTH MAINTENANCE LETTER (OUTPATIENT)
Age: 48
End: 2023-02-11

## 2023-02-27 ENCOUNTER — HOSPITAL ENCOUNTER (OUTPATIENT)
Dept: MRI IMAGING | Facility: CLINIC | Age: 48
Discharge: HOME | End: 2023-02-27
Attending: PSYCHIATRY & NEUROLOGY | Admitting: PSYCHIATRY & NEUROLOGY
Payer: COMMERCIAL

## 2023-02-27 DIAGNOSIS — C71.9 OLIGODENDROGLIOMA, WHO GRADE II (H): ICD-10-CM

## 2023-02-27 PROCEDURE — A9585 GADOBUTROL INJECTION: HCPCS | Performed by: PSYCHIATRY & NEUROLOGY

## 2023-02-27 PROCEDURE — 255N000002 HC RX 255 OP 636: Performed by: PSYCHIATRY & NEUROLOGY

## 2023-02-27 PROCEDURE — 70553 MRI BRAIN STEM W/O & W/DYE: CPT

## 2023-02-27 RX ORDER — GADOBUTROL 604.72 MG/ML
15 INJECTION INTRAVENOUS ONCE
Status: COMPLETED | OUTPATIENT
Start: 2023-02-27 | End: 2023-02-27

## 2023-02-27 RX ADMIN — GADOBUTROL 15 ML: 604.72 INJECTION INTRAVENOUS at 17:52

## 2023-02-28 ENCOUNTER — ONCOLOGY VISIT (OUTPATIENT)
Dept: ONCOLOGY | Facility: CLINIC | Age: 48
End: 2023-02-28
Attending: PSYCHIATRY & NEUROLOGY
Payer: COMMERCIAL

## 2023-02-28 VITALS
TEMPERATURE: 97.9 F | BODY MASS INDEX: 20.78 KG/M2 | DIASTOLIC BLOOD PRESSURE: 94 MMHG | WEIGHT: 149 LBS | HEART RATE: 66 BPM | SYSTOLIC BLOOD PRESSURE: 138 MMHG | OXYGEN SATURATION: 98 %

## 2023-02-28 DIAGNOSIS — C71.9 OLIGODENDROGLIOMA, WHO GRADE II (H): Primary | ICD-10-CM

## 2023-02-28 PROCEDURE — G0463 HOSPITAL OUTPT CLINIC VISIT: HCPCS | Performed by: PSYCHIATRY & NEUROLOGY

## 2023-02-28 PROCEDURE — 99215 OFFICE O/P EST HI 40 MIN: CPT | Performed by: PSYCHIATRY & NEUROLOGY

## 2023-02-28 ASSESSMENT — PAIN SCALES - GENERAL: PAINLEVEL: NO PAIN (0)

## 2023-02-28 NOTE — LETTER
"    2/28/2023         RE: Lang Collins Jr.  [address]  [facility] [address]        Dear Colleague,    Thank you for referring your patient, Lang Collins Jr., to the [facility] [place]. Please see a copy of my visit note below.    NEURO-ONCOLOGY VISIT  Feb 28, 2023    CHIEF COMPLAINT: Mr. 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     He is currently managed on imaging surveillance. Imaging in 12/2022 showed a subtle change concerning for the potential of cancer progression. Repeat imaging in 2/2023 was largely stable, but close interval imaging was recommended.     I met with Joss in follow-up today.     HISTORY OF PRESENT ILLNESS  -Joss had been experiencing headaches in the setting of hitting his head at work about 2 weeks ago. Now, he is no longer experiencing headaches.   -Reports no issues with fatigue. He sleeps well at night.  -No concerns with cognition. He is working long hours without difficulty keeping up.  -He is now off Keppra and denies any events concerning for a seizure.  -No weaknesses. No falls since last appointment. Denies balance difficulty.  -Denies any changes in vision.  -Off all steroids.  -Mood is overall positive.       MEDICATIONS   Current Outpatient Medications   Medication Sig Dispense Refill     multivitamin w/minerals (THERA-VIT-M) tablet Take 1 tablet by mouth daily       DRUG ALLERGIES No Known Allergies       IMMUNIZATIONS   Immunization History   Administered Date(s) Administered     COVID-19 Vaccine 12+ (Pfizer) 11/05/2021     Tdap (Adacel,Boostrix) 12/03/2021       ONCOLOGIC HISTORY  -8/8/2021 PRESENTATION: New onset seizure; generalized event. Started on Keppra.  -8/2021 MR brain imaging with a 2.1cm left frontal non-enhancing mass in the superior frontal gyrus in the expected " "region of the supplementary motor area.  -9/8/2021 MR brain imaging with no significant change in the nonenhancing T2 hyperintense mass.  -11/12/21 SURGERY: Left frontal-parietal craniotomy for mass resection.   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  Post-operative imaging with interval left frontal craniotomy for resection of abnormal lesion within the left parafalcine frontal lobe.  Hemiparesis, discharged to ARU.  -12/7/2021 NEURO-ONC: Recommending imaging surveillance given low risk factors and need to continue to focus on ongoing rehab. Referral to Dr. Chiang for optimizing rehab. Referral to [facility]/ epilepsy for seizure risk management in relation to future employment. Trial of flexeril 10mg at bedtime for headaches.   -3/8/2022 NEURO-ONC/ MRB: Clinically well; improved from prior. Restarting Keppra; second referral to [facility]. Imaging with post-surgical changes. Continue imaging surveillance.   -6/7/2022 NEURO-ONC/ MRB: Clinically well. Imaging with continued healing post-surgery. Continue imaging surveillance.   -12/6/2022 NEURO-ONC/ MRB: Clinically with no new/ progressive neurological symptoms. Imaging with a subtle increase in T2 FLAIR about the posterior aspect of the resection cavity. Continue imaging surveillance at a shortened interval of 3 months.   -2/28/2023 NEURO-ONC/ MRB: Clinically with no new/ progressive neurological symptoms. Imaging largely stable, but close interval imaging was recommended.       PHYSICAL EXAMINATION  BP (!) 138/94 (BP Location: Right arm, Patient Position: Sitting, Cuff Size: Adult Large)   Pulse 66   Temp 97.9  F (36.6  C) (Oral)   Wt 67.6 kg (149 lb)   SpO2 98%   BMI 20.78 kg/m     Wt Readings from Last 2 Encounters:   02/28/23 67.6 kg (149 lb)   01/09/23 68 kg (150 lb)      Ht Readings from Last 2 Encounters:   01/09/23 1.803 m (5' 11\")   07/11/22 1.803 m (5' 11\")     KPS: 100    -Generally well " appearing.  -Respiratory: No acute respiratory distress.   -Hematologic/ lymphatic: No abnormal bruising. No leg swelling.  -Psychiatric: Normal mood and affect. Pleasant, talkative.  -Neurologic:   MENTAL STATUS:     Alert, oriented to date.    Recall: Intact.    Speech fluent.    Comprehension intact to multi-step commands.   Good right-left orientation.     CRANIAL NERVES:     Pupils are equal, round.    Extraocular movements full, denies diplopia.     Visual fields full.     Facial sensation intact to light touch.   Symmetric facial movements.   Hearing intact.  MOTOR:    No pronation or drift.  SENSATION:    Intact to light touch throughout.   COORDINATION: Intact finger-nose with eyes closed bilaterally.    GAIT:  Walks without assistance.   Good speed.    Intact toe walk, heel walk, tandem gait.      MEDICAL RECORDS  Personally reviewed; Neurology notes.    LABS  Personally reviewed all available lab results; none in the past 6 months.     IMAGING  Personally reviewed MR brain imaging from yesterday and compared to prior imaging.     Postoperative changes for tumor resection from the posterior parasagittal left frontal region. Nonenhancing parenchymal T2 hyperintensity may reflect some combination of residual tumor and treatment related change. It is stable versus 12/2/2022 (below) and increased.           Imaging was shown to and results were reviewed with Joss.       IMPRESSION  Clinic time of 40 minutes reviewing data, evaluation, and coordinating care for this high complexity visit was spent discussing in detail the nature of his cancer in light of repeat imaging. This was in addition to answering questions pertaining to my recommendations and devising the plan as outlined below.      Clinically, Joss has recovered from a post-traumatic headahce. Otherwise, his right-sided hemiparesis is stable. He continues to work with outpatient therapies as well as with Dr. Chiang. I have personally reviewed Dr. Chiang's  clinic notes. In addition, his energy is good.    Joss is now off Keppra per Dr. Panda's direction in July and he has not had any recurrent seizure events in the interim. I have reviewed his clinic note from January.    Imaging as detailed above was largely stable. I personally reviewed his imaging at Brain Tumor Conference and all in attendance were in general agreement with this impression, but stressed the importance of close imaging follow-up. On HPI and examination today, Joss is showing no signs/ symptoms of worsening strength or coordination. As a result, the plan is to continue with imaging surveillance with a repeat MRI in three months. Joss is in agreement with this plan and in the meantime, he knows to call the clinic with any new concerns and he can be seen sooner if needed.     PROBLEM LIST  Oligodendroglioma  Hemiparesis, right  Tension headache  Seizure    PLAN  -CANCER-DIRECTED THERAPY-  -As above; Repeat imaging at a 3 month interval.    -STEROIDS-  -Currently off dexamethasone.    -SEIZURE MANAGEMENT-  -Off Keppra.   -Following with Dr. Panda.      -Quality of life/ MOOD/ HEADACHE/ FATIGUE-  -Denies any mood issues.  -Continue to monitor mood as untreated/ undertreated depression can worsen fatigue, dysorexia, and quality of life.    -HEMIPARESIS-  -Following with Dr. Chiang.     -HYPERTENSION-  -Primary care for management.    -TOBACCO DEPENDENCE-  -Encouraged continued abstinence from smoking.    Return to clinic with imaging in 3 months.     Elva Park MD  Neuro-oncology         Again, thank you for allowing me to participate in the care of your patient.        Sincerely,        Elva Park MD

## 2023-02-28 NOTE — PROGRESS NOTES
"NEURO-ONCOLOGY VISIT  Feb 28, 2023    CHIEF COMPLAINT: Mr. 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     He is currently managed on imaging surveillance. Imaging in 12/2022 showed a subtle change concerning for the potential of cancer progression. Repeat imaging in 2/2023 was largely stable, but close interval imaging was recommended.     I met with Joss in follow-up today.     HISTORY OF PRESENT ILLNESS  -Joss had been experiencing headaches in the setting of hitting his head at work about 2 weeks ago. Now, he is no longer experiencing headaches.   -Reports no issues with fatigue. He sleeps well at night.  -No concerns with cognition. He is working long hours without difficulty keeping up.  -He is now off Keppra and denies any events concerning for a seizure.  -No weaknesses. No falls since last appointment. Denies balance difficulty.  -Denies any changes in vision.  -Off all steroids.  -Mood is overall positive.       MEDICATIONS   Current Outpatient Medications   Medication Sig Dispense Refill     multivitamin w/minerals (THERA-VIT-M) tablet Take 1 tablet by mouth daily       DRUG ALLERGIES No Known Allergies       IMMUNIZATIONS   Immunization History   Administered Date(s) Administered     COVID-19 Vaccine 12+ (Pfizer) 11/05/2021     Tdap (Adacel,Boostrix) 12/03/2021       ONCOLOGIC HISTORY  -8/8/2021 PRESENTATION: New onset seizure; generalized event. Started on Keppra.  -8/2021 MR brain imaging with a 2.1cm left frontal non-enhancing mass in the superior frontal gyrus in the expected region of the supplementary motor area.  -9/8/2021 MR brain imaging with no significant change in the nonenhancing T2 hyperintense mass.  -11/12/21 SURGERY: Left frontal-parietal craniotomy for mass resection.   PATHOLOGY: WHO grade 2 oligodendroglioma; IDH1 mutated, ATRX " "retained, 1p19q co-deletional status pending.  Post-operative imaging with interval left frontal craniotomy for resection of abnormal lesion within the left parafalcine frontal lobe.  Hemiparesis, discharged to ARU.  -12/7/2021 NEURO-ONC: Recommending imaging surveillance given low risk factors and need to continue to focus on ongoing rehab. Referral to Dr. Chiang for optimizing rehab. Referral to [facility]/ epilepsy for seizure risk management in relation to future employment. Trial of flexeril 10mg at bedtime for headaches.   -3/8/2022 NEURO-ONC/ MRB: Clinically well; improved from prior. Restarting Keppra; second referral to [facility]. Imaging with post-surgical changes. Continue imaging surveillance.   -6/7/2022 NEURO-ONC/ MRB: Clinically well. Imaging with continued healing post-surgery. Continue imaging surveillance.   -12/6/2022 NEURO-ONC/ MRB: Clinically with no new/ progressive neurological symptoms. Imaging with a subtle increase in T2 FLAIR about the posterior aspect of the resection cavity. Continue imaging surveillance at a shortened interval of 3 months.   -2/28/2023 NEURO-ONC/ MRB: Clinically with no new/ progressive neurological symptoms. Imaging largely stable, but close interval imaging was recommended.       PHYSICAL EXAMINATION  BP (!) 138/94 (BP Location: Right arm, Patient Position: Sitting, Cuff Size: Adult Large)   Pulse 66   Temp 97.9  F (36.6  C) (Oral)   Wt 67.6 kg (149 lb)   SpO2 98%   BMI 20.78 kg/m     Wt Readings from Last 2 Encounters:   02/28/23 67.6 kg (149 lb)   01/09/23 68 kg (150 lb)      Ht Readings from Last 2 Encounters:   01/09/23 1.803 m (5' 11\")   07/11/22 1.803 m (5' 11\")     KPS: 100    -Generally well appearing.  -Respiratory: No acute respiratory distress.   -Hematologic/ lymphatic: No abnormal bruising. No leg swelling.  -Psychiatric: Normal mood and affect. Pleasant, talkative.  -Neurologic:   MENTAL STATUS:     Alert, oriented to date.    Recall: Intact.    Speech " fluent.    Comprehension intact to multi-step commands.   Good right-left orientation.     CRANIAL NERVES:     Pupils are equal, round.    Extraocular movements full, denies diplopia.     Visual fields full.     Facial sensation intact to light touch.   Symmetric facial movements.   Hearing intact.  MOTOR:    No pronation or drift.  SENSATION:    Intact to light touch throughout.   COORDINATION: Intact finger-nose with eyes closed bilaterally.    GAIT:  Walks without assistance.   Good speed.    Intact toe walk, heel walk, tandem gait.      MEDICAL RECORDS  Personally reviewed; Neurology notes.    LABS  Personally reviewed all available lab results; none in the past 6 months.     IMAGING  Personally reviewed MR brain imaging from yesterday and compared to prior imaging.     Postoperative changes for tumor resection from the posterior parasagittal left frontal region. Nonenhancing parenchymal T2 hyperintensity may reflect some combination of residual tumor and treatment related change. It is stable versus 12/2/2022 (below) and increased.           Imaging was shown to and results were reviewed with Joss.       IMPRESSION  Clinic time of 40 minutes reviewing data, evaluation, and coordinating care for this high complexity visit was spent discussing in detail the nature of his cancer in light of repeat imaging. This was in addition to answering questions pertaining to my recommendations and devising the plan as outlined below.      Clinically, Joss has recovered from a post-traumatic headahce. Otherwise, his right-sided hemiparesis is stable. He continues to work with outpatient therapies as well as with Dr. Chiang. I have personally reviewed Dr. Chiang's clinic notes. In addition, his energy is good.    Joss is now off Keppra per Dr. Panda's direction in July and he has not had any recurrent seizure events in the interim. I have reviewed his clinic note from January.    Imaging as detailed above was largely stable. I  personally reviewed his imaging at Brain Tumor Conference and all in attendance were in general agreement with this impression, but stressed the importance of close imaging follow-up. On HPI and examination today, Joss is showing no signs/ symptoms of worsening strength or coordination. As a result, the plan is to continue with imaging surveillance with a repeat MRI in three months. Joss is in agreement with this plan and in the meantime, he knows to call the clinic with any new concerns and he can be seen sooner if needed.     PROBLEM LIST  Oligodendroglioma  Hemiparesis, right  Tension headache  Seizure    PLAN  -CANCER-DIRECTED THERAPY-  -As above; Repeat imaging at a 3 month interval.    -STEROIDS-  -Currently off dexamethasone.    -SEIZURE MANAGEMENT-  -Off Keppra.   -Following with Dr. Panda.      -Quality of life/ MOOD/ HEADACHE/ FATIGUE-  -Denies any mood issues.  -Continue to monitor mood as untreated/ undertreated depression can worsen fatigue, dysorexia, and quality of life.    -HEMIPARESIS-  -Following with Dr. Chiang.     -HYPERTENSION-  -Primary care for management.    -TOBACCO DEPENDENCE-  -Encouraged continued abstinence from smoking.    Return to clinic with imaging in 3 months.     Elva Park MD  Neuro-oncology

## 2023-03-06 ENCOUNTER — TUMOR CONFERENCE (OUTPATIENT)
Dept: ONCOLOGY | Facility: CLINIC | Age: 48
End: 2023-03-06
Payer: COMMERCIAL

## 2023-03-09 NOTE — TUMOR CONFERENCE
Tumor Conference Information  Tumor Conference: Neuro-Onc  Specialties Present: Medical oncology, Radiation oncology, Pathology, Radiology, Surgery  Patient Status: Prospective  Stage: oligodendroglioma  Treatment to Date: Surgery  Clinical Trials: Not discussed  Genetic Testing Discussed/Recommended?: No  Supportive Care Services Discussed/Recommended?: No  Recommended Plan: Follows evidence-based guidelines (Comment: subtle changes seen; close interval follow up in 3 months, consider adjuvant treatment at that time if continued changes seen)  Did the review exceed 30 minutes?: did not           Documentation / Disclaimer Cancer Tumor Board Note  Cancer tumor board recommendations do not override what is determined to be reasonable care and treatment, which is dependent on the circumstances of a patient's case; the patient's medical, social, and personal concerns; and the clinical judgment of the oncologist [physician].

## 2023-04-24 ENCOUNTER — TELEPHONE (OUTPATIENT)
Dept: ONCOLOGY | Facility: CLINIC | Age: 48
End: 2023-04-24
Payer: COMMERCIAL

## 2023-04-24 DIAGNOSIS — G44.309 POST-TRAUMATIC HEADACHE, NOT INTRACTABLE, UNSPECIFIED CHRONICITY PATTERN: ICD-10-CM

## 2023-04-24 DIAGNOSIS — G93.6 BRAIN SWELLING (H): Primary | ICD-10-CM

## 2023-04-24 RX ORDER — DEXAMETHASONE 2 MG/1
TABLET ORAL
Qty: 12 TABLET | Refills: 0 | Status: SHIPPED | OUTPATIENT
Start: 2023-04-24 | End: 2023-05-30

## 2023-04-24 RX ORDER — DEXAMETHASONE 2 MG/1
TABLET ORAL
Qty: 12 TABLET | Refills: 0 | Status: SHIPPED | OUTPATIENT
Start: 2023-04-24 | End: 2023-04-24

## 2023-04-24 NOTE — CONFIDENTIAL NOTE
Writer called pt to inform him of recommendations from Dr. Park.    Elva Park MD  You; Cami Redding APRN CNP; Mylene Qureshi, RN 4 minutes ago (10:54 AM)     EN  Please have him try the following for migraine/ post-concussion headache;   -Tylenol 1 g x 1 plus ibuprofen 800mg x 1 plus Gatorade/ PowerAid/ etc 32oz over 15 minutes plus dexamethasone 6mg daily x 1, then continue with dexamethasone 4mg daily x 3, then 2mg daily x 3, then stop.   Rx sent to pharmacy.     Elva Park MD   Neuro-oncology   4/24/2023          Pt verbalized understanding. Pt inquiring if Dr. Park would be able to switch the pharmacy for the dexamethasone to the [facility] in [facility].    Will route to provider.

## 2023-04-24 NOTE — CONFIDENTIAL NOTE
"Pt calling in to report headaches that have been coming and going for the past 3 days. He states that the pain is rated an 8/10 when he is having a headache and is located on the top and front of his head. He states that he has been taking tylenol prn for his pain, but this has been minimally effective. The headaches will stop spontaneously for a few hours but then will return for a few more hours.    Pt denies hearing changes, and states that \"my vision sometimes is a little cloudy\". He also reports sensitivity to light when he is having a headache. Pt cannot recall any injuries that may have precipitated his headaches. Denies having any loss of consciousness or known seizure activity.    Denies fever, signs of bleeding, muscle weakness, or loss of sensation.    Will route to provider for recommendations.  "

## 2023-05-26 ENCOUNTER — HOSPITAL ENCOUNTER (OUTPATIENT)
Dept: MRI IMAGING | Facility: CLINIC | Age: 48
Discharge: HOME | End: 2023-05-26
Attending: PSYCHIATRY & NEUROLOGY | Admitting: PSYCHIATRY & NEUROLOGY
Payer: COMMERCIAL

## 2023-05-26 DIAGNOSIS — C71.9 OLIGODENDROGLIOMA, WHO GRADE II (H): ICD-10-CM

## 2023-05-26 PROCEDURE — 255N000002 HC RX 255 OP 636: Performed by: PSYCHIATRY & NEUROLOGY

## 2023-05-26 PROCEDURE — 70553 MRI BRAIN STEM W/O & W/DYE: CPT

## 2023-05-26 PROCEDURE — A9585 GADOBUTROL INJECTION: HCPCS | Performed by: PSYCHIATRY & NEUROLOGY

## 2023-05-26 RX ORDER — GADOBUTROL 604.72 MG/ML
15 INJECTION INTRAVENOUS ONCE
Status: COMPLETED | OUTPATIENT
Start: 2023-05-26 | End: 2023-05-26

## 2023-05-26 RX ADMIN — GADOBUTROL 15 ML: 604.72 INJECTION INTRAVENOUS at 17:31

## 2023-05-30 ENCOUNTER — ONCOLOGY VISIT (OUTPATIENT)
Dept: ONCOLOGY | Facility: CLINIC | Age: 48
End: 2023-05-30
Attending: PSYCHIATRY & NEUROLOGY
Payer: COMMERCIAL

## 2023-05-30 VITALS
RESPIRATION RATE: 18 BRPM | DIASTOLIC BLOOD PRESSURE: 104 MMHG | OXYGEN SATURATION: 100 % | SYSTOLIC BLOOD PRESSURE: 166 MMHG | HEART RATE: 76 BPM | BODY MASS INDEX: 20.08 KG/M2 | TEMPERATURE: 98.1 F | WEIGHT: 144 LBS

## 2023-05-30 DIAGNOSIS — R11.2 CHEMOTHERAPY-INDUCED NAUSEA AND VOMITING: ICD-10-CM

## 2023-05-30 DIAGNOSIS — D61.810 ANTINEOPLASTIC CHEMOTHERAPY INDUCED PANCYTOPENIA (H): ICD-10-CM

## 2023-05-30 DIAGNOSIS — C71.9 OLIGODENDROGLIOMA, WHO GRADE II (H): Primary | ICD-10-CM

## 2023-05-30 DIAGNOSIS — Z91.89 HIGH RISK FOR CHEMOTHERAPY-INDUCED INFECTIOUS COMPLICATION: ICD-10-CM

## 2023-05-30 DIAGNOSIS — T45.1X5A ANTINEOPLASTIC CHEMOTHERAPY INDUCED PANCYTOPENIA (H): ICD-10-CM

## 2023-05-30 DIAGNOSIS — R45.89 DEPRESSED MOOD: ICD-10-CM

## 2023-05-30 DIAGNOSIS — I10 BENIGN ESSENTIAL HYPERTENSION: ICD-10-CM

## 2023-05-30 DIAGNOSIS — Z11.59 SCREENING FOR VIRAL DISEASE: ICD-10-CM

## 2023-05-30 DIAGNOSIS — T45.1X5A CHEMOTHERAPY-INDUCED NAUSEA AND VOMITING: ICD-10-CM

## 2023-05-30 PROCEDURE — 99215 OFFICE O/P EST HI 40 MIN: CPT | Performed by: PSYCHIATRY & NEUROLOGY

## 2023-05-30 PROCEDURE — G0463 HOSPITAL OUTPT CLINIC VISIT: HCPCS | Performed by: PSYCHIATRY & NEUROLOGY

## 2023-05-30 ASSESSMENT — PAIN SCALES - GENERAL: PAINLEVEL: EXTREME PAIN (8)

## 2023-05-30 NOTE — PATIENT INSTRUCTIONS
[facility]  Neuro-Oncology  [facility], [address], [address], [place], [address]   Patient Name:  Lang Collins Jr.  :  25  To whom it may concern, Mr. Collins is under the care of neuro-oncologist Dr. Elva Park at the [facility]. Due to the nature of his underlying neurologic condition, Mr. Collins would NOT be able to complete a field sobriety test (including tandem gait). It is not a reliable indicator of sobriety. Please reach out to clinic with any questions or concerns. [phone]     Thank you,   Elva Park MD  Neuro-oncology

## 2023-05-30 NOTE — PROGRESS NOTES
"NEURO-ONCOLOGY VISIT  May 30, 2023    CHIEF COMPLAINT: Mr. 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     He is currently managed on imaging surveillance. However, imaging in 12/2022 showed a subtle change concerning for cancer progression. On close interval imaging, slow progression has been identified again today. As a result, the recommendation is to initiate chemoradiotherapy.     I met with Jsos in follow-up today.     HISTORY OF PRESENT ILLNESS  -Reports new worsening fatigue.  -Irritability more than normal for him.  -Having concentration issues; fired from 2 jobs due to mistakes.   -He is now off Keppra and denies any events concerning for a seizure.  -Headaches, wakes up with one early in the morning then remits throughout the day. Worsened with valsalva.   -No progression in right sided weakness. No falls since last appointment. Denies balance difficulty.  -Denies any changes in vision.  -Off all steroids.  -Mood is overall positive.   -Having some dizzy spells, briefly with changing positions then it improves.  -Blood pressure is evaluated today in clinic, but denies any chest pain, shortness of breath, vision changes, or headache. Open to a referral for primary care.   -Cramping pain in his RLE, improves with Gatorade.      MEDICATIONS   Current Outpatient Medications   Medication Sig Dispense Refill     sertraline (ZOLOFT) 50 MG tablet Take 1 tablet (50 mg) by mouth daily for 14 days, THEN 2 tablets (100 mg) daily for 14 days. Call for a refill. 42 tablet 0     DRUG ALLERGIES No Known Allergies       IMMUNIZATIONS   Immunization History   Administered Date(s) Administered     COVID-19 MONOVALENT 12+ (Pfizer) 11/05/2021     TDAP (Adacel,Boostrix) 12/03/2021       ONCOLOGIC HISTORY  -8/8/2021 PRESENTATION: New onset seizure; generalized event. " Started on Keppra.  -8/2021 MR brain imaging with a 2.1cm left frontal non-enhancing mass in the superior frontal gyrus in the expected region of the supplementary motor area.  -9/8/2021 MR brain imaging with no significant change in the nonenhancing T2 hyperintense mass.  -11/12/21 SURGERY: Left frontal-parietal craniotomy for mass resection.   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  Post-operative imaging with interval left frontal craniotomy for resection of abnormal lesion within the left parafalcine frontal lobe.  Hemiparesis, discharged to ARU.  -12/7/2021 NEURO-ONC: Recommending imaging surveillance given low risk factors and need to continue to focus on ongoing rehab. Referral to Dr. Chiang for optimizing rehab. Referral to [facility]/ epilepsy for seizure risk management in relation to future employment. Trial of flexeril 10mg at bedtime for headaches.   -3/8/2022 NEURO-ONC/ MRB: Clinically well; improved from prior. Restarting Keppra; second referral to [facility]. Imaging with post-surgical changes. Continue imaging surveillance.   -6/7/2022 NEURO-ONC/ MRB: Clinically well. Imaging with continued healing post-surgery. Continue imaging surveillance.   -12/6/2022 NEURO-ONC/ MRB: Clinically with no new/ progressive neurological symptoms. Imaging with a subtle increase in T2 FLAIR about the posterior aspect of the resection cavity. Continue imaging surveillance at a shortened interval of 3 months.   -2/28/2023 NEURO-ONC/ MRB: Clinically with no new/ progressive neurological symptoms. Imaging largely stable, but close interval imaging was recommended.   -5/30/2023 NEURO-ONC/ MRB: Worsened mood, concentration; starting Zoloft. Imaging with subtle progression noted when comparing to imaging 6 months ago; recommending chemoradiotherapy with referral to radiation oncology. Social work involvement. Primary care referral for management of hypertension.  "      PHYSICAL EXAMINATION  BP (!) 166/104 (BP Location: Right leg, Patient Position: Right side, Cuff Size: Adult Regular)   Pulse 76   Temp 98.1  F (36.7  C) (Oral)   Resp 18   Wt 65.3 kg (144 lb)   SpO2 100%   BMI 20.08 kg/m     Wt Readings from Last 2 Encounters:   05/30/23 65.3 kg (144 lb)   02/28/23 67.6 kg (149 lb)      Ht Readings from Last 2 Encounters:   01/09/23 1.803 m (5' 11\")   07/11/22 1.803 m (5' 11\")     KPS: 100    -Generally well appearing.  -Respiratory: No acute respiratory distress.   -Psychiatric: Normal mood and affect. Pleasant, talkative.  -Neurologic:   MENTAL STATUS:     Alert, oriented to date.    Recall: Intact.    Speech fluent.    Comprehension intact to multi-step commands.   Good right-left orientation.     CRANIAL NERVES:     Pupils are equal, round.    Extraocular movements full, denies diplopia.     Visual fields full.     Facial sensation intact to light touch.   Symmetric facial movements.   Hearing intact.  MOTOR:    No pronation or drift. Endorses subjective sense of decreased coordination and strength on right, very strong on my exam today.  SENSATION:    Intact to light touch throughout.   COORDINATION: Intact finger-nose with eyes closed.  HTS intact.  GAIT:  Walks without assistance.   Good speed.    Decreased distance from heel to floor on right on tip toe. Able to heel walk. Tandem with step-out.      MEDICAL RECORDS  Personally reviewed; Triage notes.    LABS  Personally reviewed all available lab results; none in the past 6 months.     IMAGING  Personally reviewed recent MR brain imaging and compared to prior imaging.     Postsurgical changes resection frontal parasagittal left frontal region. Nonenhancing signal abnormality within the left frontal lobe. No elevated cerebral blood volume on perfusion imaging. This is slightly increased over the prior to serial examinations 12/02/2022 and 02/27/2023 (below).          Imaging was shown to and results were reviewed " with Joss.       IMPRESSION  Clinic time of 50 minutes reviewing data, evaluation, and coordinating care for this high complexity visit was spent discussing in detail the nature of his cancer in light of repeat imaging. This was in addition to answering questions pertaining to my recommendations and devising the plan as outlined below.      Clinically, Joss has had a worsening in mood and concentration since last visit. He states that his personality has changed and he is much more irritable. He becomes more easily overwhelmed, anxious, and cognitively fatigued. As a result, he has unfortunately been fired from his last 2 jobs. These symptoms could be an element of depression. Therefore, today we discussed the risks/ side effects and benefits of starting Zoloft in the setting of low mood, low stamina/ fatigue, and poor motivation. In the setting of brain surgery and brain cancer, this can lead to an imbalance in neurotransmitters and thus, the combination of symptoms that Joss is experiencing. Starting a selective serotonin reuptake inhibitor, like Zoloft, can restore the chemical balance and improve mood and cognitive ability. This class of medications can have side effects such as weight gain, sexual dysfunction, insomnia, headache, and nausea, however, these side effects are not experienced by everyone. Joss is wanting to start Zoloft. I will have him follow-up with RAUL in about 3 weeks to reassess.     Joss is off Keppra per Dr. Panda's direction in July and he has not had any recurrent seizure events in the interim.    Imaging as detailed above with radiographic evidence of subtle progression over the past 6+ months. Prior to Joss's appointment today, I reviewed his case with Dr. Beltrán and it was his impression that surgery in this region would be challenging as it is located within the motor strip. Joss remains acutely aware that he has been diagnosed with an oligodendroglioma, which is a type of primary brain  cancer for which there is currently no cure. As a result, today I discussed with Joss the utility of cancer-directed treatment, which strives to slow further growth and increase the time interval to recurrence. Obviously, such a discussion would be overwhelming for most, as it was for Joss. Joss acknowledged such barriers to treatment as financial and employment instability. I have alerted RNCC and social work to reach out with assistance.     Today, I discussed with Joss that there are no formal standards for treatment, however, the combined use of radiation therapy and chemotherapy over radiation therapy alone is recommended. In terms of chemotherapy, there are two options; temozolomide or PCV (Procarbazine, CCNU, and Vincristine). Unfortunately, there is no definitive evidence or overall expert consensus to support one regimen over the other. Since temozolomide is generally well tolerated, has more manageable associated side effects, has proven efficacy in high grade gliomas, and has a less complicated, more convenient dosing regimen, I prescribe temozolomide over PCV. The risks/ benefits of temozolomide were reviewed and the following common, anticipated side effects of this treatment were discussed today including, but not limited to, fatigue, nausea, and constipation. Any AST/ ALT elevations are typically reversible. The combination therapy can result in bone marrow suppression; leukopenia and thrombocytopenia.    Joss lives in [place] and I placed a referral to radiation oncology at [place]. In addition, I have personally updated Rachel Irvin and Marianela on Joss's case.     While Joss is in agreement to start chemoradiotherapy, with no overt concerning findings on imaging, there is no urgency in starting. Instead, it is most important that Joss is in the most optimal social and mental position to undergo this therapy. Finally, with regard to fertility; we discussed that cancer treatments can cause  infertility. Joss endorses that he is not interested in having more children. He notes that he has 5 children, and a grandchild, and is relishing his role as a grandparent. Therefore, he was not interested in fertility preservation.    Of note, Joss's blood pressure was again evaluated today in clinic. He denied any chest pain, shortness of breath, vision changes, or headache. Joss is open to a referral to establish with a primary care provider for management of his hypertension.     PROBLEM LIST  Oligodendroglioma  Hemiparesis, right  Tension headache  Seizure  Fatigue  Hypertension  Depressed mood, anxiety, irritability    PLAN  -CANCER-DIRECTED THERAPY-  -No urgency in initiating chemoradiotherapy with temozolomide;    Referral to [facility].   Chemotherapy orders entered, needing PA.    Social work and RNCC aware to assist with any barriers to treatment.   -At his follow-up appointment in 3 weeks;    Additional temozolomide teaching will need to be performed by RN/ pharmacy staff.    Baseline labs entered and will be drawn.    Supportive medications; Zofran and Bactrim ordered and will need to be released to pharmacy.    -STEROIDS-  -Currently off dexamethasone.    -SEIZURE MANAGEMENT-  -Off Keppra.   -Following with Dr. Panda.      -Quality of life/ MOOD/ HEADACHE/ FATIGUE-  -Worsened mood. Will start Zoloft; 50mg daily for 2 weeks, then increase to 100mg daily. Can continue to increase as needed to max dose of 200mg.    -HEMIPARESIS-  -Following with Dr. Chiang.     -HYPERTENSION-  -Primary care referral for management.    -TOBACCO DEPENDENCE-  -Encouraged continued abstinence from smoking.    -CRAMPING-  -Encouraged continued hydration as well as electrolyte supplementation.  -Will monitor closely.    Return to clinic in ~3 weeks with TITA Redding CNP to confirm treatment plan and reassess mood.    Patient was also seen and evaluated by Dr. Jeana Galeano, neurology PGY4, under my direct  supervision.      Elva Park MD  Neuro-oncology

## 2023-05-30 NOTE — LETTER
"    5/30/2023         RE: Lang Collins Jr.  [address]  [place] [address]        Dear Colleague,    Thank you for referring your patient, Lang Collins Jr., to the [facility] [place]. Please see a copy of my visit note below.    NEURO-ONCOLOGY VISIT  May 30, 2023    CHIEF COMPLAINT: Mr. 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     He is currently managed on imaging surveillance. However, imaging in 12/2022 showed a subtle change concerning for cancer progression. On close interval imaging, slow progression has been identified again today. As a result, the recommendation is to initiate chemoradiotherapy.     I met with Joss in follow-up today.     HISTORY OF PRESENT ILLNESS  -Reports new worsening fatigue.  -Irritability more than normal for him.  -Having concentration issues; fired from 2 jobs due to mistakes.   -He is now off Keppra and denies any events concerning for a seizure.  -Headaches, wakes up with one early in the morning then remits throughout the day. Worsened with valsalva.   -No progression in right sided weakness. No falls since last appointment. Denies balance difficulty.  -Denies any changes in vision.  -Off all steroids.  -Mood is overall positive.   -Having some dizzy spells, briefly with changing positions then it improves.  -Blood pressure is evaluated today in clinic, but denies any chest pain, shortness of breath, vision changes, or headache. Open to a referral for primary care.   -Cramping pain in his RLE, improves with Gatorade.      MEDICATIONS   Current Outpatient Medications   Medication Sig Dispense Refill     sertraline (ZOLOFT) 50 MG tablet Take 1 tablet (50 mg) by mouth daily for 14 days, THEN 2 tablets (100 mg) daily for 14 days. Call for a refill. 42 tablet 0     DRUG ALLERGIES No " Known Allergies       IMMUNIZATIONS   Immunization History   Administered Date(s) Administered     COVID-19 MONOVALENT 12+ (Pfizer) 11/05/2021     TDAP (Adacel,Boostrix) 12/03/2021       ONCOLOGIC HISTORY  -8/8/2021 PRESENTATION: New onset seizure; generalized event. Started on Keppra.  -8/2021 MR brain imaging with a 2.1cm left frontal non-enhancing mass in the superior frontal gyrus in the expected region of the supplementary motor area.  -9/8/2021 MR brain imaging with no significant change in the nonenhancing T2 hyperintense mass.  -11/12/21 SURGERY: Left frontal-parietal craniotomy for mass resection.   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  Post-operative imaging with interval left frontal craniotomy for resection of abnormal lesion within the left parafalcine frontal lobe.  Hemiparesis, discharged to ARU.  -12/7/2021 NEURO-ONC: Recommending imaging surveillance given low risk factors and need to continue to focus on ongoing rehab. Referral to Dr. Chiang for optimizing rehab. Referral to [facility]/ epilepsy for seizure risk management in relation to future employment. Trial of flexeril 10mg at bedtime for headaches.   -3/8/2022 NEURO-ONC/ MRB: Clinically well; improved from prior. Restarting Keppra; second referral to [facility]. Imaging with post-surgical changes. Continue imaging surveillance.   -6/7/2022 NEURO-ONC/ MRB: Clinically well. Imaging with continued healing post-surgery. Continue imaging surveillance.   -12/6/2022 NEURO-ONC/ MRB: Clinically with no new/ progressive neurological symptoms. Imaging with a subtle increase in T2 FLAIR about the posterior aspect of the resection cavity. Continue imaging surveillance at a shortened interval of 3 months.   -2/28/2023 NEURO-ONC/ MRB: Clinically with no new/ progressive neurological symptoms. Imaging largely stable, but close interval imaging was recommended.   -5/30/2023 NEURO-ONC/ MRB: Worsened  "mood, concentration; starting Zoloft. Imaging with subtle progression noted when comparing to imaging 6 months ago; recommending chemoradiotherapy with referral to radiation oncology. Social work involvement. Primary care referral for management of hypertension.       PHYSICAL EXAMINATION  BP (!) 166/104 (BP Location: Right leg, Patient Position: Right side, Cuff Size: Adult Regular)   Pulse 76   Temp 98.1  F (36.7  C) (Oral)   Resp 18   Wt 65.3 kg (144 lb)   SpO2 100%   BMI 20.08 kg/m     Wt Readings from Last 2 Encounters:   05/30/23 65.3 kg (144 lb)   02/28/23 67.6 kg (149 lb)      Ht Readings from Last 2 Encounters:   01/09/23 1.803 m (5' 11\")   07/11/22 1.803 m (5' 11\")     KPS: 100    -Generally well appearing.  -Respiratory: No acute respiratory distress.   -Psychiatric: Normal mood and affect. Pleasant, talkative.  -Neurologic:   MENTAL STATUS:     Alert, oriented to date.    Recall: Intact.    Speech fluent.    Comprehension intact to multi-step commands.   Good right-left orientation.     CRANIAL NERVES:     Pupils are equal, round.    Extraocular movements full, denies diplopia.     Visual fields full.     Facial sensation intact to light touch.   Symmetric facial movements.   Hearing intact.  MOTOR:    No pronation or drift. Endorses subjective sense of decreased coordination and strength on right, very strong on my exam today.  SENSATION:    Intact to light touch throughout.   COORDINATION: Intact finger-nose with eyes closed.  HTS intact.  GAIT:  Walks without assistance.   Good speed.    Decreased distance from heel to floor on right on tip toe. Able to heel walk. Tandem with step-out.      MEDICAL RECORDS  Personally reviewed; Triage notes.    LABS  Personally reviewed all available lab results; none in the past 6 months.     IMAGING  Personally reviewed recent MR brain imaging and compared to prior imaging.     Postsurgical changes resection frontal parasagittal left frontal region. " Nonenhancing signal abnormality within the left frontal lobe. No elevated cerebral blood volume on perfusion imaging. This is slightly increased over the prior to serial examinations 12/02/2022 and 02/27/2023 (below).          Imaging was shown to and results were reviewed with Joss.       IMPRESSION  Clinic time of 50 minutes reviewing data, evaluation, and coordinating care for this high complexity visit was spent discussing in detail the nature of his cancer in light of repeat imaging. This was in addition to answering questions pertaining to my recommendations and devising the plan as outlined below.      Clinically, Joss has had a worsening in mood and concentration since last visit. He states that his personality has changed and he is much more irritable. He becomes more easily overwhelmed, anxious, and cognitively fatigued. As a result, he has unfortunately been fired from his last 2 jobs. These symptoms could be an element of depression. Therefore, today we discussed the risks/ side effects and benefits of starting Zoloft in the setting of low mood, low stamina/ fatigue, and poor motivation. In the setting of brain surgery and brain cancer, this can lead to an imbalance in neurotransmitters and thus, the combination of symptoms that Joss is experiencing. Starting a selective serotonin reuptake inhibitor, like Zoloft, can restore the chemical balance and improve mood and cognitive ability. This class of medications can have side effects such as weight gain, sexual dysfunction, insomnia, headache, and nausea, however, these side effects are not experienced by everyone. Joss is wanting to start Zoloft. I will have him follow-up with RAUL in about 3 weeks to reassess.     Joss is off Keppra per Dr. Panda's direction in July and he has not had any recurrent seizure events in the interim.    Imaging as detailed above with radiographic evidence of subtle progression over the past 6+ months. Prior to Joss's appointment  today, I reviewed his case with Dr. Beltrán and it was his impression that surgery in this region would be challenging as it is located within the motor strip. Joss remains acutely aware that he has been diagnosed with an oligodendroglioma, which is a type of primary brain cancer for which there is currently no cure. As a result, today I discussed with Joss the utility of cancer-directed treatment, which strives to slow further growth and increase the time interval to recurrence. Obviously, such a discussion would be overwhelming for most, as it was for Joss. Joss acknowledged such barriers to treatment as financial and employment instability. I have alerted RNCC and social work to reach out with assistance.     Today, I discussed with Joss that there are no formal standards for treatment, however, the combined use of radiation therapy and chemotherapy over radiation therapy alone is recommended. In terms of chemotherapy, there are two options; temozolomide or PCV (Procarbazine, CCNU, and Vincristine). Unfortunately, there is no definitive evidence or overall expert consensus to support one regimen over the other. Since temozolomide is generally well tolerated, has more manageable associated side effects, has proven efficacy in high grade gliomas, and has a less complicated, more convenient dosing regimen, I prescribe temozolomide over PCV. The risks/ benefits of temozolomide were reviewed and the following common, anticipated side effects of this treatment were discussed today including, but not limited to, fatigue, nausea, and constipation. Any AST/ ALT elevations are typically reversible. The combination therapy can result in bone marrow suppression; leukopenia and thrombocytopenia.    Joss lives in [place] and I placed a referral to radiation oncology at [place]. In addition, I have personally updated Rachel Irvin and Marianela on Joss's case.     While Joss is in agreement to start chemoradiotherapy, with no  overt concerning findings on imaging, there is no urgency in starting. Instead, it is most important that Joss is in the most optimal social and mental position to undergo this therapy. Finally, with regard to fertility; we discussed that cancer treatments can cause infertility. Joss endorses that he is not interested in having more children. He notes that he has 5 children, and a grandchild, and is relishing his role as a grandparent. Therefore, he was not interested in fertility preservation.    Of note, Joss's blood pressure was again evaluated today in clinic. He denied any chest pain, shortness of breath, vision changes, or headache. Joss is open to a referral to establish with a primary care provider for management of his hypertension.     PROBLEM LIST  Oligodendroglioma  Hemiparesis, right  Tension headache  Seizure  Fatigue  Hypertension  Depressed mood, anxiety, irritability    PLAN  -CANCER-DIRECTED THERAPY-  -No urgency in initiating chemoradiotherapy with temozolomide;    Referral to [facility].   Chemotherapy orders entered, needing PA.    Social work and RNCC aware to assist with any barriers to treatment.   -At his follow-up appointment in 3 weeks;    Additional temozolomide teaching will need to be performed by RN/ pharmacy staff.    Baseline labs entered and will be drawn.    Supportive medications; Zofran and Bactrim ordered and will need to be released to pharmacy.    -STEROIDS-  -Currently off dexamethasone.    -SEIZURE MANAGEMENT-  -Off Keppra.   -Following with Dr. Panda.      -Quality of life/ MOOD/ HEADACHE/ FATIGUE-  -Worsened mood. Will start Zoloft; 50mg daily for 2 weeks, then increase to 100mg daily. Can continue to increase as needed to max dose of 200mg.    -HEMIPARESIS-  -Following with Dr. Chiang.     -HYPERTENSION-  -Primary care referral for management.    -TOBACCO DEPENDENCE-  -Encouraged continued abstinence from smoking.    -CRAMPING-  -Encouraged continued  hydration as well as electrolyte supplementation.  -Will monitor closely.    Return to clinic in ~3 weeks with TITA Redding CNP to confirm treatment plan and reassess mood.    Patient was also seen and evaluated by Dr. Jeana Galeano, neurology PGY4, under my direct supervision.      Elva Park MD  Neuro-oncology         Again, thank you for allowing me to participate in the care of your patient.        Sincerely,        Elva Park MD

## 2023-05-31 ENCOUNTER — PATIENT OUTREACH (OUTPATIENT)
Dept: CARE COORDINATION | Facility: CLINIC | Age: 48
End: 2023-05-31
Payer: COMMERCIAL

## 2023-05-31 ENCOUNTER — PATIENT OUTREACH (OUTPATIENT)
Dept: ONCOLOGY | Facility: CLINIC | Age: 48
End: 2023-05-31
Payer: COMMERCIAL

## 2023-05-31 ENCOUNTER — TELEPHONE (OUTPATIENT)
Dept: PHARMACY | Facility: CLINIC | Age: 48
End: 2023-05-31
Payer: COMMERCIAL

## 2023-05-31 DIAGNOSIS — Z71.89 COUNSELING AND COORDINATION OF CARE: Primary | ICD-10-CM

## 2023-05-31 NOTE — PROGRESS NOTES
New Patient Oncology Nurse Navigator Note     Referring provider: Elva Park, [facility] [place]       Referred to (specialty): [facility] - Chlor[facility]: [phone]     Requested provider (if applicable): n/a     Date Referral Received: 5/30/2023     Evaluation for : WHO grade 2 oligodendroglioma (IDH1 mutated, ATRX retained, 1p19q co-deleted), diagnosed following resection on 11/12/2021     Clinical History (per Nurse review of records provided):    **BOOK MARKED**   NOTES:  5/30/2023:  Dr. Elva Park, medical oncology  IMAGING:  ~many~    PATHOLOGY:  11/12/2021:    FINAL INTEGRATED DIAGNOSIS:     Brain, left frontal mass, biopsy:   - OLIGODENDROGLIOMA, WHO GRADE II.                - IMMUNOPOSITIVE FOR MUTANT IDH-1(R132H) PROTEIN (IHC).                - CO-DELETION OF 1P/19Q (FISH), (SEE COMPLETE REPORT [id number]).  Addendum electronically signed by Jimbo Benton MD on 1/3/2022 at  2:01 PM  Final Diagnosis  A. Brain, left frontal mass, biopsy:  - Oligodendroglioma, IDH-mutant, WHO grade II. See comment.     B. Brain, left frontal mass, biopsy:  - Oligodendroglioma, IDH-mutant, WHO grade II. See comment.  Electronically signed by Jimbo Benton MD on 11/18/2021 at  4:10 PM        Comments:   This is an appended report. These results have been appended to a previously preliminary verified report         Clinical Assessment / Barriers to Care (Per Nurse): Lost job     Records Location (Care Everywhere, Media, etc.): [facility]     Records Needed: none    Additional testing needed prior to consult: none

## 2023-05-31 NOTE — PROGRESS NOTES
"Social Work Progress Note      Data/Intervention:  Patient Name:  Lang Collins Jr.  /Age:  1975 (48 year old)    Reason for Follow-Up:  Joss is a 48-year-old gentleman with a diagnosis of oligodendroglioma who is followed by Dr. Park at [facility]. Slow progression has been identified on recent scans, and Dr. Park has made recommendation to initiate chemoradiotherapy. SARAH received referral from Dr. Park with concerns about job flexibility to accommodate treatment, as well as anxiety and social support.    Intervention:   Local Support:   Joss reports that he relocated to [place] from [place], [place] to access medical care, and live closer to extended family. Joss reports that his mother, sister, aunts, and cousins live here. Joss reports that he is presently staying on cousin Bryanna Gayle' couch. Joss reports that he is interested in residing in his own home that he can afford, so that his children could come and visit him. Joss reports despite having multiple family members living locally, he reports \"they are all living their own lives.\" Joss endorses that he will be able to stay with cousin for the duration of treatment, but would also like to work on securing housing.     Children:  Joss reports that he is father to 5 children (ages: 26, 16, 15, 9 &8) and the grandfather of one 1 yo girl. Joss reports that his children all reside with their mothers, and that he pays child support for his 15yo child ($40/wk). Joss is working with Dr. Park around constructing a letter to provide exemption from payment for child support.     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 Joss reports that he has been unable to maintain his employment (or be hired on long term) due to cognitive deficits (concentration and memory issues). Joss reports at present time he has no income.     Joss asking informed questions about whether he should " pursue unemployment or social security disability. SAARH educated that unemployment is typically recommended when the goal is to be able to return to work, and SSDI when someone is anticipating being out of work for at least a calendar year. Joss anticipates that SSDI would be better fit, but is open to outreaching to [employer] to discuss further.     Resources Provided: via email  Open [facility] submitted today  [address] Support Resources  Disability Resources    Plan:  1) SW to collaborate with RNCC and oncologist about re-engaging with Dr. Chiang for concentration and memory support.   2) This clinician to outreach next week for next steps in care coordination and support. Joss knows to outreach prior in case of concern or need.     Please call or page if needs or concerns arise.     Summer Felix, SHAYY, LICSW, OSW-C  Clinical - Adult Oncology  She/Her/Hers  Phone: [phone]  [facility]: M, Thu  *every other Tue, 8am-4:30pm  [facility]: W, F, *every other Tue, 8am-4:30pm

## 2023-05-31 NOTE — PROGRESS NOTES
I called and left a detailed vm for Joss and he called back.  We went over my role, purpose of my call and what to expect at his appointment/consult.  I then forwarded him onto our NPS (new patient scheduling) team to finalize his schedule.

## 2023-06-01 NOTE — TELEPHONE ENCOUNTER
PA Initiation    Medication: TEMOZOLOMIDE 140 MG PO CAPS  Insurance Company: [employer] - Phone [phone] Fax [phone]  Pharmacy Filling the Rx:    Filling Pharmacy Phone:    Filling Pharmacy Fax:    Start Date: 5/31/2023    Patient will most likely need to fill this at [facility] pharmacy:

## 2023-06-01 NOTE — TELEPHONE ENCOUNTER
Prior Authorization Approval    Medication: TEMOZOLOMIDE 140 MG PO CAPS  Authorization Effective Date: 6/1/2023  Authorization Expiration Date: 5/31/2024  Approved Dose/Quantity: 30 for 30  Reference #:   Spoke with rep Perry  [employer]: [employer] - Phone [phone] Fax [phone]  Expected CoPay: ? not sure     CoPay Card Available: No    Financial Assistance Needed:   Which Pharmacy is filling the prescription: [place], [place] - [facility] [address]  Pharmacy Notified: Yes, pt must fill at [facility]  Patient Notified: Yes    *insurance mails the approval letter, they do not fax.

## 2023-06-06 ENCOUNTER — PATIENT OUTREACH (OUTPATIENT)
Dept: CARE COORDINATION | Facility: CLINIC | Age: 48
End: 2023-06-06
Payer: COMMERCIAL

## 2023-06-06 ENCOUNTER — ONCOLOGY VISIT (OUTPATIENT)
Dept: ONCOLOGY | Facility: CLINIC | Age: 48
End: 2023-06-06
Attending: STUDENT IN AN ORGANIZED HEALTH CARE EDUCATION/TRAINING PROGRAM
Payer: COMMERCIAL

## 2023-06-06 VITALS
WEIGHT: 143.2 LBS | HEART RATE: 67 BPM | HEIGHT: 71 IN | BODY MASS INDEX: 20.05 KG/M2 | DIASTOLIC BLOOD PRESSURE: 89 MMHG | OXYGEN SATURATION: 99 % | RESPIRATION RATE: 16 BRPM | SYSTOLIC BLOOD PRESSURE: 148 MMHG

## 2023-06-06 DIAGNOSIS — C71.9 OLIGODENDROGLIOMA, WHO GRADE II (H): Primary | ICD-10-CM

## 2023-06-06 DIAGNOSIS — R26.81 GAIT INSTABILITY: ICD-10-CM

## 2023-06-06 DIAGNOSIS — C71.9 OLIGODENDROGLIOMA OF BRAIN (H): Primary | ICD-10-CM

## 2023-06-06 DIAGNOSIS — Z98.890 S/P CRANIOTOMY: ICD-10-CM

## 2023-06-06 PROCEDURE — G0463 HOSPITAL OUTPT CLINIC VISIT: HCPCS | Performed by: STUDENT IN AN ORGANIZED HEALTH CARE EDUCATION/TRAINING PROGRAM

## 2023-06-06 PROCEDURE — 99215 OFFICE O/P EST HI 40 MIN: CPT | Mod: GC | Performed by: STUDENT IN AN ORGANIZED HEALTH CARE EDUCATION/TRAINING PROGRAM

## 2023-06-06 RX ORDER — TEMOZOLOMIDE 140 MG/1
75 CAPSULE ORAL AT BEDTIME
Qty: 42 CAPSULE | Refills: 0 | Status: SHIPPED | OUTPATIENT
Start: 2023-06-19 | End: 2023-10-13

## 2023-06-06 RX ORDER — SULFAMETHOXAZOLE/TRIMETHOPRIM 800-160 MG
1 TABLET ORAL
Qty: 18 TABLET | Refills: 0 | Status: SHIPPED | OUTPATIENT
Start: 2023-06-19 | End: 2023-07-21

## 2023-06-06 RX ORDER — ONDANSETRON 4 MG/1
4 TABLET, FILM COATED ORAL AT BEDTIME
Qty: 30 TABLET | Refills: 3 | Status: SHIPPED | OUTPATIENT
Start: 2023-06-18 | End: 2023-07-21

## 2023-06-06 ASSESSMENT — PAIN SCALES - GENERAL: PAINLEVEL: NO PAIN (0)

## 2023-06-06 NOTE — PROGRESS NOTES
[facility]   PM&R clinic note        Interval history:     Lang Collins Jr. presents to clinic today for follow up reg his rehab needs.   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    Was last seen in clinic on 6/7/2022. Recommendations at that time were:  1.           Work-up: no new workup  2.           Therapy/equipment/braces: Continue home exercise regimen as previously instructed by therapy.  3.           Follow up: prn    ONCOLOGIC HISTORY  -8/8/2021 PRESENTATION: New onset seizure; generalized event. Started on Keppra.  -8/2021 MR brain imaging with a 2.1cm left frontal non-enhancing mass in the superior frontal gyrus in the expected region of the supplementary motor area.  -9/8/2021 MR brain imaging with no significant change in the nonenhancing T2 hyperintense mass.  -11/12/21 SURGERY: Left frontal-parietal craniotomy for mass resection.   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  Post-operative imaging with interval left frontal craniotomy for resection of abnormal lesion within the left parafalcine frontal lobe.  Hemiparesis, discharged to ARU.  -12/7/2021 NEURO-ONC: Recommending imaging surveillance given low risk factors and need to continue to focus on ongoing rehab. Referral to Dr. Chiang for optimizing rehab. Referral to [facility]/ epilepsy for seizure risk management in relation to future employment. Trial of flexeril 10mg at bedtime for headaches.   -3/8/2022 NEURO-ONC/ MRB: Clinically well; improved from prior. Restarting Keppra; second referral to [facility]. Imaging with post-surgical changes. Continue imaging surveillance.   -6/7/2022 NEURO-ONC/ MRB: Clinically well. Imaging with continued healing post-surgery. Continue imaging surveillance.   -12/6/2022 NEURO-ONC/ MRB: Clinically with no new/ progressive neurological symptoms. Imaging with a subtle increase in T2 FLAIR about the  "posterior aspect of the resection cavity. Continue imaging surveillance at a shortened interval of 3 months.   -2/28/2023 NEURO-ONC/ MRB: Clinically with no new/ progressive neurological symptoms. Imaging largely stable, but close interval imaging was recommended.   -5/30/2023 NEURO-ONC/ MRB: Worsened mood, concentration; starting Zoloft. Imaging with subtle progression noted when comparing to imaging 6 months ago; recommending chemoradiotherapy with referral to radiation oncology. Social work involvement. Primary care referral for management of hypertension.       Medical issues since last visit:  MR brain 5/26/23 showing subtle progression of left frontal lobe non-enhancing lesion concerning for cancer progression. Dr. Park recommended initiating chemoradiotherapy with referral to radiation oncology. Also noted to have worsening mood and concentration, so was started on Zoloft. He did not tolerate Zoloft due to nausea, decreased appetite.     Symptoms:  Joss presents for a return visit today.    The below symptoms have largely been present since his resection, but have been getting significantly worse the last 2 months.     Anxiety: Has moments where he feels overwhelmed. No clear trigger. Has had to stop to pull over.  Only feels safe at home. Struggles to stay in 1 place for an extended period of time, always feels like he has to be on the go.     Memory: Is having difficulty remembering what he is supposed to do next, phone numbers, what to buy at the grocery store, calling people back, etc.     Emotional regulation: Feels he is more easily agitated, doesn't have patience anymore. Finds himself speaking out in anger/frustration more frequently.     Fatigue: Mentally drained by the end of the day. Feels he is walking through a fog at times.     Mood: \"so-so\" until he is triggered. Again, he notes he has no patience, lashes out easily.     Dizzy spells: Brought on when he gets to a cash register, room rocks, " "feels unsteady. Episodes last 10-15 seconds.     Headaches: Has had AM headaches for the last month, slowly getting better. Located over the right frontal area. Described as an ache. No vision changes. Last 20-45 minutes before resolving. No nausea/vomiting.     No weakness, numbness, tingling. No falls. No word finding difficulties      Therapies/HEP:   None recently, largely focused on PT after his resection.       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     Social history is unchanged.    Medications:  Current Outpatient Medications   Medication Sig Dispense Refill     sertraline (ZOLOFT) 50 MG tablet Take 1 tablet (50 mg) by mouth daily for 14 days, THEN 2 tablets (100 mg) daily for 14 days. Call for a refill. (Patient not taking: Reported on 6/6/2023) 42 tablet 0              Physical Exam:   BP (!) 148/89   Pulse 67   Resp 16   Ht 1.803 m (5' 11\")   Wt 65 kg (143 lb 3.2 oz)   SpO2 99%   BMI 19.97 kg/m    Gen: NAD, pleasant and cooperative   HEENT: Lids and lashes normal, pupils equal, round and reactive to light, extra ocular muscles intact, sclera clear, conjunctiva normal, normocepalic, without obvious abnormality  Cardio: regular pulse  Pulm: non-labored breathing in room air  Abd: benign  Ext: WWP, no edema in BLE, no tenderness in calves.  Neuro/MSK:   Cranial Nerves: CN II-XII intact  Strength: 5/5 strength in BUE and BLE  Sensation: Intact to light touch in BUE and BLE  Cognition:   - Short term recall: 2/3 words   - WORLD Backwards: No problems   - Serial 7s: [id number]    Labs/Imaging:  Lab Results   Component Value Date    WBC 6.0 11/17/2021    HGB 12.0 (L) 11/17/2021    HCT 36.0 (L) 11/17/2021    MCV 92 11/17/2021     11/17/2021     Lab Results   Component Value Date     11/17/2021    POTASSIUM 4.0 11/17/2021    CHLORIDE 106 11/17/2021    CO2 29 11/17/2021    GLC 97 11/17/2021     Lab " Results   Component Value Date    GFRESTIMATED >90 11/17/2021     Lab Results   Component Value Date    AST 22 08/13/2021    ALT 26 08/13/2021    ALKPHOS 69 08/13/2021    BILITOTAL 0.8 08/13/2021     No results found for: INR  Lab Results   Component Value Date    BUN 20 11/17/2021    CR 0.84 11/17/2021       MRI Brain, 5/26/23  IMPRESSION:  1.  Postsurgical changes resection frontal parasagittal left frontal region. Nonenhancing signal abnormality within the left frontal lobe which likely represents a combination of residual tumor and treatment related change. No elevated cerebral blood   volume on perfusion imaging. This is slightly increased over the prior to serial examinations 12/02/2022 and 02/27/2023.    2.  No evidence of acute intracranial hemorrhage, mass effect, or infarction.           Assessment/Plan   Lang Collins Jr. presents to clinic today for follow up reg his rehab needs.   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  Joss was recently evaluated by Dr. Park who recommending chemoradiation for new disease progression. Over the last 2 months, Joss has noted worsening cognitive and emotional symptoms. Reviewed treatment options, and Joss elected to proceed with referrals to psychiatry and psychology for worsening anxiety and emotional regulation in addition to cognitive rehab. Follow up with Dr. Chiang in 3 months to assess his progress.     1. Work-up:  1. None  2. Therapy/equipment/braces:  1. Referral to occupational rehab for cognitive rehab  3. Medications:  1. No changes today  4. Interventions:  1. No changes today  5. Referral / follow up with other providers:  1. Psychiatry referral for medication management of anxiety  2. Cancer psychology for emotional support  6. Follow up:  1. With Dr. Chiang in 3 months      Staffed with Dr. Aaliyah Chiang.    Jerome Stephen  PM&R, PGY-3    Physician Attestation   I, Aaliyah Chiang MD, saw this patient and agree with the findings and plan of  care as documented in the note.      Items personally reviewed/procedural attestation: vitals, labs and imaging and agree with the interpretation documented in the note.    Aaliyah Chiang MD     50 minutes spent on the date of the encounter doing chart review, history and exam, documentation and further activities as noted above.

## 2023-06-06 NOTE — PROGRESS NOTES
"Oncology Rooming Note    June 6, 2023 1:54 PM   Lang Collins Jr. is a 48 year old male who presents for:    Chief Complaint   Patient presents with     Oncology Clinic Visit     Oligodendroglioma, WHO grade II (H)     Initial Vitals: BP (!) 148/89   Pulse 67   Resp 16   Ht 1.803 m (5' 11\")   Wt 65 kg (143 lb 3.2 oz)   SpO2 99%   BMI 19.97 kg/m   Estimated body mass index is 19.97 kg/m  as calculated from the following:    Height as of this encounter: 1.803 m (5' 11\").    Weight as of this encounter: 65 kg (143 lb 3.2 oz). Body surface area is 1.8 meters squared.  No Pain (0) Comment: Data Unavailable   No LMP for male patient.  Allergies reviewed: Yes  Medications reviewed: Yes    Medications: Medication refills not needed today.  Pharmacy name entered into [employer]:    [id number] - [place], [place] - [address] AT [facility] [address]  [id number] - [place] - [address] [facility] [address] [facility]    Clinical concerns: None       Peggy Delgado MA            "

## 2023-06-06 NOTE — PATIENT INSTRUCTIONS
It was a pleasure seeing you today. We discussed many of your  cancer related  symptoms today, including your memory, anxiety, emotional regulation, fatigue, mood, dizzy spells, and headaches. Per our  discussion, the plan moving  forward is:    1) Occupational Therapy for cognitive rehab  2) Cancer psychology for emotional  support  3) Psychiatry referral  for anxiety/mood medication management    You should follow up with Dr. Chiang in 3 months to assess your progress.

## 2023-06-06 NOTE — PROGRESS NOTES
Social Work Progress Note      Data/Intervention:  Patient Name:  Lang Collins Jr.  /Age:  1975 (48 year old)    Reason for Follow-Up:  Joss is a 48-year-old gentleman with a diagnosis of oligodendroglioma who is followed by Dr. Park at [facility]. Slow progression has been identified on recent scans, and Dr. Park has made recommendation to initiate chemoradiotherapy. SARAH received referral from Dr. Андрей thomas.     Intervention:   SARAH met in-person with Joss today, providing social work contact information and availability. SW assisted Joss in locating email from this clinician with resources discussed.     Joss denied interest in OAM at present time, and reported that he would review and outreach if he needed assistance completing other cale applications.     Plan:  1) SW to outreach to RD to request her connection with Joss about new diet  2) Onc SW will continue to be available as needed for ongoing psychosocial support. Joss knows to outreach in case of concern or need.     Please call or page if needs or concerns arise.     Summer Felix, MSW, LICSW, OSW-C  Clinical - Adult Oncology  She/Her/Hers  Phone: [phone]  [facility]: M, Thu  *every other Tue, 8am-4:30pm  [facility]: W, F, *every other Tue, 8am-4:30pm

## 2023-06-06 NOTE — LETTER
6/6/2023         RE: Lang Collins Jr.  [address]  [facility] [address]        Dear Colleague,    Thank you for referring your patient, Lang Collins Jr., to the [facility]. Please see a copy of my visit note below.    [facility]   PM&R clinic note        Interval history:     Lang Collins Jr. presents to clinic today for follow up reg his rehab needs.   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    Was last seen in clinic on 6/7/2022. Recommendations at that time were:  1.           Work-up: no new workup  2.           Therapy/equipment/braces: Continue home exercise regimen as previously instructed by therapy.  3.           Follow up: prn    ONCOLOGIC HISTORY  -8/8/2021 PRESENTATION: New onset seizure; generalized event. Started on Keppra.  -8/2021 MR brain imaging with a 2.1cm left frontal non-enhancing mass in the superior frontal gyrus in the expected region of the supplementary motor area.  -9/8/2021 MR brain imaging with no significant change in the nonenhancing T2 hyperintense mass.  -11/12/21 SURGERY: Left frontal-parietal craniotomy for mass resection.   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  Post-operative imaging with interval left frontal craniotomy for resection of abnormal lesion within the left parafalcine frontal lobe.  Hemiparesis, discharged to ARU.  -12/7/2021 NEURO-ONC: Recommending imaging surveillance given low risk factors and need to continue to focus on ongoing rehab. Referral to Dr. Chiang for optimizing rehab. Referral to [facility]/ epilepsy for seizure risk management in relation to future employment. Trial of flexeril 10mg at bedtime for headaches.   -3/8/2022 NEURO-ONC/ MRB: Clinically well; improved from prior. Restarting Keppra; second referral to [facility]. Imaging with post-surgical changes. Continue imaging surveillance.    -6/7/2022 NEURO-ONC/ MRB: Clinically well. Imaging with continued healing post-surgery. Continue imaging surveillance.   -12/6/2022 NEURO-ONC/ MRB: Clinically with no new/ progressive neurological symptoms. Imaging with a subtle increase in T2 FLAIR about the posterior aspect of the resection cavity. Continue imaging surveillance at a shortened interval of 3 months.   -2/28/2023 NEURO-ONC/ MRB: Clinically with no new/ progressive neurological symptoms. Imaging largely stable, but close interval imaging was recommended.   -5/30/2023 NEURO-ONC/ MRB: Worsened mood, concentration; starting Zoloft. Imaging with subtle progression noted when comparing to imaging 6 months ago; recommending chemoradiotherapy with referral to radiation oncology. Social work involvement. Primary care referral for management of hypertension.       Medical issues since last visit:  MR brain 5/26/23 showing subtle progression of left frontal lobe non-enhancing lesion concerning for cancer progression. Dr. Park recommended initiating chemoradiotherapy with referral to radiation oncology. Also noted to have worsening mood and concentration, so was started on Zoloft. He did not tolerate Zoloft due to nausea, decreased appetite.     Symptoms:  Joss presents for a return visit today.    The below symptoms have largely been present since his resection, but have been getting significantly worse the last 2 months.     Anxiety: Has moments where he feels overwhelmed. No clear trigger. Has had to stop to pull over.  Only feels safe at home. Struggles to stay in 1 place for an extended period of time, always feels like he has to be on the go.     Memory: Is having difficulty remembering what he is supposed to do next, phone numbers, what to buy at the grocery store, calling people back, etc.     Emotional regulation: Feels he is more easily agitated, doesn't have patience anymore. Finds himself speaking out in anger/frustration more frequently.  "    Fatigue: Mentally drained by the end of the day. Feels he is walking through a fog at times.     Mood: \"so-so\" until he is triggered. Again, he notes he has no patience, lashes out easily.     Dizzy spells: Brought on when he gets to a cash register, room rocks, feels unsteady. Episodes last 10-15 seconds.     Headaches: Has had AM headaches for the last month, slowly getting better. Located over the right frontal area. Described as an ache. No vision changes. Last 20-45 minutes before resolving. No nausea/vomiting.     No weakness, numbness, tingling. No falls. No word finding difficulties      Therapies/HEP:   None recently, largely focused on PT after his resection.       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     Social history is unchanged.    Medications:  Current Outpatient Medications   Medication Sig Dispense Refill     sertraline (ZOLOFT) 50 MG tablet Take 1 tablet (50 mg) by mouth daily for 14 days, THEN 2 tablets (100 mg) daily for 14 days. Call for a refill. (Patient not taking: Reported on 6/6/2023) 42 tablet 0              Physical Exam:   BP (!) 148/89   Pulse 67   Resp 16   Ht 1.803 m (5' 11\")   Wt 65 kg (143 lb 3.2 oz)   SpO2 99%   BMI 19.97 kg/m    Gen: NAD, pleasant and cooperative   HEENT: Lids and lashes normal, pupils equal, round and reactive to light, extra ocular muscles intact, sclera clear, conjunctiva normal, normocepalic, without obvious abnormality  Cardio: regular pulse  Pulm: non-labored breathing in room air  Abd: benign  Ext: WWP, no edema in BLE, no tenderness in calves.  Neuro/MSK:   Cranial Nerves: CN II-XII intact  Strength: 5/5 strength in BUE and BLE  Sensation: Intact to light touch in BUE and BLE  Cognition:   - Short term recall: 2/3 words   - WORLD Backwards: No problems   - Serial 7s: [id number]    Labs/Imaging:  Lab Results   Component Value Date    WBC 6.0 " 11/17/2021    HGB 12.0 (L) 11/17/2021    HCT 36.0 (L) 11/17/2021    MCV 92 11/17/2021     11/17/2021     Lab Results   Component Value Date     11/17/2021    POTASSIUM 4.0 11/17/2021    CHLORIDE 106 11/17/2021    CO2 29 11/17/2021    GLC 97 11/17/2021     Lab Results   Component Value Date    GFRESTIMATED >90 11/17/2021     Lab Results   Component Value Date    AST 22 08/13/2021    ALT 26 08/13/2021    ALKPHOS 69 08/13/2021    BILITOTAL 0.8 08/13/2021     No results found for: INR  Lab Results   Component Value Date    BUN 20 11/17/2021    CR 0.84 11/17/2021       MRI Brain, 5/26/23  IMPRESSION:  1.  Postsurgical changes resection frontal parasagittal left frontal region. Nonenhancing signal abnormality within the left frontal lobe which likely represents a combination of residual tumor and treatment related change. No elevated cerebral blood   volume on perfusion imaging. This is slightly increased over the prior to serial examinations 12/02/2022 and 02/27/2023.    2.  No evidence of acute intracranial hemorrhage, mass effect, or infarction.           Assessment/Plan   Lang Collins Jr. presents to clinic today for follow up reg his rehab needs.   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  Joss was recently evaluated by Dr. Park who recommending chemoradiation for new disease progression. Over the last 2 months, Joss has noted worsening cognitive and emotional symptoms. Reviewed treatment options, and Joss elected to proceed with referrals to psychiatry and psychology for worsening anxiety and emotional regulation in addition to cognitive rehab. Follow up with Dr. Chiang in 3 months to assess his progress.     1. Work-up:  1. None  2. Therapy/equipment/braces:  1. Referral to occupational rehab for cognitive rehab  3. Medications:  1. No changes today  4. Interventions:  1. No changes today  5. Referral / follow up with other providers:  1. Psychiatry referral for medication management of  "anxiety  2. Cancer psychology for emotional support  6. Follow up:  1. With Dr. Chiang in 3 months      Staffed with Dr. Aaliyah Chiang.    Jerome Stephen  PM&R, PGY-3    Physician Attestation   I, Aaliyah Chiang MD, saw this patient and agree with the findings and plan of care as documented in the note.      Items personally reviewed/procedural attestation: vitals, labs and imaging and agree with the interpretation documented in the note.    Aaliyah Chiang MD     50 minutes spent on the date of the encounter doing chart review, history and exam, documentation and further activities as noted above.            Oncology Rooming Note    June 6, 2023 1:54 PM   Lang Collins Jr. is a 48 year old male who presents for:    Chief Complaint   Patient presents with     Oncology Clinic Visit     Oligodendroglioma, WHO grade II (H)     Initial Vitals: BP (!) 148/89   Pulse 67   Resp 16   Ht 1.803 m (5' 11\")   Wt 65 kg (143 lb 3.2 oz)   SpO2 99%   BMI 19.97 kg/m   Estimated body mass index is 19.97 kg/m  as calculated from the following:    Height as of this encounter: 1.803 m (5' 11\").    Weight as of this encounter: 65 kg (143 lb 3.2 oz). Body surface area is 1.8 meters squared.  No Pain (0) Comment: Data Unavailable   No LMP for male patient.  Allergies reviewed: Yes  Medications reviewed: Yes    Medications: Medication refills not needed today.  Pharmacy name entered into [employer]:    [id number] - [place], [place] - [address] AT [facility] [address]  [id number] - [place], [place] - [address] AT [facility]    Clinical concerns: None       Peggy Delgado MA                Again, thank you for allowing me to participate in the care of your patient.        Sincerely,        Aaliyah Chiang MD    "

## 2023-06-12 ENCOUNTER — PATIENT OUTREACH (OUTPATIENT)
Dept: ONCOLOGY | Facility: CLINIC | Age: 48
End: 2023-06-12
Payer: COMMERCIAL

## 2023-06-12 NOTE — PROGRESS NOTES
Dear Colleagues,  Today Lang Collins Jr. was seen in consultation.     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     HISTORY OF PRESENT ILLNESS:  Lang Collins Jr. is a 48 year old right-handed current smoker (0.5-1ppd x 20 years) male with previously diagnosed left frontal oligodendroglioma.  His relevant medical history begins in August 2021 when he presented with new onset seizures.  He was started on Keppra and imaging revealed a 2.1 cm left frontal nonenhancing mass within the superior frontal gyrus. On 9/8/21 brain MRI once again showed the mass that was stable. On November 12, 2021, Dr. Beltrán took him to the OR for a left frontoparietal craniotomy for mass resection revealing WHO grade 2 oligodendroglioma IDH 1 mutated, ATRX retained, 1p19q codeleted.  Postoperative imaging revealed gross total resection.  Postoperatively he also had right-sided hemiparesis and was discharged to rehab.  He was put on seizure prophylactics and was followed by Dr. Park for surveillance imaging.    Most recently on May 26, 2023 brain MRI showed nonenhancing signal abnormality within the left frontal lobe likely represents a combination of residual tumor and treatment related change.  This was read as slightly increased over the prior to his serial examinations in December 2022 in February 2023.  There is no evidence of acute intracranial hemorrhage, mass effect or infarction.  From a surgical standpoint repeat surgical resection would be difficult given the location of the lesion being near the motor strip.      Pt is otherwise doing well recovering from surgery with only minor headaches.  He is not taking Keppra or Decadron. He was previously taking both.  Currently going to PT for right hemiparesis and also seeing therapist for depression.  He feels that his motor dysfunction is back to its baseline.  He is being seen here today for consideration of partial brain radiotherapy.     REVIEW OF SYSTEMS: As per HPI, a 14-point review of system is otherwise negative.  PAST RADIATION THERAPY:  Denies.  PAST CTD/PACEMAKER: Denies     Past Medical History:   Diagnosis Date     Hypertension      Oligodendroglioma, WHO grade II (H) 12/7/2021     Primary hypertension 11/8/2021     S/P craniotomy 11/16/2021       Past Surgical History:   Procedure Laterality Date     OPTICAL TRACKING SYSTEM CRANIOTOMY, EXCISE TUMOR WITH MAPPING, COMBINED Left 11/12/2021    Procedure: Left frontal craniotomy for tumor resection with asleep motor mapping;  Surgeon: Kuldeep Beltrán MD;  Location: [facility] OR     ORTHOPEDIC SURGERY         Family History   Problem Relation Age of Onset     Lung Cancer Paternal Grandfather         mets to brain     Breast Cancer Maternal Aunt        Social History     Tobacco Use     Smoking status: Every Day     Packs/day: 1.00     Types: Cigarettes     Smokeless tobacco: Never     Tobacco comments:     Patient try to quit smoking, down to 0.5 ppd.- updated 6/13/23   Vaping Use     Vaping status: Not on file   Substance Use Topics     Alcohol use: Yes     Comment: socially     Current Outpatient Medications   Medication     ondansetron (ZOFRAN) 4 MG tablet     [START ON 6/26/2023] SENNA-docusate sodium (SENNA S) 8.6-50 MG tablet     sertraline (ZOLOFT) 50 MG tablet     sulfamethoxazole-trimethoprim (BACTRIM DS) 800-160 MG tablet     temozolomide (TEMODAR) 140 MG capsule     No current facility-administered medications for this visit.      No Known Allergies    PHYSICAL EXAMINATION:  BP (!) 173/106   Pulse 53   Temp 98.2  F (36.8  C) (Oral)   Resp 16   Wt 64.6 kg (142 lb 8 oz)   SpO2 98%   BMI 19.87 kg/m    GENERAL      Well-appearing gentleman in no acute  distress.  HEENT            Normocephalic. Left sided surgical scar well healed.  Sclerae anicteric.  CVR                  Regular rate and rhythm.  No murmurs, rubs, or gallops.  LUNGS            Clear to auscultation bilaterally.  EXT                  No CCE.    NEURO           No focal deficits. Gait wnl. Strength is symmetrical in all four extremities. Sensation intact in all areas tested. No dysdiadochokinesias. Normal finger to nose test.  MSK                 Good ROM.   SKIN                 Warm and well perfused.    PSYCH            Alert and oriented x 3     ECOG PERFORMANCE STATUS: 0      IMAGING: I personally reviewed the relevant imaging for this case. On my review of the last 3 brain MRI the area in question appears stable.     IMPRESSION/PLAN:  Lang Collins Jr. 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    Today we reviewed the indications, logistics, alternatives and potential side effects associated with partial brain radiotherapy including fatigue, headaches, exacerbation of existing neurologic deficits, alopecia, nausea, and risk of radiation necrosis. In regard to his headaches, they are not significantly bothersome at this time.  If the headaches increase in severity I'd recommend starting otc tylenol first and then re-starting steroids at 2mg QAM with consideration of dose increase should he develop worsening symptoms.     Patient consented to partial brain irradiation. Will have case discussed at [facility] again.     Additional problem list to be addressed in the following manner:     1. Systemic Treatment: per Dr. Park. Pt will also be started on Bactrim.     There was ample time for questions and all were answered to the patient's satisfaction.  Thank you for allowing me to participate in the care of this pleasant patient. If you have any questions, please do not hesitate to contact my office.     Sincerely,  Yg Bear MD     [phone] Pager   [phone] [facility]   [phone] [facility] Patterson  [phone] [place]

## 2023-06-12 NOTE — TELEPHONE ENCOUNTER
MEDICAL RECORDS REQUEST   Radiation Oncology  [address] [place] [address]  Fax: [phone]          FUTURE VISIT INFORMATION                                                   Lang Collins Jr., : 1975 scheduled for future visit at [facility] Radiation Oncology    RECORDS REQUESTED FOR VISIT                                                     BRAIN STATUS DETAILS   OFFICE NOTE from PCP -[facility]: Dr. Denise Thomas   OFFICE NOTE from Neuro Onc [facility] 23: Dr. Elva Park   OFFICE NOTE from neuro/neuro surg Epic 23: Dr. Hayden Panda   ED/HOSP ADMISSION NOTES [facility] 21: [facility]  21: [facility]    21: [facility] ED   OPERATIVE/BIOPSY REPORTS Epic 21: Left frontal craniotomy for tumor resection with asleep motor mapping   EEG Epic 22   MEDICATION LIST [facility]    LABS     PATHOLOGY REPORTS Report in EPIC 21: [id number]   ANYTHING RELATED TO DIAGNOSIS Epic Most recent 21   IMAGING (NEED IMAGES & REPORT)     CT SCANS PACS 21: CT Head   MRI PACS 23-21: MR Brain

## 2023-06-12 NOTE — PROGRESS NOTES
[facility]: Cancer Care                                                                                          Follow up on new medication: Zoloft   Was mentioned in Dr. Chiang's recent note that patient Zoloft caused nausea and decreased appetite   Rec: Psychology, Psychiatry, OT cognition   Patient all set up for appointments     Signature:  Mylene Qureshi RN

## 2023-06-13 ENCOUNTER — PRE VISIT (OUTPATIENT)
Dept: RADIATION ONCOLOGY | Facility: CLINIC | Age: 48
End: 2023-06-13

## 2023-06-13 ENCOUNTER — OFFICE VISIT (OUTPATIENT)
Dept: RADIATION ONCOLOGY | Facility: CLINIC | Age: 48
End: 2023-06-13
Attending: PSYCHIATRY & NEUROLOGY
Payer: COMMERCIAL

## 2023-06-13 ENCOUNTER — APPOINTMENT (OUTPATIENT)
Dept: RADIATION ONCOLOGY | Facility: CLINIC | Age: 48
End: 2023-06-13
Payer: COMMERCIAL

## 2023-06-13 VITALS
BODY MASS INDEX: 19.87 KG/M2 | OXYGEN SATURATION: 98 % | RESPIRATION RATE: 16 BRPM | DIASTOLIC BLOOD PRESSURE: 106 MMHG | SYSTOLIC BLOOD PRESSURE: 173 MMHG | HEART RATE: 53 BPM | WEIGHT: 142.5 LBS | TEMPERATURE: 98.2 F

## 2023-06-13 DIAGNOSIS — C71.9 OLIGODENDROGLIOMA, WHO GRADE II (H): ICD-10-CM

## 2023-06-13 PROBLEM — I10 PRIMARY HYPERTENSION: Status: ACTIVE | Noted: 2021-11-08

## 2023-06-13 PROCEDURE — 77263 THER RADIOLOGY TX PLNG CPLX: CPT | Performed by: RADIOLOGY

## 2023-06-13 PROCEDURE — 99205 OFFICE O/P NEW HI 60 MIN: CPT | Mod: 25 | Performed by: RADIOLOGY

## 2023-06-13 PROCEDURE — 77334 RADIATION TREATMENT AID(S): CPT | Performed by: RADIOLOGY

## 2023-06-13 ASSESSMENT — PAIN SCALES - GENERAL: PAINLEVEL: NO PAIN (1)

## 2023-06-13 NOTE — LETTER
6/13/2023         RE: Lang Collins Jr.  [address]  [facility] [address]        Dear Colleague,    Thank you for referring your patient, Lang Collins Jr., to the [facility] RADIATION ONCOLOG[facility] [place]. Please see a copy of my visit note below.    Dear Colleagues,  Today Lang Collins Jr. was seen in consultation.     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     HISTORY OF PRESENT ILLNESS:  Lang Collins Jr. is a 48 year old right-handed current smoker (0.5-1ppd x 20 years) male with previously diagnosed left frontal oligodendroglioma.  His relevant medical history begins in August 2021 when he presented with new onset seizures.  He was started on Keppra and imaging revealed a 2.1 cm left frontal nonenhancing mass within the superior frontal gyrus. On 9/8/21 brain MRI once again showed the mass that was stable. On November 12, 2021, Dr. Beltrán took him to the OR for a left frontoparietal craniotomy for mass resection revealing WHO grade 2 oligodendroglioma IDH 1 mutated, ATRX retained, 1p19q codeleted.  Postoperative imaging revealed gross total resection.  Postoperatively he also had right-sided hemiparesis and was discharged to rehab.  He was put on seizure prophylactics and was followed by Dr. Park for surveillance imaging.    Most recently on May 26, 2023 brain MRI showed nonenhancing signal abnormality within the left frontal lobe likely represents a combination of residual tumor and treatment related change.  This was read as slightly increased over the prior to his serial examinations in December 2022 in February 2023.  There is no evidence of acute intracranial hemorrhage, mass effect or infarction.  From a  surgical standpoint repeat surgical resection would be difficult given the location of the lesion being near the motor strip.      Pt is otherwise doing well recovering from surgery with only minor headaches.  He is not taking Keppra or Decadron. He was previously taking both. Currently going to PT for right hemiparesis and also seeing therapist for depression.  He feels that his motor dysfunction is back to its baseline.  He is being seen here today for consideration of partial brain radiotherapy.     REVIEW OF SYSTEMS: As per HPI, a 14-point review of system is otherwise negative.  PAST RADIATION THERAPY:  Denies.  PAST CTD/PACEMAKER: Denies     Past Medical History:   Diagnosis Date     Hypertension      Oligodendroglioma, WHO grade II (H) 12/7/2021     Primary hypertension 11/8/2021     S/P craniotomy 11/16/2021       Past Surgical History:   Procedure Laterality Date     OPTICAL TRACKING SYSTEM CRANIOTOMY, EXCISE TUMOR WITH MAPPING, COMBINED Left 11/12/2021    Procedure: Left frontal craniotomy for tumor resection with asleep motor mapping;  Surgeon: Kuldeep Beltrán MD;  Location:  OR     ORTHOPEDIC SURGERY         Family History   Problem Relation Age of Onset     Lung Cancer Paternal Grandfather         mets to brain     Breast Cancer Maternal Aunt        Social History     Tobacco Use     Smoking status: Every Day     Packs/day: 1.00     Types: Cigarettes     Smokeless tobacco: Never     Tobacco comments:     Patient try to quit smoking, down to 0.5 ppd.- updated 6/13/23   Vaping Use     Vaping status: Not on file   Substance Use Topics     Alcohol use: Yes     Comment: socially     Current Outpatient Medications   Medication     ondansetron (ZOFRAN) 4 MG tablet     [START ON 6/26/2023] SENNA-docusate sodium (SENNA S) 8.6-50 MG tablet     sertraline (ZOLOFT) 50 MG tablet     sulfamethoxazole-trimethoprim (BACTRIM DS) 800-160 MG tablet     temozolomide (TEMODAR) 140 MG capsule     No current  facility-administered medications for this visit.      No Known Allergies    PHYSICAL EXAMINATION:  BP (!) 173/106   Pulse 53   Temp 98.2  F (36.8  C) (Oral)   Resp 16   Wt 64.6 kg (142 lb 8 oz)   SpO2 98%   BMI 19.87 kg/m    GENERAL      Well-appearing gentleman in no acute distress.  HEENT            Normocephalic. Left sided surgical scar well healed.  Sclerae anicteric.  CVR                  Regular rate and rhythm.  No murmurs, rubs, or gallops.  LUNGS            Clear to auscultation bilaterally.  EXT                  No CCE.    NEURO           No focal deficits. Gait wnl. Strength is symmetrical in all four extremities. Sensation intact in all areas tested. No dysdiadochokinesias. Normal finger to nose test.  MSK                 Good ROM.   SKIN                 Warm and well perfused.    PSYCH            Alert and oriented x 3     ECOG PERFORMANCE STATUS: 0      IMAGING: I personally reviewed the relevant imaging for this case. On my review of the last 3 brain MRI the area in question appears stable.     IMPRESSION/PLAN:  Lang Collins Jr. 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    Today we reviewed the indications, logistics, alternatives and potential side effects associated with partial brain radiotherapy including fatigue, headaches, exacerbation of existing neurologic deficits, alopecia, nausea, and risk of radiation necrosis. In regard to his headaches, they are not significantly bothersome at this time.  If the headaches increase in severity I'd recommend starting otc tylenol first and then re-starting steroids at 2mg QAM with consideration of dose increase should he develop worsening symptoms.     Patient consented to partial brain irradiation.  Will have case discussed at [facility] again.     Additional problem list to be addressed in the following manner:     1. Systemic Treatment: per Dr. Park. Pt will also be started on Bactrim.     There was ample time for questions and all were answered to the patient's satisfaction. Thank you for allowing me to participate in the care of this pleasant patient. If you have any questions, please do not hesitate to contact my office.     Sincerely,  Yg Bear MD     [phone] Pager   [phone] [facility]   [phone] [place]  [phone] [place]             Again, thank you for allowing me to participate in the care of your patient.        Sincerely,        JOSSE Bear MD

## 2023-06-13 NOTE — NURSING NOTE
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    TREATMENT TO-DATE FOR THIS CANCER  Surgery ? 11/12/21 L frontal craniotomy with tumor resection Dr. Beltrán   Chemotherapy ? Plan for Temodar + XRT   Other Treatments for this Cancer ? none    PERSONAL HISTORY OF CANCER   Previous Cancer ? no   Prior Radiation ? no   Prior Chemotherapy ? no   Prior Hormonal Therapy ? no     REFERRALS NEEDED  Dietician, patient already referred to psychology.     VITALS  BP (!) 173/106   Pulse 53   Temp 98.2  F (36.8  C) (Oral)   Resp 16   Wt 64.6 kg (142 lb 8 oz)   SpO2 98%   BMI 19.87 kg/m      PACEMAKER/IMPLANTED CARDIAC DEVICE no    PAIN  Current history of pain associated with this visit:   Intensity: 1/10  Current: aching  Location: head  Treatment: none    PSYCHOSOCIAL  Marital Status: single  Patient lives in home with cousin he cares for.  Number of children: unknown  Working status: Not currently working  Do you feel safe in your home? Yes    REVIEW OF SYSTEMS  Skin: negative  Eyes: positive for glasses  Ears/Nose/Throat: negative  Respiratory: No shortness of breath, dyspnea on exertion, cough, or hemoptysis  Cardiovascular: negative  Gastrointestinal: negative  Genitourinary: negative  Musculoskeletal: negative  Neurologic: positive for R sided weakness  Psychiatric: positive for excessive stress and depression  Hematologic/Lymphatic/Immunologic: negative  Endocrine: negative    Radiation Oncology Patient Teaching    Current Concern: Oligodendroglioma    Person involved with teaching: Patient  Patient asked Questions: Yes  Patient was cooperative: Yes  Patient was receptive (willing to accept information given): Yes    Education Assessment  Comprehension ability: High  Knowledge level: Medium  Factors affecting teaching: None    Education Materials  Given  Fatigue management, Skin cares, Aquaphor, Hair loss    Educational Topics Discussed  Side effects- see above, monitoring for headaches, neurological changes    Response To Teaching  Verbalizes understanding    Do you have an advanced directive or living will? No  Are you DNR/DNI? No    Shelley Freitas RN

## 2023-06-14 ENCOUNTER — PATIENT OUTREACH (OUTPATIENT)
Dept: ONCOLOGY | Facility: CLINIC | Age: 48
End: 2023-06-14
Payer: COMMERCIAL

## 2023-06-14 DIAGNOSIS — R63.4 WEIGHT LOSS: ICD-10-CM

## 2023-06-14 DIAGNOSIS — C71.9 OLIGODENDROGLIOMA, WHO GRADE II (H): Primary | ICD-10-CM

## 2023-06-16 ENCOUNTER — ONCOLOGY VISIT (OUTPATIENT)
Dept: ONCOLOGY | Facility: CLINIC | Age: 48
End: 2023-06-16
Attending: PSYCHIATRY & NEUROLOGY
Payer: COMMERCIAL

## 2023-06-16 ENCOUNTER — LAB (OUTPATIENT)
Dept: INFUSION THERAPY | Facility: CLINIC | Age: 48
End: 2023-06-16
Attending: PSYCHIATRY & NEUROLOGY
Payer: COMMERCIAL

## 2023-06-16 VITALS
WEIGHT: 142 LBS | RESPIRATION RATE: 16 BRPM | DIASTOLIC BLOOD PRESSURE: 98 MMHG | OXYGEN SATURATION: 100 % | SYSTOLIC BLOOD PRESSURE: 156 MMHG | HEART RATE: 63 BPM | BODY MASS INDEX: 19.8 KG/M2

## 2023-06-16 DIAGNOSIS — R45.89 DEPRESSED MOOD: ICD-10-CM

## 2023-06-16 DIAGNOSIS — T45.1X5A CHEMOTHERAPY-INDUCED NAUSEA AND VOMITING: ICD-10-CM

## 2023-06-16 DIAGNOSIS — D61.810 ANTINEOPLASTIC CHEMOTHERAPY INDUCED PANCYTOPENIA (H): ICD-10-CM

## 2023-06-16 DIAGNOSIS — T45.1X5A ANTINEOPLASTIC CHEMOTHERAPY INDUCED PANCYTOPENIA (H): ICD-10-CM

## 2023-06-16 DIAGNOSIS — Z91.89 HIGH RISK FOR CHEMOTHERAPY-INDUCED INFECTIOUS COMPLICATION: ICD-10-CM

## 2023-06-16 DIAGNOSIS — R11.2 CHEMOTHERAPY-INDUCED NAUSEA AND VOMITING: ICD-10-CM

## 2023-06-16 DIAGNOSIS — Z11.59 SCREENING FOR VIRAL DISEASE: ICD-10-CM

## 2023-06-16 DIAGNOSIS — C71.9 OLIGODENDROGLIOMA, WHO GRADE II (H): Primary | ICD-10-CM

## 2023-06-16 LAB
ALBUMIN SERPL BCG-MCNC: 4.2 G/DL (ref 3.5–5.2)
ALP SERPL-CCNC: 72 U/L (ref 40–129)
ALT SERPL W P-5'-P-CCNC: ABNORMAL U/L
ANION GAP SERPL CALCULATED.3IONS-SCNC: 8 MMOL/L (ref 7–15)
AST SERPL W P-5'-P-CCNC: 33 U/L (ref 0–45)
BASOPHILS # BLD AUTO: 0 10E3/UL (ref 0–0.2)
BASOPHILS NFR BLD AUTO: 1 %
BILIRUB SERPL-MCNC: 0.5 MG/DL
BUN SERPL-MCNC: 14.5 MG/DL (ref 6–20)
CALCIUM SERPL-MCNC: 9 MG/DL (ref 8.6–10)
CHLORIDE SERPL-SCNC: 109 MMOL/L (ref 98–107)
CREAT SERPL-MCNC: 1.02 MG/DL (ref 0.67–1.17)
DEPRECATED HCO3 PLAS-SCNC: 27 MMOL/L (ref 22–29)
EOSINOPHIL # BLD AUTO: 0.1 10E3/UL (ref 0–0.7)
EOSINOPHIL NFR BLD AUTO: 2 %
ERYTHROCYTE [DISTWIDTH] IN BLOOD BY AUTOMATED COUNT: 12 % (ref 10–15)
GFR SERPL CREATININE-BSD FRML MDRD: >90 ML/MIN/1.73M2
GLUCOSE SERPL-MCNC: 99 MG/DL (ref 70–99)
HCT VFR BLD AUTO: 39.7 % (ref 40–53)
HGB BLD-MCNC: 13 G/DL (ref 13.3–17.7)
IMM GRANULOCYTES # BLD: 0 10E3/UL
IMM GRANULOCYTES NFR BLD: 0 %
LYMPHOCYTES # BLD AUTO: 1.7 10E3/UL (ref 0.8–5.3)
LYMPHOCYTES NFR BLD AUTO: 44 %
MCH RBC QN AUTO: 31 PG (ref 26.5–33)
MCHC RBC AUTO-ENTMCNC: 32.7 G/DL (ref 31.5–36.5)
MCV RBC AUTO: 95 FL (ref 78–100)
MONOCYTES # BLD AUTO: 0.2 10E3/UL (ref 0–1.3)
MONOCYTES NFR BLD AUTO: 6 %
NEUTROPHILS # BLD AUTO: 1.9 10E3/UL (ref 1.6–8.3)
NEUTROPHILS NFR BLD AUTO: 47 %
NRBC # BLD AUTO: 0 10E3/UL
NRBC BLD AUTO-RTO: 0 /100
PLATELET # BLD AUTO: 201 10E3/UL (ref 150–450)
POTASSIUM SERPL-SCNC: 5 MMOL/L (ref 3.4–5.3)
PROT SERPL-MCNC: 6.8 G/DL (ref 6.4–8.3)
RBC # BLD AUTO: 4.2 10E6/UL (ref 4.4–5.9)
SODIUM SERPL-SCNC: 144 MMOL/L (ref 136–145)
WBC # BLD AUTO: 4 10E3/UL (ref 4–11)

## 2023-06-16 PROCEDURE — 99214 OFFICE O/P EST MOD 30 MIN: CPT | Performed by: NURSE PRACTITIONER

## 2023-06-16 PROCEDURE — G0463 HOSPITAL OUTPT CLINIC VISIT: HCPCS | Performed by: NURSE PRACTITIONER

## 2023-06-16 PROCEDURE — 87340 HEPATITIS B SURFACE AG IA: CPT | Performed by: PSYCHIATRY & NEUROLOGY

## 2023-06-16 PROCEDURE — 84155 ASSAY OF PROTEIN SERUM: CPT | Performed by: PSYCHIATRY & NEUROLOGY

## 2023-06-16 PROCEDURE — 86704 HEP B CORE ANTIBODY TOTAL: CPT | Performed by: PSYCHIATRY & NEUROLOGY

## 2023-06-16 PROCEDURE — 85018 HEMOGLOBIN: CPT | Performed by: PSYCHIATRY & NEUROLOGY

## 2023-06-16 PROCEDURE — 36415 COLL VENOUS BLD VENIPUNCTURE: CPT

## 2023-06-16 PROCEDURE — 84450 TRANSFERASE (AST) (SGOT): CPT | Performed by: PSYCHIATRY & NEUROLOGY

## 2023-06-16 RX ORDER — SENNA AND DOCUSATE SODIUM 50; 8.6 MG/1; MG/1
1 TABLET, FILM COATED ORAL 2 TIMES DAILY
Qty: 60 TABLET | Refills: 2 | Status: SHIPPED | OUTPATIENT
Start: 2023-06-26 | End: 2023-10-17

## 2023-06-16 ASSESSMENT — PAIN SCALES - GENERAL: PAINLEVEL: NO PAIN (0)

## 2023-06-16 NOTE — PROGRESS NOTES
Medical Assistant Note:  Lang Collins Jr. presents today for lab draw.    Patient seen by provider today: No.   present during visit today: Not Applicable.    Concerns: No Concerns.    Procedure:  Lab draw site: RAC, Needle type: BF, Gauge: 21. Gauze and coban applied    Post Assessment:  Labs drawn without difficulty: Yes.    Discharge Plan:  Departure Mode: Ambulatory.    Face to Face Time: 5.    Debra Lockett CMA

## 2023-06-16 NOTE — LETTER
"    6/16/2023         RE: Lang Collins Jr.  [address]  [facility] [address]        Dear Colleague,    Thank you for referring your patient, Lang Collins Jr., to the [facility]. Please see a copy of my visit note below.    NEURO-ONCOLOGY VISIT  Jun 16, 2023    CHIEF COMPLAINT: Mr. 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     He is currently managed on imaging surveillance. However, imaging in 12/2022 showed a subtle change concerning for cancer progression. On close interval imaging, slow progression has been identified again today. As a result, the recommendation is to initiate chemoradiotherapy.     I met with Joss in follow-up today tosee how he is since starting Zoloft, and for ongoing discussion of planned chemoradiation.  He will also meet with pharmacy for in-person education on temozolomide.     HISTORY OF PRESENT ILLNESS  -Tried Zoloft - made him jittery and uncomfortable so he stopped.   -Feeling frustrated and overwhelmed and feels he is having trouble with concentration and forgetting things..   - Reports he has a planned start date of 6/27 for radiation.  -Not on dexamethasone.  -Staying with a cousin so that he has someone to watch him.   -No more muscle cramping  -He is now off Keppra and denies any events concerning for a seizure.  -No progression in right sided weakness. No falls since last appointment. Denies balance difficulty.  -Denies any changes in vision.    MEDICATIONS   Current Outpatient Medications   Medication Sig Dispense Refill     [START ON 6/18/2023] ondansetron (ZOFRAN) 4 MG tablet Take 1 tablet (4 mg) by mouth At Bedtime Take 30-60 mins before each dose of temozolomide. (Patient not taking: Reported on 6/13/2023) 30 tablet 3     sertraline (ZOLOFT) 50 MG tablet Take 1 tablet " (50 mg) by mouth daily for 14 days, THEN 2 tablets (100 mg) daily for 14 days. Call for a refill. (Patient not taking: Reported on 6/6/2023) 42 tablet 0     [START ON 6/19/2023] sulfamethoxazole-trimethoprim (BACTRIM DS) 800-160 MG tablet Take 1 tablet by mouth Every Mon, Wed, Fri Morning Begin with the start of radiation therapy. (Patient not taking: Reported on 6/13/2023) 18 tablet 0     [START ON 6/19/2023] temozolomide (TEMODAR) 140 MG capsule Take 1 capsule (140 mg) by mouth At Bedtime Daily during radiation. Take a dose of ondansetron 30-60 min before temozolomide. Take on empty stomach. (Patient not taking: Reported on 6/13/2023) 42 capsule 0     DRUG ALLERGIES No Known Allergies     IMMUNIZATIONS   Immunization History   Administered Date(s) Administered     COVID-19 MONOVALENT 12+ (Pfizer) 11/05/2021     TDAP (Adacel,Boostrix) 12/03/2021       ONCOLOGIC HISTORY  -8/8/2021 PRESENTATION: New onset seizure; generalized event. Started on Keppra.  -8/2021 MR brain imaging with a 2.1cm left frontal non-enhancing mass in the superior frontal gyrus in the expected region of the supplementary motor area.  -9/8/2021 MR brain imaging with no significant change in the nonenhancing T2 hyperintense mass.  -11/12/21 SURGERY: Left frontal-parietal craniotomy for mass resection.   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  Post-operative imaging with interval left frontal craniotomy for resection of abnormal lesion within the left parafalcine frontal lobe.  Hemiparesis, discharged to ARU.  -12/7/2021 NEURO-ONC: Recommending imaging surveillance given low risk factors and need to continue to focus on ongoing rehab. Referral to Dr. Chiang for optimizing rehab. Referral to [facility]/ epilepsy for seizure risk management in relation to future employment. Trial of flexeril 10mg at bedtime for headaches.   -3/8/2022 NEURO-ONC/ MRB: Clinically well; improved from prior.  "Restarting Keppra; second referral to [facility]. Imaging with post-surgical changes. Continue imaging surveillance.   -6/7/2022 NEURO-ONC/ MRB: Clinically well. Imaging with continued healing post-surgery. Continue imaging surveillance.   -12/6/2022 NEURO-ONC/ MRB: Clinically with no new/ progressive neurological symptoms. Imaging with a subtle increase in T2 FLAIR about the posterior aspect of the resection cavity. Continue imaging surveillance at a shortened interval of 3 months.   -2/28/2023 NEURO-ONC/ MRB: Clinically with no new/ progressive neurological symptoms. Imaging largely stable, but close interval imaging was recommended.   -5/30/2023 NEURO-ONC/ MRB: Worsened mood, concentration; starting Zoloft. Imaging with subtle progression noted when comparing to imaging 6 months ago; recommending chemoradiotherapy with referral to radiation oncology. Social work involvement. Primary care referral for management of hypertension.   -6/16/2023 NEURO-ONC: Started Zoloft, stopped r/t feeling jittery. Imaging with subtle progression noted when comparing to imaging 6 months ago; planned start date is 6/27 for chemoradiotherapy. Ongoing social work involvement.     PHYSICAL EXAMINATION  BP (!) 156/98   Pulse 63   Resp 16   Wt 64.4 kg (142 lb)   SpO2 100%   BMI 19.80 kg/m     Wt Readings from Last 2 Encounters:   06/16/23 64.4 kg (142 lb)   06/13/23 64.6 kg (142 lb 8 oz)      Ht Readings from Last 2 Encounters:   06/06/23 1.803 m (5' 11\")   01/09/23 1.803 m (5' 11\")     KPS: 100    -Generally well appearing.  -Respiratory: No acute respiratory distress.   -Psychiatric: Normal mood and affect. Pleasant, talkative.  -Neurologic:   MENTAL STATUS:     Alert, oriented to date.    Recall: Intact.    Speech fluent.    Comprehension intact to multi-step commands.   Good right-left orientation.     CRANIAL NERVES:     Pupils are equal, round.    Extraocular movements full, denies diplopia.     Visual fields full.     Facial " sensation intact to light touch.   Symmetric facial movements.   Hearing intact.  MOTOR:    No pronation or drift.  SENSATION:    Intact to light touch throughout.   COORDINATION: Intact finger-nose with eyes closed.   GAIT:  Walks without assistance.    MEDICAL RECORDS  Personally reviewed; Triage notes, radiation oncology notes.    LABS  Personally reviewed all available lab results from today.    IMAGING  No new imaging to review today.    IMPRESSION  As above, he is seen in clinic today to see how he did with starting Zoloft, as well as to have in person review of chemotherapy and plan moving forward.  He did experience feeling very jittery after taking Zoloft which continue for a few days.  He did stop this.  We do discussed with pharmacy that 1 possibility would be for him to start with a half a tab (25 mg) to see if this is more tolerable and to do a slower ramp up.  He does feel that his mood overall is okay, he just feels anxious and stressed about his inability to concentrate, which is caused him to lose 2 jobs that he is otherwise quite qualified for.  He does have family that he needs to support, so was concerned about this as well.  Social work is involved.    Joss is off Keppra per Dr. Panda's direction and he has not had any recurrent seizure events in the interim.    He has been seen by radiation oncology and the plan is for a course of chemoradiotherapy with temozolomide.  We briefly review the risks/ benefits of temozolomide and the following common, anticipated side effects of this treatment were discussed today including, but not limited to, fatigue, nausea, and constipation. Any AST/ ALT elevations are typically reversible. The combination therapy can result in bone marrow suppression; leukopenia and thrombocytopenia.  He will have further in-person teaching today with the pharmacist.     Of note, Joss's blood pressure was again evaluated today in clinic. He denied any chest pain, shortness of  breath, vision changes, or headache. Joss was referred to primary care at his last visit and has an appt on 6/22/2023.    PROBLEM LIST  Oligodendroglioma  Hemiparesis, right  Tension headache  Seizure  Fatigue  Hypertension  Depressed mood, anxiety, irritability    PLAN  -CANCER-DIRECTED THERAPY-  -Planned start of chemoradiation on 6/27/2023.  Additional temozolomide teaching was performed by pharmacy staff.    Baseline labs drawn today.   Supportive medications; Zofran and Bactrim ordered and will need to be released to pharmacy.    -STEROIDS-  -Currently off dexamethasone.    -SEIZURE MANAGEMENT-  -Off Keppra.   -Following with Dr. Panda.      -Quality of life/ MOOD/ HEADACHE/ FATIGUE-  -Worsened mood. Started Zoloft at 50mg/ day and developed jittery feeling.  Discussed he could potentially try 25mg dose.  Will also be seeing psychology and PCP.    -SW continued involvement as financial and work stressors present.     -HEMIPARESIS-  -Following with Dr. Chiang.     -HYPERTENSION-  -Primary care referral for management, appt 6/22.    -TOBACCO DEPENDENCE-  -Encouraged continued abstinence from smoking.    -CRAMPING-  -Encouraged continued hydration as well as electrolyte supplementation.  -Will monitor closely.    Return to clinic mid-cycle of radiation with me.  He will call sooner with any concerns.    TITA Murray, CNP  [facility]  [address] [place] [address] [phone]          Again, thank you for allowing me to participate in the care of your patient.        Sincerely,        TITA Crystal CNP

## 2023-06-16 NOTE — PROGRESS NOTES
"NEURO-ONCOLOGY VISIT  Jun 16, 2023    CHIEF COMPLAINT: Mr. 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     He is currently managed on imaging surveillance. However, imaging in 12/2022 showed a subtle change concerning for cancer progression. On close interval imaging, slow progression has been identified again today. As a result, the recommendation is to initiate chemoradiotherapy.     I met with Joss in follow-up today tosee how he is since starting Zoloft, and for ongoing discussion of planned chemoradiation.  He will also meet with pharmacy for in-person education on temozolomide.     HISTORY OF PRESENT ILLNESS  -Tried Zoloft - made him jittery and uncomfortable so he stopped.   -Feeling frustrated and overwhelmed and feels he is having trouble with concentration and forgetting things..   - Reports he has a planned start date of 6/27 for radiation.  -Not on dexamethasone.  -Staying with a cousin so that he has someone to watch him.   -No more muscle cramping  -He is now off Keppra and denies any events concerning for a seizure.  -No progression in right sided weakness. No falls since last appointment. Denies balance difficulty.  -Denies any changes in vision.    MEDICATIONS   Current Outpatient Medications   Medication Sig Dispense Refill     [START ON 6/18/2023] ondansetron (ZOFRAN) 4 MG tablet Take 1 tablet (4 mg) by mouth At Bedtime Take 30-60 mins before each dose of temozolomide. (Patient not taking: Reported on 6/13/2023) 30 tablet 3     sertraline (ZOLOFT) 50 MG tablet Take 1 tablet (50 mg) by mouth daily for 14 days, THEN 2 tablets (100 mg) daily for 14 days. Call for a refill. (Patient not taking: Reported on 6/6/2023) 42 tablet 0     [START ON 6/19/2023] sulfamethoxazole-trimethoprim (BACTRIM DS) 800-160 MG tablet Take 1 tablet by mouth Every " Mon, Wed, Fri Morning Begin with the start of radiation therapy. (Patient not taking: Reported on 6/13/2023) 18 tablet 0     [START ON 6/19/2023] temozolomide (TEMODAR) 140 MG capsule Take 1 capsule (140 mg) by mouth At Bedtime Daily during radiation. Take a dose of ondansetron 30-60 min before temozolomide. Take on empty stomach. (Patient not taking: Reported on 6/13/2023) 42 capsule 0     DRUG ALLERGIES No Known Allergies     IMMUNIZATIONS   Immunization History   Administered Date(s) Administered     COVID-19 MONOVALENT 12+ (Pfizer) 11/05/2021     TDAP (Adacel,Boostrix) 12/03/2021       ONCOLOGIC HISTORY  -8/8/2021 PRESENTATION: New onset seizure; generalized event. Started on Keppra.  -8/2021 MR brain imaging with a 2.1cm left frontal non-enhancing mass in the superior frontal gyrus in the expected region of the supplementary motor area.  -9/8/2021 MR brain imaging with no significant change in the nonenhancing T2 hyperintense mass.  -11/12/21 SURGERY: Left frontal-parietal craniotomy for mass resection.   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  Post-operative imaging with interval left frontal craniotomy for resection of abnormal lesion within the left parafalcine frontal lobe.  Hemiparesis, discharged to ARU.  -12/7/2021 NEURO-ONC: Recommending imaging surveillance given low risk factors and need to continue to focus on ongoing rehab. Referral to Dr. Chiang for optimizing rehab. Referral to [facility]/ epilepsy for seizure risk management in relation to future employment. Trial of flexeril 10mg at bedtime for headaches.   -3/8/2022 NEURO-ONC/ MRB: Clinically well; improved from prior. Restarting Keppra; second referral to [facility]. Imaging with post-surgical changes. Continue imaging surveillance.   -6/7/2022 NEURO-ONC/ MRB: Clinically well. Imaging with continued healing post-surgery. Continue imaging surveillance.   -12/6/2022 NEURO-ONC/ MRB:  "Clinically with no new/ progressive neurological symptoms. Imaging with a subtle increase in T2 FLAIR about the posterior aspect of the resection cavity. Continue imaging surveillance at a shortened interval of 3 months.   -2/28/2023 NEURO-ONC/ MRB: Clinically with no new/ progressive neurological symptoms. Imaging largely stable, but close interval imaging was recommended.   -5/30/2023 NEURO-ONC/ MRB: Worsened mood, concentration; starting Zoloft. Imaging with subtle progression noted when comparing to imaging 6 months ago; recommending chemoradiotherapy with referral to radiation oncology. Social work involvement. Primary care referral for management of hypertension.   -6/16/2023 NEURO-ONC: Started Zoloft, stopped r/t feeling jittery. Imaging with subtle progression noted when comparing to imaging 6 months ago; planned start date is 6/27 for chemoradiotherapy. Ongoing social work involvement.     PHYSICAL EXAMINATION  BP (!) 156/98   Pulse 63   Resp 16   Wt 64.4 kg (142 lb)   SpO2 100%   BMI 19.80 kg/m     Wt Readings from Last 2 Encounters:   06/16/23 64.4 kg (142 lb)   06/13/23 64.6 kg (142 lb 8 oz)      Ht Readings from Last 2 Encounters:   06/06/23 1.803 m (5' 11\")   01/09/23 1.803 m (5' 11\")     KPS: 100    -Generally well appearing.  -Respiratory: No acute respiratory distress.   -Psychiatric: Normal mood and affect. Pleasant, talkative.  -Neurologic:   MENTAL STATUS:     Alert, oriented to date.    Recall: Intact.    Speech fluent.    Comprehension intact to multi-step commands.   Good right-left orientation.     CRANIAL NERVES:     Pupils are equal, round.    Extraocular movements full, denies diplopia.     Visual fields full.     Facial sensation intact to light touch.   Symmetric facial movements.   Hearing intact.  MOTOR:    No pronation or drift.  SENSATION:    Intact to light touch throughout.   COORDINATION: Intact finger-nose with eyes closed.   GAIT:  Walks without assistance.    MEDICAL " RECORDS  Personally reviewed; Triage notes, radiation oncology notes.    LABS  Personally reviewed all available lab results from today.    IMAGING  No new imaging to review today.    IMPRESSION  As above, he is seen in clinic today to see how he did with starting Zoloft, as well as to have in person review of chemotherapy and plan moving forward.  He did experience feeling very jittery after taking Zoloft which continue for a few days.  He did stop this.  We do discussed with pharmacy that 1 possibility would be for him to start with a half a tab (25 mg) to see if this is more tolerable and to do a slower ramp up.  He does feel that his mood overall is okay, he just feels anxious and stressed about his inability to concentrate, which is caused him to lose 2 jobs that he is otherwise quite qualified for.  He does have family that he needs to support, so was concerned about this as well.  Social work is involved.    Joss is off Keppra per Dr. Panda's direction and he has not had any recurrent seizure events in the interim.    He has been seen by radiation oncology and the plan is for a course of chemoradiotherapy with temozolomide.  We briefly review the risks/ benefits of temozolomide and the following common, anticipated side effects of this treatment were discussed today including, but not limited to, fatigue, nausea, and constipation. Any AST/ ALT elevations are typically reversible. The combination therapy can result in bone marrow suppression; leukopenia and thrombocytopenia.  He will have further in-person teaching today with the pharmacist.     Of note, Joss's blood pressure was again evaluated today in clinic. He denied any chest pain, shortness of breath, vision changes, or headache. Joss was referred to primary care at his last visit and has an appt on 6/22/2023.    PROBLEM LIST  Oligodendroglioma  Hemiparesis, right  Tension headache  Seizure  Fatigue  Hypertension  Depressed mood, anxiety,  irritability    PLAN  -CANCER-DIRECTED THERAPY-  -Planned start of chemoradiation on 6/27/2023.  Additional temozolomide teaching was performed by pharmacy staff.    Baseline labs drawn today.   Supportive medications; Zofran and Bactrim ordered and will need to be released to pharmacy.    -STEROIDS-  -Currently off dexamethasone.    -SEIZURE MANAGEMENT-  -Off Keppra.   -Following with Dr. Panda.      -Quality of life/ MOOD/ HEADACHE/ FATIGUE-  -Worsened mood. Started Zoloft at 50mg/ day and developed jittery feeling.  Discussed he could potentially try 25mg dose.  Will also be seeing psychology and PCP.    -SW continued involvement as financial and work stressors present.     -HEMIPARESIS-  -Following with Dr. Chiang.     -HYPERTENSION-  -Primary care referral for management, appt 6/22.    -TOBACCO DEPENDENCE-  -Encouraged continued abstinence from smoking.    -CRAMPING-  -Encouraged continued hydration as well as electrolyte supplementation.  -Will monitor closely.    Return to clinic mid-cycle of radiation with me.  He will call sooner with any concerns.    TITA Murray, CNP  [facility] [place] [address] [place] [address]  [phone]

## 2023-06-16 NOTE — ORAL ONC MGMT
"Oral Chemotherapy Monitoring Program    Lab Monitoring Plan  CBC weekly during radiation & CMP every 4 weeks  Subjective/Objective:  Lang Collins Jr. is a 48 year old male seen in clinic for an initial visit for oral chemotherapy education. Met with Joss in clinic and discussed Temozolomide regimen and education. Discussed side effects, management of side effects. Discussed taking Ondansetron, Bactrim, and Senna/Docusate. Discussed lab monitoring.      6/16/2023     4:00 PM   ORAL CHEMOTHERAPY   Assessment Type Chart Review;New Teach   Providers Dr. Park   Clinic Name/Location [facility]   Drug Name Temodar (temozolomide)   Dose 140 mg   Current Schedule Daily   Cycle Details Continuous for 42 days during XRT   Planned next cycle start date 6/26/2023   Any new drug interactions? No   Is the dose as ordered appropriate for the patient? Yes       Last PHQ-2 Score on record:       6/13/2023    12:08 PM 1/9/2023     3:24 PM   PHQ-2 ( 1999 Pfizer)   Q1: Little interest or pleasure in doing things 1 0   Q2: Feeling down, depressed or hopeless 1 1   PHQ-2 Score 2 1       Vitals:  BP:   BP Readings from Last 1 Encounters:   06/16/23 (!) 156/98     Wt Readings from Last 1 Encounters:   06/16/23 64.4 kg (142 lb)     Estimated body surface area is 1.8 meters squared as calculated from the following:    Height as of 6/6/23: 1.803 m (5' 11\").    Weight as of an earlier encounter on 6/16/23: 64.4 kg (142 lb).    Labs:  _  Result Component Current Result Ref Range   Sodium 144 (6/16/2023) 136 - 145 mmol/L     _  Result Component Current Result Ref Range   Potassium 5.0 (6/16/2023) 3.4 - 5.3 mmol/L     _  Result Component Current Result Ref Range   Calcium 9.0 (6/16/2023) 8.6 - 10.0 mg/dL     No results found for Mag within last 30 days.     No results found for Phos within last 30 days.     _  Result Component Current Result Ref Range   Albumin 4.2 (6/16/2023) 3.5 - 5.2 g/dL     _  Result Component Current Result Ref Range   Urea " Nitrogen 14.5 (6/16/2023) 6.0 - 20.0 mg/dL     _  Result Component Current Result Ref Range   Creatinine 1.02 (6/16/2023) 0.67 - 1.17 mg/dL     _  Result Component Current Result Ref Range   AST 33 (6/16/2023) 0 - 45 U/L     No results found for ALT within last 30 days.     _  Result Component Current Result Ref Range   Bilirubin Total 0.5 (6/16/2023) <=1.2 mg/dL     _  Result Component Current Result Ref Range   WBC Count 4.0 (6/16/2023) 4.0 - 11.0 10e3/uL     _  Result Component Current Result Ref Range   Hemoglobin 13.0 (L) (6/16/2023) 13.3 - 17.7 g/dL     _  Result Component Current Result Ref Range   Platelet Count 201 (6/16/2023) 150 - 450 10e3/uL     No results found for ANC within last 30 days.     _  Result Component Current Result Ref Range   Absolute Neutrophils 1.9 (6/16/2023) 1.6 - 8.3 10e3/uL        Assessment:  Patient is appropriate to start therapy. CMP and CBC reviewed, ok baseline labs.     Plan:  Basic chemotherapy teaching was reviewed with the patient including indication, start date of therapy, dose, administration, adverse effects, missed doses, food and drug interactions, monitoring, side effect management, office contact information, and safe handling. Written materials were provided and all questions answered.    Follow-Up:  6/23: phone call to review and answer any last minute questions/concerns  6/26: start TMZ in the evening (start Senna/Docusate & Bactrim)  6/27: RT start  7/5: 1 week assessment call      Xavier Machado PharmD  [facility]  [phone]  June 16, 2023

## 2023-06-17 LAB
HBV CORE AB SERPL QL IA: NONREACTIVE
HBV SURFACE AG SERPL QL IA: NONREACTIVE

## 2023-06-19 ENCOUNTER — TUMOR CONFERENCE (OUTPATIENT)
Dept: ONCOLOGY | Facility: CLINIC | Age: 48
End: 2023-06-19
Payer: COMMERCIAL

## 2023-06-19 ENCOUNTER — VIRTUAL VISIT (OUTPATIENT)
Dept: ONCOLOGY | Facility: CLINIC | Age: 48
End: 2023-06-19
Attending: PSYCHIATRY & NEUROLOGY
Payer: COMMERCIAL

## 2023-06-19 DIAGNOSIS — R63.4 WEIGHT LOSS: ICD-10-CM

## 2023-06-19 DIAGNOSIS — C71.9 OLIGODENDROGLIOMA, WHO GRADE II (H): ICD-10-CM

## 2023-06-19 PROCEDURE — 97802 MEDICAL NUTRITION INDIV IN: CPT | Mod: TEL | Performed by: DIETITIAN, REGISTERED

## 2023-06-19 NOTE — TELEPHONE ENCOUNTER
I called [facility] to check the status, they delivered #21 on 6/14 and have a refill of #21 queued to fill when insurance will allow it.

## 2023-06-19 NOTE — PATIENT INSTRUCTIONS
Regina Coreas,     I have attached the resources that I referred to during our conversation (see your email):  --high calorie/high protein recipes  --Sources of protein    Here are your nutrition goals:  --Calories: 2300/day  --Protein:  grams/day  --Fluids: 8-10 cups/day    I will send you the [employer] [place] form through [employer].    [place] Breakfast Essentials  - Nutritional Drinks & Powder Mixes  [employer] Breakfast Essentials  - Nutritional Drinks & Powder Mixes  With essential nutrients & a kid-approved taste, our quick & easy [place] Breakfast Essentials  nutritional drinks & powder mixes help start their day right.  [email or web address]  ?  Please reach out to me with any questions along the way!    Be well,     Yesika Farooq RD, , LD  Board Certified Specialist in Oncology Nutrition  [facility]  Oncology Services  [email or web address]   Office: [phone]  Fax: [phone]

## 2023-06-19 NOTE — LETTER
"    6/19/2023         RE: Lang Collins Jr.  [address]  [facility] [address]        Dear Colleague,    Thank you for referring your patient, Lang Collins Jr., to the [facility]. Please see a copy of my visit note below.    The patient has been notified of the following:      \"We have found that certain health care needs can be provided without the need for a face to face visit.  This service lets us provide the care you need with a phone conversation.       I will have full access to your [facility] medical record during this entire phone call.   I will be taking notes for your medical record.      Since this is like an office visit, we will bill your insurance company for this service.       There are potential benefits and risks of telephone visits (e.g. limits to patient confidentiality) that differ from in-person visits.?  Confidentiality still applies for telephone services, and nobody will record the visit.  It is important to be in a quiet, private space that is free of distractions (including cell phone or other devices) during the visit.??      If during the course of the call I believe a telephone visit is not appropriate, you will not be charged for this service\"     Consent has been obtained for this service by care team member: Yes       CLINICAL NUTRITION SERVICES - ASSESSMENT NOTE    Lang Collins Jr. 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   He is interested " "in [employer] meals and did receive the web link for it but has not printed it or looked at it.   He inquires about Ensure shakes and if he can get them covered by insurance.    He tends to eat 1-3 meals/day. He has been eating a lot of peanut butter as this is easy for him to prepare and he knows it's high in calories and protein.     Diet Recall  Breakfast Peanut butter and jelly sandwich   Lunch Noodles, eggs   Dinner Skips    Snacks Peanut butter, toast   Beverages Gatorade, Powerade, Water - 10 cups       Treatment Plan:  Oncology History   Oligodendroglioma, WHO grade II (H)   12/7/2021 Initial Diagnosis    Oligodendroglioma, WHO grade II (H)     6/19/2023 -  Chemotherapy    Oral ONC Brain Cancer (Oligodendroglioma) - Temozolomide + concurrent radiation (42 Days)  Plan Provider: Elva Park MD  Treatment goal: Other  Line of treatment: [No plan line of treatment]     Treatment plan has been reviewed.    ANTHROPOMETRICS  Height: 5'11\"  Weight: 142 lbs/64kg  BMI: 19  Weight Status:  Normal BMI  IBW: 172 lbs (82%)  Weight History: down 8 lb x past 5 months  Wt Readings from Last 10 Encounters:   06/16/23 64.4 kg (142 lb)   06/13/23 64.6 kg (142 lb 8 oz)   06/06/23 65 kg (143 lb 3.2 oz)   05/30/23 65.3 kg (144 lb)   02/28/23 67.6 kg (149 lb)   01/09/23 68 kg (150 lb)   07/11/22 66.7 kg (147 lb)   06/07/22 66.2 kg (146 lb)   06/07/22 66.2 kg (146 lb)   03/21/22 67.2 kg (148 lb 3.2 oz)     Dosing Weight: 64kg    Medications/vitamins/minerals/herbals:   Reviewed    Labs:   Labs reviewed    NUTRITION FOCUSED PHYSICAL ASSESSMENT FOR DIAGNOSING MALNUTRITION:  Consult for education only      ASSESSED NUTRITION NEEDS:  Estimated Energy Needs: 2300 kcals (35+ Kcal/Kg)  Justification: repletion  Estimated Protein Needs: 96 grams protein (1.5 g pro/Kg)  Justification: Repletion  Estimated Fluid Needs: 2300  mL   Justification: 1mL/kcal    MALNUTRITION:  % Weight Loss:  Weight loss does not meet criteria for " malnutrition   % Intake:  Decreased intake does not meet criteria for malnutrition   Subcutaneous Fat Loss:  None observed  Muscle Loss:  None observed  Fluid Retention:  None noted    Malnutrition Diagnosis: Patient does not meet two of the above criteria necessary for diagnosing malnutrition    NUTRITION DIAGNOSIS:  Inadequate oral intake related to decreased ability to consume sufficient energy due to side effects of cancer, lack of energy as evidenced by 7 lb wt loss x past 3 months,  dietary intake 50-75% estimated needs.      INTERVENTIONS  Provided written & verbal education:     - Reviewed nutrition and hydration needs.   Advised pt to aim for at least 2300kcal and 75g protein daily.    Advised pt to aim for 8 cups non-caffeine containing beverages (water/electrolytes) daily.    - Discussed strategies to help fortify meals and snacks. Encouraged to focus on small, frequent meals.    - Reviewed sources of protein. Encouraged to have a protein source with each meal and snack.    - Reviewed common barriers to eating with cancer treatment.  Discussed ways to cope with 70.   - Reviewed high calorie/high protein oral nutrition supplement options (Ensure Complete, Ensure Plus/Boost Pluss etc).   - Encouraged utilizing these ONS in home made shakes/smoothies to prevent flavor fatigue.    - Message sent to [facility] for inquiry about insurance coverage for Ensure Plus. Writer placed order for 2 Ensure Plus (strawberry per patients preference) daily.  - Writer mailed printed and completed portions of the [employer] [place] form for Joss and mailed to him to complete and in as desired.     Provided pt with corresponding education materials/handouts on:  Academy of Nutrition and Dietetics High estiven/High protein recipes, Academy of Nutrition and Dietetics High protein list, Sources of Protein    Pt verbalize understanding of materials provided during consult.   Patient Understanding: good  Expected patient  engagement: good    Goals  1.  Aim for 5-6 small frequent meals  2.  Aim for 2300kcal and 75g protein/day  3. Try taking 2 nutrition shakes/day (Ensure Plus or [place] Breakfast Essentials)  4. Weight maintenance /weight repletion as able    Follow-Up Plans: Pt has RD contact information for questions.      MONITORING AND EVALUATION:  -Coverage for nutrition shakes  -Food/beverage intake  -Weight trends    Yesika Farooq RD, , LD

## 2023-06-19 NOTE — TUMOR CONFERENCE
Tumor Conference Information  Tumor Conference: Neuro-Onc  Specialties Present: Medical oncology, Radiation oncology, Pathology, Radiology, Surgery  Patient Status: Prospective  Stage: oligodendroglioma  Treatment to Date: Surgery  Clinical Trials: Not discussed  Genetic Testing Discussed/Recommended?: No  Supportive Care Services Discussed/Recommended?: No  Recommended Plan: Follows evidence-based guidelines (Comment: reviewed imaging - changes seen over last year; recommend chemoradiation)  Did the review exceed 30 minutes?: did not           Documentation / Disclaimer Cancer Tumor Board Note  Cancer tumor board recommendations do not override what is determined to be reasonable care and treatment, which is dependent on the circumstances of a patient's case; the patient's medical, social, and personal concerns; and the clinical judgment of the oncologist [physician].

## 2023-06-19 NOTE — PROGRESS NOTES
"The patient has been notified of the following:      \"We have found that certain health care needs can be provided without the need for a face to face visit.  This service lets us provide the care you need with a phone conversation.       I will have full access to your [facility] medical record during this entire phone call.   I will be taking notes for your medical record.      Since this is like an office visit, we will bill your insurance company for this service.       There are potential benefits and risks of telephone visits (e.g. limits to patient confidentiality) that differ from in-person visits.?  Confidentiality still applies for telephone services, and nobody will record the visit.  It is important to be in a quiet, private space that is free of distractions (including cell phone or other devices) during the visit.??      If during the course of the call I believe a telephone visit is not appropriate, you will not be charged for this service\"     Consent has been obtained for this service by care team member: Yes       CLINICAL NUTRITION SERVICES - ASSESSMENT NOTE    Lang Collins . 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   He is interested in [employer] meals and did receive the web link for it but has not printed it or looked at it.   He inquires about Ensure shakes and if he can get them covered by insurance.    He tends to eat 1-3 meals/day. He has been eating a lot of peanut butter as this is easy for " "him to prepare and he knows it's high in calories and protein.     Diet Recall  Breakfast Peanut butter and jelly sandwich   Lunch Noodles, eggs   Dinner Skips    Snacks Peanut butter, toast   Beverages Gatorade, Powerade, Water - 10 cups       Treatment Plan:  Oncology History   Oligodendroglioma, WHO grade II (H)   12/7/2021 Initial Diagnosis    Oligodendroglioma, WHO grade II (H)     6/19/2023 -  Chemotherapy    Oral ONC Brain Cancer (Oligodendroglioma) - Temozolomide + concurrent radiation (42 Days)  Plan Provider: Elva Park MD  Treatment goal: Other  Line of treatment: [No plan line of treatment]     Treatment plan has been reviewed.    ANTHROPOMETRICS  Height: 5'11\"  Weight: 142 lbs/64kg  BMI: 19  Weight Status:  Normal BMI  IBW: 172 lbs (82%)  Weight History: down 8 lb x past 5 months  Wt Readings from Last 10 Encounters:   06/16/23 64.4 kg (142 lb)   06/13/23 64.6 kg (142 lb 8 oz)   06/06/23 65 kg (143 lb 3.2 oz)   05/30/23 65.3 kg (144 lb)   02/28/23 67.6 kg (149 lb)   01/09/23 68 kg (150 lb)   07/11/22 66.7 kg (147 lb)   06/07/22 66.2 kg (146 lb)   06/07/22 66.2 kg (146 lb)   03/21/22 67.2 kg (148 lb 3.2 oz)     Dosing Weight: 64kg    Medications/vitamins/minerals/herbals:   Reviewed    Labs:   Labs reviewed    NUTRITION FOCUSED PHYSICAL ASSESSMENT FOR DIAGNOSING MALNUTRITION:  Consult for education only      ASSESSED NUTRITION NEEDS:  Estimated Energy Needs: 2300 kcals (35+ Kcal/Kg)  Justification: repletion  Estimated Protein Needs: 96 grams protein (1.5 g pro/Kg)  Justification: Repletion  Estimated Fluid Needs: 2300  mL   Justification: 1mL/kcal    MALNUTRITION:  % Weight Loss:  Weight loss does not meet criteria for malnutrition   % Intake:  Decreased intake does not meet criteria for malnutrition   Subcutaneous Fat Loss:  None observed  Muscle Loss:  None observed  Fluid Retention:  None noted    Malnutrition Diagnosis: Patient does not meet two of the above criteria necessary for " diagnosing malnutrition    NUTRITION DIAGNOSIS:  Inadequate oral intake related to decreased ability to consume sufficient energy due to side effects of cancer, lack of energy as evidenced by 7 lb wt loss x past 3 months,  dietary intake 50-75% estimated needs.      INTERVENTIONS  Provided written & verbal education:     - Reviewed nutrition and hydration needs.   Advised pt to aim for at least 2300kcal and 75g protein daily.    Advised pt to aim for 8 cups non-caffeine containing beverages (water/electrolytes) daily.    - Discussed strategies to help fortify meals and snacks. Encouraged to focus on small, frequent meals.    - Reviewed sources of protein. Encouraged to have a protein source with each meal and snack.    - Reviewed common barriers to eating with cancer treatment.  Discussed ways to cope with 70.   - Reviewed high calorie/high protein oral nutrition supplement options (Ensure Complete, Ensure Plus/Boost Pluss etc).   - Encouraged utilizing these ONS in home made shakes/smoothies to prevent flavor fatigue.    - Message sent to [facility] for inquiry about insurance coverage for Ensure Plus. Writer placed order for 2 Ensure Plus (strawberry per patients preference) daily.  - Writer mailed printed and completed portions of the [employer] [place] form for Joss and mailed to him to complete and in as desired.     Provided pt with corresponding education materials/handouts on:  Academy of Nutrition and Dietetics High estiven/High protein recipes, Academy of Nutrition and Dietetics High protein list, Sources of Protein    Pt verbalize understanding of materials provided during consult.   Patient Understanding: good  Expected patient engagement: good    Goals  1.  Aim for 5-6 small frequent meals  2.  Aim for 2300kcal and 75g protein/day  3. Try taking 2 nutrition shakes/day (Ensure Plus or [place] Breakfast Essentials)  4. Weight maintenance /weight repletion as able    Follow-Up Plans: Pt has RD contact  information for questions.      MONITORING AND EVALUATION:  -Coverage for nutrition shakes  -Food/beverage intake  -Weight trends    Yesika Farooq RD, , LD

## 2023-06-20 ENCOUNTER — VIRTUAL VISIT (OUTPATIENT)
Dept: ONCOLOGY | Facility: HOSPITAL | Age: 48
End: 2023-06-20
Attending: PSYCHOLOGIST
Payer: COMMERCIAL

## 2023-06-20 DIAGNOSIS — C71.9 OLIGODENDROGLIOMA OF BRAIN (H): ICD-10-CM

## 2023-06-20 DIAGNOSIS — F43.22 ADJUSTMENT DISORDER WITH ANXIETY: Primary | ICD-10-CM

## 2023-06-20 PROCEDURE — 90834 PSYTX W PT 45 MINUTES: CPT | Mod: VID | Performed by: PSYCHOLOGIST

## 2023-06-20 ASSESSMENT — ANXIETY QUESTIONNAIRES
7. FEELING AFRAID AS IF SOMETHING AWFUL MIGHT HAPPEN: NEARLY EVERY DAY
4. TROUBLE RELAXING: SEVERAL DAYS
6. BECOMING EASILY ANNOYED OR IRRITABLE: NEARLY EVERY DAY
3. WORRYING TOO MUCH ABOUT DIFFERENT THINGS: NEARLY EVERY DAY
IF YOU CHECKED OFF ANY PROBLEMS ON THIS QUESTIONNAIRE, HOW DIFFICULT HAVE THESE PROBLEMS MADE IT FOR YOU TO DO YOUR WORK, TAKE CARE OF THINGS AT HOME, OR GET ALONG WITH OTHER PEOPLE: EXTREMELY DIFFICULT
GAD7 TOTAL SCORE: 17
2. NOT BEING ABLE TO STOP OR CONTROL WORRYING: NEARLY EVERY DAY
5. BEING SO RESTLESS THAT IT IS HARD TO SIT STILL: NEARLY EVERY DAY
1. FEELING NERVOUS, ANXIOUS, OR ON EDGE: SEVERAL DAYS
GAD7 TOTAL SCORE: 17

## 2023-06-21 ENCOUNTER — PATIENT OUTREACH (OUTPATIENT)
Dept: CARE COORDINATION | Facility: CLINIC | Age: 48
End: 2023-06-21
Payer: COMMERCIAL

## 2023-06-21 NOTE — PROGRESS NOTES
Social Work Progress Note    Patient Name:  Lang Collins Jr.  /Age:  1975 (48 year old)    Reason for Follow-Up:  Joss is a 48-year-old gentleman with a diagnosis of oligodendroglioma who is followed by Dr. Park at [facility]. This clinician returning VM from Joss.     Intervention:   Joss reports that he is having more financial concern at present time. Made the following plan to address financial concerns:     1) Joss to come to clinic to pickup $100 of gas cards from SARAH  2) SARAH submitted request for [employer] bill support ($157.03, due ) to go to [facility]. Requesting check submission 23  3) Joss working on [employer]. SARAH coordinating around access to MRI and path reports which are needed in PDF format for application  4) SARAH introduced Lucita Cleary at [facility] and Joss via email. Lucita presently out of office, but to outreach to Joss and work on SSDI application together next week.  5) SARAH resubmitted application for [employer], as this clinician unable to find in AF queue.    Plan:  Previously provided patient/family with writer's contact information and availability. Joss knows to outreach in case of concern or need.     Please call or page if needs or concerns arise.     Summer Felix, SHAYY, LICSW, OSW-C  Clinical - Adult Oncology  She/Her/Hers  Phone: [phone]  [facility]: M, Thu  *every other Tue, 8am-4:30pm  [facility]: W, F, *every other Tue, 8am-4:30pm     Addendum:   Joss came to clinic, completed WALT for [employer] and picked up MRI and path results. Joss also picked up application for [employer] with Medical Verification page completed.     [facility] request for [employer] has been submitted by [employer]ist, check to be in mail 23.

## 2023-06-22 ENCOUNTER — APPOINTMENT (OUTPATIENT)
Dept: RADIATION ONCOLOGY | Facility: CLINIC | Age: 48
End: 2023-06-22
Payer: COMMERCIAL

## 2023-06-22 ENCOUNTER — OFFICE VISIT (OUTPATIENT)
Dept: FAMILY MEDICINE | Facility: CLINIC | Age: 48
End: 2023-06-22
Payer: COMMERCIAL

## 2023-06-22 VITALS — DIASTOLIC BLOOD PRESSURE: 78 MMHG | SYSTOLIC BLOOD PRESSURE: 114 MMHG

## 2023-06-22 DIAGNOSIS — Z13.6 SCREENING FOR HYPERTENSION: Primary | ICD-10-CM

## 2023-06-22 PROCEDURE — 77470 SPECIAL RADIATION TREATMENT: CPT | Performed by: RADIOLOGY

## 2023-06-22 PROCEDURE — 99203 OFFICE O/P NEW LOW 30 MIN: CPT | Performed by: INTERNAL MEDICINE

## 2023-06-22 NOTE — PROGRESS NOTES
[facility] Internal Medicine Progress Note           Assessment and Plan:     Screening for hypertension  - Albumin Random Urine Quantitative with Creat Ratio; Standing  - Aldosterone; Standing  - Renin activity; Standing  - Aldosterone Renin Ratio; Standing        Interval History:   This is a 48 year old male who was referred to our clinic regarding elevated blood pressure. Mr. Collins has oligodendroglioma, currently on chemotherapy. The patient's blood pressures were elevated during each in-person visit. He is asymptomatic and does not take any medications.            Significant Problems:   Patient Active Problem List   Diagnosis     S/P craniotomy     Oligodendroglioma, WHO grade II (H)     Primary hypertension     Adjustment disorder with mixed anxiety and depressed mood              Review of Systems:   CONSTITUTIONAL: NEGATIVE for fever, chills, change in weight  INTEGUMENTARY/SKIN: NEGATIVE for worrisome rashes, moles or lesions  EYES: NEGATIVE for vision changes or irritation  ENT/MOUTH: NEGATIVE for ear, mouth and throat problems  RESP: NEGATIVE for significant cough or SOB  CV: NEGATIVE for chest pain, palpitations or peripheral edema  GI: NEGATIVE for nausea, abdominal pain, heartburn, or change in bowel habits  : NEGATIVE for frequency, dysuria, or hematuria  MUSCULOSKELETAL: NEGATIVE for significant arthralgias or myalgia  NEURO: NEGATIVE for weakness, dizziness or paresthesias  ENDOCRINE: NEGATIVE for temperature intolerance, skin/hair changes  HEME: NEGATIVE for bleeding problems  PSYCHIATRIC: NEGATIVE for changes in mood or affect            Medications:     Current Outpatient Medications   Medication Sig     ondansetron (ZOFRAN) 4 MG tablet Take 1 tablet (4 mg) by mouth At Bedtime Take 30-60 mins before each dose of temozolomide.     sulfamethoxazole-trimethoprim (BACTRIM DS) 800-160 MG tablet Take 1 tablet by mouth Every Mon, Wed, Fri Morning Begin with the start of radiation  therapy.     temozolomide (TEMODAR) 140 MG capsule Take 1 capsule (140 mg) by mouth At Bedtime Daily during radiation. Take a dose of ondansetron 30-60 min before temozolomide. Take on empty stomach.     dexamethasone (DECADRON) 2 MG tablet Take 1 tablet (2 mg) by mouth daily (with breakfast)     SENNA-docusate sodium (SENNA S) 8.6-50 MG tablet Take 1 tablet by mouth 2 times daily     sertraline (ZOLOFT) 25 MG tablet Take 0.5 tablets (12.5 mg) by mouth daily for 7 days, THEN 1 tablet (25 mg) daily for 25 days.     No current facility-administered medications for this visit.             Physical Exam:   /78 (BP Location: Left arm, Patient Position: Sitting, Cuff Size: Adult Regular)   Constitutional: Awake, alert, cooperative, no apparent distress, and appears stated age.  Eyes: extra-ocular muscles intact and sclera clear  ENT: normocepalic, without obvious abnormality  Lungs: No increased work of breathing, good air exchange, clear to auscultation bilaterally, no crackles or wheezing.  Cardiovascular: regular rate and rhythm  Musculoskeletal: no lower extremity pitting edema present  Neurologic: Mental Status Exam:  Orientation:   person, place, time  Memory:   normal  Neuropsychiatric: Normal affect, mood, orientation, memory and insight.          Data:   Epic reviewed.     Disposition:  Follow-up in 4 weeks.    Adrián Bowden MD  Internal Medicine  [facility] Team

## 2023-06-26 ENCOUNTER — APPOINTMENT (OUTPATIENT)
Dept: RADIATION ONCOLOGY | Facility: CLINIC | Age: 48
End: 2023-06-26
Payer: COMMERCIAL

## 2023-06-26 PROCEDURE — 77300 RADIATION THERAPY DOSE PLAN: CPT | Performed by: RADIOLOGY

## 2023-06-26 PROCEDURE — 77301 RADIOTHERAPY DOSE PLAN IMRT: CPT | Performed by: RADIOLOGY

## 2023-06-26 PROCEDURE — 77338 DESIGN MLC DEVICE FOR IMRT: CPT | Performed by: RADIOLOGY

## 2023-06-27 ENCOUNTER — PATIENT OUTREACH (OUTPATIENT)
Dept: CARE COORDINATION | Facility: CLINIC | Age: 48
End: 2023-06-27

## 2023-06-27 ENCOUNTER — APPOINTMENT (OUTPATIENT)
Dept: RADIATION ONCOLOGY | Facility: CLINIC | Age: 48
End: 2023-06-27
Payer: COMMERCIAL

## 2023-06-27 PROCEDURE — 77014 PR CT GUIDE FOR PLACEMENT RADIATION THERAPY FIELDS: CPT | Performed by: RADIOLOGY

## 2023-06-27 PROCEDURE — 77427 RADIATION TX MANAGEMENT X5: CPT | Performed by: RADIOLOGY

## 2023-06-27 PROCEDURE — 77386 PR IMRT TREATMENT DELIVERY, COMPLEX: CPT | Performed by: RADIOLOGY

## 2023-06-27 NOTE — PROGRESS NOTES
Social Work - Follow-Up  [facility]    Data/Intervention:    Patient Name: Lang Collins Jr. Goes By: Joss    /Age: 1975 (48 year old)    Reason for Follow-Up:  Joss outreaching to this clinician today to follow-up on financial resources.     Intervention/Education/Resources Provided:  SARAH reviewed that Joss was approved for [employer] (General Fund: $500, [employer]: $200). SW assisted Joss in completing email to [employer] designating how he would like to have these funds allocated.     SW reviewed information that is needed for completion of the [employer] [facility]. Joss expressed understanding and reported that he would complete application and submit to [employer] [facility] on his own.     SW reviewed that Lucita Cleary would be outreaching to Joss this week to discuss next steps in SSDI application.     Assessment/Plan:  Joss knows to outreach to this clinician in case of psychosocial distress or need. This clinician will continue to follow for ongoing psychosocial support.    Previously provided patient/family with writer's contact information and availability.    Summer Felix, SHAYY, LICSW, OSW-C  Clinical - Adult Oncology  She/Her/Hers  Phone: [phone]  [facility]: M, Thu  *every other Tue, 8am-4:30pm  [facility] W, F, *every other Tue, 8am-4:30pm

## 2023-06-27 NOTE — PROGRESS NOTES
"Virtual Visit Details    Type of service:  Video Visit   Video Start Time: 2:32 PM  Video End Time:3:10 PM    Originating Location (pt. Location): Home    Distant Location (provider location):  Off-site  Platform used for Video Visit: [facility]         PSYCHIATRIC  DIAGNOSTIC  EVALUATION                                                                    Name:  Lang Collins Jr.  : 1975     Telemedicine Visit: The patient's condition can be safely assessed and treated via synchronous audio and visual telemedicine encounter.     Consent:  The patient/guardian has verbally consented to: the potential risks and benefits of telemedicine (video visit or phone) versus in person care; bill my insurance or make self-payment for services provided; and responsibility for payment of non-covered services.     As the provider I attest to compliance with applicable laws and regulations related to telemedicine.    Source of Referral:  Primary Care Provider: Dr. 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 Worsening anxiety, emotional regulation, memory, cognitive fatigue over last 2 months in the setting of disease progression. Did not tolerate Zoloft (nausea).\"    The [facility] psychiat[facility] providers act as a specialty service for Primary Care Providers in the [facility] who seek to optimize medications for unstable patients. Once medications have been optimized, [facility] providers discharge the patient back to the referring Primary Care Provider for ongoing medication management. This type of system allows [facility] to serve a high volume of patients.     Identifying Data:  Patient is a 48 year old, single Black or  Not  or  male  who presents for initial visit with me.    Patient prefers to be called: \"Joss\".  Patient is currently unemployed. " "    HPI  Patient presents for initial psychiatric evaluation with the [facility] ([facility]) for evaluation of depression and anxiety.      RECORDS AVAILABLE FOR REVIEW: EHR records through [employer] .       Chief Complaint:  \"Had brain surgery last year, and ever since went back to work, but my mind wasn't caught up. Still needed my mind to rest.\"    Joss is a 48M seen today after referral from current oncology provider. He did recently establish with new PCP at [facility] as well. He denies any recent psychiatric history, reports he previously saw provider and was prescribed ritalin for ADHD as a child, none in many years. No history of psychiatric hospitalizations or suicide attempts. 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 Following surgery, and particularly worsened with recent disease progression and referral for chemotherapy and radiation, patient reports significant anxiety, low mood, changes to memory and forgetfulness. Reports it has caused him to lose 2 jobs since surgery. Referring MD also placed referral to occupational rehab for cognitive rehab. Was started on zoloft 50 mg but only took 1 dose due to worsening nausea and jitteriness, worsened anxiety. He has not tried to retake it. Patient reports his depression today is 10/10, and associated with anhedonia, poor sleep and energy, low appetite, trouble concentrating. He reports withdrawing and \"shutting down\" versus his normal good communication per patient. He denies any SI/SIB/HI. No history of psychosis or kadi. He reports his anxiety is also severe (rates at 8/10), and associated with rumination, restlessness and trouble relaxing, irritability, feeling \"ready to scream\". He denies any history of violence or aggression toward people/animals/property. He denies panic attacks. He reports limited support at this time, and along with his medical issues is also caring for other family " "members, and 2 of his children living with him (\"feels like too much\"). He started seeing Dr. Salgado for therapy which he thinks will be helpful. He does report daily high cannabis use (4 grams) that he feels is useful for his anxiety and his appetite. This is not prescribed / medical marijuana at this time. Denies other substance use at this time. Does have legal history of incarceration (7639-0596) for selling drugs. Denies any other abuse or trauma history.      Current Medications:    Current Outpatient Medications:      ondansetron (ZOFRAN) 4 MG tablet, Take 1 tablet (4 mg) by mouth At Bedtime Take 30-60 mins before each dose of temozolomide., Disp: 30 tablet, Rfl: 3     SENNA-docusate sodium (SENNA S) 8.6-50 MG tablet, Take 1 tablet by mouth 2 times daily, Disp: 60 tablet, Rfl: 2     sertraline (ZOLOFT) 25 MG tablet, Take 0.5 tablets (12.5 mg) by mouth daily for 7 days, THEN 1 tablet (25 mg) daily for 25 days., Disp: 30 tablet, Rfl: 0     sulfamethoxazole-trimethoprim (BACTRIM DS) 800-160 MG tablet, Take 1 tablet by mouth Every Mon, Wed, Fri Morning Begin with the start of radiation therapy., Disp: 18 tablet, Rfl: 0     temozolomide (TEMODAR) 140 MG capsule, Take 1 capsule (140 mg) by mouth At Bedtime Daily during radiation. Take a dose of ondansetron 30-60 min before temozolomide. Take on empty stomach., Disp: 42 capsule, Rfl: 0     dexamethasone (DECADRON) 2 MG tablet, Take 1 tablet (2 mg) by mouth daily (with breakfast), Disp: 90 tablet, Rfl: 3     sertraline (ZOLOFT) 50 MG tablet, Take 1 tablet (50 mg) by mouth daily for 14 days, THEN 2 tablets (100 mg) daily for 14 days. Call for a refill., Disp: 42 tablet, Rfl: 0    Medication side effects: N/A    The [place] Prescription Monitoring Program has been reviewed and there are no concerns about diversionary activity for controlled substances at this time.  No data for controlled substances over the last one year.    Psychiatric Review of " "Symptoms:  Depression: depressed mood, little interest / pleasure, appetite change or significant weight loss / gain, sleep changes (insomnia or hypersomnia), psychomotor agitation or retardation, fatigue of loss of energy and difficulty concentrating or indecisiveness Self rates as 10/10, where 0 is none at all and 10 being severe depression.  SI/SIB/HI: denies all. No history.  Anxiety: excessive worry, difficult to control, restlessness / feeling keyed up,  easily fatigued,  difficulty concentrating or mind going blank, irritability, muscle tension and sleep disturbances (difficulty falling / staying asleep, or restless / unsatisfying) Self rates as 8/10, where 0 is none at all and 10 being severe anxiety.  Sleep / changes: \"bad the last few days bc started chemotherapy and radiation\". Can sleep 2-3 hours and unable to return to sleep, feeling unrested.  Energy:  \"terrible, but keep pushing through.\"   Appetite or weight changes: \"terrible, just not hungry\", lost 4 lbs this week. Given some Ensure to help today.   Irritability / mood swings: does feel irritable \"a lot\", daily lately which is newer. no history of aggression or violence.  Anni / impulsivity / racing thoughts / speech: denies periods of anni  Panic (description): denies, \"I think been close - ready to scream, but didn't do it.\"  OCD: none  Psychosis/AH/VH/delusions: denies  Paranoia: denies  Eating DO: none  Trauma sx: denies    All other ROS negative.     PHQ-9 scores:       6/28/2023     2:11 PM   PHQ-9 SCORE   PHQ-9 Total Score MyChart 19 (Moderately severe depression)   PHQ-9 Total Score 19       ROSALBA-7 scores:        6/20/2023    12:58 PM   ROSALBA-7 SCORE   Total Score 17 (severe anxiety)   Total Score 17         Medical Review of Systems:  10 systems (general, cardiovascular, respiratory, eyes, ENT, endocrine, GI, , M/S, neurological) were reviewed. Most pertinent finding(s) is/are: early AM headaches (rates as 5/10 pain). Mild nausea and low " "appetite. Denies fever, chest pain / tightness / palpitations, vomiting / diarrhea / constipation, tics / tremors / abnormal muscle mvmts. The remaining systems are all unremarkable    Vitals:   There were no vitals taken for this visit.    Pulse Readings from Last 5 Encounters:   06/28/23 63   06/16/23 63   06/13/23 53   06/06/23 67   05/30/23 76     Wt Readings from Last 5 Encounters:   06/28/23 63.2 kg (139 lb 4.8 oz)   06/16/23 64.4 kg (142 lb)   06/13/23 64.6 kg (142 lb 8 oz)   06/06/23 65 kg (143 lb 3.2 oz)   05/30/23 65.3 kg (144 lb)     BP Readings from Last 5 Encounters:   06/28/23 (!) 156/101   06/22/23 114/78   06/16/23 (!) 156/98   06/13/23 (!) 173/106   06/06/23 (!) 148/89       Psychiatric History:   Hospitalizations: None  History of Commitment? No   Past Treatment: medication(s) from physician / PCP  History of Suicidal Ideation: denies  Suicide Attempts: No   History of Violence/Aggression: denies   Self-injurious Behavior: Denies  Electroconvulsive Therapy (ECT) or Transcranial Magnetic Stimulation (TMS): No   GeneSight Genetic Testing: No     Past psychotropic medication trials include but are not limited to:   Ritalin as a child for ADHD    Substance Use History:  Current Use of Drugs/Alcohol: Marijuana - smoking 4 grams a day, not prescribed. Denies any worsening or change. Very rare alcohol.  Past Use of Drugs/Alcohol: denies  Patient reports no problems as a result of their drinking / drug use.   Patient has not received chemical dependency treatment in the past  Recovery Programming Involvement: None    Tobacco use: Yes - \"cutting down real well\"  Caffeine:  Yes  Previously energy drinks - none now.     Based on the clinical interview, there  are indications of drug or alcohol use. Continue to monitor.   Discussed effect of substance use on overall health.   CAGE-AID Score today was: 2    Family History:   Patient reported family history includes:   Family History   Problem Relation Age of " "Onset     Lung Cancer Paternal Grandfather         mets to brain     Breast Cancer Maternal Aunt      Diabetes Mother      Hypertension Mother      Other Cancer Maternal Grandfather      Breast Cancer Cousin      Depression Sister      Anxiety Disorder Sister      Mental Illness Cousin      Substance Abuse Cousin      Obesity Cousin       Sudden cardiac death at young age? denies  Mental Illness History: 2 aunts ?unknown diagnosis  Substance Abuse History: Yes: father, aunties, uncle -crack, alcohol, marijuana  Suicide History: Denies  Medications that helped: Unknown     Social History:   Birth place: Grew up in [place], [place]  Childhood: Raised by mother, reports high amount of substance use in the family growing up  Siblings: 1 Sister   Highest education level was (requires further evaluation)  Employment Status: not working right now.   Relationship status: single, currently going through breakup  Current Living situation: cousin, and 2 of his daughters (8,9). Feels safe at home.  Children: 5 kids and a grandbaby - 2 typically live in [place], 3 in [place]. (8, 9,15,16, 26)   Firearms/Weapons Access: No: Patient denies   Service: No  Support: recent therapist,     Legal History:  Yes: [employer] Time  - reports incarcerated 2017 - 2020 for \"selling drugs\" per patient.      Significant Losses / Trauma / Abuse / Neglect Issues:  There are no indications or report of: significant losses, trauma, abuse or neglect.   Issues of possible neglect are not present.   Recommended that patient call 911 or go to the local ED should there be a change in any of these risk factors.      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 Post op off medications without any seizure activity.  Diet: No Restrictions  Exercise: No regular exercise program reported  Supplements: none      Mental Status Exam:  Alertness: alert  and oriented   Appearance: adequately groomed  Behavior/Demeanor: cooperative and mildly guarded, with good eye contact   Speech: normal and regular rate and rhythm  Language: intact and no problems  Psychomotor: fidgety  Mood: depressed and anxious  Affect: mildly restricted but appropriate; was congruent to mood; was congruent to content  Thought Process/Associations: unremarkable  Thought Content:  Reports none;  Denies suicidal ideation, violent ideation and delusions  Perception:  Reports none;  Denies auditory hallucinations and visual hallucinations  Insight: intact  Judgment: intact  Cognition: does  appear grossly intact; formal cognitive testing was not done  Recent and Remote Memory: Intact to interview. Not formally assessed. No amnesia.  Attention Span and Concentration: normal  Fund of Knowledge: appropriate  Gait and Station: unremarkable    Strengths and Opportunities:   Lang Dennis Johns identified the following strengths or resources that may help he succeed in counseling: , commitment to health and well being and motivation.     Things that may interfere with the patient's success include:  financial hardship and lack of family support.    Protective Factors: hope for the future, future oriented, restricted access to means, resilience, motivation and readiness for change, reasons for living and displaying help seeking  behavior    Static Risk Factors: sex    Dynamic Risk Factors: emotional distress, insomnia, substance use, withdrawing from friends/family, anxiety and lack of social support    There are no language or communication issues or need for modification in treatment.   There are ethnic, cultural or Taoist factors that may be relevant for therapy:  male  Client identified their preferred language to be English.  Client does not need the assistance of an  or other support involved in therapy.    Suicide Risk Assessment:  Based on review of above risk and protective factors and today's exam, pt is at low elevated risk of harm to self or others. He does not meet criteria for a 72 hr hold and remains appropriate for ongoing outpatient care. The patient convincingly denies suicidality today. There was no deceit detected, and the patient presented in a manner that was believable. Local community safety resources reviewed and sent to patient to use if needed.    Recommended that patient call 911 or go to the local ED should there be a change in any of these risk factors.    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  Known issue that I take into account for their medical decisions, no current exacerbations or new concerns.    Impression:  Lang Collins  is a 48 year old Black or , male who presents for initial visit with [facility]  ([facility]) for medication management. He denies any recent psychiatric history, reports he previously saw provider and was prescribed ritalin for ADHD as a child, none in many years. No history of psychiatric hospitalizations or suicide attempts. 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 Following surgery, and particularly worsened with recent disease progression and referral for chemotherapy and radiation, patient reports significant anxiety, low mood, changes to memory and forgetfulness. He reports significant depression today. Denies any SI/SIB/HI. No history of kadi or psychosis. Anxiety also very elevated today. Recent trial of zoloft self-discontinued after 1 dose (50 mg) due to side effects. He is open to retrial of medication, but will plan slower taper from low dose to help with side effects. Encouraged continued therapy.  Follow-up with this provider in 2 weeks or sooner PRN. Patient agreeable to plan.     Medication side effects and alternatives reviewed. Health promotion activities recommended and reviewed today. All questions addressed. Education and counseling completed regarding risks and benefits of medications and psychotherapy options. Recommend therapy for additional support.     Treatment Plan:    START sertraline 12.5 mg (1/2 tablet) every day for 1 week. Then INCREASE to 25 mg (1 tablet) every day. Script sent for #30.    Continue all other medications per primary care / other provider.     Continue therapy with established provider as planned.    Safety plan reviewed. To the Emergency Department as needed or call after hours crisis line at [phone] or [phone]. [place] Crisis Text Line: Text [place] to 920943 or  Suicide LifeLine Chat: suicidepreventionlifeline.org/chat    Schedule an appointment with me in 2 weeks or sooner as needed.  Call [facility] at [phone] to schedule.    Follow up with primary care provider  as planned or sooner if needed for acute medical concerns.    Call the psychiatric nurse line with medication questions or concerns at [phone].    MyChart may be used to communicate with your provider, but this is not intended to be used for emergencies.    Patient Education:  Medication side effects and alternatives reviewed. Health promotion activities recommended and reviewed today. All questions addressed. Education and counseling completed regarding risks and benefits of medications and psychotherapy options.  Consent provided by patient/guardian  Call the psychiatric nurse line with medication questions or concerns at [phone].  MyChart may be used to communicate with your provider, but this is not intended to be used for emergencies.  Medlineplus.gov is information for patients.  It is run by the [employer] and it contains information about all disorders, diseases and all medications.      Side effects for SSRI: sexual dysfunction, decreased appetite, increased appetite, nausea, diarrhea, constipation, dry mouth, insomnia, sedation, agitation, tremors, headaches, dizziness, sweating, bruising and rare bleeding, discontinuation syndrome, rare hyponatremia, rare induction of kadi, suicidal ideations, and serotonin syndrome.      Community Resources:    National Suicide Prevention Lifeline: 549.740.1404 (TTY: 615.783.7952). Call anytime for help.  (www.suicidepreventionlifeline.org)  National [place] on Mental Illness (www.[place].org): 187.443.7978 or 800-882-8281.   Mental Health Association (www.mentalhealth.org): 195.412.8009 or 528-269-4704.  [place] Crisis Text Line: Text [place] to 133428  Suicide LifeLine Chat: suicidepreBloggersBaseline.org/chat    Administrative Billin minutes spent on the date of the encounter doing chart review and reviewing referral documents, history and exam of patient, documentation and further activities as noted above.    Video Start Time: 2:32  PM  Video End Time: 3:10 PM      Patient Status:  [facility] MD/DO/NP/PA providers offer care a specialty service for Primary Care Providers in the [facility] that seek to optimize psychotropic medications for unstable patients.  Once medications have been optimized, our providers discharge the patient back to the referring Primary Care Provider for ongoing medication management.  This type of system allows our providers to serve a high volume of patients.   Patient will continue to be seen for ongoing consultation and stabilization.    Signed:   Batsheva Wright MSN, APRN, PMHNP-BC  Coll[facility] Care Psychiatry Service ([facility])  [facility]

## 2023-06-28 ENCOUNTER — VIRTUAL VISIT (OUTPATIENT)
Dept: PSYCHIATRY | Facility: CLINIC | Age: 48
End: 2023-06-28
Attending: STUDENT IN AN ORGANIZED HEALTH CARE EDUCATION/TRAINING PROGRAM
Payer: COMMERCIAL

## 2023-06-28 ENCOUNTER — APPOINTMENT (OUTPATIENT)
Dept: RADIATION ONCOLOGY | Facility: CLINIC | Age: 48
End: 2023-06-28
Payer: COMMERCIAL

## 2023-06-28 ENCOUNTER — OFFICE VISIT (OUTPATIENT)
Dept: RADIATION ONCOLOGY | Facility: CLINIC | Age: 48
End: 2023-06-28
Payer: COMMERCIAL

## 2023-06-28 VITALS
DIASTOLIC BLOOD PRESSURE: 101 MMHG | RESPIRATION RATE: 16 BRPM | BODY MASS INDEX: 19.43 KG/M2 | OXYGEN SATURATION: 100 % | HEART RATE: 63 BPM | WEIGHT: 139.3 LBS | SYSTOLIC BLOOD PRESSURE: 156 MMHG | TEMPERATURE: 97.8 F

## 2023-06-28 DIAGNOSIS — F43.23 ADJUSTMENT DISORDER WITH MIXED ANXIETY AND DEPRESSED MOOD: Primary | ICD-10-CM

## 2023-06-28 DIAGNOSIS — C71.9 OLIGODENDROGLIOMA, WHO GRADE II (H): Primary | ICD-10-CM

## 2023-06-28 PROCEDURE — 77386 PR IMRT TREATMENT DELIVERY, COMPLEX: CPT | Performed by: RADIOLOGY

## 2023-06-28 PROCEDURE — 99207 PR DROP WITH A PROCEDURE: CPT | Performed by: RADIOLOGY

## 2023-06-28 PROCEDURE — 99204 OFFICE O/P NEW MOD 45 MIN: CPT | Mod: VID | Performed by: NURSE PRACTITIONER

## 2023-06-28 PROCEDURE — 77014 PR CT GUIDE FOR PLACEMENT RADIATION THERAPY FIELDS: CPT | Performed by: RADIOLOGY

## 2023-06-28 RX ORDER — SERTRALINE HYDROCHLORIDE 25 MG/1
TABLET, FILM COATED ORAL
Qty: 30 TABLET | Refills: 0 | Status: SHIPPED | OUTPATIENT
Start: 2023-06-28 | End: 2023-08-11

## 2023-06-28 RX ORDER — DEXAMETHASONE 2 MG/1
2 TABLET ORAL
Qty: 90 TABLET | Refills: 3 | Status: SHIPPED | OUTPATIENT
Start: 2023-06-28 | End: 2023-10-13

## 2023-06-28 ASSESSMENT — PAIN SCALES - GENERAL: PAINLEVEL: MILD PAIN (2)

## 2023-06-28 ASSESSMENT — PATIENT HEALTH QUESTIONNAIRE - PHQ9
10. IF YOU CHECKED OFF ANY PROBLEMS, HOW DIFFICULT HAVE THESE PROBLEMS MADE IT FOR YOU TO DO YOUR WORK, TAKE CARE OF THINGS AT HOME, OR GET ALONG WITH OTHER PEOPLE: VERY DIFFICULT
SUM OF ALL RESPONSES TO PHQ QUESTIONS 1-9: 19
SUM OF ALL RESPONSES TO PHQ QUESTIONS 1-9: 19

## 2023-06-28 NOTE — PROGRESS NOTES
[facility] PHYSICIANS  SPECIALIZING IN BREAKTHROUGHS  Radiation Oncology    On Treatment Visit Note      Lang Collins Jr.      Date: 2023   MRN: [id number]   : 1975  Diagnosis: Oligodendroglioma      Reason for Visit:  On Radiation Treatment Visit     Treatment Summary to Date  Treatment Site: L frontal brain Current Dose: 360/5400 cGy Fractions:       Chemotherapy  Chemo concurrent with radx?: Yes  Oncologist: Dr. Park  Drug Name/Frequency 1: Xeloda    Subjective:     Brief episode of headache that self resolved without intervention.  Today he also feels he has had difficulty articulating his thoughts.  Denies any other new neurologic symptoms eg. Motor or sensory.     Nursing ROS:   Nutrition Alteration  Diet Type: Patient's Preference  Nutrition Note: Patient expressed decreased appetite, met with dietician. Provided ensures for patient to try and coupon.  Skin  Skin Reaction: 0 - No changes  Skin Intervention: Aquaphor PRN scalp  CNS Alteration  CNS note: Patient alert and oriented x 3, reports mild expressive aphasia at times, Dr. Bear prescribed Decadron 2 mg PRN worsening of expressive aphasia.     Cardiovascular  Respiratory effort: 1 - Normal - without distress        Psychosocial  Psychosocial Note: Patient scheduled for an appointment with Adult Psych today and has contact information for . Patient states he is living with his cousin and taking care of 2 of his children on his own. He also helps with his mother who has dementia and Aunt who is in senior living. Emotional support provided today, patient feels he has supportive services in place at this time.  Pain Assessment  0-10 Pain Scale: 2  Pain Note: Patient reports mild headaches, reviewed Tylenol PRN.      Objective:   BP (!) 156/101   Pulse 63   Temp 97.8  F (36.6  C) (Oral)   Resp 16   Wt 63.2 kg (139 lb 4.8 oz)   SpO2 100%   BMI 19.43 kg/m    Gen: Appears well, in no acute distress  Skin: No  erythema    Labs:  CBC RESULTS: Recent Labs   Lab Test 06/16/23  1506   WBC 4.0   RBC 4.20*   HGB 13.0*   HCT 39.7*   MCV 95   MCH 31.0   MCHC 32.7   RDW 12.0        ELECTROLYTES:  Recent Labs   Lab Test 06/16/23  1506      POTASSIUM 5.0   CHLORIDE 109*   MARCY 9.0   CO2 27   BUN 14.5   CR 1.02   GLC 99       Assessment:    Tolerating radiation therapy well.  All questions and concerns addressed.    Toxicities:  Headache: Grade 1: Mild pain    Plan:   1. Continue current therapy.    2. Decadron 2 mg p.o. prescription written in anticipation of brain edema.  Discussed that if headaches do not self resolve he should first try to call for the first day or 2.  Then when they persist he can start the Decadron 2 mg every morning.  Patient verbalized understanding      Mosaiq chart and setup information reviewed  MVCT/IGRT images checked    Medication Review  Med list reviewed with patient?: Yes    Educational Topic Discussed  Education Instructions: Fatigue, Supportive services, skin cares, Nutrition, diet, Decadron PRN.        Yg Bear MD    Please do not send letter to referring physician.

## 2023-06-28 NOTE — Clinical Note
The following patient was referred to the Collaborative Care Psychiatry Service (CCPS) by Dr. Aaliyah Chiang. Our psychiatry providers act as a specialty service for PCPs in the [facility] who seek to optimize medications for unstable patients.  Once medications have been optimized, our providers discharge the patient back to the Primary Care Provider for ongoing medication management.  This type of system allows our providers to serve a high volume of patients.   Please see my Impression and Plan. I will continue to work with them in stabilizing their mood.  If you have any questions or concerns, please let me know. Thank you again!  TIAT Rosado, [id number] Collaborative Care Psychiatry Service (CCPS) [facility]

## 2023-06-28 NOTE — LETTER
2023         RE: Lang Collins Jr.  [address]  [place] [address]        Dear Colleague,    Thank you for referring your patient, Lang Collins Jr., to the [facility] RADIATION ONCOLOGY [place]. Please see a copy of my visit note below.    [facility] PHYSICIANS  SPECIALIZING IN BREAKTHROUGHS  Radiation Oncology    On Treatment Visit Note      Lang Collins Jr.      Date: 2023   MRN: [id number]   : 1975  Diagnosis: Oligodendroglioma      Reason for Visit:  On Radiation Treatment Visit     Treatment Summary to Date  Treatment Site: L frontal brain Current Dose: 360/5400 cGy Fractions:       Chemotherapy  Chemo concurrent with radx?: Yes  Oncologist: Dr. Park  Drug Name/Frequency 1: Xeloda    Subjective:     Brief episode of headache that self resolved without intervention.  Today he also feels he has had difficulty articulating his thoughts.  Denies any other new neurologic symptoms eg. Motor or sensory.     Nursing ROS:   Nutrition Alteration  Diet Type: Patient's Preference  Nutrition Note: Patient expressed decreased appetite, met with dietician. Provided ensures for patient to try and coupon.  Skin  Skin Reaction: 0 - No changes  Skin Intervention: Aquaphor PRN scalp  CNS Alteration  CNS note: Patient alert and oriented x 3, reports mild expressive aphasia at times, Dr. Bear prescribed Decadron 2 mg PRN worsening of expressive aphasia.     Cardiovascular  Respiratory effort: 1 - Normal - without distress        Psychosocial  Psychosocial Note: Patient scheduled for an appointment with Adult Psych today and has contact information for . Patient states he is living with his cousin and taking care of 2 of his children on his own. He also helps with his mother who has dementia and Aunt who is in senior living. Emotional support provided today, patient feels he has supportive services in place at this time.  Pain Assessment  0-10 Pain  Scale: 2  Pain Note: Patient reports mild headaches, reviewed Tylenol PRN.      Objective:   BP (!) 156/101   Pulse 63   Temp 97.8  F (36.6  C) (Oral)   Resp 16   Wt 63.2 kg (139 lb 4.8 oz)   SpO2 100%   BMI 19.43 kg/m    Gen: Appears well, in no acute distress  Skin: No erythema    Labs:  CBC RESULTS: Recent Labs   Lab Test 06/16/23  1506   WBC 4.0   RBC 4.20*   HGB 13.0*   HCT 39.7*   MCV 95   MCH 31.0   MCHC 32.7   RDW 12.0        ELECTROLYTES:  Recent Labs   Lab Test 06/16/23  1506      POTASSIUM 5.0   CHLORIDE 109*   MARCY 9.0   CO2 27   BUN 14.5   CR 1.02   GLC 99       Assessment:    Tolerating radiation therapy well.  All questions and concerns addressed.    Toxicities:  Headache: Grade 1: Mild pain    Plan:   1. Continue current therapy.    2. Decadron 2 mg p.o. prescription written in anticipation of brain edema.  Discussed that if headaches do not self resolve he should first try to call for the first day or 2.  Then when they persist he can start the Decadron 2 mg every morning.  Patient verbalized understanding      Mosaiq chart and setup information reviewed  MVCT/IGRT images checked    Medication Review  Med list reviewed with patient?: Yes    Educational Topic Discussed  Education Instructions: Fatigue, Supportive services, skin cares, Nutrition, diet, Decadron PRN.        Yg Bear MD    Please do not send letter to referring physician.          Again, thank you for allowing me to participate in the care of your patient.        Sincerely,        JOSSE Bear MD

## 2023-06-28 NOTE — NURSING NOTE
Is the patient currently in the state of [place]? YES    Visit mode:VIDEO    If the visit is dropped, the patient can be reconnected by: VIDEO VISIT: Text to cell phone: [phone]    Will anyone else be joining the visit? NO      How would you like to obtain your AVS? MyChart    Are changes needed to the allergy or medication list? NO    Reason for visit: Consult    Pt was driving.  I instructed patient to find a place to pull over for visit.  We will contact him in around appt time.    Britni Birght VF

## 2023-06-29 ENCOUNTER — APPOINTMENT (OUTPATIENT)
Dept: RADIATION ONCOLOGY | Facility: CLINIC | Age: 48
End: 2023-06-29
Payer: COMMERCIAL

## 2023-06-29 PROBLEM — F43.23 ADJUSTMENT DISORDER WITH MIXED ANXIETY AND DEPRESSED MOOD: Status: ACTIVE | Noted: 2023-06-29

## 2023-06-29 PROCEDURE — 77386 PR IMRT TREATMENT DELIVERY, COMPLEX: CPT | Performed by: RADIOLOGY

## 2023-06-29 PROCEDURE — 77014 PR CT GUIDE FOR PLACEMENT RADIATION THERAPY FIELDS: CPT | Performed by: RADIOLOGY

## 2023-06-29 NOTE — PATIENT INSTRUCTIONS
**For crisis resources, please see the information at the end of this document**     Thank you for coming to the [facility].    TREATMENT PLAN:  Medications:   START sertraline 12.5 mg (1/2 tablet) every day for 1 week. Then INCREASE to 25 mg (1 tablet) every day. Script sent for #30.  Continue all other medications per primary care / other provider.     Consults / Referrals:   - Continue therapy with established provider as planned.    Follow-up:  Schedule an appointment with me in 2 weeks or sooner as needed.  Call [facility] at [phone] to schedule.  Follow up with primary care provider as planned or sooner if needed for acute medical concerns.  Call the psychiatric nurse line with medication questions or concerns at [phone].  Causecasthart may be used to communicate with your provider, but this is not intended to be used for emergencies.    Psychoeducation:  Side effects for SSRI: sexual dysfunction, decreased appetite, increased appetite, nausea, diarrhea, constipation, dry mouth, insomnia, sedation, agitation, tremors, headaches, dizziness, sweating, bruising and rare bleeding, discontinuation syndrome, rare hyponatremia, rare induction of kadi, suicidal ideations, and serotonin syndrome.      Financial Assistance [phone]  [facility] Billing [phone]  Central Billing Office, [facility]: [phone]  [facility] Billing [phone]  Medical Records [phone]  [facility] Patient Bill of Rights [email or web address]       MENTAL HEALTH CRISIS RESOURCES:  For a emergency help, please call 911 or go to the nearest Emergency Department.     Emergency Walk-In Options:   EmPATH Unit @ [facility] ([place]): [phone] - Specialized mental health emergency area designed to be calming  [facility] ([place]): [phone]  [facility] Acute Psychiatry Services  ([place]): [phone]  [facility] ([place]): [phone]    [facility] Crisis Information:   [place]: [phone]  [place]: [phone]  Lee[place] ([place]) - Adult: [phone]     Child: [phone]  [place] - Adult: [phone]     Child: [phone]  [place]: [phone]  List of all [facility] resources:   [email or web address]/resources/crisis-contacts.[email or web address]    National Crisis Information:   National Suicide & Crisis Lifeline: Call 948        For online chat options, visit https://suicidepreventionlifeline.org/chat/  Poison Control Center: 7-145-383-8350  Poison Control Center: 1-048-328-9576  Trans Lifeline: 1-299.929.3869 - Hotline for transgender people of all ages  The [employer]: 5-848-585-7330 - Hotline for LGBT youth     For Non-Emergency Support:   Fast Tracker: Mental Health & Substance Use Disorder Resources -   https://www.fasttrackermn.org/       Again thank you for choosing [facility] and please let us know how we can best partner with you to improve you and your family's health.    You may be receiving a survey regarding this appointment. We would love to have your feedback, both positive and negative. The survey is done by an external company, so your answers are anonymous.        Patient Education   Collaborative Care Psychiatry Service  What to Expect  Here's what to expect from your Collaborative Care Psychiatry Service (CCPS).   About CCPS  CCPS means 2 people work together to help you get better. You'll meet with a behavioral health clinician and a psychiatric doctor. A behavioral health clinician helps people with mental health problems by talking with them. A psychiatric doctor helps people by giving them medicine.  How it works  At every visit, you'll see the behavioral health clinician (BHC) first. They'll talk with you about how you're doing and teach you how to feel better.  "  Then you'll see the psychiatric doctor. This doctor can help you deal with troubling thoughts and feelings by giving you medicine. They'll make sure you know the plan for your care.   CCPS usually takes 3 to 6 visits. If you need more visits, we may have you start seeing a different psychiatric doctor for ongoing care.  If you have any questions or concerns, we'll be glad to talk with you.  About visits  Be open  At your visits, please talk openly about your problems. It may feel hard, but it's the best way for us to help you.  Cancelling visits  If you can't come to your visit, please call us right away at [phone]. If you don't cancel at least 24 hours (1 full day) before your visit, that's \"late cancellation.\"  Being late to visits  Being very late is the same as not showing up. You will be a \"no show\" if:  Your appointment starts with a [facility], and you're more than 15 minutes late for a 30-minute (half hour) visit. This will also cancel your appointment with the psychiatric doctor.  Your appointment is with a psychiatric doctor only, and you're more than 15 minutes late for a 30-minute (half hour) visit.  Your appointment is with a psychiatric doctor only, and you're more than 30 minutes late for a 60-minute (full hour) visit.  If you cancel late or don't show up 2 times within 6 months, we may end your care.   Getting help between visits  If you need help between visits, you can call us Monday to Friday from 8 a.m. to 4:30 p.m. at [phone].  Emergency care  Call 911 or go to the nearest emergency department if your life or someone else's life is in danger.  Call 998 anytime to reach the national Suicide and Crisis hotline.  Medicine refills  To refill your medicine, call your pharmacy. You can also call [facility]'s Behavioral Access at [phone], Monday to Friday, 8 a.m. to 4:30 p.m. It can take 1 to 3 business days to get a refill.   Forms, letters, and tests  You may have papers to " fill out, like FMLA, short-term disability, and workability. We can help you with these forms at your visits, but you must have an appointment. You may need more than 1 visit for this, to be in an intensive therapy program, or both.  Before we can give you medicine for ADHD, we may refer you to get tested for it or confirm it another way.  We may not be able to give you an emotional support animal letter.  We don't do mental health checks ordered by the court.   We don't do mental health testing, but we can refer you to get tested.   Thank you for choosing us for your care.  For informational purposes only. Not to replace the advice of your health care provider. Copyright   2022 [facility]. All rights reserved. [employer] [id number] - 12/22.

## 2023-06-30 ENCOUNTER — APPOINTMENT (OUTPATIENT)
Dept: RADIATION ONCOLOGY | Facility: CLINIC | Age: 48
End: 2023-06-30
Payer: COMMERCIAL

## 2023-06-30 PROCEDURE — 77014 PR CT GUIDE FOR PLACEMENT RADIATION THERAPY FIELDS: CPT | Performed by: RADIOLOGY

## 2023-06-30 PROCEDURE — 77386 PR IMRT TREATMENT DELIVERY, COMPLEX: CPT | Performed by: RADIOLOGY

## 2023-07-03 ENCOUNTER — APPOINTMENT (OUTPATIENT)
Dept: LAB | Facility: CLINIC | Age: 48
End: 2023-07-03
Payer: COMMERCIAL

## 2023-07-03 ENCOUNTER — DOCUMENTATION ONLY (OUTPATIENT)
Dept: PHARMACY | Facility: CLINIC | Age: 48
End: 2023-07-03

## 2023-07-03 ENCOUNTER — APPOINTMENT (OUTPATIENT)
Dept: RADIATION ONCOLOGY | Facility: CLINIC | Age: 48
End: 2023-07-03
Payer: COMMERCIAL

## 2023-07-03 PROCEDURE — 77014 PR CT GUIDE FOR PLACEMENT RADIATION THERAPY FIELDS: CPT | Performed by: RADIOLOGY

## 2023-07-03 PROCEDURE — 77386 PR IMRT TREATMENT DELIVERY, COMPLEX: CPT | Performed by: RADIOLOGY

## 2023-07-03 PROCEDURE — 77336 RADIATION PHYSICS CONSULT: CPT | Performed by: RADIOLOGY

## 2023-07-03 NOTE — PROGRESS NOTES
Oral Chemotherapy Monitoring Program    Subjective/Objective:  Lang Collins Jr. is a 48 year old male contacted by phone for a follow-up visit for oral chemotherapy. The patient started therapy with temozolomide on 6/26 and 1st day of RT on 6/27. He has been tolerating therapy well so far. He noted some mild nausea the day after his first dose of temozolomide but has not had any nausea since. The patient has been taking ondansetron 30 minutes prior to his chemotherapy dose and was educated on using additional doses as needed for breakthrough nausea. He also noted some mild constipation. He still has daily bowel movements but they have become harder in consistency. He will start a bowel regimen of docusate 100 mg 2-3 times daily and will add on 1-2 tablets of senna 8.6 mg tablets at bedtime if he goes a day without a bowel movement or if his constipation does not improve with docusate.        6/16/2023     4:00 PM 7/3/2023    11:00 AM   ORAL CHEMOTHERAPY   Assessment Type Chart Review;[place] Initial Follow up   Providers Dr. Xavier Park   Clinic Name/Location [facility]   Drug Name Temodar (temozolomide) Temodar (temozolomide)   Dose 140 mg 140 mg   Current Schedule Daily Daily   Cycle Details Continuous for 42 days during XRT Continuous for 42 days during XRT   Start Date of Last Cycle  6/26/2023   Planned next cycle start date 6/26/2023    Doses missed in last 2 weeks  0   Adherence Assessment  Adherent   Adverse Effects  Constipation;Nausea   Nausea  Grade 1   Pharmacist Intervention(nausea)  Yes   Intervention(s)  Patient education   Constipation  Grade 1   Pharmacist Intervention(constipation)  Yes   Intervention(s)  Patient education   Any new drug interactions? No    Is the dose as ordered appropriate for the patient? Yes        Last PHQ-2 Score on record:       6/13/2023    12:08 PM 1/9/2023     3:24 PM   PHQ-2 ( 1999 [employer])   Q1: Little interest or pleasure in doing things 1 0   Q2: Feeling  "down, depressed or hopeless 1 1   PHQ-2 Score 2 1       Vitals:  BP:   BP Readings from Last 1 Encounters:   06/28/23 (!) 156/101     Wt Readings from Last 1 Encounters:   06/28/23 63.2 kg (139 lb 4.8 oz)     Estimated body surface area is 1.78 meters squared as calculated from the following:    Height as of 6/6/23: 1.803 m (5' 11\").    Weight as of 6/28/23: 63.2 kg (139 lb 4.8 oz).    Labs:  _  Result Component Current Result Ref Range   Sodium 144 (6/16/2023) 136 - 145 mmol/L     _  Result Component Current Result Ref Range   Potassium 5.0 (6/16/2023) 3.4 - 5.3 mmol/L     _  Result Component Current Result Ref Range   Calcium 9.0 (6/16/2023) 8.6 - 10.0 mg/dL     No results found for Mag within last 30 days.     No results found for Phos within last 30 days.     _  Result Component Current Result Ref Range   Albumin 4.2 (6/16/2023) 3.5 - 5.2 g/dL     _  Result Component Current Result Ref Range   Urea Nitrogen 14.5 (6/16/2023) 6.0 - 20.0 mg/dL     _  Result Component Current Result Ref Range   Creatinine 1.02 (6/16/2023) 0.67 - 1.17 mg/dL     _  Result Component Current Result Ref Range   AST 33 (6/16/2023) 0 - 45 U/L     No results found for ALT within last 30 days.     _  Result Component Current Result Ref Range   Bilirubin Total 0.5 (6/16/2023) <=1.2 mg/dL     _  Result Component Current Result Ref Range   WBC Count 4.0 (6/16/2023) 4.0 - 11.0 10e3/uL     _  Result Component Current Result Ref Range   Hemoglobin 13.0 (L) (6/16/2023) 13.3 - 17.7 g/dL     _  Result Component Current Result Ref Range   Platelet Count 201 (6/16/2023) 150 - 450 10e3/uL       Result Component Current Result Ref Range   Absolute Neutrophils 1.9 (6/16/2023) 1.6 - 8.3 10e3/uL         Assessment/Plan:  The patient has tolerated therapy well. He experienced some mild nausea and was educated on utilizing ondansetron as needed for breakthrough nausea. He has also had some mild nausea. The patient was educated on initiating a bowel regimen. " He will call with any new or worsening symptoms. The patient was initially due to have weekly labs drawn today but forgot to stay to have them drawn after radiation. He confirmed that he will have labs drawn on 7/5. Ok to proceed with temozolomide.     Follow-Up:  7/5 - Labs    Rachel Moniz, PharmD  Hematology/Oncology Pharmacist Intern  [facility]  [phone]

## 2023-07-05 ENCOUNTER — LAB (OUTPATIENT)
Dept: LAB | Facility: CLINIC | Age: 48
End: 2023-07-05
Payer: COMMERCIAL

## 2023-07-05 ENCOUNTER — DOCUMENTATION ONLY (OUTPATIENT)
Dept: ONCOLOGY | Facility: CLINIC | Age: 48
End: 2023-07-05

## 2023-07-05 ENCOUNTER — APPOINTMENT (OUTPATIENT)
Dept: RADIATION ONCOLOGY | Facility: CLINIC | Age: 48
End: 2023-07-05
Payer: COMMERCIAL

## 2023-07-05 DIAGNOSIS — D61.810 ANTINEOPLASTIC CHEMOTHERAPY INDUCED PANCYTOPENIA (H): ICD-10-CM

## 2023-07-05 DIAGNOSIS — T45.1X5A CHEMOTHERAPY-INDUCED NAUSEA AND VOMITING: ICD-10-CM

## 2023-07-05 DIAGNOSIS — T45.1X5A ANTINEOPLASTIC CHEMOTHERAPY INDUCED PANCYTOPENIA (H): ICD-10-CM

## 2023-07-05 DIAGNOSIS — R11.2 CHEMOTHERAPY-INDUCED NAUSEA AND VOMITING: ICD-10-CM

## 2023-07-05 DIAGNOSIS — Z13.6 SCREENING FOR HYPERTENSION: ICD-10-CM

## 2023-07-05 LAB
ALBUMIN SERPL BCG-MCNC: 4.7 G/DL (ref 3.5–5.2)
ALP SERPL-CCNC: 72 U/L (ref 40–129)
ALT SERPL W P-5'-P-CCNC: <5 U/L (ref 0–70)
ANION GAP SERPL CALCULATED.3IONS-SCNC: 10 MMOL/L (ref 7–15)
AST SERPL W P-5'-P-CCNC: 22 U/L (ref 0–45)
BASOPHILS # BLD AUTO: 0 10E3/UL (ref 0–0.2)
BASOPHILS NFR BLD AUTO: 1 %
BILIRUB SERPL-MCNC: 0.9 MG/DL
BUN SERPL-MCNC: 15.9 MG/DL (ref 6–20)
CALCIUM SERPL-MCNC: 9.6 MG/DL (ref 8.6–10)
CHLORIDE SERPL-SCNC: 104 MMOL/L (ref 98–107)
CREAT SERPL-MCNC: 1.09 MG/DL (ref 0.67–1.17)
DEPRECATED HCO3 PLAS-SCNC: 26 MMOL/L (ref 22–29)
EOSINOPHIL # BLD AUTO: 0 10E3/UL (ref 0–0.7)
EOSINOPHIL NFR BLD AUTO: 1 %
ERYTHROCYTE [DISTWIDTH] IN BLOOD BY AUTOMATED COUNT: 12.3 % (ref 10–15)
GFR SERPL CREATININE-BSD FRML MDRD: 85 ML/MIN/1.73M2
GLUCOSE SERPL-MCNC: 92 MG/DL (ref 70–99)
HCT VFR BLD AUTO: 43.4 % (ref 40–53)
HGB BLD-MCNC: 14 G/DL (ref 13.3–17.7)
IMM GRANULOCYTES # BLD: 0 10E3/UL
IMM GRANULOCYTES NFR BLD: 0 %
LYMPHOCYTES # BLD AUTO: 1.6 10E3/UL (ref 0.8–5.3)
LYMPHOCYTES NFR BLD AUTO: 38 %
MCH RBC QN AUTO: 31 PG (ref 26.5–33)
MCHC RBC AUTO-ENTMCNC: 32.3 G/DL (ref 31.5–36.5)
MCV RBC AUTO: 96 FL (ref 78–100)
MONOCYTES # BLD AUTO: 0.2 10E3/UL (ref 0–1.3)
MONOCYTES NFR BLD AUTO: 6 %
NEUTROPHILS # BLD AUTO: 2.3 10E3/UL (ref 1.6–8.3)
NEUTROPHILS NFR BLD AUTO: 54 %
NRBC # BLD AUTO: 0 10E3/UL
NRBC BLD AUTO-RTO: 0 /100
PLATELET # BLD AUTO: 201 10E3/UL (ref 150–450)
POTASSIUM SERPL-SCNC: 4.7 MMOL/L (ref 3.4–5.3)
PROT SERPL-MCNC: 7 G/DL (ref 6.4–8.3)
RBC # BLD AUTO: 4.52 10E6/UL (ref 4.4–5.9)
SODIUM SERPL-SCNC: 140 MMOL/L (ref 136–145)
WBC # BLD AUTO: 4.2 10E3/UL (ref 4–11)

## 2023-07-05 PROCEDURE — 77014 PR CT GUIDE FOR PLACEMENT RADIATION THERAPY FIELDS: CPT | Performed by: SURGERY

## 2023-07-05 PROCEDURE — 80053 COMPREHEN METABOLIC PANEL: CPT

## 2023-07-05 PROCEDURE — 77386 PR IMRT TREATMENT DELIVERY, COMPLEX: CPT | Performed by: SURGERY

## 2023-07-05 PROCEDURE — 36415 COLL VENOUS BLD VENIPUNCTURE: CPT

## 2023-07-05 PROCEDURE — 85025 COMPLETE CBC W/AUTO DIFF WBC: CPT

## 2023-07-05 PROCEDURE — 99000 SPECIMEN HANDLING OFFICE-LAB: CPT

## 2023-07-05 PROCEDURE — 84244 ASSAY OF RENIN: CPT | Mod: 90

## 2023-07-05 PROCEDURE — 82088 ASSAY OF ALDOSTERONE: CPT

## 2023-07-05 NOTE — PROGRESS NOTES
Oral Chemotherapy Monitoring Program  Lab Follow Up     Patient currently on Temozolomide with radiation.   Reviewed lab results from today.     Lab Results   Component Value Date    WBC 4.2 07/05/2023     Lab Results   Component Value Date    RBC 4.52 07/05/2023     Lab Results   Component Value Date    HGB 14.0 07/05/2023     Lab Results   Component Value Date    HCT 43.4 07/05/2023     Lab Results   Component Value Date    MCV 96 07/05/2023     Lab Results   Component Value Date    MCH 31.0 07/05/2023     Lab Results   Component Value Date    MCHC 32.3 07/05/2023     Lab Results   Component Value Date    RDW 12.3 07/05/2023     Lab Results   Component Value Date     07/05/2023       Last Comprehensive Metabolic Panel:  Sodium   Date Value Ref Range Status   07/05/2023 140 136 - 145 mmol/L Final     Potassium   Date Value Ref Range Status   07/05/2023 4.7 3.4 - 5.3 mmol/L Final   11/17/2021 4.0 3.4 - 5.3 mmol/L Final     Chloride   Date Value Ref Range Status   07/05/2023 104 98 - 107 mmol/L Final   11/17/2021 106 94 - 109 mmol/L Final     Carbon Dioxide (CO2)   Date Value Ref Range Status   07/05/2023 26 22 - 29 mmol/L Final   11/17/2021 29 20 - 32 mmol/L Final     Anion Gap   Date Value Ref Range Status   07/05/2023 10 7 - 15 mmol/L Final   11/17/2021 1 (L) 3 - 14 mmol/L Final     Glucose   Date Value Ref Range Status   07/05/2023 92 70 - 99 mg/dL Final   11/17/2021 97 70 - 99 mg/dL Final     Urea Nitrogen   Date Value Ref Range Status   07/05/2023 15.9 6.0 - 20.0 mg/dL Final   11/17/2021 20 7 - 30 mg/dL Final     Creatinine   Date Value Ref Range Status   07/05/2023 1.09 0.67 - 1.17 mg/dL Final     GFR Estimate   Date Value Ref Range Status   07/05/2023 85 >60 mL/min/1.73m2 Final     Calcium   Date Value Ref Range Status   07/05/2023 9.6 8.6 - 10.0 mg/dL Final     Bilirubin Total   Date Value Ref Range Status   07/05/2023 0.9 <=1.2 mg/dL Final     Alkaline Phosphatase   Date Value Ref Range Status    07/05/2023 72 40 - 129 U/L Final     ALT   Date Value Ref Range Status   07/05/2023 <5 0 - 70 U/L Final     Comment:     Reference intervals for this test were updated on 6/12/2023 to more accurately reflect our healthy population. There may be differences in the flagging of prior results with similar values performed with this method. Interpretation of those prior results can be made in the context of the updated reference intervals.       AST   Date Value Ref Range Status   07/05/2023 22 0 - 45 U/L Final     Comment:     Reference intervals for this test were updated on 6/12/2023 to more accurately reflect our healthy population. There may be differences in the flagging of prior results with similar values performed with this method. Interpretation of those prior results can be made in the context of the updated reference intervals.     Assessment & Plan:  CMP and CBC reviewed, no new concerns. Ok to continue Temozolomide and radiation treatment. Continue weekly CBC lab monitoring and every 4 week CMP lab monitoring.      Follow-Up:  7/10 lab appt.     Xavier Machado PharmD  [facility] Oral Chemotherapy  [phone]

## 2023-07-06 ENCOUNTER — APPOINTMENT (OUTPATIENT)
Dept: RADIATION ONCOLOGY | Facility: CLINIC | Age: 48
End: 2023-07-06
Payer: COMMERCIAL

## 2023-07-06 PROCEDURE — 77014 PR CT GUIDE FOR PLACEMENT RADIATION THERAPY FIELDS: CPT | Performed by: SURGERY

## 2023-07-06 PROCEDURE — 77386 PR IMRT TREATMENT DELIVERY, COMPLEX: CPT | Performed by: SURGERY

## 2023-07-07 ENCOUNTER — APPOINTMENT (OUTPATIENT)
Dept: RADIATION ONCOLOGY | Facility: CLINIC | Age: 48
End: 2023-07-07
Payer: COMMERCIAL

## 2023-07-07 LAB
ALDOST SERPL-MCNC: 3.2 NG/DL (ref 0–31)
RENIN PLAS-CCNC: 3.7 NG/ML/HR

## 2023-07-07 PROCEDURE — 77386 PR IMRT TREATMENT DELIVERY, COMPLEX: CPT | Performed by: SURGERY

## 2023-07-07 PROCEDURE — 77014 PR CT GUIDE FOR PLACEMENT RADIATION THERAPY FIELDS: CPT | Performed by: SURGERY

## 2023-07-08 LAB — ALDOST/RENIN PLAS-RTO: 0.9 {RATIO} (ref 0–25)

## 2023-07-10 ENCOUNTER — LAB (OUTPATIENT)
Dept: LAB | Facility: CLINIC | Age: 48
End: 2023-07-10
Payer: COMMERCIAL

## 2023-07-10 ENCOUNTER — DOCUMENTATION ONLY (OUTPATIENT)
Dept: PHARMACY | Facility: CLINIC | Age: 48
End: 2023-07-10

## 2023-07-10 ENCOUNTER — APPOINTMENT (OUTPATIENT)
Dept: RADIATION ONCOLOGY | Facility: CLINIC | Age: 48
End: 2023-07-10
Payer: COMMERCIAL

## 2023-07-10 ENCOUNTER — OFFICE VISIT (OUTPATIENT)
Dept: RADIATION ONCOLOGY | Facility: CLINIC | Age: 48
End: 2023-07-10
Payer: COMMERCIAL

## 2023-07-10 VITALS
HEART RATE: 76 BPM | BODY MASS INDEX: 18.91 KG/M2 | WEIGHT: 135.6 LBS | DIASTOLIC BLOOD PRESSURE: 82 MMHG | TEMPERATURE: 97.9 F | RESPIRATION RATE: 16 BRPM | SYSTOLIC BLOOD PRESSURE: 122 MMHG | OXYGEN SATURATION: 99 %

## 2023-07-10 DIAGNOSIS — T45.1X5A CHEMOTHERAPY-INDUCED NAUSEA AND VOMITING: ICD-10-CM

## 2023-07-10 DIAGNOSIS — C71.9 OLIGODENDROGLIOMA, WHO GRADE II (H): Primary | ICD-10-CM

## 2023-07-10 DIAGNOSIS — D61.810 ANTINEOPLASTIC CHEMOTHERAPY INDUCED PANCYTOPENIA (H): ICD-10-CM

## 2023-07-10 DIAGNOSIS — T45.1X5A ANTINEOPLASTIC CHEMOTHERAPY INDUCED PANCYTOPENIA (H): ICD-10-CM

## 2023-07-10 DIAGNOSIS — R11.2 CHEMOTHERAPY-INDUCED NAUSEA AND VOMITING: ICD-10-CM

## 2023-07-10 LAB
ALBUMIN SERPL BCG-MCNC: 4.6 G/DL (ref 3.5–5.2)
ALP SERPL-CCNC: 73 U/L (ref 40–129)
ALT SERPL W P-5'-P-CCNC: 6 U/L (ref 0–70)
ANION GAP SERPL CALCULATED.3IONS-SCNC: 9 MMOL/L (ref 7–15)
AST SERPL W P-5'-P-CCNC: 19 U/L (ref 0–45)
BASOPHILS # BLD AUTO: 0 10E3/UL (ref 0–0.2)
BASOPHILS NFR BLD AUTO: 0 %
BILIRUB SERPL-MCNC: 0.8 MG/DL
BUN SERPL-MCNC: 19 MG/DL (ref 6–20)
CALCIUM SERPL-MCNC: 9.6 MG/DL (ref 8.6–10)
CHLORIDE SERPL-SCNC: 105 MMOL/L (ref 98–107)
CREAT SERPL-MCNC: 1.14 MG/DL (ref 0.67–1.17)
DEPRECATED HCO3 PLAS-SCNC: 28 MMOL/L (ref 22–29)
EOSINOPHIL # BLD AUTO: 0 10E3/UL (ref 0–0.7)
EOSINOPHIL NFR BLD AUTO: 1 %
ERYTHROCYTE [DISTWIDTH] IN BLOOD BY AUTOMATED COUNT: 12.2 % (ref 10–15)
GFR SERPL CREATININE-BSD FRML MDRD: 80 ML/MIN/1.73M2
GLUCOSE SERPL-MCNC: 71 MG/DL (ref 70–99)
HCT VFR BLD AUTO: 42.8 % (ref 40–53)
HGB BLD-MCNC: 13.9 G/DL (ref 13.3–17.7)
IMM GRANULOCYTES # BLD: 0 10E3/UL
IMM GRANULOCYTES NFR BLD: 0 %
LYMPHOCYTES # BLD AUTO: 1.7 10E3/UL (ref 0.8–5.3)
LYMPHOCYTES NFR BLD AUTO: 45 %
MCH RBC QN AUTO: 30.8 PG (ref 26.5–33)
MCHC RBC AUTO-ENTMCNC: 32.5 G/DL (ref 31.5–36.5)
MCV RBC AUTO: 95 FL (ref 78–100)
MONOCYTES # BLD AUTO: 0.2 10E3/UL (ref 0–1.3)
MONOCYTES NFR BLD AUTO: 6 %
NEUTROPHILS # BLD AUTO: 1.7 10E3/UL (ref 1.6–8.3)
NEUTROPHILS NFR BLD AUTO: 48 %
NRBC # BLD AUTO: 0 10E3/UL
NRBC BLD AUTO-RTO: 0 /100
PLATELET # BLD AUTO: 227 10E3/UL (ref 150–450)
POTASSIUM SERPL-SCNC: 5 MMOL/L (ref 3.4–5.3)
PROT SERPL-MCNC: 6.9 G/DL (ref 6.4–8.3)
RBC # BLD AUTO: 4.51 10E6/UL (ref 4.4–5.9)
SODIUM SERPL-SCNC: 142 MMOL/L (ref 136–145)
WBC # BLD AUTO: 3.7 10E3/UL (ref 4–11)

## 2023-07-10 PROCEDURE — 77014 PR CT GUIDE FOR PLACEMENT RADIATION THERAPY FIELDS: CPT | Performed by: RADIOLOGY

## 2023-07-10 PROCEDURE — 99207 PR DROP WITH A PROCEDURE: CPT | Performed by: RADIOLOGY

## 2023-07-10 PROCEDURE — 80053 COMPREHEN METABOLIC PANEL: CPT

## 2023-07-10 PROCEDURE — 77386 PR IMRT TREATMENT DELIVERY, COMPLEX: CPT | Performed by: RADIOLOGY

## 2023-07-10 PROCEDURE — 85025 COMPLETE CBC W/AUTO DIFF WBC: CPT

## 2023-07-10 PROCEDURE — 36415 COLL VENOUS BLD VENIPUNCTURE: CPT

## 2023-07-10 ASSESSMENT — PAIN SCALES - GENERAL: PAINLEVEL: MILD PAIN (3)

## 2023-07-10 NOTE — PROGRESS NOTES
[facility] PHYSICIANS  SPECIALIZING IN BREAKTHROUGHS  Radiation Oncology    On Treatment Visit Note      Lang Collins Jr.      Date: 7/10/2023   MRN: [id number]   : 1975  Diagnosis: Oligodendroglioma      Reason for Visit:  On Radiation Treatment Visit     Treatment Summary to Date  Treatment Site: L frontal brain Current Dose: 1620/5400 cGy Fractions:       Chemotherapy  Chemo concurrent with radx?: Yes  Oncologist: Dr. Park  Drug Name/Frequency 1: Xeloda    Subjective:     Has had decreased appetite with weight loss since starting treatment.    Nursing ROS:   Nutrition Alteration  Diet Type: Patient's Preference  Nutrition Note: Patient expressed decreased appetite, was provided by dietician Ensure 2 x day for 60 day supply patient said.  Skin  Skin Reaction: 0 - No changes  Skin Intervention: Aquaphor PRN scalp  CNS Alteration  CNS note: Patient alert and oriented x 3, reports mild expressive aphasia at times, Dr. Bear prescribed Decadron 2 mg PRN worsening of expressive aphasia.     Cardiovascular  Respiratory effort: 1 - Normal - without distress        Psychosocial  Psychosocial Note: Patient had an appointment with Adult Psych on 23 and has contact information for . Patient states he is living with his cousin and taking care of 2 of his children on his own. He also helps with his mother who has dementia and Aunt who is in senior living. Emotional support provided today, patient feels he has supportive services in place at this time.  Pain Assessment  0-10 Pain Scale: 2  Pain Note: Patient reports mild headaches, reviewed Tylenol PRN.      Objective:   /82   Pulse 76   Temp 97.9  F (36.6  C) (Oral)   Resp 16   Wt 61.5 kg (135 lb 9.6 oz)   SpO2 99%   BMI 18.91 kg/m    Gen: Appears well, in no acute distress  Skin: No erythema    Labs:  CBC RESULTS: Recent Labs   Lab Test 07/10/23  0830   WBC 3.7*   RBC 4.51   HGB 13.9   HCT 42.8   MCV 95   MCH 30.8   MCHC  32.5   RDW 12.2        ELECTROLYTES:  Recent Labs   Lab Test 07/10/23  0830      POTASSIUM 5.0   CHLORIDE 105   MARCY 9.6   CO2 28   BUN 19.0   CR 1.14   GLC 71       Assessment:    Tolerating radiation therapy well.  All questions and concerns addressed.  Anorexia likely consequence of chemoradiation.  He has already been instructed to increase Ensure shakes.     Toxicities:  Alopecia: Grade 1: Hair loss <50% of normal; not obvious from a distance; does not require a wig or hair peice to camoflage  Anorexia: Grade 2: Oral intake altered without significant weight loss or malnutrition; oral nutritional supplements indicated  Nausea: Grade 0: No toxicity  Dermatitis: Grade 0: No toxicity    Plan:   1. Continue current therapy.    2. Recommend Zofran 4mg Q8 PO  3. Encouraged increased p.o. intake that could be supplemented with Ensure.  4. Chemotherapy per neuro-oncology      Mosaiq chart and setup information reviewed  MVCT/IGRT images checked    Medication Review  Med list reviewed with patient?: Yes    Educational Topic Discussed  Education Instructions: Fatigue, Supportive services, skin cares, Nutrition, diet, Decadron PRN.        Yg Bear MD    Please do not send letter to referring physician.

## 2023-07-10 NOTE — LETTER
7/10/2023         RE: Lang Collins Jr.  [address]  [facility] [address]        Dear Colleague,    Thank you for referring your patient, Lang Collins Jr., to the [facility] RADIATION ONCOLOGY [place]. Please see a copy of my visit note below.    [facility] PHYSICIANS  SPECIALIZING IN BREAKTHROUGHS  Radiation Oncology    On Treatment Visit Note      Lang Collins Jr.      Date: 7/10/2023   MRN: [id number]   : 1975  Diagnosis: Oligodendroglioma      Reason for Visit:  On Radiation Treatment Visit     Treatment Summary to Date  Treatment Site: L frontal brain Current Dose: 1620/5400 cGy Fractions:       Chemotherapy  Chemo concurrent with radx?: Yes  Oncologist: Dr. Park  Drug Name/Frequency 1: Xeloda    Subjective:     Has had decreased appetite with weight loss since starting treatment.    Nursing ROS:   Nutrition Alteration  Diet Type: Patient's Preference  Nutrition Note: Patient expressed decreased appetite, was provided by dietician Ensure 2 x day for 60 day supply patient said.  Skin  Skin Reaction: 0 - No changes  Skin Intervention: Aquaphor PRN scalp  CNS Alteration  CNS note: Patient alert and oriented x 3, reports mild expressive aphasia at times, Dr. Bear prescribed Decadron 2 mg PRN worsening of expressive aphasia.     Cardiovascular  Respiratory effort: 1 - Normal - without distress        Psychosocial  Psychosocial Note: Patient had an appointment with Adult Psych on 23 and has contact information for . Patient states he is living with his cousin and taking care of 2 of his children on his own. He also helps with his mother who has dementia and Aunt who is in senior living. Emotional support provided today, patient feels he has supportive services in place at this time.  Pain Assessment  0-10 Pain Scale: 2  Pain Note: Patient reports mild headaches, reviewed Tylenol PRN.      Objective:   /82   Pulse 76   Temp 97.9  F  (36.6  C) (Oral)   Resp 16   Wt 61.5 kg (135 lb 9.6 oz)   SpO2 99%   BMI 18.91 kg/m    Gen: Appears well, in no acute distress  Skin: No erythema    Labs:  CBC RESULTS: Recent Labs   Lab Test 07/10/23  0830   WBC 3.7*   RBC 4.51   HGB 13.9   HCT 42.8   MCV 95   MCH 30.8   MCHC 32.5   RDW 12.2        ELECTROLYTES:  Recent Labs   Lab Test 07/10/23  0830      POTASSIUM 5.0   CHLORIDE 105   MARCY 9.6   CO2 28   BUN 19.0   CR 1.14   GLC 71       Assessment:    Tolerating radiation therapy well.  All questions and concerns addressed.  Anorexia likely consequence of chemoradiation.  He has already been instructed to increase Ensure shakes.     Toxicities:  Alopecia: Grade 1: Hair loss <50% of normal; not obvious from a distance; does not require a wig or hair peice to camoflage  Anorexia: Grade 2: Oral intake altered without significant weight loss or malnutrition; oral nutritional supplements indicated  Nausea: Grade 0: No toxicity  Dermatitis: Grade 0: No toxicity    Plan:   1. Continue current therapy.    2. Recommend Zofran 4mg Q8 PO  3. Encouraged increased p.o. intake that could be supplemented with Ensure.  4. Chemotherapy per neuro-oncology      Mosaiq chart and setup information reviewed  MVCT/IGRT images checked    Medication Review  Med list reviewed with patient?: Yes    Educational Topic Discussed  Education Instructions: Fatigue, Supportive services, skin cares, Nutrition, diet, Decadron PRN.        Yg Bear MD    Please do not send letter to referring physician.          Again, thank you for allowing me to participate in the care of your patient.        Sincerely,        JOSSE Bear MD

## 2023-07-10 NOTE — PROGRESS NOTES
Oral Chemotherapy Monitoring Program  Lab Follow Up    Reviewed lab results from 7/10/23.        6/16/2023     4:00 PM 7/3/2023    11:00 AM 7/5/2023     1:00 PM 7/10/2023     9:00 AM   ORAL CHEMOTHERAPY   Assessment Type Chart Review;New Teach Initial Follow up Lab Monitoring;Chart Review Lab Monitoring;Chart Review   Providers Dr. Xavier Park   Clinic Name/Location [facility]   Drug Name Temodar (temozolomide) Temodar (temozolomide) Temodar (temozolomide) Temodar (temozolomide)   Dose 140 mg 140 mg 140 mg 140 mg   Current Schedule Daily Daily Daily Daily   Cycle Details Continuous for 42 days during XRT Continuous for 42 days during XRT Continuous for 42 days during XRT Continuous for 42 days during XRT   Start Date of Last Cycle  6/26/2023 6/26/2023 6/26/2023   Planned next cycle start date 6/26/2023      Doses missed in last 2 weeks  0     Adherence Assessment  Adherent     Adverse Effects  Constipation;Nausea     Nausea  Grade 1     Pharmacist Intervention(nausea)  Yes     Intervention(s)  Patient education     Constipation  Grade 1     Pharmacist Intervention(constipation)  Yes     Intervention(s)  Patient education     Any new drug interactions? No      Is the dose as ordered appropriate for the patient? Yes          Labs:  _  Result Component Current Result Ref Range   Sodium 142 (7/10/2023) 136 - 145 mmol/L     _  Result Component Current Result Ref Range   Potassium 5.0 (7/10/2023) 3.4 - 5.3 mmol/L     _  Result Component Current Result Ref Range   Calcium 9.6 (7/10/2023) 8.6 - 10.0 mg/dL     No results found for Mag within last 30 days.     No results found for Phos within last 30 days.     _  Result Component Current Result Ref Range   Albumin 4.6 (7/10/2023) 3.5 - 5.2 g/dL     _  Result Component Current Result Ref Range   Urea Nitrogen 19.0 (7/10/2023) 6.0 - 20.0 mg/dL     _  Result Component Current Result Ref Range   Creatinine 1.14 (7/10/2023) 0.67 -  1.17 mg/dL     _  Result Component Current Result Ref Range   AST 19 (7/10/2023) 0 - 45 U/L     _  Result Component Current Result Ref Range   ALT 6 (7/10/2023) 0 - 70 U/L     _  Result Component Current Result Ref Range   Bilirubin Total 0.8 (7/10/2023) <=1.2 mg/dL     _  Result Component Current Result Ref Range   WBC Count 3.7 (L) (7/10/2023) 4.0 - 11.0 10e3/uL     _  Result Component Current Result Ref Range   Hemoglobin 13.9 (7/10/2023) 13.3 - 17.7 g/dL     _  Result Component Current Result Ref Range   Platelet Count 227 (7/10/2023) 150 - 450 10e3/uL     No results found for ANC within last 30 days.     _  Result Component Current Result Ref Range   Absolute Neutrophils 1.7 (7/10/2023) 1.6 - 8.3 10e3/uL        Assessment & Plan:  No concerning abnormalities. Proceed with chemoradiation and weekly labs on MOndays.     Questions answered to patient's satisfaction.    Follow-Up:  1 week with labs.     Vandana Vines PharmD  July 10, 2023

## 2023-07-11 ENCOUNTER — APPOINTMENT (OUTPATIENT)
Dept: RADIATION ONCOLOGY | Facility: CLINIC | Age: 48
End: 2023-07-11
Payer: COMMERCIAL

## 2023-07-11 PROCEDURE — 77427 RADIATION TX MANAGEMENT X5: CPT | Performed by: RADIOLOGY

## 2023-07-11 PROCEDURE — 77336 RADIATION PHYSICS CONSULT: CPT | Performed by: RADIOLOGY

## 2023-07-11 PROCEDURE — 77386 PR IMRT TREATMENT DELIVERY, COMPLEX: CPT | Performed by: RADIOLOGY

## 2023-07-11 PROCEDURE — 77014 PR CT GUIDE FOR PLACEMENT RADIATION THERAPY FIELDS: CPT | Performed by: RADIOLOGY

## 2023-07-12 ENCOUNTER — OFFICE VISIT (OUTPATIENT)
Dept: RADIATION ONCOLOGY | Facility: CLINIC | Age: 48
End: 2023-07-12
Payer: COMMERCIAL

## 2023-07-12 ENCOUNTER — APPOINTMENT (OUTPATIENT)
Dept: RADIATION ONCOLOGY | Facility: CLINIC | Age: 48
End: 2023-07-12
Payer: COMMERCIAL

## 2023-07-12 VITALS
RESPIRATION RATE: 16 BRPM | DIASTOLIC BLOOD PRESSURE: 81 MMHG | OXYGEN SATURATION: 100 % | SYSTOLIC BLOOD PRESSURE: 122 MMHG | WEIGHT: 139.7 LBS | BODY MASS INDEX: 19.48 KG/M2 | HEART RATE: 71 BPM | TEMPERATURE: 97.8 F

## 2023-07-12 DIAGNOSIS — C71.9 OLIGODENDROGLIOMA, WHO GRADE II (H): Primary | ICD-10-CM

## 2023-07-12 PROCEDURE — 77386 PR IMRT TREATMENT DELIVERY, COMPLEX: CPT | Performed by: RADIOLOGY

## 2023-07-12 PROCEDURE — 77014 PR CT GUIDE FOR PLACEMENT RADIATION THERAPY FIELDS: CPT | Performed by: RADIOLOGY

## 2023-07-12 PROCEDURE — 99207 PR DROP WITH A PROCEDURE: CPT | Performed by: RADIOLOGY

## 2023-07-12 ASSESSMENT — PAIN SCALES - GENERAL: PAINLEVEL: MILD PAIN (3)

## 2023-07-12 NOTE — PROGRESS NOTES
[facility] PHYSICIANS  SPECIALIZING IN BREAKTHROUGHS  Radiation Oncology    On Treatment Visit Note      Lang Collins Jr.      Date: 2023   MRN: [id number]   : 1975  Diagnosis: Oligodendroglioma      Reason for Visit:  On Radiation Treatment Visit     Treatment Summary to Date  Treatment Site: L frontal brain Current Dose: 1980/5400 cGy Fractions:       Chemotherapy  Chemo concurrent with radx?: Yes  Oncologist: Dr. Park  Drug Name/Frequency 1: Temodar    Subjective:     Appetite is much better today.  He has increased his Zofran to every 8.  His mood is also greatly improved since his 2 youngest children have returned to stay with him    Nursing ROS:   Nutrition Alteration  Diet Type: Patient's Preference  Nutrition Note: Patient reports appetite has improved.  Skin  Skin Reaction: 0 - No changes  Skin Intervention: Aquaphor PRN scalp  CNS Alteration  CNS note: Patient alert and oriented x 3, reports mild expressive aphasia at times, Dr. Bear prescribed Decadron 2 mg PRN worsening of expressive aphasia.     Cardiovascular  Respiratory effort: 1 - Normal - without distress        Psychosocial  Psychosocial Note: Patient had an appointment with Adult Psych on 23 and has contact information for . Patient states he is living with his cousin and taking care of 2 of his children on his own. He also helps with his mother who has dementia and Aunt who is in senior living. Emotional support provided today, patient feels he has supportive services in place at this time.  Pain Assessment  0-10 Pain Scale: 3  Pain Note: Patient reports mild headaches, reviewed Tylenol PRN.      Objective:   /81   Pulse 71   Temp 97.8  F (36.6  C) (Oral)   Resp 16   Wt 63.4 kg (139 lb 11.2 oz)   SpO2 100%   BMI 19.48 kg/m    Gen: Appears well, in no acute distress  Skin: No erythema    Labs:  CBC RESULTS: Recent Labs   Lab Test 07/10/23  0830   WBC 3.7*   RBC 4.51   HGB 13.9   HCT  42.8   MCV 95   MCH 30.8   MCHC 32.5   RDW 12.2        ELECTROLYTES:  Recent Labs   Lab Test 07/10/23  0830      POTASSIUM 5.0   CHLORIDE 105   MARCY 9.6   CO2 28   BUN 19.0   CR 1.14   GLC 71       Assessment:    Tolerating radiation therapy well.  All questions and concerns addressed.    Toxicities:  Nausea: Grade 2: Oral intake decreased without significant weight loss, dehydration or malnutrition    Plan:   1. Continue current therapy.    2. Continue Zofran 4 mg every 8.  3. Continue with increased nutritional intake and supplementation with Ensure.  Patient will discuss  4. Patient will discuss childcare/Schooling issues with social work      Mosaiq chart and setup information reviewed  MVCT/IGRT images checked    Medication Review  Med list reviewed with patient?: Yes    Educational Topic Discussed  Additional Instructions: Patient states he needs a refill of his chemo pills, he is going to call to get the refill and has appropriate contact information  Education Instructions: Fatigue, Supportive services, skin cares, Nutrition, diet, Decadron PRN.        Yg Bear MD    Please do not send letter to referring physician.

## 2023-07-12 NOTE — LETTER
2023         RE: Lang Collins Jr.  [address]  [place] [facility] [address]        Dear Colleague,    Thank you for referring your patient, Lang Collins Jr., to the [facility] RADIATION ONCOLOGY [place]. Please see a copy of my visit note below.    [facility] PHYSICIANS  SPECIALIZING IN BREAKTHROUGHS  Radiation Oncology    On Treatment Visit Note      Lang Collins Jr.      Date: 2023   MRN: [id number]   : 1975  Diagnosis: Oligodendroglioma      Reason for Visit:  On Radiation Treatment Visit     Treatment Summary to Date  Treatment Site: L frontal brain Current Dose: /5400 cGy Fractions:       Chemotherapy  Chemo concurrent with radx?: Yes  Oncologist: Dr. Park  Drug Name/Frequency 1: Temodar    Subjective:     Appetite is much better today.  He has increased his Zofran to every 8.  His mood is also greatly improved since his 2 youngest children have returned to stay with him    Nursing ROS:   Nutrition Alteration  Diet Type: Patient's Preference  Nutrition Note: Patient reports appetite has improved.  Skin  Skin Reaction: 0 - No changes  Skin Intervention: Aquaphor PRN scalp  CNS Alteration  CNS note: Patient alert and oriented x 3, reports mild expressive aphasia at times, Dr. Bear prescribed Decadron 2 mg PRN worsening of expressive aphasia.     Cardiovascular  Respiratory effort: 1 - Normal - without distress        Psychosocial  Psychosocial Note: Patient had an appointment with Adult Psych on 23 and has contact information for . Patient states he is living with his cousin and taking care of 2 of his children on his own. He also helps with his mother who has dementia and Aunt who is in senior living. Emotional support provided today, patient feels he has supportive services in place at this time.  Pain Assessment  0-10 Pain Scale: 3  Pain Note: Patient reports mild headaches, reviewed Tylenol PRN.      Objective:   /81    Pulse 71   Temp 97.8  F (36.6  C) (Oral)   Resp 16   Wt 63.4 kg (139 lb 11.2 oz)   SpO2 100%   BMI 19.48 kg/m    Gen: Appears well, in no acute distress  Skin: No erythema    Labs:  CBC RESULTS: Recent Labs   Lab Test 07/10/23  0830   WBC 3.7*   RBC 4.51   HGB 13.9   HCT 42.8   MCV 95   MCH 30.8   MCHC 32.5   RDW 12.2        ELECTROLYTES:  Recent Labs   Lab Test 07/10/23  0830      POTASSIUM 5.0   CHLORIDE 105   MARCY 9.6   CO2 28   BUN 19.0   CR 1.14   GLC 71       Assessment:    Tolerating radiation therapy well.  All questions and concerns addressed.    Toxicities:  Nausea: Grade 2: Oral intake decreased without significant weight loss, dehydration or malnutrition    Plan:   1. Continue current therapy.    2. Continue Zofran 4 mg every 8.  3. Continue with increased nutritional intake and supplementation with Ensure.  Patient will discuss  4. Patient will discuss childcare/Schooling issues with social work      Mosaiq chart and setup information reviewed  MVCT/IGRT images checked    Medication Review  Med list reviewed with patient?: Yes    Educational Topic Discussed  Additional Instructions: Patient states he needs a refill of his chemo pills, he is going to call to get the refill and has appropriate contact information  Education Instructions: Fatigue, Supportive services, skin cares, Nutrition, diet, Decadron PRN.        Yg Bear MD    Please do not send letter to referring physician.          Again, thank you for allowing me to participate in the care of your patient.        Sincerely,        JOSSE Bear MD

## 2023-07-13 ENCOUNTER — APPOINTMENT (OUTPATIENT)
Dept: RADIATION ONCOLOGY | Facility: CLINIC | Age: 48
End: 2023-07-13
Payer: COMMERCIAL

## 2023-07-13 ENCOUNTER — VIRTUAL VISIT (OUTPATIENT)
Dept: NEUROLOGY | Facility: CLINIC | Age: 48
End: 2023-07-13
Payer: COMMERCIAL

## 2023-07-13 ENCOUNTER — PATIENT OUTREACH (OUTPATIENT)
Dept: CARE COORDINATION | Facility: CLINIC | Age: 48
End: 2023-07-13

## 2023-07-13 VITALS
WEIGHT: 139 LBS | SYSTOLIC BLOOD PRESSURE: 112 MMHG | HEIGHT: 71 IN | DIASTOLIC BLOOD PRESSURE: 80 MMHG | BODY MASS INDEX: 19.46 KG/M2

## 2023-07-13 DIAGNOSIS — G40.909 SEIZURE DISORDER (H): Primary | ICD-10-CM

## 2023-07-13 PROCEDURE — 77014 PR CT GUIDE FOR PLACEMENT RADIATION THERAPY FIELDS: CPT | Performed by: SURGERY

## 2023-07-13 PROCEDURE — 77386 PR IMRT TREATMENT DELIVERY, COMPLEX: CPT | Performed by: SURGERY

## 2023-07-13 ASSESSMENT — PAIN SCALES - GENERAL: PAINLEVEL: MILD PAIN (3)

## 2023-07-13 ASSESSMENT — PATIENT HEALTH QUESTIONNAIRE - PHQ9: SUM OF ALL RESPONSES TO PHQ QUESTIONS 1-9: 15

## 2023-07-13 NOTE — PROGRESS NOTES
CHIEF COMPLAINT:  Seizures, likely secondary to brain tumor.    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 Beltrán in 11/2021.  He is followed by Dr. Elva Park from Neuro-Oncology. Since the last visit, he had no seizures. His last seizure was in 2021.  He had 3 seizures in his life time so far. No complaints. He is now off Keppra for one year.    His most recent MRI brain on 12///2/2022 showed slight increased size of non-enhancing T2 signal abnormality along the posterior aspect of the resection cavity compared to the MRI on 6/7/2022.  This may represent tumor progression. Patient tapered off Keppra in mid - 2022, and he has been seizure-free since 08/2021.  He had a total of 3 seizures in his lifetime.    According to the patient, his first seizure was 2019 when he suddenly became stiff in the whole body, which lasted 1-2 minutes.  He did not lose consciousness.  He was able to remember everything with no amnesia.  The body stiffening lasted for about 1-2 minutes.  He did not seek medical attention at that time.  In early 2021, he had another one with similar presentation with no amnesia, just body stiffening.  In 08/2021, he had a generalized tonic-clonic seizure which was witnessed by his girlfriend and his daughters.  He was told that he started with right arm shaking, then his eyes rolled back, he was breathing hard, he was sweating, and he had stiffening of the body and had convulsion of all extremities, which lasted for a few minutes.  He later had a brain scan done and had a left frontal brain tumor, which was resected in 11/2021 by Dr. Beltrán.    Since the surgery, he has been doing very well with no seizures.  His last seizure was in 08/2021.  He continued Keppra since 08/2021.  He is now on 500 mg twice daily.  She is complaining of  "some fatigue but no new issues.    TRIGGERS FOR SEIZURES:  Unclear.    RISK FACTORS FOR SEIZURES:  Left frontal WHO grade II oligodendroglioma with IDH1 mutated, ATRX retained, 1p/19q co-deleted.  No history of head trauma with loss of consciousness.  No history of CNS infection.  No history of febrile convulsions.    PAST MEDICAL HISTORY:  1.  Left frontal oligodendroglioma, status post resection.  2.  Right-sided hemiparesis, resolved.  3.  Tension headaches.  4.  Seizures.    PAST SURGICAL HISTORY:  1.  Resection of left frontal oligodendroglioma in 11/2021.  2.  History of hernia surgery.  3.  Tonsillectomy.    ALLERGIES:  No known drug allergies.    MEDICATIONS:  1.  Keppra 500 mg twice daily.  2.  Lisinopril 20 mg daily.    FAMILY HISTORY:  Father had history of brain tumor.    SOCIAL HISTORY:  He recently moved from [place] to [facility] with his mother.  He is living with his fiancee.  He has 5 children.  He has history of tobacco use but he stopped smoking.  He drinks alcohol occasionally.  He uses marijuana occasionally.    PHYSICAL EXAMINATION:      Blood pressure 112/80, height 1.803 m (5' 11\"), weight 63 kg (139 lb).    AAO x 3, speech fluent and appropriate. Hearing normal. No facial weakness.    REVIEW OF SYSTEMS: Negative.    PREVIOUS DIAGNOSTIC TESTING:  MRI scan on 03/07/2022 showed 1 postoperative change consisting of:  1.  Left parietal craniotomy and surgical resection cavity in the high medial left frontal lobe.  2.  Interval resolution of pneumocephalus since the immediate postoperative study.  3.  Probable small residual focus of nonenhancing tumor at the anterior aspect of the surgical resection cavity, measuring 0.9 x 1.3 x 0.9 cm in size.  Continued surveillance recommended.  4.  Linear enhancement in the surgical resection cavity, likely represents postoperative scarring.    MRI brain 12/2/2022:  IMPRESSION:    1. Slight increased size of nonenhancing T2 signal abnormality " along  the posterior aspect of the resection cavity compared to most recent  MR 6/7/2022. T2 signal abnormality along the anterior aspect of the  resection cavity is unchanged going back to 3/7/2022, but remains  increased since postoperative MR 11/13/2021. This could represent  residual/recurrent nonenhancing tumor, although treatment effects  would also be in the differential. No new suspicious intracranial  enhancement with close attention to the operative bed.  2. Otherwise unremarkable MRI of the brain.    IMPRESSION:    1.  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    Since the last visit, he had no seizures. His last seizure was in 2021.  He had 3 seizures in his life time so far. No complaints. He is now off Keppra for one year.    2.  Oligodendroglioma of the left frontal lobe.  He is followed by Dr. Elva Park. He is doing Chemo and Radiation therapy.    PLAN:    1.  Off Keppra.  2.  Follow up with Dr. Park for left frontal oligodendroglioma.  3.  Return to clinic RTC.  If seizure recurs, will restart keppra.       20 min total time was spent on the day of this visit.      10 min was spent on face to face time  10 min was spent on preparation of visit to review charts and labs, ordering medications and tests, and documentation of clinical information

## 2023-07-13 NOTE — PROGRESS NOTES
Virtual Visit Details    Type of service:  Video Visit   Video Start Time: 3:07 PM  Video End Time:3:17 PM    Originating Location (pt. Location): Home    Distant Location (provider location):  Off-site  Platform used for Video Visit: Estuardo

## 2023-07-13 NOTE — NURSING NOTE
PHQ-9 score is 15.     Is the patient currently in the state of [place]? YES    Visit mode:VIDEO    If the visit is dropped, the patient can be reconnected by: VIDEO VISIT: Text to cell phone: [phone]    Will anyone else be joining the visit? NO    How would you like to obtain your AVS? MyChart    Are changes needed to the allergy or medication list? NO    Reason for visit: RECHECK    Medication and allergies have been reviewed.     Wally Cross, VF

## 2023-07-13 NOTE — PATIENT INSTRUCTIONS
Seizure free for 2 years after tumor resection.    PLAN:    1.  Off Keppra.  2.  Follow up with Dr. Park for left frontal oligodendroglioma.  3.  Return to clinic RTC.  If seizure recurs, will restart keppra.

## 2023-07-13 NOTE — PROGRESS NOTES
Social Work - Telephone/MyChart message  [facility]  Data:   Patient Name: Lang Collins Jr.  Goes By: Joss    /Age: 1975 (48 year old)      Referral Source: Lucita Cleary    Reason for Referral: Housing in [place] for Joss and 2 kids    Intervention: Left voice message for patient on 2023.   Plan:  will await patient's return phone call/message and provide assistance at that time.     Summer Felix, SHAYY, LICSW, OSW-C  Clinical - Adult Oncology  She/Her/Hers  Phone: [phone]  [facility]: M, Thu  *every other Tue, 8am-4:30pm  [facility]: W, F, *every other Tue, 8am-4:30pm

## 2023-07-13 NOTE — LETTER
2023       RE: Lang Collins Jr.  : 1975   MRN: [id number]      Dear Colleague,    Thank you for referring your patient, Lang Collins Jr., to the [facility] EPILEPSY CARE at [facility]. Please see a copy of my visit note below.       CHIEF COMPLAINT:  Seizures, likely secondary to brain tumor.    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 Beltrán in 2021.  He is followed by Dr. Elva Park from Neuro[facility]. Since the last visit, he had no seizures. His last seizure was in .  He had 3 seizures in his life time so far. No complaints. He is now off Keppra for one year.    His most recent MRI brain on  showed slight increased size of non-enhancing T2 signal abnormality along the posterior aspect of the resection cavity compared to the MRI on 2022.  This may represent tumor progression. Patient tapered off Keppra in mid - , and he has been seizure-free since 2021.  He had a total of 3 seizures in his lifetime.    According to the patient, his first seizure was 2019 when he suddenly became stiff in the whole body, which lasted 1-2 minutes.  He did not lose consciousness.  He was able to remember everything with no amnesia.  The body stiffening lasted for about 1-2 minutes.  He did not seek medical attention at that time.  In early , he had another one with similar presentation with no amnesia, just body stiffening.  In 2021, he had a generalized tonic-clonic seizure which was witnessed by his girlfriend and his daughters.  He was told that he started with right arm shaking, then his eyes rolled back, he was breathing hard, he was sweating, and he had stiffening of the body and had convulsion of all extremities, which lasted for a few minutes.   "He later had a brain scan done and had a left frontal brain tumor, which was resected in 11/2021 by Dr. Beltrán.    Since the surgery, he has been doing very well with no seizures.  His last seizure was in 08/2021.  He continued Keppra since 08/2021.  He is now on 500 mg twice daily.  She is complaining of some fatigue but no new issues.    TRIGGERS FOR SEIZURES:  Unclear.    RISK FACTORS FOR SEIZURES:  Left frontal WHO grade II oligodendroglioma with IDH1 mutated, ATRX retained, 1p/19q co-deleted.  No history of head trauma with loss of consciousness.  No history of CNS infection.  No history of febrile convulsions.    PAST MEDICAL HISTORY:  1.  Left frontal oligodendroglioma, status post resection.  2.  Right-sided hemiparesis, resolved.  3.  Tension headaches.  4.  Seizures.    PAST SURGICAL HISTORY:  1.  Resection of left frontal oligodendroglioma in 11/2021.  2.  History of hernia surgery.  3.  Tonsillectomy.    ALLERGIES:  No known drug allergies.    MEDICATIONS:  1.  Keppra 500 mg twice daily.  2.  Lisinopril 20 mg daily.    FAMILY HISTORY:  Father had history of brain tumor.    SOCIAL HISTORY:  He recently moved from [place] to [facility] with his mother.  He is living with his fiancee.  He has 5 children.  He has history of tobacco use but he stopped smoking.  He drinks alcohol occasionally.  He uses marijuana occasionally.    PHYSICAL EXAMINATION:      Blood pressure 112/80, height 1.803 m (5' 11\"), weight 63 kg (139 lb).    AAO x 3, speech fluent and appropriate. Hearing normal. No facial weakness.    REVIEW OF SYSTEMS: Negative.    PREVIOUS DIAGNOSTIC TESTING:  MRI scan on 03/07/2022 showed 1 postoperative change consisting of:  1.  Left parietal craniotomy and surgical resection cavity in the high medial left frontal lobe.  2.  Interval resolution of pneumocephalus since the immediate postoperative study.  3.  Probable small residual focus of nonenhancing tumor at the anterior aspect of the " surgical resection cavity, measuring 0.9 x 1.3 x 0.9 cm in size.  Continued surveillance recommended.  4.  Linear enhancement in the surgical resection cavity, likely represents postoperative scarring.    MRI brain 12/2/2022:  IMPRESSION:    1. Slight increased size of nonenhancing T2 signal abnormality along  the posterior aspect of the resection cavity compared to most recent  MR 6/7/2022. T2 signal abnormality along the anterior aspect of the  resection cavity is unchanged going back to 3/7/2022, but remains  increased since postoperative MR 11/13/2021. This could represent  residual/recurrent nonenhancing tumor, although treatment effects  would also be in the differential. No new suspicious intracranial  enhancement with close attention to the operative bed.  2. Otherwise unremarkable MRI of the brain.    IMPRESSION:    1.  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    Since the last visit, he had no seizures. His last seizure was in 2021.  He had 3 seizures in his life time so far. No complaints. He is now off Keppra for one year.    2.  Oligodendroglioma of the left frontal lobe.  He is followed by Dr. Elva Park. He is doing Chemo and Radiation therapy.    PLAN:    1.  Off Keppra.  2.  Follow up with Dr. Park for left frontal oligodendroglioma.  3.  Return to clinic RTC.  If seizure recurs, will restart keppra.       20 min total time was spent on the day of this visit.      10 min was spent on face to face time  10 min was spent on preparation of visit to review charts and labs, ordering medications and tests, and documentation of clinical information          Again, thank you for allowing me to participate in the care of your patient.      Sincerely,    Hayden Panda MD

## 2023-07-14 ENCOUNTER — VIRTUAL VISIT (OUTPATIENT)
Dept: PSYCHIATRY | Facility: CLINIC | Age: 48
End: 2023-07-14
Payer: COMMERCIAL

## 2023-07-14 ENCOUNTER — PATIENT OUTREACH (OUTPATIENT)
Dept: CARE COORDINATION | Facility: CLINIC | Age: 48
End: 2023-07-14

## 2023-07-14 ENCOUNTER — APPOINTMENT (OUTPATIENT)
Dept: RADIATION ONCOLOGY | Facility: CLINIC | Age: 48
End: 2023-07-14
Payer: COMMERCIAL

## 2023-07-14 DIAGNOSIS — F43.23 ADJUSTMENT DISORDER WITH MIXED ANXIETY AND DEPRESSED MOOD: Primary | ICD-10-CM

## 2023-07-14 PROCEDURE — 99214 OFFICE O/P EST MOD 30 MIN: CPT | Mod: VID | Performed by: NURSE PRACTITIONER

## 2023-07-14 PROCEDURE — 77014 PR CT GUIDE FOR PLACEMENT RADIATION THERAPY FIELDS: CPT | Performed by: SURGERY

## 2023-07-14 PROCEDURE — 77386 PR IMRT TREATMENT DELIVERY, COMPLEX: CPT | Performed by: SURGERY

## 2023-07-14 NOTE — Clinical Note
Please call to schedule patient for follow-up in 4 weeks.  Thank you!   TITA Rosado, KARISSA-BC Collaborative Care Psychiatry Service (CCPS) [facility]

## 2023-07-14 NOTE — PROGRESS NOTES
Social Work - Telephone/[employer] message  [facility]  Data:   Patient Name: Lang Collins Jr.  Goes By: Joss    /Age: 1975 (48 year old)      Referral Source: Lucita Cleary    Reason for Referral: Housing in [place] for Joss and 2 kids. SW returning VM.     Intervention: SW left VM 23.   Plan:  will await patient's return phone call/message and provide assistance at that time. SW to coordinate with RNCC about completing Professional Statement of Need for Housing Stabilization Services.     Summer Felix, MSW, LICSW, OSW-C  Clinical - Adult Oncology  She/Her/Hers  Phone: [phone]  [facility]: M, Thu  *every other Tue, 8am-4:30pm  [facility]: W, F, *every other Tue, 8am-4:30pm

## 2023-07-14 NOTE — NURSING NOTE
Is the patient currently in the state of [place]? YES    Visit mode:VIDEO    If the visit is dropped, the patient can be reconnected by: VIDEO VISIT: Text to cell phone: [phone]    Will anyone else be joining the visit? NO      How would you like to obtain your AVS? MyChart    Are changes needed to the allergy or medication list? NO    Reason for visit: RECHECK      Care team has reviewed attendance agreement with patient. Patient advised that two failed appointments within 6 months may lead to termination of current episode of care.      Sallie Ware VF

## 2023-07-14 NOTE — PATIENT INSTRUCTIONS
**For crisis resources, please see the information at the end of this document**     Thank you for coming to the [facility] [place] [facility].    TREATMENT PLAN:  Medications:   INCREASE to sertraline 50 mg every day (after finishing sertraline 25 mg RX). No script needed.  Continue all other medications per primary care / other provider.     Consults / Referrals:   - Continue therapy with established provider as planned.    Follow-up:  Schedule an appointment with me in 4 weeks or sooner as needed.  Call [facility] at [phone] to schedule.  Follow up with primary care provider as planned or sooner if needed for acute medical concerns.  Call the psychiatric nurse line with medication questions or concerns at [phone].  [facility] [employer] may be used to communicate with your provider, but this is not intended to be used for emergencies.    Psychoeducation:  Side effects for SSRI: sexual dysfunction, decreased appetite, increased appetite, nausea, diarrhea, constipation, dry mouth, insomnia, sedation, agitation, tremors, headaches, dizziness, sweating, bruising and rare bleeding, discontinuation syndrome, rare hyponatremia, rare induction of kadi, suicidal ideations, and serotonin syndrome.      Financial Assistance [phone]  [facility] Billing [phone]  Central Billing Office, ealth: [phone]  [facility] Billing [phone]  Medical Records [phone]  [facility] Patient Bill of Rights [email or web address]       MENTAL HEALTH CRISIS RESOURCES:  For a emergency help, please call 911 or go to the nearest Emergency Department.     Emergency Walk-In Options:   EmPATH Unit @ [facility] ([place]): [phone] - Specialized mental health emergency area designed to be calming  [facility] ([place]): [phone]  [facility] ([place]):  [phone]  [facility] ([place]): [phone]    [facility] Crisis Information:   [place]: [phone]  Gerardo: [phone]  Leeann HSASAN) - Adult: [phone]     Child: [phone]  [place] - Adult: [phone]     Child: [phone]  [place]: [phone]  List of all [facility] resources:   https://[place].gov/dhs/people-we-serve/adults/health-care/mental-health/resources/crisis-contacts.jsp    National Crisis Information:   National Suicide & Crisis Lifeline: Call 538        For online chat options, visit https://suicidepreventionlifeline.org/chat/  Poison Control Center: 2-371-813-0621  Poison Control Center: 4-519-514-8904  Trans Lifeline: 1-397.861.1698 - Hotline for transgender people of all ages  The [employer]: 2-472-553-6390 - Hotline for LGBT youth     For Non-Emergency Support:   Fast Tracker: Mental Health & Substance Use Disorder Resources -   https://www.fasttrackermn.org/       Again thank you for choosing [facility] [place] [facility] and please let us know how we can best partner with you to improve you and your family's health.    You may be receiving a survey regarding this appointment. We would love to have your feedback, both positive and negative. The survey is done by an external company, so your answers are anonymous.        Patient Education   Collaborative Care Psychiatry Service  What to Expect  Here's what to expect from your Collaborative Care Psychiatry Service (CCPS).   About CCPS  CCPS means 2 people work together to help you get better. You'll meet with a behavioral health clinician and a psychiatric doctor. A behavioral health clinician helps people with mental health problems by talking with them. A psychiatric doctor helps people by giving them medicine.  How it works  At every visit, you'll see the behavioral health clinician (BHC) first. They'll talk with you about how you're doing and teach you how to feel better.   Then you'll  "see the psychiatric doctor. This doctor can help you deal with troubling thoughts and feelings by giving you medicine. They'll make sure you know the plan for your care.   CCPS usually takes 3 to 6 visits. If you need more visits, we may have you start seeing a different psychiatric doctor for ongoing care.  If you have any questions or concerns, we'll be glad to talk with you.  About visits  Be open  At your visits, please talk openly about your problems. It may feel hard, but it's the best way for us to help you.  Cancelling visits  If you can't come to your visit, please call us right away at [phone]. If you don't cancel at least 24 hours (1 full day) before your visit, that's \"late cancellation.\"  Being late to visits  Being very late is the same as not showing up. You will be a \"no show\" if:  Your appointment starts with a [facility], and you're more than 15 minutes late for a 30-minute (half hour) visit. This will also cancel your appointment with the psychiatric doctor.  Your appointment is with a psychiatric doctor only, and you're more than 15 minutes late for a 30-minute (half hour) visit.  Your appointment is with a psychiatric doctor only, and you're more than 30 minutes late for a 60-minute (full hour) visit.  If you cancel late or don't show up 2 times within 6 months, we may end your care.   Getting help between visits  If you need help between visits, you can call us Monday to Friday from 8 a.m. to 4:30 p.m. at [phone].  Emergency care  Call 911 or go to the nearest emergency department if your life or someone else's life is in danger.  Call 988 anytime to reach the national Suicide and Crisis hotline.  Medicine refills  To refill your medicine, call your pharmacy. You can also call [facility]'s Behavioral Access at [phone], Monday to Friday, 8 a.m. to 4:30 p.m. It can take 1 to 3 business days to get a refill.   Forms, letters, and tests  You may have papers to fill out, like " FMLA, short-term disability, and workability. We can help you with these forms at your visits, but you must have an appointment. You may need more than 1 visit for this, to be in an intensive therapy program, or both.  Before we can give you medicine for ADHD, we may refer you to get tested for it or confirm it another way.  We may not be able to give you an emotional support animal letter.  We don't do mental health checks ordered by the court.   We don't do mental health testing, but we can refer you to get tested.   Thank you for choosing us for your care.  For informational purposes only. Not to replace the advice of your health care provider. Copyright   2022 [facility]. All rights reserved. June Blackbox 681123 - 12/22.

## 2023-07-14 NOTE — PROGRESS NOTES
"Virtual Visit Details    Type of service:  Video Visit   Video Start Time: 2:30 PM  Video End Time:2:47 PM    Originating Location (pt. Location): Home    Distant Location (provider location):  Off-site  Platform used for Video Visit: [employer]        PSYCHIATRIC MEDICATION FOLLOW UP APPT     Name: Lang Collins Jr.   : 1975               Telemedicine Visit: The patient's condition can be safely assessed and treated via synchronous audio and visual telemedicine encounter.      Consent:  The patient/guardian has verbally consented to: the potential risks and benefits of telemedicine (video visit or phone) versus in person care; bill my insurance or make self-payment for services provided; and responsibility for payment of non-covered services.     As the provider I attest to compliance with applicable laws and regulations related to telemedicine.         Source of Referral / Care Team:  Primary Care Provider: Physician Dionne Ref-Primary   Current Psychotherapist: Arturo Salgado PsyD  Case Management: Summer Meng LICSW    The [facility] psychiatry providers act as a specialty service for Primary Care Providers in the [facility] who seek to optimize medications for unstable patients. Once medications have been optimized, [facility] providers discharge the patient back to the referring Primary Care Provider for ongoing medication management. This type of system allows [facility] to serve a high volume of patients.     Patient Identification:  Patient is a 48 year old, single  Black or  Not  or  male  who presents for return visit with me.   Patient prefers to be called: \"Joss\".  Patient is currently unemployed.     Patient attended the session alone.    RECORDS AVAILABLE FOR REVIEW: EHR records through [facility] .       Interim History:  I last saw Lang Collins  for outpatient psychiatry Consultation 2 weeks ago on 23. During that appointment, we restarted " "sertraline 25 mg QD. Patient reports ADHERING to prescribed medications. He reports he was able to tolerate the slower sertraline taper, on 25 mg for ~ 1 week. Does still notice \"a little jittery\" after taking medication in AM for a few minutes, but resolves quickly. He does feel his mood has improved over the last 2 weeks, which he largely attributes to having his children with him. He reports depression has been lower, denies any SI/SIB/HI. His sleep and appetite have also improved, although he does still struggle with daytime fatigue especially after radiation. He feels his anxiety is better as well overall. Does report 1 panic attack that occurred this morning, \"just a lot going on\" bt appointments and medication delivery. He feel his irritability has been much better. Does think his forgetfulness (e.g. unsure where he left keys) remains, but feels \"maybe a little\" clearer.       Initial Impression:   6/28/23: Lang Collins Jr. is a 48 year old Black or , male who presents for initial visit with [facility] for medication management. He denies any recent psychiatric history, reports he previously saw provider and was prescribed ritalin for ADHD as a child, none in many years. No history of psychiatric hospitalizations or suicide attempts. 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 Following surgery, and particularly worsened with recent disease progression and referral for chemotherapy and radiation, patient reports significant anxiety, low mood, changes to memory and forgetfulness. He reports significant depression today. Denies any SI/SIB/HI. No history of kadi or psychosis. Anxiety also very elevated today. Recent trial of zoloft self-discontinued after 1 dose (50 mg) due to side effects. He is open to retrial of medication, but will plan slower taper from low dose to help with side effects. Encouraged continued therapy.  Follow-up with this " "provider in 2 weeks or sooner PRN. Patient agreeable to plan.       Current medications include:   Current Outpatient Medications   Medication Sig     dexamethasone (DECADRON) 2 MG tablet Take 1 tablet (2 mg) by mouth daily (with breakfast)     ondansetron (ZOFRAN) 4 MG tablet Take 1 tablet (4 mg) by mouth At Bedtime Take 30-60 mins before each dose of temozolomide.     SENNA-docusate sodium (SENNA S) 8.6-50 MG tablet Take 1 tablet by mouth 2 times daily     sertraline (ZOLOFT) 25 MG tablet Take 0.5 tablets (12.5 mg) by mouth daily for 7 days, THEN 1 tablet (25 mg) daily for 25 days.     sulfamethoxazole-trimethoprim (BACTRIM DS) 800-160 MG tablet Take 1 tablet by mouth Every Mon, Wed, Fri Morning Begin with the start of radiation therapy.     temozolomide (TEMODAR) 140 MG capsule Take 1 capsule (140 mg) by mouth At Bedtime Daily during radiation. Take a dose of ondansetron 30-60 min before temozolomide. Take on empty stomach.     No current facility-administered medications for this visit.         Side effects: mild jitteriness immediately after taking medication (~15 min)    The [place] Prescription Monitoring Program has been reviewed and there are no concerns about diversionary activity for controlled substances at this time.    Psychiatric ROS:  Lang Collins Jr. reports mood has been: \"I've been alright.\"  Depression has been: depressed mood, appetite change or significant weight loss / gain, fatigue of loss of energy and difficulty concentrating or indecisiveness self rates as \"depressed only bc what I'm going through\" 3-4 /10, where 0 is none at all and 10 being severe depression. Was 10/10 at last appt.  SI/SIB/HI: denies all.   Anxiety has been: excessive worry, difficult to control, restlessness / feeling keyed up,  easily fatigued and  difficulty concentrating or mind going blank  self rates as 4-6/10, where 0 is none at all and 10 being severe anxiety. Was 8/10 at last appt.  Sleep has been: \"been " "alright\", no issues Falling and staying asleep until 5-6A. Doesn't feel rested in AM.   Energy has been: low energy, always tired and wanting to nap during the day but not always able bc busy. Appetite has been: \"eating up a storm, out of nowhere.\" Gained back the weight he lost.  Anni sxs: none  Psychosis sxs: denies  ADHD/ADD sxs: history of ADHD diagnosis as a child  Trauma sx: denies    GAD2 scores were reviewed today: 2     PHQ-9 scores:       6/28/2023     2:11 PM 7/13/2023     2:30 PM   PHQ-9 SCORE   PHQ-9 Total Score MyChart 19 (Moderately severe depression)    PHQ-9 Total Score 19 15       ROSALBA-7 scores:        6/20/2023    12:58 PM   ROSALBA-7 SCORE   Total Score 17 (severe anxiety)   Total Score 17         Current stressors include: Parenting Stress, Financial Difficulties, Occupational Difficulties and medical comorbidity  Coping mechanisms and supports include: Therapy and Family    Vital Signs:   There were no vitals taken for this visit.    Review of Systems:  10 systems (general, cardiovascular, respiratory, eyes, ENT, endocrine, GI, , M/S, neurological) were reviewed. Most pertinent finding(s) is/are: ongoing daily headaches (rates as 3/10) often accompanied by nausea. Denies fever, chest pain / tightness / palpitations, vomiting / diarrhea / constipation, tics / tremors / abnormal muscle mvmts. The remaining systems are all unremarkable.    Labs:  Most recent laboratory results reviewed and the pertinent results include: recent CBC within normal limits but low WBC (3.7), CMP all within normal limits.    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    He has no past medical history of Arthritis, Cerebral infarction (H), Congestive heart failure (H), COPD (chronic obstructive pulmonary disease) (H), Diabetes (H), Heart disease, History of blood transfusion, Thyroid disease, or Uncomplicated asthma.    Medication allergies:  No Known Allergies    Social History:  Birth place: Grew up in [place], [place]  Childhood: Raised by mother, reports high amount of substance use in the family growing up  Siblings: 1 Sister   Highest education level was (requires further evaluation)  Employment Status: not working right now.   Relationship status: single, currently going through breakup  Current Living situation: cousin, and 2 of his daughters (8,9). Feels safe at home.  Children: 5 kids and a grandbaby - 2 typically live in [place], 3 in [place]. (8, 9,15,16, 26)   Firearms/Weapons Access: No: Patient denies   Service: No  Support: recent therapist,     Current Use of Drugs/Alcohol: Marijuana - smoking 4 grams a day, not prescribed. Denies any worsening or change. Very rare alcohol.  Tobacco use: Yes - \"cutting down real well\"    Mental Status Exam:  Alertness: alert  and oriented   Appearance: adequately groomed  Behavior/Demeanor: cooperative, pleasant and calm, with fair eye contact   Speech: normal and regular rate and rhythm  Language: intact and no problems  Psychomotor: normal or unremarkable  Mood: description consistent with euthymia  Affect: full range and appropriate; was congruent to mood; was congruent to content  Thought Process/Associations: unremarkable  Thought Content:  Reports none;  Denies suicidal ideation, violent ideation and delusions  Perception:  Reports none;  Denies auditory hallucinations and visual hallucinations  Insight: intact  Judgment: intact  Cognition: does  appear grossly intact; formal cognitive testing was not done  Recent and Remote Memory: Intact to interview. Not formally assessed. No " amnesia.  Attention Span and Concentration: normal  Fund of Knowledge: appropriate  Gait and Station: unremarkable    Suicide Risk Assessment:  Today Lang Collins Jr. reports no suicidal ideation. In addition, there are notable risk factors for self-harm, including anxiety and comorbid medical condition of oligodengolioma. However, risk is mitigated by commitment to family, absence of past attempts, future oriented, no access to firearms or weapons, identifies reasons to live including his children and denies suicidal intent or plan. Therefore, based on all available evidence including the factors cited above, Lang Collins Jr. does not appear to be at imminent risk for self-harm, does not meet criteria for a 72-hr hold, and therefore remains appropriate for ongoing outpatient level of care.  A thorough assessment of risk factors related to suicide and self-harm have been reviewed and are noted above. The patient convincingly denies suicidality on several occasions. Local community safety resources printed and reviewed for patient to use if needed. There was no deceit detected, and the patient presented in a manner that was believable.     Recommended that patient call 911 or go to the local ED should there be a change in any of these risk factors.    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  Known issue that I take into account for their medical decisions, no current exacerbations or new concerns.    Impression:  Lang Collins Jr. is a 48 year old Black or , male who presents for return visit with  [facility] for medication management. At initial appointment 2 weeks ago, we restarted sertraline 25 mg QD. He reports he was able to tolerate the slower sertraline taper, on 25 mg for ~ 1 week. He reports his mood has improved over the last 2 weeks, which he largely attributes to having his children with him. He reports depression has been lower, denies any SI/SIB/HI. His sleep and appetite have also improved, although he does still struggle with daytime fatigue especially after radiation. He feels his anxiety is better as well overall. He feel his irritability has been much better. Does think his forgetfulness (e.g. unsure where he left keys) remains, but feels \"maybe a little\" clearer. Discussed recommendation to increase to dosing range as tolerated, and will plan to do so once finishes current 25 mg rx. Follow-up with this provider in 1 month. Patient agreeable to plan.     Medication side effects and alternatives were reviewed. Health promotion activities recommended and reviewed today. All questions addressed. Education and counseling completed regarding risks and benefits of medications and psychotherapy options. Recommend therapy for additional support.       Treatment Plan:    INCREASE to sertraline 50 mg every day (after finishing sertraline 25 mg RX). No script needed.    Continue all other medications per primary care / other provider.     Continue therapy with established provider as planned.    Safety plan reviewed. To the Emergency Department as needed or call after hours crisis line at " [phone] or [phone]. [place] Crisis Text Line. Text [place] to 161381 or Suicide LifeLine Chat: suicideprecentrose.org/chat    Schedule an appointment with me in 4 weeks or sooner as needed. Call [facility] at [phone] to schedule.    Follow up with primary care provider as planned or for acute medical concerns.    Call the psychiatric nurse line with medication questions or concerns at [phone].    MyChart may be used to communicate with your provider, but this is not intended to be used for emergencies.    Patient Education:  Medication side effects and alternatives reviewed. Health promotion activities recommended and reviewed today. All questions addressed. Education and counseling completed regarding risks and benefits of medications and psychotherapy options.  Consent provided by patient/guardian  Call the psychiatric nurse line with medication questions or concerns at [phone].  MyChart may be used to communicate with your provider, but this is not intended to be used for emergencies.  [employer] is information for patients.  It is run by the [employer] and it contains information about all disorders, diseases and all medications.      Community Resources:    National Suicide Prevention Lifeline: 982.513.7843 (TTY: 873.942.6425). Call anytime for help.  (www.suicidepreventionlifeline.org)  National [place] on Mental Illness (www.[place].org): 634.670.2067 or 000-332-4442.   Mental Health Association (www.mentalhealth.org): 442.963.5839 or 315-309-0993.  [place] Crisis Text Line: Text [place] to 538945  Suicide LifeLine Chat: suicideMOMENTFACE SRO.org/chat    Administrative Billin minutes spent on the date of the encounter doing chart review and reviewing referral documents, history and exam of patient, documentation and further activities as noted above.    Video Start Time: 2:30 PM  Video End Time:2:47 PM    Patient Status:  [facility]  MD/DO/NP/PA providers offer care a specialty service for Primary Care Providers in the [facility] that seek to optimize psychotropic medications for unstable patients.  Once medications have been optimized, our providers discharge the patient back to the referring Primary Care Provider for ongoing medication management.  This type of system allows our providers to serve a high volume of patients.   Patient will continue to be seen for ongoing consultation and stabilization.    Signed:   Batsheva Wright MSN, APRN, PMHNP-BC  [facility] ([facility]  [facility]

## 2023-07-17 ENCOUNTER — LAB (OUTPATIENT)
Dept: LAB | Facility: CLINIC | Age: 48
End: 2023-07-17
Payer: COMMERCIAL

## 2023-07-17 ENCOUNTER — APPOINTMENT (OUTPATIENT)
Dept: RADIATION ONCOLOGY | Facility: CLINIC | Age: 48
End: 2023-07-17
Payer: COMMERCIAL

## 2023-07-17 ENCOUNTER — DOCUMENTATION ONLY (OUTPATIENT)
Dept: PHARMACY | Facility: CLINIC | Age: 48
End: 2023-07-17

## 2023-07-17 ENCOUNTER — THERAPY VISIT (OUTPATIENT)
Dept: OCCUPATIONAL THERAPY | Facility: CLINIC | Age: 48
End: 2023-07-17
Payer: COMMERCIAL

## 2023-07-17 DIAGNOSIS — T45.1X5A CHEMOTHERAPY-INDUCED NAUSEA AND VOMITING: ICD-10-CM

## 2023-07-17 DIAGNOSIS — R11.2 CHEMOTHERAPY-INDUCED NAUSEA AND VOMITING: ICD-10-CM

## 2023-07-17 DIAGNOSIS — T45.1X5A ANTINEOPLASTIC CHEMOTHERAPY INDUCED PANCYTOPENIA (H): ICD-10-CM

## 2023-07-17 DIAGNOSIS — D61.810 ANTINEOPLASTIC CHEMOTHERAPY INDUCED PANCYTOPENIA (H): ICD-10-CM

## 2023-07-17 DIAGNOSIS — C71.9 OLIGODENDROGLIOMA OF BRAIN (H): ICD-10-CM

## 2023-07-17 DIAGNOSIS — Z78.9 IMPAIRED INSTRUMENTAL ACTIVITIES OF DAILY LIVING (IADL): Primary | ICD-10-CM

## 2023-07-17 LAB
ALBUMIN SERPL BCG-MCNC: 4.7 G/DL (ref 3.5–5.2)
ALP SERPL-CCNC: 72 U/L (ref 40–129)
ALT SERPL W P-5'-P-CCNC: 6 U/L (ref 0–70)
ANION GAP SERPL CALCULATED.3IONS-SCNC: 9 MMOL/L (ref 7–15)
AST SERPL W P-5'-P-CCNC: 18 U/L (ref 0–45)
BASOPHILS # BLD AUTO: 0 10E3/UL (ref 0–0.2)
BASOPHILS NFR BLD AUTO: 0 %
BILIRUB SERPL-MCNC: 0.6 MG/DL
BUN SERPL-MCNC: 19.9 MG/DL (ref 6–20)
CALCIUM SERPL-MCNC: 9.4 MG/DL (ref 8.6–10)
CHLORIDE SERPL-SCNC: 106 MMOL/L (ref 98–107)
CREAT SERPL-MCNC: 1.11 MG/DL (ref 0.67–1.17)
DEPRECATED HCO3 PLAS-SCNC: 27 MMOL/L (ref 22–29)
EOSINOPHIL # BLD AUTO: 0.1 10E3/UL (ref 0–0.7)
EOSINOPHIL NFR BLD AUTO: 2 %
ERYTHROCYTE [DISTWIDTH] IN BLOOD BY AUTOMATED COUNT: 12.1 % (ref 10–15)
GFR SERPL CREATININE-BSD FRML MDRD: 83 ML/MIN/1.73M2
GLUCOSE SERPL-MCNC: 97 MG/DL (ref 70–99)
HCT VFR BLD AUTO: 40.8 % (ref 40–53)
HGB BLD-MCNC: 13.4 G/DL (ref 13.3–17.7)
IMM GRANULOCYTES # BLD: 0 10E3/UL
IMM GRANULOCYTES NFR BLD: 0 %
LYMPHOCYTES # BLD AUTO: 1.3 10E3/UL (ref 0.8–5.3)
LYMPHOCYTES NFR BLD AUTO: 31 %
MCH RBC QN AUTO: 31.2 PG (ref 26.5–33)
MCHC RBC AUTO-ENTMCNC: 32.8 G/DL (ref 31.5–36.5)
MCV RBC AUTO: 95 FL (ref 78–100)
MONOCYTES # BLD AUTO: 0.3 10E3/UL (ref 0–1.3)
MONOCYTES NFR BLD AUTO: 8 %
NEUTROPHILS # BLD AUTO: 2.4 10E3/UL (ref 1.6–8.3)
NEUTROPHILS NFR BLD AUTO: 59 %
NRBC # BLD AUTO: 0 10E3/UL
NRBC BLD AUTO-RTO: 0 /100
PLATELET # BLD AUTO: 250 10E3/UL (ref 150–450)
POTASSIUM SERPL-SCNC: 5 MMOL/L (ref 3.4–5.3)
PROT SERPL-MCNC: 6.8 G/DL (ref 6.4–8.3)
RBC # BLD AUTO: 4.3 10E6/UL (ref 4.4–5.9)
SODIUM SERPL-SCNC: 142 MMOL/L (ref 136–145)
WBC # BLD AUTO: 4 10E3/UL (ref 4–11)

## 2023-07-17 PROCEDURE — 77386 PR IMRT TREATMENT DELIVERY, COMPLEX: CPT | Performed by: RADIOLOGY

## 2023-07-17 PROCEDURE — 80053 COMPREHEN METABOLIC PANEL: CPT

## 2023-07-17 PROCEDURE — 77014 PR CT GUIDE FOR PLACEMENT RADIATION THERAPY FIELDS: CPT | Performed by: RADIOLOGY

## 2023-07-17 PROCEDURE — 97530 THERAPEUTIC ACTIVITIES: CPT | Mod: GO

## 2023-07-17 PROCEDURE — 97165 OT EVAL LOW COMPLEX 30 MIN: CPT | Mod: GO

## 2023-07-17 PROCEDURE — 36415 COLL VENOUS BLD VENIPUNCTURE: CPT

## 2023-07-17 PROCEDURE — 85025 COMPLETE CBC W/AUTO DIFF WBC: CPT

## 2023-07-17 NOTE — PROGRESS NOTES
OCCUPATIONAL THERAPY EVALUATION  Type of Visit: Evaluation    See electronic medical record for Abuse and Falls Screening details.    Subjective   Presenting condition or subjective complaint: Memory, fatigue, stress, concentration,  Date of onset: 06/06/23 (order date)      Occupational Profile: Patient is a 48 year old male who was referred to outpatient occupational therapy at the request of Dr. Aaliyah Chiang for cognitive rehabilitation. 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 MR brain 5/26/23 showing subtle progression of left frontal lobe non-enhancing lesion concerning for cancer progression. Dr. Park recommended initiating chemoradiotherapy with referral to radiation oncology. Patient notes difficulty with focus/concentration, memory, slowed cognitive processing,  cognitive fatigue, challenges with multi-step planning, sequencing and processing, and emotional regulation. He notes these symptoms have been present since his resection, but have been getting significantly worse the past few months. He has proceed with referrals to psychiatry and psychology for worsening anxiety and emotional regulation which has been helping. He also reports caring for his children has given him motivation and purpose. He has a follow up with Dr. Chiang in September to assess his progress.   Relevant medical history:   cancer, depression, high blood pressure, smoking  Dates & types of surgery: Pins and screws in my left finger 1997 rods pen screws in my leg 1994 brain surgery 2022    Prior diagnostic imaging/testing results:       Prior therapy history for the same diagnosis, illness or injury: No      Prior Level of Function   Transfers: Independent  Ambulation: Independent  ADL: Independent  IADL: Driving, Finances, Housekeeping, Meal preparation, Medication management, Work    Living Environment  Social support: With family members   Type of home: Apartment/condo    Stairs to enter the home: Yes 20 Is there a railing: Yes   Ramp: No   Stairs inside the home: No       Help at home: None  Equipment owned:       Employment: No    Hobbies/Interests:      Patient goals for therapy: Be my old self    Pain assessment: headaches in the morning (~15 minutes)     Objective   Fatigue    General Fatigue ADL   Patient notes physical and cognitive fatigue, requiring him to limit/modify activities and/or take more frequent breaks     Cognitive Status Examination  Orientation: Oriented to person, place and time   Level of Consciousness: Altern. patient reports anxiety is improving - motivated by his children and working with therapist  Follows Commands and Answers Questions: 100% of the time  Personal Safety and Judgement: Intact  Memory: reports short-term memory issues such as misplacing belongings, remembering what he told somebody in conversation, names and numbers, remembering what he has done or needs to do next  Attention: Selective attention impaired, difficulty ignoring irrelevant stimuli, Alternating attention impaired, difficulty shifting between tasks, Divided attention impaired, difficulty with simultaneous tasks, Difficulty with dual tasking   Organization/Problem Solving: Reports problems with organization, Prioritizing of info/tasks impaired  Executive Function: Working memory impaired, decreased storage of information for performing tasks, Cognitive flexibility impaired, Planning ability impaired  Patient notes difficulty with focus/concentration, memory, multi-step planning, sequencing and processing, cognitive fatigue, and emotional regulation.    The Brayden Cognitive Assessment (MoCA) is a brief screen of cognitive abilities used to detect cognitive impairments in the domains of visuospatial/executive functioning, naming, memory, attention, language, abstraction, and orientation. A score of 26 or above is considered normal with a total possible score of 30.  18-26 =  mild cognitive impairment, 10-17= moderate cognitive impairment and less than 10= severe cognitive impairment. Results reveal 25/30, with deficits noted in executive functioning, attention, and recall       Memory Questionaire administered which reveals occasional difficulty remembering faces, things he has done, taking medications on time, the date, how to operate something, details of conversation or what happened the day before, and what he just said. He reports frequent difficulty keeping track of where you put things,  appointments, remembering things you have done (ie: lock the door), remembering important details of what you did or what happened, word retrieval, remebering something you read difficulty recalling/retaining information, losing train of thought in conversation, remembering to pass on messages, keeping track of all parts of a task, repeating something he has said to someone, remebering news items, and difficulty keeping track of all parts of a task as he is performing it    VISUAL SKILLS  Visual Acuity: No deficits identified - wears glasses  Visual Field: Appears normal  Visual Attention: Appears normal      SENSATION: UE Sensation WNL    POSTURE:   RANGE OF MOTION: UE AROM WFL  STRENGTH: UE Strength WFL  MUSCLE TONE: WFL  COORDINATION: WFL  BALANCE: WFL    FUNCTIONAL MOBILITY  Assistive Device(s): None  Ambulation: Independent       BED MOBILITY: Independent    TRANSFERS: Independent    BATHING: Independent    UPPER BODY DRESSING: Independent    LOWER BODY DRESSING: Independent    TOILETING: Independent    GROOMING: Independent      EATING/SELF FEEDING: Independent       ACTIVITY TOLERANCE: Pt reports physical and cognitive fatigue and he is required to nap during the day. He notes he experiences greater impact of fatigue on function after radiation treatments    INSTRUMENTAL ACTIVITIES OF DAILY LIVING (IADL): Overall decrease in ease and efficiency in IADL's due to fatigue and challenges with  focus/concentration, memory, and cognitive processing  Meal Planning/Prep: Patient performs meal preparation and grocery shopping independently.  Home/Financial Management: Patient manages household tasks (cleaning, laundry) independently. He notes he lacks initiatve or motivation to complete but able to complete  Communication/Computer Use: Denies limitations  Community Mobility: Patient drives independently without reported limitaitons  Care of Others:- Cares for two daughters and cousin. He states he has improved his motivation, purpose, and overall mood  Work: Patient reports he has had difficulty keeping up with the cognitive demands at work. 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 He reports difficulty with focus, memory, processing, and keeping track of all parts of a task as he is performing it    MFIS (Modified Fatigue Impact Scale). Total score: 64/84. Subscale totals: Physical=24/36; Cognitive 34/40; Psychosocial= 6/8. Cut-off value for MFIS is 38, discriminating between fatigued & non-fatigued. The higher the score the greater the impact of fatigue on activity participation.      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 The following significant findings have been identified: difficulty with focus/concentration, memory, multi-step planning, sequencing and processing, cognitive fatigue, and emotional regulation.These identified deficits interfere with their ability to perform work tasks, household chores, medication management, care  of others and managing appointments/schedule as compared to previous level of function.  Patient benefits from skilled outpatient occupational therapy for education regarding strategies to aide and improve memory and attention/concentration, maximzie functional cognition, and promote independence in ADL/IADL's.    Clinical Decision Making (Complexity):   Assessment of Occupational Performance: 1-3 Performance Deficits  Occupational Performance Limitations: child rearing, health management and maintenance, home establishment and management and work  Clinical Decision Making (Complexity): Low complexity    PLAN OF CARE  Treatment Interventions:   Interventions: Self-Care/Home Management, Therapeutic Activity    Long Term Goals   OT Goal 1  Goal Identifier: Memory and Concentration  Goal Description: Pt will verbalize understanding about strategies to help with improving attention (concentration) and internal and external strategies to help with memory and recall into daily routine for improved ADL/IADL performance  Target Date: 10/14/23  OT Goal 2  Goal Identifier: Executive Function - Planning/Sequencing  Goal Description: Patient will demonstrate the ability to complete moderately complex tasks requiring multi-step planning, sequencing, and problem solving skills with 95% accuracy with strategies prn to improve ease and effieciency in IADL's (household tasks, medications, finances, daily schedule/planning)  Target Date: 10/14/23    Frequency of Treatment: 1x/week  Duration of Treatment: up to 90 days     Recommended Referrals to Other Professionals: none  Education Assessment: Learner/Method: Patient;Listening     Risks and benefits of evaluation/treatment have been explained.   Patient/Family/caregiver agrees with Plan of Care.     Evaluation Time:OT Eval, Low Complexity Minutes (87588): 30    Signing Clinician: Yolanda Parson, YULIYA/L, CNS, CSRS        [facility]                                                                                    OUTPATIENT OCCUPATIONAL THERAPY    PLAN OF TREATMENT FOR OUTPATIENT REHABILITATION   Patient's Last Name, First Name, Lang Garvin    YOB: 1975   Provider's Name   [facility]   Medical Record No.  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                  Referring Provider:  Aaliyah Chiang      Initial Assessment  See Epic Evaluation- 07/17/23

## 2023-07-17 NOTE — PROGRESS NOTES
Oral Chemotherapy Monitoring Program  Lab Follow Up    Reviewed lab results from 7/17/23.        6/16/2023     4:00 PM 7/3/2023    11:00 AM 7/5/2023     1:00 PM 7/10/2023     9:00 AM 7/17/2023    10:00 AM   ORAL CHEMOTHERAPY   Assessment Type Chart Review;New Teach Initial Follow up Lab Monitoring;Chart Review Lab Monitoring;Chart Review Lab Monitoring;Chart Review   Providers Dr. Xavier Park   Clinic Name/Location [facility]   Drug Name Temodar (temozolomide) Temodar (temozolomide) Temodar (temozolomide) Temodar (temozolomide) Temodar (temozolomide)   Dose 140 mg 140 mg 140 mg 140 mg 140 mg   Current Schedule Daily Daily Daily Daily Daily   Cycle Details Continuous for 42 days during XRT Continuous for 42 days during XRT Continuous for 42 days during XRT Continuous for 42 days during XRT Continuous for 42 days during XRT   Start Date of Last Cycle  6/26/2023 6/26/2023 6/26/2023 6/26/2023   Planned next cycle start date 6/26/2023       Doses missed in last 2 weeks  0      Adherence Assessment  Adherent      Adverse Effects  Constipation;Nausea      Nausea  Grade 1      Pharmacist Intervention(nausea)  Yes      Intervention(s)  Patient education      Constipation  Grade 1      Pharmacist Intervention(constipation)  Yes      Intervention(s)  Patient education      Any new drug interactions? No       Is the dose as ordered appropriate for the patient? Yes           Labs:  _  Result Component Current Result Ref Range   Sodium 142 (7/17/2023) 136 - 145 mmol/L     _  Result Component Current Result Ref Range   Potassium 5.0 (7/17/2023) 3.4 - 5.3 mmol/L     _  Result Component Current Result Ref Range   Calcium 9.4 (7/17/2023) 8.6 - 10.0 mg/dL     No results found for Mag within last 30 days.     No results found for Phos within last 30 days.     _  Result Component Current Result Ref Range   Albumin 4.7 (7/17/2023) 3.5 - 5.2 g/dL     _  Result Component  Current Result Ref Range   Urea Nitrogen 19.9 (7/17/2023) 6.0 - 20.0 mg/dL     _  Result Component Current Result Ref Range   Creatinine 1.11 (7/17/2023) 0.67 - 1.17 mg/dL     _  Result Component Current Result Ref Range   AST 18 (7/17/2023) 0 - 45 U/L     _  Result Component Current Result Ref Range   ALT 6 (7/17/2023) 0 - 70 U/L     _  Result Component Current Result Ref Range   Bilirubin Total 0.6 (7/17/2023) <=1.2 mg/dL     _  Result Component Current Result Ref Range   WBC Count 4.0 (7/17/2023) 4.0 - 11.0 10e3/uL     _  Result Component Current Result Ref Range   Hemoglobin 13.4 (7/17/2023) 13.3 - 17.7 g/dL     _  Result Component Current Result Ref Range   Platelet Count 250 (7/17/2023) 150 - 450 10e3/uL     No results found for ANC within last 30 days.     _  Result Component Current Result Ref Range   Absolute Neutrophils 2.4 (7/17/2023) 1.6 - 8.3 10e3/uL        Assessment & Plan:  Results show no concerning abnormalities. Okay to continue temozolomide.    Follow-Up:  1. Has appointment with Cami on 7/21 at 10:00 am   2. Has labs on 7/24/23 at 8:15 am (weekly labs)     Thank you,  Sonja Garcia, Pharmacy Intern   [facility]

## 2023-07-18 ENCOUNTER — APPOINTMENT (OUTPATIENT)
Dept: RADIATION ONCOLOGY | Facility: CLINIC | Age: 48
End: 2023-07-18
Payer: COMMERCIAL

## 2023-07-18 PROCEDURE — 77336 RADIATION PHYSICS CONSULT: CPT | Performed by: RADIOLOGY

## 2023-07-18 PROCEDURE — 77427 RADIATION TX MANAGEMENT X5: CPT | Performed by: RADIOLOGY

## 2023-07-18 PROCEDURE — 77014 PR CT GUIDE FOR PLACEMENT RADIATION THERAPY FIELDS: CPT | Performed by: RADIOLOGY

## 2023-07-18 PROCEDURE — 77386 PR IMRT TREATMENT DELIVERY, COMPLEX: CPT | Performed by: RADIOLOGY

## 2023-07-19 ENCOUNTER — APPOINTMENT (OUTPATIENT)
Dept: RADIATION ONCOLOGY | Facility: CLINIC | Age: 48
End: 2023-07-19
Payer: COMMERCIAL

## 2023-07-19 ENCOUNTER — OFFICE VISIT (OUTPATIENT)
Dept: RADIATION ONCOLOGY | Facility: CLINIC | Age: 48
End: 2023-07-19
Payer: COMMERCIAL

## 2023-07-19 VITALS
OXYGEN SATURATION: 100 % | RESPIRATION RATE: 16 BRPM | TEMPERATURE: 97.9 F | SYSTOLIC BLOOD PRESSURE: 137 MMHG | BODY MASS INDEX: 19.92 KG/M2 | WEIGHT: 142.8 LBS | HEART RATE: 68 BPM | DIASTOLIC BLOOD PRESSURE: 90 MMHG

## 2023-07-19 DIAGNOSIS — C71.9 OLIGODENDROGLIOMA, WHO GRADE II (H): Primary | ICD-10-CM

## 2023-07-19 PROCEDURE — 77014 PR CT GUIDE FOR PLACEMENT RADIATION THERAPY FIELDS: CPT | Performed by: RADIOLOGY

## 2023-07-19 PROCEDURE — 99207 PR DROP WITH A PROCEDURE: CPT | Performed by: RADIOLOGY

## 2023-07-19 PROCEDURE — 77386 PR IMRT TREATMENT DELIVERY, COMPLEX: CPT | Performed by: RADIOLOGY

## 2023-07-19 ASSESSMENT — PAIN SCALES - GENERAL: PAINLEVEL: MILD PAIN (3)

## 2023-07-19 NOTE — PROGRESS NOTES
[facility] PHYSICIANS  SPECIALIZING IN BREAKTHROUGHS  Radiation Oncology    On Treatment Visit Note      Lang Collins Jr.      Date: 2023   MRN: [id number]   : 1975  Diagnosis: Oligodendroglioma      Reason for Visit:  On Radiation Treatment Visit     Treatment Summary to Date  Treatment Site: L frontal brain Current Dose: 2880/5400 cGy Fractions:       Chemotherapy  Chemo concurrent with radx?: Yes  Oncologist: Dr. Park  Drug Name/Frequency 1: Temodar    Subjective:     Headaches are stable.  Denies n/v/new neuro symptoms.    Nursing ROS:   Nutrition Alteration  Diet Type: Patient's Preference  Nutrition Note: Patient reports appetite has improved.  Skin  Skin Reaction: 1 - Faint erythema or dry desquamation (Mild hyperpigmentation to scalp.)  Skin Intervention: Hair loss to radiation site, start Hydrocortisone 1% TID for itching and continue Aquaphor TID.  CNS Alteration  CNS note: Patient alert and oriented x 3, reports mild expressive aphasia at times, Dr. Bear prescribed Decadron 2 mg PRN worsening of expressive aphasia.     Cardiovascular  Respiratory effort: 1 - Normal - without distress        Psychosocial  Psychosocial Note: Patient reports he feels less anxious since his kids have moved in with him. He feels he is eating better and feeling well overall.  Pain Assessment  0-10 Pain Scale: 3  Pain Note: Patient reports mild headaches, reviewed Tylenol PRN.      Objective:   BP (!) 137/90   Pulse 68   Temp 97.9  F (36.6  C) (Oral)   Resp 16   Wt 64.8 kg (142 lb 12.8 oz)   SpO2 100%   BMI 19.92 kg/m    Gen: Appears well, in no acute distress  Skin: No erythema    Labs:  CBC RESULTS: Recent Labs   Lab Test 23  0902   WBC 4.0   RBC 4.30*   HGB 13.4   HCT 40.8   MCV 95   MCH 31.2   MCHC 32.8   RDW 12.1        ELECTROLYTES:  Recent Labs   Lab Test 23  0902      POTASSIUM 5.0   CHLORIDE 106   MARCY 9.4   CO2 27   BUN 19.9   CR 1.11   GLC 97        Assessment:    Tolerating radiation therapy well.  All questions and concerns addressed.    Toxicities:  Alopecia: Grade 1: Hair loss <50% of normal; not obvious from a distance; does not require a wig or hair peice to camoflage  Fatigue: Grade 0:  Headache: Grade 0: No toxicity  Nausea: Grade 0: No toxicity    Plan:   1. Continue current therapy.    2. Chemo per med onc      Mosaiq chart and setup information reviewed  MVCT/IGRT images checked    Medication Review  Med list reviewed with patient?: Yes    Educational Topic Discussed  Additional Instructions: Patient verified he did get a refill of his chemo pills  Education Instructions: Fatigue, Supportive services, skin cares, Nutrition, diet, Decadron PRN.        Yg Bear MD    Please do not send letter to referring physician.

## 2023-07-19 NOTE — LETTER
2023         RE: Lang Collins Jr.  [address]  [place] [address]        Dear Colleague,    Thank you for referring your patient, Lang Collins Jr., to the [facility] [place]. Please see a copy of my visit note below.    [facility] PHYSICIANS  SPECIALIZING IN BREAKTHROUGHS  Radiation Oncology    On Treatment Visit Note      Lang Collins Jr.      Date: 2023   MRN: [id number]   : 1975  Diagnosis: Oligodendroglioma      Reason for Visit:  On Radiation Treatment Visit     Treatment Summary to Date  Treatment Site: L frontal brain Current Dose: 2880/5400 cGy Fractions:       Chemotherapy  Chemo concurrent with radx?: Yes  Oncologist: Dr. Park  Drug Name/Frequency 1: Temodar    Subjective:     Headaches are stable.  Denies n/v/new neuro symptoms.    Nursing ROS:   Nutrition Alteration  Diet Type: Patient's Preference  Nutrition Note: Patient reports appetite has improved.  Skin  Skin Reaction: 1 - Faint erythema or dry desquamation (Mild hyperpigmentation to scalp.)  Skin Intervention: Hair loss to radiation site, start Hydrocortisone 1% TID for itching and continue Aquaphor TID.  CNS Alteration  CNS note: Patient alert and oriented x 3, reports mild expressive aphasia at times, Dr. Bear prescribed Decadron 2 mg PRN worsening of expressive aphasia.     Cardiovascular  Respiratory effort: 1 - Normal - without distress        Psychosocial  Psychosocial Note: Patient reports he feels less anxious since his kids have moved in with him. He feels he is eating better and feeling well overall.  Pain Assessment  0-10 Pain Scale: 3  Pain Note: Patient reports mild headaches, reviewed Tylenol PRN.      Objective:   BP (!) 137/90   Pulse 68   Temp 97.9  F (36.6  C) (Oral)   Resp 16   Wt 64.8 kg (142 lb 12.8 oz)   SpO2 100%   BMI 19.92 kg/m    Gen: Appears well, in no acute distress  Skin: No erythema    Labs:  CBC RESULTS: Recent Labs    Lab Test 07/17/23  0902   WBC 4.0   RBC 4.30*   HGB 13.4   HCT 40.8   MCV 95   MCH 31.2   MCHC 32.8   RDW 12.1        ELECTROLYTES:  Recent Labs   Lab Test 07/17/23  0902      POTASSIUM 5.0   CHLORIDE 106   MARCY 9.4   CO2 27   BUN 19.9   CR 1.11   GLC 97       Assessment:    Tolerating radiation therapy well.  All questions and concerns addressed.    Toxicities:  Alopecia: Grade 1: Hair loss <50% of normal; not obvious from a distance; does not require a wig or hair peice to camoflage  Fatigue: Grade 0:  Headache: Grade 0: No toxicity  Nausea: Grade 0: No toxicity    Plan:   1. Continue current therapy.    2. Chemo per med onc      Mosaiq chart and setup information reviewed  MVCT/IGRT images checked    Medication Review  Med list reviewed with patient?: Yes    Educational Topic Discussed  Additional Instructions: Patient verified he did get a refill of his chemo pills  Education Instructions: Fatigue, Supportive services, skin cares, Nutrition, diet, Decadron PRN.        Yg Bear MD    Please do not send letter to referring physician.          Again, thank you for allowing me to participate in the care of your patient.        Sincerely,        JOSSE Bear MD

## 2023-07-20 ENCOUNTER — APPOINTMENT (OUTPATIENT)
Dept: RADIATION ONCOLOGY | Facility: CLINIC | Age: 48
End: 2023-07-20
Payer: COMMERCIAL

## 2023-07-20 PROCEDURE — 77386 PR IMRT TREATMENT DELIVERY, COMPLEX: CPT | Performed by: SURGERY

## 2023-07-20 PROCEDURE — 77014 PR CT GUIDE FOR PLACEMENT RADIATION THERAPY FIELDS: CPT | Performed by: SURGERY

## 2023-07-21 ENCOUNTER — VIRTUAL VISIT (OUTPATIENT)
Dept: ONCOLOGY | Facility: CLINIC | Age: 48
End: 2023-07-21
Attending: NURSE PRACTITIONER
Payer: COMMERCIAL

## 2023-07-21 ENCOUNTER — APPOINTMENT (OUTPATIENT)
Dept: RADIATION ONCOLOGY | Facility: CLINIC | Age: 48
End: 2023-07-21
Payer: COMMERCIAL

## 2023-07-21 VITALS — HEIGHT: 71 IN | BODY MASS INDEX: 19.99 KG/M2 | WEIGHT: 142.8 LBS

## 2023-07-21 DIAGNOSIS — C71.9 OLIGODENDROGLIOMA, WHO GRADE II (H): ICD-10-CM

## 2023-07-21 PROCEDURE — 99214 OFFICE O/P EST MOD 30 MIN: CPT | Mod: VID | Performed by: NURSE PRACTITIONER

## 2023-07-21 PROCEDURE — 77386 PR IMRT TREATMENT DELIVERY, COMPLEX: CPT | Performed by: SURGERY

## 2023-07-21 PROCEDURE — 77014 PR CT GUIDE FOR PLACEMENT RADIATION THERAPY FIELDS: CPT | Performed by: SURGERY

## 2023-07-21 RX ORDER — SULFAMETHOXAZOLE/TRIMETHOPRIM 800-160 MG
1 TABLET ORAL
Qty: 18 TABLET | Refills: 0 | Status: SHIPPED | OUTPATIENT
Start: 2023-07-21 | End: 2023-08-11

## 2023-07-21 RX ORDER — ONDANSETRON 4 MG/1
4 TABLET, FILM COATED ORAL AT BEDTIME
Qty: 30 TABLET | Refills: 3 | Status: SHIPPED | OUTPATIENT
Start: 2023-07-21 | End: 2023-10-17

## 2023-07-21 ASSESSMENT — PAIN SCALES - GENERAL: PAINLEVEL: MILD PAIN (3)

## 2023-07-21 NOTE — LETTER
"    7/21/2023         RE: Lang Collins Jr.  [address]  [facility] [address]        Dear Colleague,    Thank you for referring your patient, Lang Collins Jr., to the [facility]. Please see a copy of my visit note below.    Virtual Visit Details    Type of service:  Video Visit   Video Start Time: 10:00 AM  Video End Time:10:20 AM    Originating Location (pt. Location): Home  Distant Location (provider location):  On-site  Platform used for Video Visit: [facility]     NEURO-ONCOLOGY VISIT  Jul 21, 2023    CHIEF COMPLAINT: Mr. 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     He is currently managed on imaging surveillance. However, imaging in 12/2022 showed a subtle change concerning for cancer progression. On close interval imaging, slow progression has been identified again today. As a result, the recommendation is to initiate chemoradiotherapy.     He is seen today unaccompanied via virtual visit.    HISTORY OF PRESENT ILLNESS:  -He reports that he is tolerating chemoradiation well overall, and that it is \"not as bad\" as he thought it would be.  -He does report fatigue is his primary symptom.  -He is also experiencing mild nausea.  He does occasionally wake up in the morning feeling nauseated, and on those days he will take an extra dose of ondansetron with good relief.  -He continues to have a daily bowel movement, but his stools are hard.  -Denies any new headaches, dizziness or vision changes Although he did lose his glasses and needs a new pair.  He continues to have an occasional morning headache.  -He is off dexamethasone.  -He denies activities concerning for seizures.  -Reports that his right-sided weakness is improving.  He has not had any falls.  -He reports that his mood is good.  He is tolerating 25 mg of " Zoloft well, and plans to increase to 50 mg.  He is finding working with a psychologist to be helpful.  -He also feels his mood is improved because his children (ages 8 and 9) are with him and they help to give him motivation.    MEDICATIONS   Current Outpatient Medications   Medication Sig Dispense Refill     dexamethasone (DECADRON) 2 MG tablet Take 1 tablet (2 mg) by mouth daily (with breakfast) 90 tablet 3     ondansetron (ZOFRAN) 4 MG tablet Take 1 tablet (4 mg) by mouth At Bedtime Take 30-60 mins before each dose of temozolomide. 30 tablet 3     SENNA-docusate sodium (SENNA S) 8.6-50 MG tablet Take 1 tablet by mouth 2 times daily 60 tablet 2     sertraline (ZOLOFT) 25 MG tablet Take 0.5 tablets (12.5 mg) by mouth daily for 7 days, THEN 1 tablet (25 mg) daily for 25 days. 30 tablet 0     sulfamethoxazole-trimethoprim (BACTRIM DS) 800-160 MG tablet Take 1 tablet by mouth Every Mon, Wed, Fri Morning Begin with the start of radiation therapy. 18 tablet 0     temozolomide (TEMODAR) 140 MG capsule Take 1 capsule (140 mg) by mouth At Bedtime Daily during radiation. Take a dose of ondansetron 30-60 min before temozolomide. Take on empty stomach. 42 capsule 0     DRUG ALLERGIES No Known Allergies     IMMUNIZATIONS   Immunization History   Administered Date(s) Administered     COVID-19 MONOVALENT 12+ (Pfizer) 11/05/2021     TDAP (Adacel,Boostrix) 12/03/2021       ONCOLOGIC HISTORY  -8/8/2021 PRESENTATION: New onset seizure; generalized event. Started on Keppra.  -8/2021 MR brain imaging with a 2.1cm left frontal non-enhancing mass in the superior frontal gyrus in the expected region of the supplementary motor area.  -9/8/2021 MR brain imaging with no significant change in the nonenhancing T2 hyperintense mass.  -11/12/21 SURGERY: Left frontal-parietal craniotomy for mass resection.   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  Post-operative imaging with  "interval left frontal craniotomy for resection of abnormal lesion within the left parafalcine frontal lobe.  Hemiparesis, discharged to ARU.  -12/7/2021 NEURO-ONC: Recommending imaging surveillance given low risk factors and need to continue to focus on ongoing rehab. Referral to Dr. Chiang for optimizing rehab. Referral to [facility]/ epilepsy for seizure risk management in relation to future employment. Trial of flexeril 10mg at bedtime for headaches.   -3/8/2022 NEURO-ONC/ MRB: Clinically well; improved from prior. Restarting Keppra; second referral to [facility]. Imaging with post-surgical changes. Continue imaging surveillance.   -6/7/2022 NEURO-ONC/ MRB: Clinically well. Imaging with continued healing post-surgery. Continue imaging surveillance.   -12/6/2022 NEURO-ONC/ MRB: Clinically with no new/ progressive neurological symptoms. Imaging with a subtle increase in T2 FLAIR about the posterior aspect of the resection cavity. Continue imaging surveillance at a shortened interval of 3 months.   -2/28/2023 NEURO-ONC/ MRB: Clinically with no new/ progressive neurological symptoms. Imaging largely stable, but close interval imaging was recommended.   -5/30/2023 NEURO-ONC/ MRB: Worsened mood, concentration; starting Zoloft. Imaging with subtle progression noted when comparing to imaging 6 months ago; recommending chemoradiotherapy with referral to radiation oncology. Social work involvement. Primary care referral for management of hypertension.   -6/16/2023 NEURO-ONC: Started Zoloft, stopped r/t feeling jittery. Imaging with subtle progression noted when comparing to imaging 6 months ago; planned start date is 6/27 for chemoradiotherapy. Ongoing social work involvement.   -7/21/2023 NEURO-ONC/ CHEMORADS: Tolerating chemoradiation well.  Tolerating Zoloft with improved mood.  Continues to follow with psychology and social work.    PHYSICAL EXAMINATION  Ht 1.803 m (5' 11\")   Wt 64.8 kg (142 lb 12.8 oz)   BMI 19.92 kg/m     Wt " "Readings from Last 2 Encounters:   07/21/23 64.8 kg (142 lb 12.8 oz)   07/19/23 64.8 kg (142 lb 12.8 oz)      Ht Readings from Last 2 Encounters:   07/21/23 1.803 m (5' 11\")   07/13/23 1.803 m (5' 11\")     KPS: 100    -Generally well appearing.  -Respiratory: Speaking in full fluid sentences with no shortness of breath or wheezing noted.  -Psychiatric: Normal mood and affect. Pleasant, talkative.  -Neurologic:   MENTAL STATUS:     Recall: Intact.    Speech fluent.      CRANIAL NERVES:     Pupils are equal, round.    Symmetric facial movements.   Hearing intact.    The remainder of the physical exam is deferred due to video health medium.    MEDICAL RECORDS  Personally reviewed; [employer] messages, social work notes, neurology follow-up, radiation notes.    LABS  Personally reviewed all available lab results from 7/17/2023.    IMAGING  No new imaging to review today.    IMPRESSION  As above, Joss reports that he is doing well overall, and that treatment is not as difficult as he was expecting it to be.  He is tolerating chemoradiation with expected fatigue, and some mild nausea and constipation, both of which are helped with supportive medications.    His mood has improved and he is less anxious today during the phone call.  He is tolerating Zoloft at the lower dose very well, and plans to increase to 50 mg.  He is following up with psychology, and has good family as well as social work support.  He is very much enjoying having his children with him.    He denies any new or worsening neurologic changes.  He denies any concerns for seizure activity.    We again review the anticipated side effects of this treatment were discussed today including, but not limited to, fatigue, nausea, and constipation. Any AST/ ALT elevations are typically reversible. The combination therapy can result in bone marrow suppression; leukopenia and thrombocytopenia.  He will follow-up with me at the end of radiation, and his appointment is " already scheduled for 8/11/2023.    Recall that his blood pressure was elevated at my last visit, and on several visits to radiation therapy.  It has since improved.  He establish care with a primary care provider on 6/22/2023 to help address his elevated blood pressures..    PROBLEM LIST  Oligodendroglioma  Hemiparesis, right  Tension headache  Seizure  Fatigue  Hypertension  Depressed mood, anxiety, irritability    PLAN  -CANCER-DIRECTED THERAPY-  -He started of chemoradiation on 6/27/2023 and is tolerating this well.    -He will continue supportive medications for nausea and constipation.  -He will continue intensive laboratory monitoring.    -STEROIDS-  -Currently off dexamethasone.    -SEIZURE MANAGEMENT-  -Off Keppra.   -Following with Dr. Panda.      -Quality of life/ MOOD/ HEADACHE/ FATIGUE-  -Worsened mood. Started Zoloft at 50mg/ day and developed jittery feeling.  Discussed he could potentially try 25mg dose.  Will also be seeing psychology and PCP.    -SW continued involvement as financial and work stressors present.     -HEMIPARESIS-  -Following with Dr. Chiang.     -HYPERTENSION-  -Primary care referral for management.    -TOBACCO DEPENDENCE-  -Encouraged continued abstinence from smoking.    -CRAMPING-  -Encouraged continued hydration as well as electrolyte supplementation.  -Will monitor closely.    Return to clinic at the end of radiation with me as scheduled.  He will call sooner with any concerns.    TITA Murray, CNP          Again, thank you for allowing me to participate in the care of your patient.        Sincerely,        TITA Crystal CNP

## 2023-07-21 NOTE — LETTER
"    7/21/2023         RE: Lang Collins Jr.  [address]  [facility] [address]        Dear Colleague,    Thank you for referring your patient, Lang Collins Jr., to the [facility]. Please see a copy of my visit note below.    Virtual Visit Details    Type of service:  Video Visit   Video Start Time: 10:00 AM  Video End Time:10:20 AM    Originating Location (pt. Location): Home  Distant Location (provider location):  On-site  Platform used for Video Visit: [facility]     NEURO-ONCOLOGY VISIT  Jul 21, 2023    CHIEF COMPLAINT: Mr. 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     He is currently managed on imaging surveillance. However, imaging in 12/2022 showed a subtle change concerning for cancer progression. On close interval imaging, slow progression has been identified again today. As a result, the recommendation is to initiate chemoradiotherapy.     He is seen today unaccompanied via virtual visit.    HISTORY OF PRESENT ILLNESS:  -He reports that he is tolerating chemoradiation well overall, and that it is \"not as bad\" as he thought it would be.  -He does report fatigue is his primary symptom.  -He is also experiencing mild nausea.  He does occasionally wake up in the morning feeling nauseated, and on those days he will take an extra dose of ondansetron with good relief.  -He continues to have a daily bowel movement, but his stools are hard.  -Denies any new headaches, dizziness or vision changes Although he did lose his glasses and needs a new pair.  He continues to have an occasional morning headache.  -He is off dexamethasone.  -He denies activities concerning for seizures.  -Reports that his right-sided weakness is improving.  He has not had any falls.  -He reports that his mood is good.  He is tolerating 25 mg of " Zoloft well, and plans to increase to 50 mg.  He is finding working with a psychologist to be helpful.  -He also feels his mood is improved because his children (ages 8 and 9) are with him and they help to give him motivation.    MEDICATIONS   Current Outpatient Medications   Medication Sig Dispense Refill     dexamethasone (DECADRON) 2 MG tablet Take 1 tablet (2 mg) by mouth daily (with breakfast) 90 tablet 3     ondansetron (ZOFRAN) 4 MG tablet Take 1 tablet (4 mg) by mouth At Bedtime Take 30-60 mins before each dose of temozolomide. 30 tablet 3     SENNA-docusate sodium (SENNA S) 8.6-50 MG tablet Take 1 tablet by mouth 2 times daily 60 tablet 2     sertraline (ZOLOFT) 25 MG tablet Take 0.5 tablets (12.5 mg) by mouth daily for 7 days, THEN 1 tablet (25 mg) daily for 25 days. 30 tablet 0     sulfamethoxazole-trimethoprim (BACTRIM DS) 800-160 MG tablet Take 1 tablet by mouth Every Mon, Wed, Fri Morning Begin with the start of radiation therapy. 18 tablet 0     temozolomide (TEMODAR) 140 MG capsule Take 1 capsule (140 mg) by mouth At Bedtime Daily during radiation. Take a dose of ondansetron 30-60 min before temozolomide. Take on empty stomach. 42 capsule 0     DRUG ALLERGIES No Known Allergies     IMMUNIZATIONS   Immunization History   Administered Date(s) Administered     COVID-19 MONOVALENT 12+ (Pfizer) 11/05/2021     TDAP (Adacel,Boostrix) 12/03/2021       ONCOLOGIC HISTORY  -8/8/2021 PRESENTATION: New onset seizure; generalized event. Started on Keppra.  -8/2021 MR brain imaging with a 2.1cm left frontal non-enhancing mass in the superior frontal gyrus in the expected region of the supplementary motor area.  -9/8/2021 MR brain imaging with no significant change in the nonenhancing T2 hyperintense mass.  -11/12/21 SURGERY: Left frontal-parietal craniotomy for mass resection.   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  Post-operative imaging with  "interval left frontal craniotomy for resection of abnormal lesion within the left parafalcine frontal lobe.  Hemiparesis, discharged to ARU.  -12/7/2021 NEURO-ONC: Recommending imaging surveillance given low risk factors and need to continue to focus on ongoing rehab. Referral to Dr. Chiang for optimizing rehab. Referral to [facility]/ epilepsy for seizure risk management in relation to future employment. Trial of flexeril 10mg at bedtime for headaches.   -3/8/2022 NEURO-ONC/ MRB: Clinically well; improved from prior. Restarting Keppra; second referral to [facility]. Imaging with post-surgical changes. Continue imaging surveillance.   -6/7/2022 NEURO-ONC/ MRB: Clinically well. Imaging with continued healing post-surgery. Continue imaging surveillance.   -12/6/2022 NEURO-ONC/ MRB: Clinically with no new/ progressive neurological symptoms. Imaging with a subtle increase in T2 FLAIR about the posterior aspect of the resection cavity. Continue imaging surveillance at a shortened interval of 3 months.   -2/28/2023 NEURO-ONC/ MRB: Clinically with no new/ progressive neurological symptoms. Imaging largely stable, but close interval imaging was recommended.   -5/30/2023 NEURO-ONC/ MRB: Worsened mood, concentration; starting Zoloft. Imaging with subtle progression noted when comparing to imaging 6 months ago; recommending chemoradiotherapy with referral to radiation oncology. Social work involvement. Primary care referral for management of hypertension.   -6/16/2023 NEURO-ONC: Started Zoloft, stopped r/t feeling jittery. Imaging with subtle progression noted when comparing to imaging 6 months ago; planned start date is 6/27 for chemoradiotherapy. Ongoing social work involvement.   -7/21/2023 NEURO-ONC/ CHEMORADS: Tolerating chemoradiation well.  Tolerating Zoloft with improved mood.  Continues to follow with psychology and social work.    PHYSICAL EXAMINATION  Ht 1.803 m (5' 11\")   Wt 64.8 kg (142 lb 12.8 oz)   BMI 19.92 kg/m     Wt " "Readings from Last 2 Encounters:   07/21/23 64.8 kg (142 lb 12.8 oz)   07/19/23 64.8 kg (142 lb 12.8 oz)      Ht Readings from Last 2 Encounters:   07/21/23 1.803 m (5' 11\")   07/13/23 1.803 m (5' 11\")     KPS: 100    -Generally well appearing.  -Respiratory: Speaking in full fluid sentences with no shortness of breath or wheezing noted.  -Psychiatric: Normal mood and affect. Pleasant, talkative.  -Neurologic:   MENTAL STATUS:     Recall: Intact.    Speech fluent.      CRANIAL NERVES:     Pupils are equal, round.    Symmetric facial movements.   Hearing intact.    The remainder of the physical exam is deferred due to video health medium.    MEDICAL RECORDS  Personally reviewed; [employer] messages, social work notes, neurology follow-up, radiation notes.    LABS  Personally reviewed all available lab results from 7/17/2023.    IMAGING  No new imaging to review today.    IMPRESSION  As above, Joss reports that he is doing well overall, and that treatment is not as difficult as he was expecting it to be.  He is tolerating chemoradiation with expected fatigue, and some mild nausea and constipation, both of which are helped with supportive medications.    His mood has improved and he is less anxious today during the phone call.  He is tolerating Zoloft at the lower dose very well, and plans to increase to 50 mg.  He is following up with psychology, and has good family as well as social work support.  He is very much enjoying having his children with him.    He denies any new or worsening neurologic changes.  He denies any concerns for seizure activity.    We again review the anticipated side effects of this treatment were discussed today including, but not limited to, fatigue, nausea, and constipation. Any AST/ ALT elevations are typically reversible. The combination therapy can result in bone marrow suppression; leukopenia and thrombocytopenia.  He will follow-up with me at the end of radiation, and his appointment is " already scheduled for 8/11/2023.    Recall that his blood pressure was elevated at my last visit, and on several visits to radiation therapy.  It has since improved.  He establish care with a primary care provider on 6/22/2023 to help address his elevated blood pressures..    PROBLEM LIST  Oligodendroglioma  Hemiparesis, right  Tension headache  Seizure  Fatigue  Hypertension  Depressed mood, anxiety, irritability    PLAN  -CANCER-DIRECTED THERAPY-  -He started of chemoradiation on 6/27/2023 and is tolerating this well.    -He will continue supportive medications for nausea and constipation.  -He will continue intensive laboratory monitoring.    -STEROIDS-  -Currently off dexamethasone.    -SEIZURE MANAGEMENT-  -Off Keppra.   -Following with Dr. Panda.      -Quality of life/ MOOD/ HEADACHE/ FATIGUE-  -Worsened mood. Started Zoloft at 50mg/ day and developed jittery feeling.  Discussed he could potentially try 25mg dose.  Will also be seeing psychology and PCP.    -SW continued involvement as financial and work stressors present.     -HEMIPARESIS-  -Following with Dr. Chiang.     -HYPERTENSION-  -Primary care referral for management.    -TOBACCO DEPENDENCE-  -Encouraged continued abstinence from smoking.    -CRAMPING-  -Encouraged continued hydration as well as electrolyte supplementation.  -Will monitor closely.    Return to clinic at the end of radiation with me as scheduled.  He will call sooner with any concerns.    TITA Murray, CNP          Again, thank you for allowing me to participate in the care of your patient.        Sincerely,        TITA Crystal CNP

## 2023-07-21 NOTE — PROGRESS NOTES
"Virtual Visit Details    Type of service:  Video Visit   Video Start Time: 10:00 AM  Video End Time:10:20 AM    Originating Location (pt. Location): Home  Distant Location (provider location):  On-site  Platform used for Video Visit: [facility]     NEURO-ONCOLOGY VISIT  Jul 21, 2023    CHIEF COMPLAINT: Mr. 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     He is currently managed on imaging surveillance. However, imaging in 12/2022 showed a subtle change concerning for cancer progression. On close interval imaging, slow progression has been identified again today. As a result, the recommendation is to initiate chemoradiotherapy.     He is seen today unaccompanied via virtual visit.    HISTORY OF PRESENT ILLNESS:  -He reports that he is tolerating chemoradiation well overall, and that it is \"not as bad\" as he thought it would be.  -He does report fatigue is his primary symptom.  -He is also experiencing mild nausea.  He does occasionally wake up in the morning feeling nauseated, and on those days he will take an extra dose of ondansetron with good relief.  -He continues to have a daily bowel movement, but his stools are hard.  -Denies any new headaches, dizziness or vision changes Although he did lose his glasses and needs a new pair.  He continues to have an occasional morning headache.  -He is off dexamethasone.  -He denies activities concerning for seizures.  -Reports that his right-sided weakness is improving.  He has not had any falls.  -He reports that his mood is good.  He is tolerating 25 mg of Zoloft well, and plans to increase to 50 mg.  He is finding working with a psychologist to be helpful.  -He also feels his mood is improved because his children (ages 8 and 9) are with him and they help to give him motivation.    MEDICATIONS   Current Outpatient " Medications   Medication Sig Dispense Refill     dexamethasone (DECADRON) 2 MG tablet Take 1 tablet (2 mg) by mouth daily (with breakfast) 90 tablet 3     ondansetron (ZOFRAN) 4 MG tablet Take 1 tablet (4 mg) by mouth At Bedtime Take 30-60 mins before each dose of temozolomide. 30 tablet 3     SENNA-docusate sodium (SENNA S) 8.6-50 MG tablet Take 1 tablet by mouth 2 times daily 60 tablet 2     sertraline (ZOLOFT) 25 MG tablet Take 0.5 tablets (12.5 mg) by mouth daily for 7 days, THEN 1 tablet (25 mg) daily for 25 days. 30 tablet 0     sulfamethoxazole-trimethoprim (BACTRIM DS) 800-160 MG tablet Take 1 tablet by mouth Every Mon, Wed, Fri Morning Begin with the start of radiation therapy. 18 tablet 0     temozolomide (TEMODAR) 140 MG capsule Take 1 capsule (140 mg) by mouth At Bedtime Daily during radiation. Take a dose of ondansetron 30-60 min before temozolomide. Take on empty stomach. 42 capsule 0     DRUG ALLERGIES No Known Allergies     IMMUNIZATIONS   Immunization History   Administered Date(s) Administered     COVID-19 MONOVALENT 12+ (Pfizer) 11/05/2021     TDAP (Adacel,Boostrix) 12/03/2021       ONCOLOGIC HISTORY  -8/8/2021 PRESENTATION: New onset seizure; generalized event. Started on Keppra.  -8/2021 MR brain imaging with a 2.1cm left frontal non-enhancing mass in the superior frontal gyrus in the expected region of the supplementary motor area.  -9/8/2021 MR brain imaging with no significant change in the nonenhancing T2 hyperintense mass.  -11/12/21 SURGERY: Left frontal-parietal craniotomy for mass resection.   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  Post-operative imaging with interval left frontal craniotomy for resection of abnormal lesion within the left parafalcine frontal lobe.  Hemiparesis, discharged to ARU.  -12/7/2021 NEURO-ONC: Recommending imaging surveillance given low risk factors and need to continue to focus on ongoing  "rehab. Referral to Dr. Chiang for optimizing rehab. Referral to [facility]/ epilepsy for seizure risk management in relation to future employment. Trial of flexeril 10mg at bedtime for headaches.   -3/8/2022 NEURO-ONC/ MRB: Clinically well; improved from prior. Restarting Keppra; second referral to [facility]. Imaging with post-surgical changes. Continue imaging surveillance.   -6/7/2022 NEURO-ONC/ MRB: Clinically well. Imaging with continued healing post-surgery. Continue imaging surveillance.   -12/6/2022 NEURO-ONC/ MRB: Clinically with no new/ progressive neurological symptoms. Imaging with a subtle increase in T2 FLAIR about the posterior aspect of the resection cavity. Continue imaging surveillance at a shortened interval of 3 months.   -2/28/2023 NEURO-ONC/ MRB: Clinically with no new/ progressive neurological symptoms. Imaging largely stable, but close interval imaging was recommended.   -5/30/2023 NEURO-ONC/ MRB: Worsened mood, concentration; starting Zoloft. Imaging with subtle progression noted when comparing to imaging 6 months ago; recommending chemoradiotherapy with referral to radiation oncology. Social work involvement. Primary care referral for management of hypertension.   -6/16/2023 NEURO-ONC: Started Zoloft, stopped r/t feeling jittery. Imaging with subtle progression noted when comparing to imaging 6 months ago; planned start date is 6/27 for chemoradiotherapy. Ongoing social work involvement.   -7/21/2023 NEURO-ONC/ CHEMORADS: Tolerating chemoradiation well.  Tolerating Zoloft with improved mood.  Continues to follow with psychology and social work.    PHYSICAL EXAMINATION  Ht 1.803 m (5' 11\")   Wt 64.8 kg (142 lb 12.8 oz)   BMI 19.92 kg/m     Wt Readings from Last 2 Encounters:   07/21/23 64.8 kg (142 lb 12.8 oz)   07/19/23 64.8 kg (142 lb 12.8 oz)      Ht Readings from Last 2 Encounters:   07/21/23 1.803 m (5' 11\")   07/13/23 1.803 m (5' 11\")     KPS: 100    -Generally well appearing.  -Respiratory: " Speaking in full fluid sentences with no shortness of breath or wheezing noted.  -Psychiatric: Normal mood and affect. Pleasant, talkative.  -Neurologic:   MENTAL STATUS:     Recall: Intact.    Speech fluent.      CRANIAL NERVES:     Pupils are equal, round.    Symmetric facial movements.   Hearing intact.    The remainder of the physical exam is deferred due to video health medium.    MEDICAL RECORDS  Personally reviewed; [employer], social work notes, neurology follow-up, radiation notes.    LABS  Personally reviewed all available lab results from 7/17/2023.    IMAGING  No new imaging to review today.    IMPRESSION  As above, Joss reports that he is doing well overall, and that treatment is not as difficult as he was expecting it to be.  He is tolerating chemoradiation with expected fatigue, and some mild nausea and constipation, both of which are helped with supportive medications.    His mood has improved and he is less anxious today during the phone call.  He is tolerating Zoloft at the lower dose very well, and plans to increase to 50 mg.  He is following up with psychology, and has good family as well as social work support.  He is very much enjoying having his children with him.    He denies any new or worsening neurologic changes.  He denies any concerns for seizure activity.    We again review the anticipated side effects of this treatment were discussed today including, but not limited to, fatigue, nausea, and constipation. Any AST/ ALT elevations are typically reversible. The combination therapy can result in bone marrow suppression; leukopenia and thrombocytopenia.  He will follow-up with me at the end of radiation, and his appointment is already scheduled for 8/11/2023.    Recall that his blood pressure was elevated at my last visit, and on several visits to radiation therapy.  It has since improved.  He establish care with a primary care provider on 6/22/2023 to help address his elevated blood  pressures..    PROBLEM LIST  Oligodendroglioma  Hemiparesis, right  Tension headache  Seizure  Fatigue  Hypertension  Depressed mood, anxiety, irritability    PLAN  -CANCER-DIRECTED THERAPY-  -He started of chemoradiation on 6/27/2023 and is tolerating this well.    -He will continue supportive medications for nausea and constipation.  -He will continue intensive laboratory monitoring.    -STEROIDS-  -Currently off dexamethasone.    -SEIZURE MANAGEMENT-  -Off Keppra.   -Following with Dr. Panda.      -Quality of life/ MOOD/ HEADACHE/ FATIGUE-  -Worsened mood. Started Zoloft at 50mg/ day and developed jittery feeling.  Discussed he could potentially try 25mg dose.  Will also be seeing psychology and PCP.    -SW continued involvement as financial and work stressors present.     -HEMIPARESIS-  -Following with Dr. Chiang.     -HYPERTENSION-  -Primary care referral for management.    -TOBACCO DEPENDENCE-  -Encouraged continued abstinence from smoking.    -CRAMPING-  -Encouraged continued hydration as well as electrolyte supplementation.  -Will monitor closely.    Return to clinic at the end of radiation with me as scheduled.  He will call sooner with any concerns.    Cami Redding, TITA, CNP

## 2023-07-21 NOTE — NURSING NOTE
Is the patient currently in the state of [place]? YES    Visit mode:VIDEO    If the visit is dropped, the patient can be reconnected by: VIDEO VISIT: Text to cell phone: [phone]    Will anyone else be joining the visit? NO      How would you like to obtain your AVS? MyChart    Are changes needed to the allergy or medication list? NO     Patient declined individual allergy and medication review by support staff because no changes since review on 7/19.    Jodie OHARA    Reason for visit: RECHECK

## 2023-07-24 ENCOUNTER — DOCUMENTATION ONLY (OUTPATIENT)
Dept: PHARMACY | Facility: CLINIC | Age: 48
End: 2023-07-24

## 2023-07-24 ENCOUNTER — LAB (OUTPATIENT)
Dept: LAB | Facility: CLINIC | Age: 48
End: 2023-07-24
Payer: COMMERCIAL

## 2023-07-24 ENCOUNTER — THERAPY VISIT (OUTPATIENT)
Dept: OCCUPATIONAL THERAPY | Facility: CLINIC | Age: 48
End: 2023-07-24
Payer: COMMERCIAL

## 2023-07-24 ENCOUNTER — PATIENT OUTREACH (OUTPATIENT)
Dept: CARE COORDINATION | Facility: CLINIC | Age: 48
End: 2023-07-24

## 2023-07-24 ENCOUNTER — APPOINTMENT (OUTPATIENT)
Dept: RADIATION ONCOLOGY | Facility: CLINIC | Age: 48
End: 2023-07-24
Payer: COMMERCIAL

## 2023-07-24 DIAGNOSIS — Z98.890 S/P CRANIOTOMY: ICD-10-CM

## 2023-07-24 DIAGNOSIS — T45.1X5A CHEMOTHERAPY-INDUCED NAUSEA AND VOMITING: ICD-10-CM

## 2023-07-24 DIAGNOSIS — D61.810 ANTINEOPLASTIC CHEMOTHERAPY INDUCED PANCYTOPENIA (H): ICD-10-CM

## 2023-07-24 DIAGNOSIS — Z78.9 IMPAIRED INSTRUMENTAL ACTIVITIES OF DAILY LIVING (IADL): ICD-10-CM

## 2023-07-24 DIAGNOSIS — Z13.6 SCREENING FOR HYPERTENSION: ICD-10-CM

## 2023-07-24 DIAGNOSIS — C71.9 OLIGODENDROGLIOMA OF BRAIN (H): Primary | ICD-10-CM

## 2023-07-24 DIAGNOSIS — R11.2 CHEMOTHERAPY-INDUCED NAUSEA AND VOMITING: ICD-10-CM

## 2023-07-24 DIAGNOSIS — T45.1X5A ANTINEOPLASTIC CHEMOTHERAPY INDUCED PANCYTOPENIA (H): ICD-10-CM

## 2023-07-24 LAB
ALBUMIN SERPL BCG-MCNC: 4.7 G/DL (ref 3.5–5.2)
ALP SERPL-CCNC: 75 U/L (ref 40–129)
ALT SERPL W P-5'-P-CCNC: 5 U/L (ref 0–70)
ANION GAP SERPL CALCULATED.3IONS-SCNC: 11 MMOL/L (ref 7–15)
AST SERPL W P-5'-P-CCNC: 19 U/L (ref 0–45)
BASOPHILS # BLD AUTO: 0 10E3/UL (ref 0–0.2)
BASOPHILS NFR BLD AUTO: 0 %
BILIRUB SERPL-MCNC: 0.8 MG/DL
BUN SERPL-MCNC: 21.2 MG/DL (ref 6–20)
CALCIUM SERPL-MCNC: 9.8 MG/DL (ref 8.6–10)
CHLORIDE SERPL-SCNC: 105 MMOL/L (ref 98–107)
CREAT SERPL-MCNC: 1.1 MG/DL (ref 0.67–1.17)
CREAT UR-MCNC: 103 MG/DL
DEPRECATED HCO3 PLAS-SCNC: 27 MMOL/L (ref 22–29)
EOSINOPHIL # BLD AUTO: 0 10E3/UL (ref 0–0.7)
EOSINOPHIL NFR BLD AUTO: 1 %
ERYTHROCYTE [DISTWIDTH] IN BLOOD BY AUTOMATED COUNT: 12.1 % (ref 10–15)
GFR SERPL CREATININE-BSD FRML MDRD: 84 ML/MIN/1.73M2
GLUCOSE SERPL-MCNC: 84 MG/DL (ref 70–99)
HCT VFR BLD AUTO: 41.8 % (ref 40–53)
HGB BLD-MCNC: 13.5 G/DL (ref 13.3–17.7)
IMM GRANULOCYTES # BLD: 0 10E3/UL
IMM GRANULOCYTES NFR BLD: 0 %
LYMPHOCYTES # BLD AUTO: 1.7 10E3/UL (ref 0.8–5.3)
LYMPHOCYTES NFR BLD AUTO: 39 %
MCH RBC QN AUTO: 31 PG (ref 26.5–33)
MCHC RBC AUTO-ENTMCNC: 32.3 G/DL (ref 31.5–36.5)
MCV RBC AUTO: 96 FL (ref 78–100)
MICROALBUMIN UR-MCNC: <12 MG/L
MICROALBUMIN/CREAT UR: NORMAL MG/G{CREAT}
MONOCYTES # BLD AUTO: 0.2 10E3/UL (ref 0–1.3)
MONOCYTES NFR BLD AUTO: 5 %
NEUTROPHILS # BLD AUTO: 2.5 10E3/UL (ref 1.6–8.3)
NEUTROPHILS NFR BLD AUTO: 55 %
NRBC # BLD AUTO: 0 10E3/UL
NRBC BLD AUTO-RTO: 0 /100
PLATELET # BLD AUTO: 251 10E3/UL (ref 150–450)
POTASSIUM SERPL-SCNC: 5.1 MMOL/L (ref 3.4–5.3)
PROT SERPL-MCNC: 7.2 G/DL (ref 6.4–8.3)
RBC # BLD AUTO: 4.35 10E6/UL (ref 4.4–5.9)
SODIUM SERPL-SCNC: 143 MMOL/L (ref 136–145)
WBC # BLD AUTO: 4.4 10E3/UL (ref 4–11)

## 2023-07-24 PROCEDURE — 82570 ASSAY OF URINE CREATININE: CPT

## 2023-07-24 PROCEDURE — 97530 THERAPEUTIC ACTIVITIES: CPT | Mod: GO

## 2023-07-24 PROCEDURE — 77386 PR IMRT TREATMENT DELIVERY, COMPLEX: CPT | Performed by: RADIOLOGY

## 2023-07-24 PROCEDURE — 85025 COMPLETE CBC W/AUTO DIFF WBC: CPT

## 2023-07-24 PROCEDURE — 99000 SPECIMEN HANDLING OFFICE-LAB: CPT

## 2023-07-24 PROCEDURE — 84244 ASSAY OF RENIN: CPT | Mod: 90

## 2023-07-24 PROCEDURE — 77014 PR CT GUIDE FOR PLACEMENT RADIATION THERAPY FIELDS: CPT | Performed by: RADIOLOGY

## 2023-07-24 PROCEDURE — 82088 ASSAY OF ALDOSTERONE: CPT

## 2023-07-24 PROCEDURE — 80053 COMPREHEN METABOLIC PANEL: CPT

## 2023-07-24 PROCEDURE — 82043 UR ALBUMIN QUANTITATIVE: CPT

## 2023-07-24 PROCEDURE — 36415 COLL VENOUS BLD VENIPUNCTURE: CPT

## 2023-07-24 NOTE — PROGRESS NOTES
Social Work - Distress Screen Intervention  [facility]    Identified Concern and Score from Distress Screenin. How concerned are you about your ability to eat? 3     2. How concerned are you about unintended weight loss or your current weight? (!) 10     3. How concerned are you about feeling depressed or very sad?  3     4. How concerned are you about feeling anxious or very scared?  6     5. Do you struggle with the loss of meaning and polo in your life?  Somewhat     6. How concerned are you about work and home life issues that may be affected by your cancer?  (!) 8     7. How concerned are you about knowing what resources are available to help you?  (!) 10     8. Do you currently have what you would describe as Yarsanism or spiritual struggles? Not at all     9. If you want to be contacted by one of our professionals, I can send a message to them right now.  Oncology Dietitian (Concerned about weight loss)       Date of Distress Screen: 23  Data: At time of last visit, patient scored positive on distress screening.  outreached to patient today to follow up on elevated distress and introduce psychosocial services and support.  Intervention/Education provided:  contacted patient by phone to discuss distress screening results. Left voicemail message  Follow-up Required:  to remain available for support and await return call from patient    Summer Felix, MSW, LICSW, OSW-C  Clinical - Adult Oncology  She/Her/Hers  Phone: [phone]  [facility]: M, Thu  *every other Tue, 8am-4:30pm  [facility]: W, F, *every other Tue, 8am-4:30pm

## 2023-07-24 NOTE — PROGRESS NOTES
Oral Chemotherapy Monitoring Program  Lab Follow Up    Reviewed lab results from 7/24/23.        6/16/2023     4:00 PM 7/3/2023    11:00 AM 7/5/2023     1:00 PM 7/10/2023     9:00 AM 7/17/2023    10:00 AM 7/24/2023     9:00 AM   ORAL CHEMOTHERAPY   Assessment Type Chart Review;New Teach Initial Follow up Lab Monitoring;Chart Review Lab Monitoring;Chart Review Lab Monitoring;Chart Review Lab Monitoring   Providers Dr. Xavier Park   Clinic Name/Location [facility]   Drug Name Temodar (temozolomide) Temodar (temozolomide) Temodar (temozolomide) Temodar (temozolomide) Temodar (temozolomide) Temodar (temozolomide)   Dose 140 mg 140 mg 140 mg 140 mg 140 mg 140 mg   Current Schedule Daily Daily Daily Daily Daily Daily   Cycle Details Continuous for 42 days during XRT Continuous for 42 days during XRT Continuous for 42 days during XRT Continuous for 42 days during XRT Continuous for 42 days during XRT Continuous for 42 days during XRT   Start Date of Last Cycle  6/26/2023 6/26/2023 6/26/2023 6/26/2023 6/26/2023   Planned next cycle start date 6/26/2023        Doses missed in last 2 weeks  0       Adherence Assessment  Adherent       Adverse Effects  Constipation;Nausea       Nausea  Grade 1       Pharmacist Intervention(nausea)  Yes       Intervention(s)  Patient education       Constipation  Grade 1       Pharmacist Intervention(constipation)  Yes       Intervention(s)  Patient education       Any new drug interactions? No        Is the dose as ordered appropriate for the patient? Yes            Labs:  _  Result Component Current Result Ref Range   Sodium 143 (7/24/2023) 136 - 145 mmol/L     _  Result Component Current Result Ref Range   Potassium 5.1 (7/24/2023) 3.4 - 5.3 mmol/L     _  Result Component Current Result Ref Range   Calcium 9.8 (7/24/2023) 8.6 - 10.0 mg/dL     No results found for Mag within last 30 days.     No results  found for Phos within last 30 days.     _  Result Component Current Result Ref Range   Albumin 4.7 (7/24/2023) 3.5 - 5.2 g/dL     _  Result Component Current Result Ref Range   Urea Nitrogen 21.2 (H) (7/24/2023) 6.0 - 20.0 mg/dL     _  Result Component Current Result Ref Range   Creatinine 1.10 (7/24/2023) 0.67 - 1.17 mg/dL     _  Result Component Current Result Ref Range   AST 19 (7/24/2023) 0 - 45 U/L     _  Result Component Current Result Ref Range   ALT 5 (7/24/2023) 0 - 70 U/L     _  Result Component Current Result Ref Range   Bilirubin Total 0.8 (7/24/2023) <=1.2 mg/dL     _  Result Component Current Result Ref Range   WBC Count 4.4 (7/24/2023) 4.0 - 11.0 10e3/uL     _  Result Component Current Result Ref Range   Hemoglobin 13.5 (7/24/2023) 13.3 - 17.7 g/dL     _  Result Component Current Result Ref Range   Platelet Count 251 (7/24/2023) 150 - 450 10e3/uL     No results found for ANC within last 30 days.     _  Result Component Current Result Ref Range   Absolute Neutrophils 2.5 (7/24/2023) 1.6 - 8.3 10e3/uL        Assessment & Plan:  No concerning abnormalities. Lang's urea nitrogen is slightly elevated, but no other concerns at this time. Lang is good to continue therapy as planned.     Follow-Up:  Lab appointment on 7/31/23 at 8:15 am at [facility]-- CBC w/ platelet + diff.   Lab appointment on 8/7/23 at 8:15 am at [facility] -- CMP and CBC w/ platelet + diff.     Thank you,  Sonja Garcia, Pharmacy Intern   [facility]

## 2023-07-25 ENCOUNTER — APPOINTMENT (OUTPATIENT)
Dept: RADIATION ONCOLOGY | Facility: CLINIC | Age: 48
End: 2023-07-25
Payer: COMMERCIAL

## 2023-07-25 ENCOUNTER — PATIENT OUTREACH (OUTPATIENT)
Dept: CARE COORDINATION | Facility: CLINIC | Age: 48
End: 2023-07-25

## 2023-07-25 LAB — ALDOST SERPL-MCNC: 5 NG/DL (ref 0–31)

## 2023-07-25 PROCEDURE — 77336 RADIATION PHYSICS CONSULT: CPT | Performed by: RADIOLOGY

## 2023-07-25 PROCEDURE — 77427 RADIATION TX MANAGEMENT X5: CPT | Performed by: RADIOLOGY

## 2023-07-25 PROCEDURE — 77014 PR CT GUIDE FOR PLACEMENT RADIATION THERAPY FIELDS: CPT | Performed by: RADIOLOGY

## 2023-07-25 PROCEDURE — 77386 PR IMRT TREATMENT DELIVERY, COMPLEX: CPT | Performed by: RADIOLOGY

## 2023-07-25 NOTE — PROGRESS NOTES
Social Work - Follow-Up  [facility]    Data/Intervention:    Patient Name: Lang Collins Jr. Goes By: Joss    /Age: 1975 (48 year old)    Reason for Follow-Up:  Housing Support    Intervention/Education/Resources Provided:  Joss reports that his children (ages 8 & 9) are with him in-person in [place] at present time, and he is interested in housing support. SARAH advised that he contact the [facility] to explore getting them onto Medicaid, and explore if there is programming available through [facility] for family housing search. SARAH oriented Joss to HSS program, and he expressed interest in this, voicing that he would like to reside in [place].     Joss voices that he is doing well from emotional standpoint, and is in hopes of applying for unemployment, and is working in ongoing way with Lucita Cleary at [facility] for assistance with SSDI application. Joss reports that he would like to work a small amount to support his children as well, and is aware of limits on income with SSDI. Joss reports that having his children nearby gives him a great sense of purpose.     SARAH contacted the following HSS agencies to explore availability for housing assistance:     [employer] [phone]- [employer]  [employer] [phone]- [employer] with [employer] [phone]- lvm  [employer] [phone]- Would be able to accommodate him for HSS services. Cole recommends [place] enrollment, and look for apartments that are section 32 apartments. Can look for 2BR apartment for him, there are waitlists (multiple months on average, then 4 qualifying needs: Birth Certificate, ID, Social Security, $100/month minimum income, Professional Statement of Need). Cole requested that this clinician Email the PSN to [email or web address].     Assessment/Plan:  1) SARAH emailed Joss Unemployment page and sent email to Joss and Cole supporting HSS connection.   2) Onc SARAH will continue to be available  as needed for ongoing psychosocial support.     Previously provided patient/family with writer's contact information and availability.      Summer Felix, MSW, LICSW, OSW-C  Clinical - Adult Oncology  She/Her/Hers  Phone: [phone]  [facility]: M, Thu  *every other Tue, 8am-4:30pm  [facility]: W, F, *every other Tue, 8am-4:30pm

## 2023-07-26 ENCOUNTER — OFFICE VISIT (OUTPATIENT)
Dept: RADIATION ONCOLOGY | Facility: CLINIC | Age: 48
End: 2023-07-26
Payer: COMMERCIAL

## 2023-07-26 ENCOUNTER — APPOINTMENT (OUTPATIENT)
Dept: RADIATION ONCOLOGY | Facility: CLINIC | Age: 48
End: 2023-07-26
Payer: COMMERCIAL

## 2023-07-26 VITALS
RESPIRATION RATE: 16 BRPM | HEART RATE: 82 BPM | BODY MASS INDEX: 19.54 KG/M2 | DIASTOLIC BLOOD PRESSURE: 97 MMHG | WEIGHT: 140.1 LBS | OXYGEN SATURATION: 99 % | TEMPERATURE: 97.8 F | SYSTOLIC BLOOD PRESSURE: 144 MMHG

## 2023-07-26 DIAGNOSIS — C71.9 OLIGODENDROGLIOMA, WHO GRADE II (H): Primary | ICD-10-CM

## 2023-07-26 PROCEDURE — 77014 PR CT GUIDE FOR PLACEMENT RADIATION THERAPY FIELDS: CPT | Performed by: RADIOLOGY

## 2023-07-26 PROCEDURE — 99207 PR DROP WITH A PROCEDURE: CPT | Performed by: RADIOLOGY

## 2023-07-26 PROCEDURE — 77386 PR IMRT TREATMENT DELIVERY, COMPLEX: CPT | Performed by: RADIOLOGY

## 2023-07-26 ASSESSMENT — PAIN SCALES - GENERAL: PAINLEVEL: MILD PAIN (2)

## 2023-07-26 NOTE — PROGRESS NOTES
[facility] PHYSICIANS  SPECIALIZING IN BREAKTHROUGHS  Radiation Oncology    On Treatment Visit Note      Lang Collins Jr.      Date: 2023   MRN: [id number]   : 1975  Diagnosis: Oligodendroglioma      Reason for Visit:  On Radiation Treatment Visit     Treatment Summary to Date  Treatment Site: L frontal brain Current Dose: 3780/5400 cGy Fractions:       Chemotherapy  Chemo concurrent with radx?: Yes  Oncologist: Dr. Park  Drug Name/Frequency 1: Temodar    Subjective:     Experiences self resolving headache and nausea in the morning for 30 minutes.  Also has intermittent scalp irritation.  Has met with  and is feeling optimistic about being able to care for his children here in [place].    Nursing ROS:   Nutrition Alteration  Diet Type: Patient's Preference  Nutrition Note: Patient reports appetite has improved.  Skin  Skin Reaction: 1 - Faint erythema or dry desquamation (Mild hyperpigmentation to scalp.)  Skin Intervention: Hair loss to radiation site, continue Aquaphor TID. May use Hydrocortisone if patient has itching.  CNS Alteration  CNS note: Patient alert and oriented x 3, reports mild expressive aphasia at times, Dr. Bear prescribed Decadron 2 mg PRN headaches. Patient reports mild headaches and nausea in the AM, goes away after 30 min. Patient to take Tylenol and Zofran in the mornings.     Cardiovascular  Respiratory effort: 1 - Normal - without distress        Psychosocial  Psychosocial Note: Patient reports increased fatigue. He expressed that he has a  helping him with setting up school for his children and with resources for him. He said things are coming together.  Pain Assessment  0-10 Pain Scale: 2  Pain Note: Patient reports mild headaches every morning, patient to take Tylenol in the AM.      Objective:   BP (!) 144/97   Pulse 82   Temp 97.8  F (36.6  C) (Oral)   Resp 16   Wt 63.5 kg (140 lb 1.6 oz)   SpO2 99%   BMI 19.54 kg/m     Gen: Appears well, in no acute distress  Skin: No erythema    Labs:  CBC RESULTS:   Recent Labs   Lab Test 07/24/23  0813   WBC 4.4   RBC 4.35*   HGB 13.5   HCT 41.8   MCV 96   MCH 31.0   MCHC 32.3   RDW 12.1        ELECTROLYTES:  Recent Labs   Lab Test 07/24/23  0813      POTASSIUM 5.1   CHLORIDE 105   MARCY 9.8   CO2 27   BUN 21.2*   CR 1.10   GLC 84       Assessment:    Tolerating radiation therapy well.  All questions and concerns addressed.  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  Headache and nausea likely due to increase brain swelling/edema intracranial pressure in the morning.  I have asked patient to try Tylenol and Zofran in the morning.  If this is not helpful or if the headache and nausea become too bothersome we can move forward with taking Decadron 2 mg every morning.    Toxicities:  Alopecia: Grade 1: Hair loss <50% of normal; not obvious from a distance; does not require a wig or hair peice to camoflage  Headache: Grade 1: Mild pain  Nausea: Grade 1: Loss of appetite without alteration in eating habits  Dermatitis: Grade 0: No toxicity    Plan:   Continue current therapy.    Aquaphor to skin twice daily.  Avoid hot showers on scalp which will exacerbate irritation.  Can use hydrocortisone ointment 1% to scalp as well  Chemotherapy per medical oncology.  Tylenol and Zofran 4mg in the morning to help with a.m. headache and nausea.      Mosaiq chart and setup information reviewed  MVCT/IGRT images checked    Medication Review  Med list reviewed with patient?: Yes    Educational Topic Discussed  Additional Instructions: Tylenol and Zofran in the morning for headaches and nausea.  Education Instructions: Fatigue, Supportive services, skin cares, Nutrition, diet, Decadron PRN.        Yg Bear MD    Please do not send letter to referring physician.

## 2023-07-26 NOTE — LETTER
2023         RE: Lang Collins Jr.  [address]  [facility] [address]        Dear Colleague,    Thank you for referring your patient, Lang Collins Jr., to the [facility] RADIATION ONCOLOGY [place]. Please see a copy of my visit note below.    [facility] PHYSICIANS  SPECIALIZING IN BREAKTHROUGHS  Radiation Oncology    On Treatment Visit Note      Lang Collins Jr.      Date: 2023   MRN: [id number]   : 1975  Diagnosis: Oligodendroglioma      Reason for Visit:  On Radiation Treatment Visit     Treatment Summary to Date  Treatment Site: L frontal brain Current Dose: 3780/5400 cGy Fractions:       Chemotherapy  Chemo concurrent with radx?: Yes  Oncologist: Dr. Park  Drug Name/Frequency 1: Temodar    Subjective:     Experiences self resolving headache and nausea in the morning for 30 minutes.  Also has intermittent scalp irritation.  Has met with  and is feeling optimistic about being able to care for his children here in [place].    Nursing ROS:   Nutrition Alteration  Diet Type: Patient's Preference  Nutrition Note: Patient reports appetite has improved.  Skin  Skin Reaction: 1 - Faint erythema or dry desquamation (Mild hyperpigmentation to scalp.)  Skin Intervention: Hair loss to radiation site, continue Aquaphor TID. May use Hydrocortisone if patient has itching.  CNS Alteration  CNS note: Patient alert and oriented x 3, reports mild expressive aphasia at times, Dr. Bear prescribed Decadron 2 mg PRN headaches. Patient reports mild headaches and nausea in the AM, goes away after 30 min. Patient to take Tylenol and Zofran in the mornings.     Cardiovascular  Respiratory effort: 1 - Normal - without distress        Psychosocial  Psychosocial Note: Patient reports increased fatigue. He expressed that he has a  helping him with setting up school for his children and with resources for him. He said things are coming  together.  Pain Assessment  0-10 Pain Scale: 2  Pain Note: Patient reports mild headaches every morning, patient to take Tylenol in the AM.      Objective:   BP (!) 144/97   Pulse 82   Temp 97.8  F (36.6  C) (Oral)   Resp 16   Wt 63.5 kg (140 lb 1.6 oz)   SpO2 99%   BMI 19.54 kg/m    Gen: Appears well, in no acute distress  Skin: No erythema    Labs:  CBC RESULTS:   Recent Labs   Lab Test 07/24/23  0813   WBC 4.4   RBC 4.35*   HGB 13.5   HCT 41.8   MCV 96   MCH 31.0   MCHC 32.3   RDW 12.1        ELECTROLYTES:  Recent Labs   Lab Test 07/24/23 0813      POTASSIUM 5.1   CHLORIDE 105   MARCY 9.8   CO2 27   BUN 21.2*   CR 1.10   GLC 84       Assessment:    Tolerating radiation therapy well.  All questions and concerns addressed.  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  Headache and nausea likely due to increase brain swelling/edema intracranial pressure in the morning.  I have asked patient to try Tylenol and Zofran in the morning.  If this is not helpful or if the headache and nausea become too bothersome we can move forward with taking Decadron 2 mg every morning.    Toxicities:  Alopecia: Grade 1: Hair loss <50% of normal; not obvious from a distance; does not require a wig or hair peice to camoflage  Headache: Grade 1: Mild pain  Nausea: Grade 1: Loss of appetite without alteration in eating habits  Dermatitis: Grade 0: No toxicity    Plan:   Continue current therapy.    Aquaphor to skin twice daily.  Avoid hot showers on scalp which will exacerbate irritation.  Can use hydrocortisone ointment 1% to scalp as well  Chemotherapy per medical oncology.  Tylenol and Zofran 4mg in the morning to help with a.m. headache and nausea.      Mosaiq chart and setup information reviewed  MVCT/IGRT images checked    Medication Review  Med list reviewed with patient?: Yes    Educational Topic Discussed  Additional Instructions: Tylenol and Zofran in the morning for headaches and  nausea.  Education Instructions: Fatigue, Supportive services, skin cares, Nutrition, diet, Decadron PRN.        Yg Bear MD    Please do not send letter to referring physician.        Again, thank you for allowing me to participate in the care of your patient.        Sincerely,        JOSSE Bear MD

## 2023-07-27 ENCOUNTER — APPOINTMENT (OUTPATIENT)
Dept: RADIATION ONCOLOGY | Facility: CLINIC | Age: 48
End: 2023-07-27
Payer: COMMERCIAL

## 2023-07-27 ENCOUNTER — PATIENT OUTREACH (OUTPATIENT)
Dept: CARE COORDINATION | Facility: CLINIC | Age: 48
End: 2023-07-27

## 2023-07-27 PROCEDURE — 77386 PR IMRT TREATMENT DELIVERY, COMPLEX: CPT | Performed by: SURGERY

## 2023-07-27 NOTE — PROGRESS NOTES
Social Work - Follow-Up  [facility]    Data/Intervention:    Patient Name: Lang Collins Jr. Goes By: Joss    /Age: 1975 (48 year old)    Reason for Follow-Up:  Community Resources    Intervention/Education/Resources Provided:  SARAH spoke with Cole at [employer] ([phone]) who reported that he met with Joss 23 to assist in enrolling in HSS program. Cole reported that he thought Joss could benefit from CADI waiver consideration, and asked SARAH to discuss waiver process with Joss at greater length.     SARAH called Joss who endorsed that he thought he could benefit from PCA support through a waiver program, and reported that he has a family member who would likely be open to being a PCA if approved. SARAH discussed initiating MNChoices assessment through [facility]. Joss reported that he would outreach to [facility] on his own, and took down contact information.     Assessment/Plan:  SARAH will continue to follow for ongoing psychosocial support. Joss knows to reach out in case of psychosocial concern or need.     Previously provided patient/family with writer's contact information and availability.    Summer Felix, MSW, LICSW, OSW-C  Clinical - Adult Oncology  She/Her/Hers  Phone: [phone]  [facility]: M, Thu  *every other Tue, 8am-4:30pm  [facility]: W, F, *every other Tue, 8am-4:30pm

## 2023-07-28 ENCOUNTER — APPOINTMENT (OUTPATIENT)
Dept: RADIATION ONCOLOGY | Facility: CLINIC | Age: 48
End: 2023-07-28
Payer: COMMERCIAL

## 2023-07-28 PROCEDURE — 77386 PR IMRT TREATMENT DELIVERY, COMPLEX: CPT | Performed by: SURGERY

## 2023-07-28 PROCEDURE — 77014 PR CT GUIDE FOR PLACEMENT RADIATION THERAPY FIELDS: CPT | Performed by: SURGERY

## 2023-07-31 ENCOUNTER — APPOINTMENT (OUTPATIENT)
Dept: RADIATION ONCOLOGY | Facility: CLINIC | Age: 48
End: 2023-07-31
Payer: COMMERCIAL

## 2023-07-31 ENCOUNTER — LAB (OUTPATIENT)
Dept: LAB | Facility: CLINIC | Age: 48
End: 2023-07-31
Payer: COMMERCIAL

## 2023-07-31 ENCOUNTER — DOCUMENTATION ONLY (OUTPATIENT)
Dept: PHARMACY | Facility: CLINIC | Age: 48
End: 2023-07-31

## 2023-07-31 DIAGNOSIS — R11.2 CHEMOTHERAPY-INDUCED NAUSEA AND VOMITING: ICD-10-CM

## 2023-07-31 DIAGNOSIS — D61.810 ANTINEOPLASTIC CHEMOTHERAPY INDUCED PANCYTOPENIA (H): ICD-10-CM

## 2023-07-31 DIAGNOSIS — T45.1X5A CHEMOTHERAPY-INDUCED NAUSEA AND VOMITING: ICD-10-CM

## 2023-07-31 DIAGNOSIS — T45.1X5A ANTINEOPLASTIC CHEMOTHERAPY INDUCED PANCYTOPENIA (H): ICD-10-CM

## 2023-07-31 LAB
ALBUMIN SERPL BCG-MCNC: 4.8 G/DL (ref 3.5–5.2)
ALP SERPL-CCNC: 71 U/L (ref 40–129)
ALT SERPL W P-5'-P-CCNC: 10 U/L (ref 0–70)
ANION GAP SERPL CALCULATED.3IONS-SCNC: 9 MMOL/L (ref 7–15)
AST SERPL W P-5'-P-CCNC: 28 U/L (ref 0–45)
BASOPHILS # BLD AUTO: 0 10E3/UL (ref 0–0.2)
BASOPHILS NFR BLD AUTO: 0 %
BILIRUB SERPL-MCNC: 0.7 MG/DL
BUN SERPL-MCNC: 15.7 MG/DL (ref 6–20)
CALCIUM SERPL-MCNC: 9.7 MG/DL (ref 8.6–10)
CHLORIDE SERPL-SCNC: 105 MMOL/L (ref 98–107)
CREAT SERPL-MCNC: 1.08 MG/DL (ref 0.67–1.17)
DEPRECATED HCO3 PLAS-SCNC: 26 MMOL/L (ref 22–29)
EOSINOPHIL # BLD AUTO: 0 10E3/UL (ref 0–0.7)
EOSINOPHIL NFR BLD AUTO: 1 %
ERYTHROCYTE [DISTWIDTH] IN BLOOD BY AUTOMATED COUNT: 12.2 % (ref 10–15)
GFR SERPL CREATININE-BSD FRML MDRD: 86 ML/MIN/1.73M2
GLUCOSE SERPL-MCNC: 80 MG/DL (ref 70–99)
HCT VFR BLD AUTO: 40.6 % (ref 40–53)
HGB BLD-MCNC: 13.3 G/DL (ref 13.3–17.7)
IMM GRANULOCYTES # BLD: 0 10E3/UL
IMM GRANULOCYTES NFR BLD: 1 %
LYMPHOCYTES # BLD AUTO: 1.1 10E3/UL (ref 0.8–5.3)
LYMPHOCYTES NFR BLD AUTO: 37 %
MCH RBC QN AUTO: 31.1 PG (ref 26.5–33)
MCHC RBC AUTO-ENTMCNC: 32.8 G/DL (ref 31.5–36.5)
MCV RBC AUTO: 95 FL (ref 78–100)
MONOCYTES # BLD AUTO: 0.2 10E3/UL (ref 0–1.3)
MONOCYTES NFR BLD AUTO: 8 %
NEUTROPHILS # BLD AUTO: 1.6 10E3/UL (ref 1.6–8.3)
NEUTROPHILS NFR BLD AUTO: 53 %
NRBC # BLD AUTO: 0 10E3/UL
NRBC BLD AUTO-RTO: 0 /100
PLATELET # BLD AUTO: 235 10E3/UL (ref 150–450)
POTASSIUM SERPL-SCNC: 4.7 MMOL/L (ref 3.4–5.3)
PROT SERPL-MCNC: 7.1 G/DL (ref 6.4–8.3)
RBC # BLD AUTO: 4.27 10E6/UL (ref 4.4–5.9)
SODIUM SERPL-SCNC: 140 MMOL/L (ref 136–145)
WBC # BLD AUTO: 3 10E3/UL (ref 4–11)

## 2023-07-31 PROCEDURE — 77014 PR CT GUIDE FOR PLACEMENT RADIATION THERAPY FIELDS: CPT | Performed by: RADIOLOGY

## 2023-07-31 PROCEDURE — 77386 PR IMRT TREATMENT DELIVERY, COMPLEX: CPT | Performed by: RADIOLOGY

## 2023-07-31 PROCEDURE — 36415 COLL VENOUS BLD VENIPUNCTURE: CPT

## 2023-07-31 PROCEDURE — 80053 COMPREHEN METABOLIC PANEL: CPT

## 2023-07-31 PROCEDURE — 85025 COMPLETE CBC W/AUTO DIFF WBC: CPT

## 2023-07-31 NOTE — PROGRESS NOTES
Oral Chemotherapy Monitoring Program  Lab Follow Up    Reviewed lab results from 7/31/23.        6/16/2023     4:00 PM 7/3/2023    11:00 AM 7/5/2023     1:00 PM 7/10/2023     9:00 AM 7/17/2023    10:00 AM 7/24/2023     9:00 AM 7/31/2023    10:00 AM   ORAL CHEMOTHERAPY   Assessment Type Chart Review;New Teach Initial Follow up Lab Monitoring;Chart Review Lab Monitoring;Chart Review Lab Monitoring;Chart Review Lab Monitoring Lab Monitoring   Providers Dr. Xavier Park   Clinic Name/Location [facility]   Drug Name Temodar (temozolomide) Temodar (temozolomide) Temodar (temozolomide) Temodar (temozolomide) Temodar (temozolomide) Temodar (temozolomide) Temodar (temozolomide)   Dose 140 mg 140 mg 140 mg 140 mg 140 mg 140 mg 140 mg   Current Schedule Daily Daily Daily Daily Daily Daily Daily   Cycle Details Continuous for 42 days during XRT Continuous for 42 days during XRT Continuous for 42 days during XRT Continuous for 42 days during XRT Continuous for 42 days during XRT Continuous for 42 days during XRT Continuous for 42 days during XRT   Start Date of Last Cycle  6/26/2023 6/26/2023 6/26/2023 6/26/2023 6/26/2023 6/26/2023   Planned next cycle start date 6/26/2023         Doses missed in last 2 weeks  0        Adherence Assessment  Adherent        Adverse Effects  Constipation;Nausea        Nausea  Grade 1        Pharmacist Intervention(nausea)  Yes        Intervention(s)  Patient education        Constipation  Grade 1        Pharmacist Intervention(constipation)  Yes        Intervention(s)  Patient education        Any new drug interactions? No         Is the dose as ordered appropriate for the patient? Yes             Labs:  _  Result Component Current Result Ref Range   Sodium 140 (7/31/2023) 136 - 145 mmol/L     _  Result Component Current Result Ref Range   Potassium 4.7 (7/31/2023) 3.4 - 5.3 mmol/L      _  Result Component Current Result Ref Range   Calcium 9.7 (7/31/2023) 8.6 - 10.0 mg/dL     No results found for Mag within last 30 days.     No results found for Phos within last 30 days.     _  Result Component Current Result Ref Range   Albumin 4.8 (7/31/2023) 3.5 - 5.2 g/dL     _  Result Component Current Result Ref Range   Urea Nitrogen 15.7 (7/31/2023) 6.0 - 20.0 mg/dL     _  Result Component Current Result Ref Range   Creatinine 1.08 (7/31/2023) 0.67 - 1.17 mg/dL     _  Result Component Current Result Ref Range   AST 28 (7/31/2023) 0 - 45 U/L     _  Result Component Current Result Ref Range   ALT 10 (7/31/2023) 0 - 70 U/L     _  Result Component Current Result Ref Range   Bilirubin Total 0.7 (7/31/2023) <=1.2 mg/dL     _  Result Component Current Result Ref Range   WBC Count 3.0 (L) (7/31/2023) 4.0 - 11.0 10e3/uL     _  Result Component Current Result Ref Range   Hemoglobin 13.3 (7/31/2023) 13.3 - 17.7 g/dL     _  Result Component Current Result Ref Range   Platelet Count 235 (7/31/2023) 150 - 450 10e3/uL     No results found for ANC within last 30 days.     _  Result Component Current Result Ref Range   Absolute Neutrophils 1.6 (7/31/2023) 1.6 - 8.3 10e3/uL        Assessment & Plan:  Results are concerning for grade 1 leukopenia, however no pharmacist intervention is needed at this time. Joss is okay to continue temozolomide as planned.     Follow-Up:  Lab appointment on 08/07/23 at 8:15 am   End of treatment follow up with Cami on 08/11/23 at 10:00 am    Thank you,  Sonja Garcia, Pharmacy Intern   [facility]

## 2023-08-01 ENCOUNTER — APPOINTMENT (OUTPATIENT)
Dept: RADIATION ONCOLOGY | Facility: CLINIC | Age: 48
End: 2023-08-01
Payer: COMMERCIAL

## 2023-08-01 ENCOUNTER — PATIENT OUTREACH (OUTPATIENT)
Dept: CARE COORDINATION | Facility: CLINIC | Age: 48
End: 2023-08-01

## 2023-08-01 ENCOUNTER — TELEPHONE (OUTPATIENT)
Dept: ONCOLOGY | Facility: CLINIC | Age: 48
End: 2023-08-01

## 2023-08-01 PROCEDURE — 77386 PR IMRT TREATMENT DELIVERY, COMPLEX: CPT | Performed by: RADIOLOGY

## 2023-08-01 PROCEDURE — 77336 RADIATION PHYSICS CONSULT: CPT | Performed by: RADIOLOGY

## 2023-08-01 PROCEDURE — 77427 RADIATION TX MANAGEMENT X5: CPT | Performed by: RADIOLOGY

## 2023-08-01 PROCEDURE — 77014 PR CT GUIDE FOR PLACEMENT RADIATION THERAPY FIELDS: CPT | Performed by: RADIOLOGY

## 2023-08-01 NOTE — PROGRESS NOTES
Social Work - Intervention  [facility]  Data/Intervention:    Patient Name: Lang Collins Jr. Goes By: Joss    /Age: 1975 (48 year old)     Visit Type: telephone  Referral Source: Kassy Gibson RN  Reason for Referral: Insurance coverage     Intervention/Education/Resources Provided:  Covering SW received message that patient's insurance coverage ended and he is not able to come in for his radiation appt. SARAH called Joss to gather information. He shared when he called the medical transport company through his insurance they told him that he didn't have coverage so they couldn't schedule a ride. Joss confirmed that he submitted his insurance renewal paperwork so was confused about this news. SARAH had colleague check his insurance through [employer]. Confirmed that he has eligibility for MA from 21-23. He also has Prepaid Medical Assistance Program (PMAP) through [employer] ([phone]). Encouraged him to call them re changes to his transportation benefit. Joss will connect with clinic re coming in today for treatment and is able to arrange his own transportation. Updated JJ Elena re insurance status.     SARAH gave contact information and encouraged Joss to call this week with any questions or concerns. Joss is aware that assigned SW, Summer Meng will return next week.      Assessment/Plan:  Provided patient with contact information and availability.    SHAYY Ragsdale, [facility]  Adult Oncology Clinics  [place] (M,W), [place] (T) & [place] (Th)  *I am off Friday  Office: [phone]

## 2023-08-01 NOTE — PROGRESS NOTES
Received positive distress screen regarding patient's concern for current weight.  Called and spoke with patient.  Patient states drinking 2 Ensure Plus per day but needs new order for Boost due to Ensure not available.  Patient feels his appetite has improved and is eating better.  RD contacted colleague that ordered Ensure Plus.  RD to follow up on supplement needs.  Patient does not have any questions at this time.  Encouraged patient to contact RD if questions arise.  Patient verbalized understanding of plan.       Elaine Wasserman, RD, LD  Clinical Dietitian  [facility]  [phone] (direct)

## 2023-08-02 ENCOUNTER — TELEPHONE (OUTPATIENT)
Dept: FAMILY MEDICINE | Facility: CLINIC | Age: 48
End: 2023-08-02

## 2023-08-02 ENCOUNTER — APPOINTMENT (OUTPATIENT)
Dept: RADIATION ONCOLOGY | Facility: CLINIC | Age: 48
End: 2023-08-02
Payer: COMMERCIAL

## 2023-08-02 ENCOUNTER — OFFICE VISIT (OUTPATIENT)
Dept: RADIATION ONCOLOGY | Facility: CLINIC | Age: 48
End: 2023-08-02
Payer: COMMERCIAL

## 2023-08-02 VITALS
WEIGHT: 138.2 LBS | DIASTOLIC BLOOD PRESSURE: 87 MMHG | SYSTOLIC BLOOD PRESSURE: 143 MMHG | TEMPERATURE: 98 F | HEART RATE: 71 BPM | RESPIRATION RATE: 16 BRPM | BODY MASS INDEX: 19.27 KG/M2 | OXYGEN SATURATION: 100 %

## 2023-08-02 DIAGNOSIS — C71.9 OLIGODENDROGLIOMA, WHO GRADE II (H): Primary | ICD-10-CM

## 2023-08-02 LAB — RENIN PLAS-CCNC: 4.1 NG/ML/HR

## 2023-08-02 PROCEDURE — 77386 PR IMRT TREATMENT DELIVERY, COMPLEX: CPT | Performed by: RADIOLOGY

## 2023-08-02 PROCEDURE — 99207 PR DROP WITH A PROCEDURE: CPT | Performed by: RADIOLOGY

## 2023-08-02 PROCEDURE — 77014 PR CT GUIDE FOR PLACEMENT RADIATION THERAPY FIELDS: CPT | Performed by: RADIOLOGY

## 2023-08-02 ASSESSMENT — PAIN SCALES - GENERAL: PAINLEVEL: MILD PAIN (2)

## 2023-08-02 NOTE — TELEPHONE ENCOUNTER
Patient Quality Outreach    Patient is due for the following:   Hypertension -  BP check and Hypertension follow-up visit  Colon Cancer Screening  Physical Preventive Adult Physical      Topic Date Due    Hepatitis B Vaccine (1 of 3 - 3-dose series) Never done    Pneumococcal Vaccine (1 - PCV) Never done    COVID-19 Vaccine (2 - Pfizer risk series) 11/26/2021       Next Steps:   Schedule a Adult Preventative    Type of outreach:    Sent [employer] message.      Questions for provider review:    None           Shreya Valenzuela MA

## 2023-08-02 NOTE — LETTER
2023         RE: Lang Collins Jr.  [address]  [facility] [address]        Dear Colleague,    Thank you for referring your patient, Lang Collins Jr., to the [facility] RADIATION ONCOLOGY [place]. Please see a copy of my visit note below.    [facility] PHYSICIANS  SPECIALIZING IN BREAKTHROUGHS  Radiation Oncology    On Treatment Visit Note      Lang Collins Jr.      Date: 2023   MRN: [id number]   : 1975  Diagnosis: Oligodendroglioma      Reason for Visit:  On Radiation Treatment Visit     Treatment Summary to Date  Treatment Site: L frontal brain Current Dose: 4680/5400 cGy Fractions:       Chemotherapy  Chemo concurrent with radx?: Yes  Oncologist: Dr. Park  Drug Name/Frequency 1: Temodar    Subjective:     Headaches are stable at this point.  Nausea well controlled with antiemetic.  Continues to have increasing alopecia and fatigue.      Nursing ROS:   Nutrition Alteration  Diet Type: Patient's Preference  Nutrition Note: Patient reports appetite has improved.  Skin  Skin Reaction: 1 - Faint erythema or dry desquamation (Mild hyperpigmentation to scalp.)  Skin Intervention: Hair loss to radiation site, continue Hydrocortisone 2.5% BID, using Aquaphor daily.  CNS Alteration  CNS note: Patient alert and oriented x 3, reports mild expressive aphasia at times, Dr. Bear prescribed Decadron 2 mg PRN headaches. Patient reports mild headaches and nausea in the AM, goes away after 30 min. Patient to taking Zofran in the mornings.     Cardiovascular  Respiratory effort: 1 - Normal - without distress        Psychosocial  Psychosocial Note: Patient reports increased fatigue. He expressed that he has a  helping him with setting up school for his children and with resources for him.  Pain Assessment  0-10 Pain Scale: 2  Pain Note: Patient reports mild headaches every morning, patient to take Tylenol in the AM.      Objective:   BP (!) 143/87    Pulse 71   Temp 98  F (36.7  C) (Oral)   Resp 16   Wt 62.7 kg (138 lb 3.2 oz)   SpO2 100%   BMI 19.27 kg/m    Gen: Appears well, in no acute distress  Skin: Minimal diffuse erythema with hyperpigmentation over treatment field    Labs:  CBC RESULTS:   Recent Labs   Lab Test 07/31/23 0817   WBC 3.0*   RBC 4.27*   HGB 13.3   HCT 40.6   MCV 95   MCH 31.1   MCHC 32.8   RDW 12.2        ELECTROLYTES:  Recent Labs   Lab Test 07/31/23 0817      POTASSIUM 4.7   CHLORIDE 105   MARCY 9.7   CO2 26   BUN 15.7   CR 1.08   GLC 80       Assessment:    Tolerating radiation therapy well.  All questions and concerns addressed.  Discussed the trajectory of the radiation-induced side effects which could worsen over the next 2-3 weeks before starting to improve.  We discussed potentially starting Decadron 2 mg every morning if he experiences any recurrent neurologic side effects or increased headache not alleviated with over-the-counter pain medication.    Toxicities:  Alopecia: Grade 1: Hair loss <50% of normal; not obvious from a distance; does not require a wig or hair peice to camoflage  Fatigue: Grade 1: Fatigue relieved by rest  Nausea: Grade 1: Loss of appetite without alteration in eating habits  Dermatitis: Grade 1: Faint erythema or dry desquamation    Plan:   Continue current therapy.    Skin care with Aquaphor  Temodar per Dr. Park   Antiemetic as previously directed  Often wearing a hat to cover his alopecia      Mosaiq chart and setup information reviewed  MVCT/IGRT images checked    Medication Review  Med list reviewed with patient?: Yes    Educational Topic Discussed  Additional Instructions: Zofran BID for nausea. Patient declines need for Tylenol at this time. Message sent to Dr. Park's RN to schedule follow up and imaging. Follow up with Dr. Bear in 1 month.  Education Instructions: Fatigue, Supportive services, skin cares, Nutrition, diet, Decadron PRN.        Yg Bear MD    Please do not send  letter to referring physician.        Again, thank you for allowing me to participate in the care of your patient.        Sincerely,        JOSSE Bear MD

## 2023-08-02 NOTE — PROGRESS NOTES
[facility] PHYSICIANS  SPECIALIZING IN BREAKTHROUGHS  Radiation Oncology    On Treatment Visit Note      Lang Collins Jr.      Date: 2023   MRN: [id number]   : 1975  Diagnosis: Oligodendroglioma      Reason for Visit:  On Radiation Treatment Visit     Treatment Summary to Date  Treatment Site: L frontal brain Current Dose: 4680/5400 cGy Fractions:       Chemotherapy  Chemo concurrent with radx?: Yes  Oncologist: Dr. Park  Drug Name/Frequency 1: Temodar    Subjective:     Headaches are stable at this point.  Nausea well controlled with antiemetic.  Continues to have increasing alopecia and fatigue.      Nursing ROS:   Nutrition Alteration  Diet Type: Patient's Preference  Nutrition Note: Patient reports appetite has improved.  Skin  Skin Reaction: 1 - Faint erythema or dry desquamation (Mild hyperpigmentation to scalp.)  Skin Intervention: Hair loss to radiation site, continue Hydrocortisone 2.5% BID, using Aquaphor daily.  CNS Alteration  CNS note: Patient alert and oriented x 3, reports mild expressive aphasia at times, Dr. Bear prescribed Decadron 2 mg PRN headaches. Patient reports mild headaches and nausea in the AM, goes away after 30 min. Patient to taking Zofran in the mornings.     Cardiovascular  Respiratory effort: 1 - Normal - without distress        Psychosocial  Psychosocial Note: Patient reports increased fatigue. He expressed that he has a  helping him with setting up school for his children and with resources for him.  Pain Assessment  0-10 Pain Scale: 2  Pain Note: Patient reports mild headaches every morning, patient to take Tylenol in the AM.      Objective:   BP (!) 143/87   Pulse 71   Temp 98  F (36.7  C) (Oral)   Resp 16   Wt 62.7 kg (138 lb 3.2 oz)   SpO2 100%   BMI 19.27 kg/m    Gen: Appears well, in no acute distress  Skin: Minimal diffuse erythema with hyperpigmentation over treatment field    Labs:  CBC RESULTS:   Recent Labs   Lab Test  07/31/23  0817   WBC 3.0*   RBC 4.27*   HGB 13.3   HCT 40.6   MCV 95   MCH 31.1   MCHC 32.8   RDW 12.2        ELECTROLYTES:  Recent Labs   Lab Test 07/31/23  0817      POTASSIUM 4.7   CHLORIDE 105   MARCY 9.7   CO2 26   BUN 15.7   CR 1.08   GLC 80       Assessment:    Tolerating radiation therapy well.  All questions and concerns addressed.  Discussed the trajectory of the radiation-induced side effects which could worsen over the next 2-3 weeks before starting to improve.  We discussed potentially starting Decadron 2 mg every morning if he experiences any recurrent neurologic side effects or increased headache not alleviated with over-the-counter pain medication.    Toxicities:  Alopecia: Grade 1: Hair loss <50% of normal; not obvious from a distance; does not require a wig or hair peice to camoflage  Fatigue: Grade 1: Fatigue relieved by rest  Nausea: Grade 1: Loss of appetite without alteration in eating habits  Dermatitis: Grade 1: Faint erythema or dry desquamation    Plan:   Continue current therapy.    Skin care with Aquaphor  Temodar per Dr. Park   Antiemetic as previously directed  Often wearing a hat to cover his alopecia      Mosaiq chart and setup information reviewed  MVCT/IGRT images checked    Medication Review  Med list reviewed with patient?: Yes    Educational Topic Discussed  Additional Instructions: Zofran BID for nausea. Patient declines need for Tylenol at this time. Message sent to Dr. Park's RN to schedule follow up and imaging. Follow up with Dr. Bear in 1 month.  Education Instructions: Fatigue, Supportive services, skin cares, Nutrition, diet, Decadron PRN.        Yg Bear MD    Please do not send letter to referring physician.

## 2023-08-03 ENCOUNTER — APPOINTMENT (OUTPATIENT)
Dept: RADIATION ONCOLOGY | Facility: CLINIC | Age: 48
End: 2023-08-03
Payer: COMMERCIAL

## 2023-08-03 PROCEDURE — 77386 PR IMRT TREATMENT DELIVERY, COMPLEX: CPT | Performed by: RADIOLOGY

## 2023-08-03 PROCEDURE — 77014 PR CT GUIDE FOR PLACEMENT RADIATION THERAPY FIELDS: CPT | Performed by: RADIOLOGY

## 2023-08-04 ENCOUNTER — APPOINTMENT (OUTPATIENT)
Dept: RADIATION ONCOLOGY | Facility: CLINIC | Age: 48
End: 2023-08-04
Payer: COMMERCIAL

## 2023-08-04 DIAGNOSIS — C71.9 OLIGODENDROGLIOMA, WHO GRADE II (H): Primary | ICD-10-CM

## 2023-08-04 PROCEDURE — 77386 PR IMRT TREATMENT DELIVERY, COMPLEX: CPT | Performed by: RADIOLOGY

## 2023-08-04 PROCEDURE — 77014 PR CT GUIDE FOR PLACEMENT RADIATION THERAPY FIELDS: CPT | Performed by: RADIOLOGY

## 2023-08-07 ENCOUNTER — APPOINTMENT (OUTPATIENT)
Dept: RADIATION ONCOLOGY | Facility: CLINIC | Age: 48
End: 2023-08-07
Payer: COMMERCIAL

## 2023-08-07 ENCOUNTER — LAB (OUTPATIENT)
Dept: LAB | Facility: CLINIC | Age: 48
End: 2023-08-07
Payer: COMMERCIAL

## 2023-08-07 DIAGNOSIS — D61.810 ANTINEOPLASTIC CHEMOTHERAPY INDUCED PANCYTOPENIA (H): ICD-10-CM

## 2023-08-07 DIAGNOSIS — T45.1X5A CHEMOTHERAPY-INDUCED NAUSEA AND VOMITING: ICD-10-CM

## 2023-08-07 DIAGNOSIS — R11.2 CHEMOTHERAPY-INDUCED NAUSEA AND VOMITING: ICD-10-CM

## 2023-08-07 DIAGNOSIS — T45.1X5A ANTINEOPLASTIC CHEMOTHERAPY INDUCED PANCYTOPENIA (H): ICD-10-CM

## 2023-08-07 LAB
ALBUMIN SERPL BCG-MCNC: 4.8 G/DL (ref 3.5–5.2)
ALP SERPL-CCNC: 70 U/L (ref 40–129)
ALT SERPL W P-5'-P-CCNC: 6 U/L (ref 0–70)
ANION GAP SERPL CALCULATED.3IONS-SCNC: 9 MMOL/L (ref 7–15)
AST SERPL W P-5'-P-CCNC: 18 U/L (ref 0–45)
BASOPHILS # BLD MANUAL: 0.1 10E3/UL (ref 0–0.2)
BASOPHILS NFR BLD MANUAL: 2 %
BILIRUB SERPL-MCNC: 0.8 MG/DL
BUN SERPL-MCNC: 15.1 MG/DL (ref 6–20)
CALCIUM SERPL-MCNC: 9.7 MG/DL (ref 8.6–10)
CHLORIDE SERPL-SCNC: 104 MMOL/L (ref 98–107)
CREAT SERPL-MCNC: 1.15 MG/DL (ref 0.67–1.17)
DEPRECATED HCO3 PLAS-SCNC: 28 MMOL/L (ref 22–29)
EOSINOPHIL # BLD MANUAL: 0 10E3/UL (ref 0–0.7)
EOSINOPHIL NFR BLD MANUAL: 0 %
ERYTHROCYTE [DISTWIDTH] IN BLOOD BY AUTOMATED COUNT: 12.4 % (ref 10–15)
GFR SERPL CREATININE-BSD FRML MDRD: 79 ML/MIN/1.73M2
GLUCOSE SERPL-MCNC: 96 MG/DL (ref 70–99)
HCT VFR BLD AUTO: 43.2 % (ref 40–53)
HGB BLD-MCNC: 14.1 G/DL (ref 13.3–17.7)
HOLD SPECIMEN: NORMAL
LYMPHOCYTES # BLD MANUAL: 0.9 10E3/UL (ref 0.8–5.3)
LYMPHOCYTES NFR BLD MANUAL: 27 %
MCH RBC QN AUTO: 31.3 PG (ref 26.5–33)
MCHC RBC AUTO-ENTMCNC: 32.6 G/DL (ref 31.5–36.5)
MCV RBC AUTO: 96 FL (ref 78–100)
MONOCYTES # BLD MANUAL: 0.2 10E3/UL (ref 0–1.3)
MONOCYTES NFR BLD MANUAL: 6 %
NEUTROPHILS # BLD MANUAL: 2.3 10E3/UL (ref 1.6–8.3)
NEUTROPHILS NFR BLD MANUAL: 65 %
PLAT MORPH BLD: NORMAL
PLATELET # BLD AUTO: 253 10E3/UL (ref 150–450)
POTASSIUM SERPL-SCNC: 5 MMOL/L (ref 3.4–5.3)
PROT SERPL-MCNC: 7.3 G/DL (ref 6.4–8.3)
RBC # BLD AUTO: 4.51 10E6/UL (ref 4.4–5.9)
RBC MORPH BLD: NORMAL
SODIUM SERPL-SCNC: 141 MMOL/L (ref 136–145)
WBC # BLD AUTO: 3.5 10E3/UL (ref 4–11)

## 2023-08-07 PROCEDURE — 36415 COLL VENOUS BLD VENIPUNCTURE: CPT

## 2023-08-07 PROCEDURE — 77386 PR IMRT TREATMENT DELIVERY, COMPLEX: CPT | Performed by: RADIOLOGY

## 2023-08-07 PROCEDURE — 85007 BL SMEAR W/DIFF WBC COUNT: CPT

## 2023-08-07 PROCEDURE — 77014 PR CT GUIDE FOR PLACEMENT RADIATION THERAPY FIELDS: CPT | Performed by: RADIOLOGY

## 2023-08-07 PROCEDURE — 85027 COMPLETE CBC AUTOMATED: CPT

## 2023-08-07 PROCEDURE — 80053 COMPREHEN METABOLIC PANEL: CPT

## 2023-08-08 ENCOUNTER — APPOINTMENT (OUTPATIENT)
Dept: RADIATION ONCOLOGY | Facility: CLINIC | Age: 48
End: 2023-08-08
Payer: COMMERCIAL

## 2023-08-08 PROCEDURE — 77336 RADIATION PHYSICS CONSULT: CPT | Performed by: RADIOLOGY

## 2023-08-08 PROCEDURE — 77386 PR IMRT TREATMENT DELIVERY, COMPLEX: CPT | Performed by: RADIOLOGY

## 2023-08-08 PROCEDURE — 77014 PR CT GUIDE FOR PLACEMENT RADIATION THERAPY FIELDS: CPT | Performed by: RADIOLOGY

## 2023-08-08 PROCEDURE — 77427 RADIATION TX MANAGEMENT X5: CPT | Performed by: RADIOLOGY

## 2023-08-09 ENCOUNTER — THERAPY VISIT (OUTPATIENT)
Dept: OCCUPATIONAL THERAPY | Facility: CLINIC | Age: 48
End: 2023-08-09
Payer: COMMERCIAL

## 2023-08-09 DIAGNOSIS — Z98.890 S/P CRANIOTOMY: ICD-10-CM

## 2023-08-09 DIAGNOSIS — Z78.9 IMPAIRED INSTRUMENTAL ACTIVITIES OF DAILY LIVING (IADL): ICD-10-CM

## 2023-08-09 DIAGNOSIS — C71.9 OLIGODENDROGLIOMA OF BRAIN (H): Primary | ICD-10-CM

## 2023-08-09 PROCEDURE — 97530 THERAPEUTIC ACTIVITIES: CPT | Mod: GO

## 2023-08-09 NOTE — PROGRESS NOTES
"Virtual Visit Details    Type of service:  Video Visit   Video Start Time: 9:57 AM  Video End Time: 10:14 AM    Originating Location (pt. Location): Home  Distant Location (provider location):  On-site  Platform used for Video Visit: [facility]    NEURO-ONCOLOGY VISIT  Aug 11, 2023    CHIEF COMPLAINT: Mr. 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     He is currently managed on imaging surveillance. However, imaging in 12/2022 showed a subtle change concerning for cancer progression. On close interval imaging, slow progression has been identified again today. As a result, it was recommended that he initiate chemoradiotherapy.     He is seen today unaccompanied via virtual visit at the endpoint of chemoradiotherapy.    HISTORY OF PRESENT ILLNESS:  -He completed treatment on 8/8/2023.   -Joss reports that he is feeling well overall and has been feeling stronger.   -Feeling that his focus is better and he is feeling more motivated since starting Zoloft.   -He is eating better and his fatigue has improved.   -He is not on dexamethasone.   -R sided weakness feels getting stronger overall and he cannot really tell a difference doing reg every day activities.  -Mild nausea and well-controlled constipation with supportive meds during treatment.   -Denies any new headaches, dizziness or vision changes.  -He denies activities concerning for seizures.    MEDICATIONS   Current Outpatient Medications   Medication Sig Dispense Refill    dexamethasone (DECADRON) 2 MG tablet Take 1 tablet (2 mg) by mouth daily (with breakfast) (Patient not taking: Reported on 7/26/2023) 90 tablet 3    ondansetron (ZOFRAN) 4 MG tablet Take 1 tablet (4 mg) by mouth At Bedtime Take 30-60 mins before each dose of temozolomide. 30 tablet 3    SENNA-docusate sodium (SENNA S) 8.6-50 MG tablet Take 1 " tablet by mouth 2 times daily 60 tablet 2    sertraline (ZOLOFT) 25 MG tablet Take 0.5 tablets (12.5 mg) by mouth daily for 7 days, THEN 1 tablet (25 mg) daily for 25 days. 30 tablet 0    sulfamethoxazole-trimethoprim (BACTRIM DS) 800-160 MG tablet Take 1 tablet by mouth Every Mon, Wed, Fri Morning Begin with the start of radiation therapy. 18 tablet 0    temozolomide (TEMODAR) 140 MG capsule Take 1 capsule (140 mg) by mouth At Bedtime Daily during radiation. Take a dose of ondansetron 30-60 min before temozolomide. Take on empty stomach. 42 capsule 0     DRUG ALLERGIES No Known Allergies     IMMUNIZATIONS   Immunization History   Administered Date(s) Administered    COVID-19 MONOVALENT 12+ (Pfizer) 11/05/2021    TDAP (Adacel,Boostrix) 12/03/2021       ONCOLOGIC HISTORY  -8/8/2021 PRESENTATION: New onset seizure; generalized event. Started on Keppra.  -8/2021 MR brain imaging with a 2.1cm left frontal non-enhancing mass in the superior frontal gyrus in the expected region of the supplementary motor area.  -9/8/2021 MR brain imaging with no significant change in the nonenhancing T2 hyperintense mass.  -11/12/21 SURGERY: Left frontal-parietal craniotomy for mass resection.   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  Post-operative imaging with interval left frontal craniotomy for resection of abnormal lesion within the left parafalcine frontal lobe.  Hemiparesis, discharged to ARU.  -12/7/2021 NEURO-ONC: Recommending imaging surveillance given low risk factors and need to continue to focus on ongoing rehab. Referral to Dr. Chiang for optimizing rehab. Referral to [facility]/ epilepsy for seizure risk management in relation to future employment. Trial of flexeril 10mg at bedtime for headaches.   -3/8/2022 NEURO-ONC/ MRB: Clinically well; improved from prior. Restarting Keppra; second referral to [facility]. Imaging with post-surgical changes. Continue imaging  "surveillance.   -6/7/2022 NEURO-ONC/ MRB: Clinically well. Imaging with continued healing post-surgery. Continue imaging surveillance.   -12/6/2022 NEURO-ONC/ MRB: Clinically with no new/ progressive neurological symptoms. Imaging with a subtle increase in T2 FLAIR about the posterior aspect of the resection cavity. Continue imaging surveillance at a shortened interval of 3 months.   -2/28/2023 NEURO-ONC/ MRB: Clinically with no new/ progressive neurological symptoms. Imaging largely stable, but close interval imaging was recommended.   -5/30/2023 NEURO-ONC/ MRB: Worsened mood, concentration; starting Zoloft. Imaging with subtle progression noted when comparing to imaging 6 months ago; recommending chemoradiotherapy with referral to radiation oncology. Social work involvement. Primary care referral for management of hypertension.   -6/16/2023 NEURO-ONC: Started Zoloft, stopped r/t feeling jittery. Imaging with subtle progression noted when comparing to imaging 6 months ago; planned start date is 6/27 for chemoradiotherapy. Ongoing social work involvement.   -7/21/2023 NEURO-ONC/ CHEMORADS: Tolerating chemoradiation well.  Tolerating Zoloft with improved mood.  Continues to follow with psychology and social work.  -8/11/2023 NEURO-ONC/ CHEMORADS: Completed chemoradiation on 8/8.  Tolerating Zoloft with ongoing improved mood.  Continues to follow with psychology and social work.    PHYSICAL EXAMINATION  There were no vitals taken for this visit.   Wt Readings from Last 2 Encounters:   08/02/23 62.7 kg (138 lb 3.2 oz)   07/26/23 63.5 kg (140 lb 1.6 oz)      Ht Readings from Last 2 Encounters:   07/21/23 1.803 m (5' 11\")   07/13/23 1.803 m (5' 11\")     KPS: 100    -Generally well appearing.  -Respiratory: Speaking in full fluid sentences with no shortness of breath or wheezing noted.  -Psychiatric: Normal mood and affect. Pleasant, talkative.  -Neurologic:   MENTAL STATUS:     Recall: Intact.    Speech fluent.      " CRANIAL NERVES:     Pupils are equal, round.    Symmetric facial movements.   Hearing intact.    The remainder of the physical exam is deferred due to video health medium.    MEDICAL RECORDS  Personally reviewed; [employer] messages, social work notes, neurology follow-up, radiation notes.    LABS  Personally reviewed all available lab results from 7/17/2023.    IMAGING  No new imaging to review today.    IMPRESSION  As above, Joss reports that he is doing well overall, and that treatment is not as difficult as he was expecting it to be.  He completed chemoradiation with expected fatigue, and some mild nausea and constipation, both of which were helped with supportive medications.    His mood has improved and he is tolerating Zoloft well at the 50 mg per day dose.   He is following up with psychology, and has good family as well as social work support.  He is very much enjoying having his children with him.    He denies any new or worsening neurologic changes.  He denies any concerns for seizure activity.    We review the plan to see Dr. Park in one month with labs and MRI.      PROBLEM LIST  Oligodendroglioma  Hemiparesis, right  Tension headache  Seizure  Fatigue  Hypertension  Depressed mood, anxiety, irritability    PLAN  -CANCER-DIRECTED THERAPY-  -He completed chemoradiation on 8/8 and tolerated this well overall.  -Labs, MRI and MD visit in one month.    -STEROIDS-  -Currently off dexamethasone.    -SEIZURE MANAGEMENT-  -Off Keppra.   -Following with Dr. Panda.      -Quality of life/ MOOD/ HEADACHE/ FATIGUE-  -Mood is much improved on Zoloft 50 mg/ day and he is tolerating this well.  He will continue seeing psychology and PCP.    -SW continued involvement as financial and work stressors present.     -HEMIPARESIS-  -Following with Dr. Chiang.     -HYPERTENSION-  -Primary care referral for management.    -TOBACCO DEPENDENCE-  -Encouraged continued abstinence from smoking.    -CRAMPING-  -Encouraged continued  hydration as well as electrolyte supplementation.  -Will monitor closely.    Return to clinic in one month with labs, MRI and to see Dr. Park.  He will call sooner with any concerns.    TITA Murray, CNP

## 2023-08-11 ENCOUNTER — VIRTUAL VISIT (OUTPATIENT)
Dept: ONCOLOGY | Facility: CLINIC | Age: 48
End: 2023-08-11
Attending: NURSE PRACTITIONER
Payer: COMMERCIAL

## 2023-08-11 VITALS — HEIGHT: 72 IN | WEIGHT: 140 LBS | BODY MASS INDEX: 18.96 KG/M2

## 2023-08-11 DIAGNOSIS — C71.9 OLIGODENDROGLIOMA, WHO GRADE II (H): Primary | ICD-10-CM

## 2023-08-11 DIAGNOSIS — F43.23 ADJUSTMENT DISORDER WITH MIXED ANXIETY AND DEPRESSED MOOD: ICD-10-CM

## 2023-08-11 PROCEDURE — 99214 OFFICE O/P EST MOD 30 MIN: CPT | Mod: VID | Performed by: NURSE PRACTITIONER

## 2023-08-11 ASSESSMENT — PAIN SCALES - GENERAL: PAINLEVEL: NO PAIN (0)

## 2023-08-11 NOTE — LETTER
"    8/11/2023         RE: Lang Collins Jr.  [address]  [place] [address]        Dear Colleague,    Thank you for referring your patient, Lang Collins Jr., to the [facility]. Please see a copy of my visit note below.    Virtual Visit Details    Type of service:  Video Visit   Video Start Time: 9:57 AM  Video End Time: 10:14 AM    Originating Location (pt. Location): Home  Distant Location (provider location):  On-site  Platform used for Video Visit: [facility]    NEURO-ONCOLOGY VISIT  Aug 11, 2023    CHIEF COMPLAINT: Mr. 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     He is currently managed on imaging surveillance. However, imaging in 12/2022 showed a subtle change concerning for cancer progression. On close interval imaging, slow progression has been identified again today. As a result, it was recommended that he initiate chemoradiotherapy.     He is seen today unaccompanied via virtual visit at the endpoint of chemoradiotherapy.    HISTORY OF PRESENT ILLNESS:  -He completed treatment on 8/8/2023.   -Joss reports that he is feeling well overall and has been feeling stronger.   -Feeling that his focus is better and he is feeling more motivated since starting Zoloft.   -He is eating better and his fatigue has improved.   -He is not on dexamethasone.   -R sided weakness feels getting stronger overall and he cannot really tell a difference doing reg every day activities.  -Mild nausea and well-controlled constipation with supportive meds during treatment.   -Denies any new headaches, dizziness or vision changes.  -He denies activities concerning for seizures.    MEDICATIONS   Current Outpatient Medications   Medication Sig Dispense Refill     dexamethasone (DECADRON) 2 MG tablet Take 1 tablet (2 mg) by mouth daily " (with breakfast) (Patient not taking: Reported on 7/26/2023) 90 tablet 3     ondansetron (ZOFRAN) 4 MG tablet Take 1 tablet (4 mg) by mouth At Bedtime Take 30-60 mins before each dose of temozolomide. 30 tablet 3     SENNA-docusate sodium (SENNA S) 8.6-50 MG tablet Take 1 tablet by mouth 2 times daily 60 tablet 2     sertraline (ZOLOFT) 25 MG tablet Take 0.5 tablets (12.5 mg) by mouth daily for 7 days, THEN 1 tablet (25 mg) daily for 25 days. 30 tablet 0     sulfamethoxazole-trimethoprim (BACTRIM DS) 800-160 MG tablet Take 1 tablet by mouth Every Mon, Wed, Fri Morning Begin with the start of radiation therapy. 18 tablet 0     temozolomide (TEMODAR) 140 MG capsule Take 1 capsule (140 mg) by mouth At Bedtime Daily during radiation. Take a dose of ondansetron 30-60 min before temozolomide. Take on empty stomach. 42 capsule 0     DRUG ALLERGIES No Known Allergies     IMMUNIZATIONS   Immunization History   Administered Date(s) Administered     COVID-19 MONOVALENT 12+ (Pfizer) 11/05/2021     TDAP (Adacel,Boostrix) 12/03/2021       ONCOLOGIC HISTORY  -8/8/2021 PRESENTATION: New onset seizure; generalized event. Started on Keppra.  -8/2021 MR brain imaging with a 2.1cm left frontal non-enhancing mass in the superior frontal gyrus in the expected region of the supplementary motor area.  -9/8/2021 MR brain imaging with no significant change in the nonenhancing T2 hyperintense mass.  -11/12/21 SURGERY: Left frontal-parietal craniotomy for mass resection.   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  Post-operative imaging with interval left frontal craniotomy for resection of abnormal lesion within the left parafalcine frontal lobe.  Hemiparesis, discharged to ARU.  -12/7/2021 NEURO-ONC: Recommending imaging surveillance given low risk factors and need to continue to focus on ongoing rehab. Referral to Dr. Chiang for optimizing rehab. Referral to [facility]/ epilepsy  "for seizure risk management in relation to future employment. Trial of flexeril 10mg at bedtime for headaches.   -3/8/2022 NEURO-ONC/ MRB: Clinically well; improved from prior. Restarting Keppra; second referral to [facility]. Imaging with post-surgical changes. Continue imaging surveillance.   -6/7/2022 NEURO-ONC/ MRB: Clinically well. Imaging with continued healing post-surgery. Continue imaging surveillance.   -12/6/2022 NEURO-ONC/ MRB: Clinically with no new/ progressive neurological symptoms. Imaging with a subtle increase in T2 FLAIR about the posterior aspect of the resection cavity. Continue imaging surveillance at a shortened interval of 3 months.   -2/28/2023 NEURO-ONC/ MRB: Clinically with no new/ progressive neurological symptoms. Imaging largely stable, but close interval imaging was recommended.   -5/30/2023 NEURO-ONC/ MRB: Worsened mood, concentration; starting Zoloft. Imaging with subtle progression noted when comparing to imaging 6 months ago; recommending chemoradiotherapy with referral to radiation oncology. Social work involvement. Primary care referral for management of hypertension.   -6/16/2023 NEURO-ONC: Started Zoloft, stopped r/t feeling jittery. Imaging with subtle progression noted when comparing to imaging 6 months ago; planned start date is 6/27 for chemoradiotherapy. Ongoing social work involvement.   -7/21/2023 NEURO-ONC/ CHEMORADS: Tolerating chemoradiation well.  Tolerating Zoloft with improved mood.  Continues to follow with psychology and social work.  -8/11/2023 NEURO-ONC/ CHEMORADS: Completed chemoradiation on 8/8.  Tolerating Zoloft with ongoing improved mood.  Continues to follow with psychology and social work.    PHYSICAL EXAMINATION  There were no vitals taken for this visit.   Wt Readings from Last 2 Encounters:   08/02/23 62.7 kg (138 lb 3.2 oz)   07/26/23 63.5 kg (140 lb 1.6 oz)      Ht Readings from Last 2 Encounters:   07/21/23 1.803 m (5' 11\")   07/13/23 1.803 m (5' 11\") "     KPS: 100    -Generally well appearing.  -Respiratory: Speaking in full fluid sentences with no shortness of breath or wheezing noted.  -Psychiatric: Normal mood and affect. Pleasant, talkative.  -Neurologic:   MENTAL STATUS:     Recall: Intact.    Speech fluent.      CRANIAL NERVES:     Pupils are equal, round.    Symmetric facial movements.   Hearing intact.    The remainder of the physical exam is deferred due to video health medium.    MEDICAL RECORDS  Personally reviewed; [employer] messages, social work notes, neurology follow-up, radiation notes.    LABS  Personally reviewed all available lab results from 7/17/2023.    IMAGING  No new imaging to review today.    IMPRESSION  As above, Joss reports that he is doing well overall, and that treatment is not as difficult as he was expecting it to be.  He completed chemoradiation with expected fatigue, and some mild nausea and constipation, both of which were helped with supportive medications.    His mood has improved and he is tolerating Zoloft well at the 50 mg per day dose.   He is following up with psychology, and has good family as well as social work support.  He is very much enjoying having his children with him.    He denies any new or worsening neurologic changes.  He denies any concerns for seizure activity.    We review the plan to see Dr. Park in one month with labs and MRI.      PROBLEM LIST  Oligodendroglioma  Hemiparesis, right  Tension headache  Seizure  Fatigue  Hypertension  Depressed mood, anxiety, irritability    PLAN  -CANCER-DIRECTED THERAPY-  -He completed chemoradiation on 8/8 and tolerated this well overall.  -Labs, MRI and MD visit in one month.    -STEROIDS-  -Currently off dexamethasone.    -SEIZURE MANAGEMENT-  -Off Keppra.   -Following with Dr. Panda.      -Quality of life/ MOOD/ HEADACHE/ FATIGUE-  -Mood is much improved on Zoloft 50 mg/ day and he is tolerating this well.  He will continue seeing psychology and PCP.    -SW continued  involvement as financial and work stressors present.     -HEMIPARESIS-  -Following with Dr. Chiang.     -HYPERTENSION-  -Primary care referral for management.    -TOBACCO DEPENDENCE-  -Encouraged continued abstinence from smoking.    -CRAMPING-  -Encouraged continued hydration as well as electrolyte supplementation.  -Will monitor closely.    Return to clinic in one month with labs, MRI and to see Dr. Park.  He will call sooner with any concerns.    TITA Murray, CNP      Again, thank you for allowing me to participate in the care of your patient.        Sincerely,        TITA Crystal CNP

## 2023-08-11 NOTE — LETTER
"    8/11/2023         RE: Lang Collins Jr.  [address]  [facility] [address]        Dear Colleague,    Thank you for referring your patient, Lang Collins Jr., to the [facility]. Please see a copy of my visit note below.    Virtual Visit Details    Type of service:  Video Visit   Video Start Time: 9:57 AM  Video End Time: 10:14 AM    Originating Location (pt. Location): Home  Distant Location (provider location):  On-site  Platform used for Video Visit: [facility]    NEURO-ONCOLOGY VISIT  Aug 11, 2023    CHIEF COMPLAINT: Mr. 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     He is currently managed on imaging surveillance. However, imaging in 12/2022 showed a subtle change concerning for cancer progression. On close interval imaging, slow progression has been identified again today. As a result, it was recommended that he initiate chemoradiotherapy.     He is seen today unaccompanied via virtual visit at the endpoint of chemoradiotherapy.    HISTORY OF PRESENT ILLNESS:  -He completed treatment on 8/8/2023.   -Joss reports that he is feeling well overall and has been feeling stronger.   -Feeling that his focus is better and he is feeling more motivated since starting Zoloft.   -He is eating better and his fatigue has improved.   -He is not on dexamethasone.   -R sided weakness feels getting stronger overall and he cannot really tell a difference doing reg every day activities.  -Mild nausea and well-controlled constipation with supportive meds during treatment.   -Denies any new headaches, dizziness or vision changes.  -He denies activities concerning for seizures.    MEDICATIONS   Current Outpatient Medications   Medication Sig Dispense Refill     dexamethasone (DECADRON) 2 MG tablet Take 1 tablet (2 mg) by mouth daily " (with breakfast) (Patient not taking: Reported on 7/26/2023) 90 tablet 3     ondansetron (ZOFRAN) 4 MG tablet Take 1 tablet (4 mg) by mouth At Bedtime Take 30-60 mins before each dose of temozolomide. 30 tablet 3     SENNA-docusate sodium (SENNA S) 8.6-50 MG tablet Take 1 tablet by mouth 2 times daily 60 tablet 2     sertraline (ZOLOFT) 25 MG tablet Take 0.5 tablets (12.5 mg) by mouth daily for 7 days, THEN 1 tablet (25 mg) daily for 25 days. 30 tablet 0     sulfamethoxazole-trimethoprim (BACTRIM DS) 800-160 MG tablet Take 1 tablet by mouth Every Mon, Wed, Fri Morning Begin with the start of radiation therapy. 18 tablet 0     temozolomide (TEMODAR) 140 MG capsule Take 1 capsule (140 mg) by mouth At Bedtime Daily during radiation. Take a dose of ondansetron 30-60 min before temozolomide. Take on empty stomach. 42 capsule 0     DRUG ALLERGIES No Known Allergies     IMMUNIZATIONS   Immunization History   Administered Date(s) Administered     COVID-19 MONOVALENT 12+ (Pfizer) 11/05/2021     TDAP (Adacel,Boostrix) 12/03/2021       ONCOLOGIC HISTORY  -8/8/2021 PRESENTATION: New onset seizure; generalized event. Started on Keppra.  -8/2021 MR brain imaging with a 2.1cm left frontal non-enhancing mass in the superior frontal gyrus in the expected region of the supplementary motor area.  -9/8/2021 MR brain imaging with no significant change in the nonenhancing T2 hyperintense mass.  -11/12/21 SURGERY: Left frontal-parietal craniotomy for mass resection.   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  Post-operative imaging with interval left frontal craniotomy for resection of abnormal lesion within the left parafalcine frontal lobe.  Hemiparesis, discharged to ARU.  -12/7/2021 NEURO-ONC: Recommending imaging surveillance given low risk factors and need to continue to focus on ongoing rehab. Referral to Dr. Chiang for optimizing rehab. Referral to [facility]/ epilepsy  "for seizure risk management in relation to future employment. Trial of flexeril 10mg at bedtime for headaches.   -3/8/2022 NEURO-ONC/ MRB: Clinically well; improved from prior. Restarting Keppra; second referral to [facility]. Imaging with post-surgical changes. Continue imaging surveillance.   -6/7/2022 NEURO-ONC/ MRB: Clinically well. Imaging with continued healing post-surgery. Continue imaging surveillance.   -12/6/2022 NEURO-ONC/ MRB: Clinically with no new/ progressive neurological symptoms. Imaging with a subtle increase in T2 FLAIR about the posterior aspect of the resection cavity. Continue imaging surveillance at a shortened interval of 3 months.   -2/28/2023 NEURO-ONC/ MRB: Clinically with no new/ progressive neurological symptoms. Imaging largely stable, but close interval imaging was recommended.   -5/30/2023 NEURO-ONC/ MRB: Worsened mood, concentration; starting Zoloft. Imaging with subtle progression noted when comparing to imaging 6 months ago; recommending chemoradiotherapy with referral to radiation oncology. Social work involvement. Primary care referral for management of hypertension.   -6/16/2023 NEURO-ONC: Started Zoloft, stopped r/t feeling jittery. Imaging with subtle progression noted when comparing to imaging 6 months ago; planned start date is 6/27 for chemoradiotherapy. Ongoing social work involvement.   -7/21/2023 NEURO-ONC/ CHEMORADS: Tolerating chemoradiation well.  Tolerating Zoloft with improved mood.  Continues to follow with psychology and social work.  -8/11/2023 NEURO-ONC/ CHEMORADS: Completed chemoradiation on 8/8.  Tolerating Zoloft with ongoing improved mood.  Continues to follow with psychology and social work.    PHYSICAL EXAMINATION  There were no vitals taken for this visit.   Wt Readings from Last 2 Encounters:   08/02/23 62.7 kg (138 lb 3.2 oz)   07/26/23 63.5 kg (140 lb 1.6 oz)      Ht Readings from Last 2 Encounters:   07/21/23 1.803 m (5' 11\")   07/13/23 1.803 m (5' 11\") "     KPS: 100    -Generally well appearing.  -Respiratory: Speaking in full fluid sentences with no shortness of breath or wheezing noted.  -Psychiatric: Normal mood and affect. Pleasant, talkative.  -Neurologic:   MENTAL STATUS:     Recall: Intact.    Speech fluent.      CRANIAL NERVES:     Pupils are equal, round.    Symmetric facial movements.   Hearing intact.    The remainder of the physical exam is deferred due to video health medium.    MEDICAL RECORDS  Personally reviewed; Edynhart messages, social work notes, neurology follow-up, radiation notes.    LABS  Personally reviewed all available lab results from 7/17/2023.    IMAGING  No new imaging to review today.    IMPRESSION  As above, Joss reports that he is doing well overall, and that treatment is not as difficult as he was expecting it to be.  He completed chemoradiation with expected fatigue, and some mild nausea and constipation, both of which were helped with supportive medications.    His mood has improved and he is tolerating Zoloft well at the 50 mg per day dose.   He is following up with psychology, and has good family as well as social work support.  He is very much enjoying having his children with him.    He denies any new or worsening neurologic changes.  He denies any concerns for seizure activity.    We review the plan to see Dr. Park in one month with labs and MRI.      PROBLEM LIST  Oligodendroglioma  Hemiparesis, right  Tension headache  Seizure  Fatigue  Hypertension  Depressed mood, anxiety, irritability    PLAN  -CANCER-DIRECTED THERAPY-  -He completed chemoradiation on 8/8 and tolerated this well overall.  -Labs, MRI and MD visit in one month.    -STEROIDS-  -Currently off dexamethasone.    -SEIZURE MANAGEMENT-  -Off Keppra.   -Following with Dr. Panda.      -Quality of life/ MOOD/ HEADACHE/ FATIGUE-  -Mood is much improved on Zoloft 50 mg/ day and he is tolerating this well.  He will continue seeing psychology and PCP.    -SW continued  involvement as financial and work stressors present.     -HEMIPARESIS-  -Following with Dr. Chiang.     -HYPERTENSION-  -Primary care referral for management.    -TOBACCO DEPENDENCE-  -Encouraged continued abstinence from smoking.    -CRAMPING-  -Encouraged continued hydration as well as electrolyte supplementation.  -Will monitor closely.    Return to clinic in one month with labs, MRI and to see Dr. Park.  He will call sooner with any concerns.    TITA Murray, CNP      Again, thank you for allowing me to participate in the care of your patient.        Sincerely,        TITA Crystal CNP

## 2023-08-11 NOTE — NURSING NOTE
Is the patient currently in the state of [place]? YES    Visit mode:VIDEO    If the visit is dropped, the patient can be reconnected by: VIDEO VISIT: Send to e-mail at: [email or web address]    Will anyone else be joining the visit? NO      How would you like to obtain your AVS? MyChart    Are changes needed to the allergy or medication list? YES: Pt states he is no longer taking chemo, radiation, stool softener or Antibiotic. Pt states the only thing he is taking currently is Sertraline which happens to show  on pt's chart. Pt states if he is told to take Dexamethasone then he will begin taking that medication as well.      Reason for visit: RECHECK    Sonido Lovett VF

## 2023-08-15 ENCOUNTER — VIRTUAL VISIT (OUTPATIENT)
Dept: PSYCHIATRY | Facility: CLINIC | Age: 48
End: 2023-08-15
Payer: COMMERCIAL

## 2023-08-15 ENCOUNTER — PATIENT OUTREACH (OUTPATIENT)
Dept: CARE COORDINATION | Facility: CLINIC | Age: 48
End: 2023-08-15
Payer: COMMERCIAL

## 2023-08-15 DIAGNOSIS — F43.23 ADJUSTMENT DISORDER WITH MIXED ANXIETY AND DEPRESSED MOOD: Primary | ICD-10-CM

## 2023-08-15 PROCEDURE — 99213 OFFICE O/P EST LOW 20 MIN: CPT | Mod: VID | Performed by: NURSE PRACTITIONER

## 2023-08-15 NOTE — PATIENT INSTRUCTIONS
**For crisis resources, please see the information at the end of this document**     Thank you for coming to the [facility] [place] [facility].    TREATMENT PLAN:  Medications:   CONTINUE sertraline 50 mg every day. No script needed (90 day just sent by oncology).  Continue all other medications per primary care / other provider.     Consults / Referrals:   - Continue therapy with established provider as planned.    Follow-up:  Schedule an appointment with me in 4 weeks or sooner as needed.  Call [facility] at [phone] to schedule.  Follow up with primary care provider as planned or sooner if needed for acute medical concerns.  Call the psychiatric nurse line with medication questions or concerns at [phone].  Trainfoxhart may be used to communicate with your provider, but this is not intended to be used for emergencies.    Psychoeducation:  Side effects for SSRI: sexual dysfunction, decreased appetite, increased appetite, nausea, diarrhea, constipation, dry mouth, insomnia, sedation, agitation, tremors, headaches, dizziness, sweating, bruising and rare bleeding, discontinuation syndrome, rare hyponatremia, rare induction of kadi, suicidal ideations, and serotonin syndrome.      Financial Assistance [phone]  [facility] Billing [phone]  Central Billing Office, [facility]: [phone]  [place] Billing [phone]  Medical Records [phone]  [place] Patient Bill of Rights [email or web address]       MENTAL HEALTH CRISIS RESOURCES:  For a emergency help, please call 911 or go to the nearest Emergency Department.     Emergency Walk-In Options:   EmPATH Unit @ [facility] ([place]): [phone] - Specialized mental health emergency area designed to be calming  [facility] ([place]): [phone]  [facility] ([place]): [phone]  [facility]): [phone]    [facility] Crisis Information:   [place]: [phone]  Ger[place]: [phone]  Leeann HASSAN) - Adult: [phone]     Child: [phone]  [place] - Adult: [phone]     Child: [phone]  [place]: [phone]  List of all [facility] resources:   https://[place].gov/dhs/people-we-serve/adults/health-care/mental-health/resources/crisis-contacts.jsp    National Crisis Information:   National Suicide & Crisis Lifeline: Call 778        For online chat options, visit https://suicidepreventionSendbloomline.org/chat/  Poison Control Center: 9-470-573-3720  Poison Control Center: 0-920-902-9494  Trans Lifeline: 1-860.660.4770 - Hotline for transgender people of all ages  The [employer]: 5-623-012-6834 - Hotline for LGBT youth     For Non-Emergency Support:   Fast Tracker: Mental Health & Substance Use Disorder Resources -   https://www.fasttrackermn.org/       Again thank you for choosing [facility] [place] [facility] and please let us know how we can best partner with you to improve you and your family's health.    You may be receiving a survey regarding this appointment. We would love to have your feedback, both positive and negative. The survey is done by an external company, so your answers are anonymous.        Patient Education   Collaborative Care Psychiatry Service  What to Expect  Here's what to expect from your Collaborative Care Psychiatry Service (CCPS).   About CCPS  CCPS means 2 people work together to help you get better. You'll meet with a behavioral health clinician and a psychiatric doctor. A behavioral health clinician helps people with mental health problems by talking with them. A psychiatric doctor helps people by giving them medicine.  How it works  At every visit, you'll see the behavioral health clinician (BHC) first. They'll talk with you about how you're doing and teach you how to feel better.   Then you'll see the psychiatric  "doctor. This doctor can help you deal with troubling thoughts and feelings by giving you medicine. They'll make sure you know the plan for your care.   CCPS usually takes 3 to 6 visits. If you need more visits, we may have you start seeing a different psychiatric doctor for ongoing care.  If you have any questions or concerns, we'll be glad to talk with you.  About visits  Be open  At your visits, please talk openly about your problems. It may feel hard, but it's the best way for us to help you.  Cancelling visits  If you can't come to your visit, please call us right away at [phone]. If you don't cancel at least 24 hours (1 full day) before your visit, that's \"late cancellation.\"  Being late to visits  Being very late is the same as not showing up. You will be a \"no show\" if:  Your appointment starts with a C, and you're more than 15 minutes late for a 30-minute (half hour) visit. This will also cancel your appointment with the psychiatric doctor.  Your appointment is with a psychiatric doctor only, and you're more than 15 minutes late for a 30-minute (half hour) visit.  Your appointment is with a psychiatric doctor only, and you're more than 30 minutes late for a 60-minute (full hour) visit.  If you cancel late or don't show up 2 times within 6 months, we may end your care.   Getting help between visits  If you need help between visits, you can call us Monday to Friday from 8 a.m. to 4:30 p.m. at [phone].  Emergency care  Call 911 or go to the nearest emergency department if your life or someone else's life is in danger.  Call 988 anytime to reach the national Suicide and Crisis hotline.  Medicine refills  To refill your medicine, call your pharmacy. You can also call [facility]'s Behavioral Access at 1-800.464.5015, Monday to Friday, 8 a.m. to 4:30 p.m. It can take 1 to 3 business days to get a refill.   Forms, letters, and tests  You may have papers to fill out, like FMLA, short-term " disability, and workability. We can help you with these forms at your visits, but you must have an appointment. You may need more than 1 visit for this, to be in an intensive therapy program, or both.  Before we can give you medicine for ADHD, we may refer you to get tested for it or confirm it another way.  We may not be able to give you an emotional support animal letter.  We don't do mental health checks ordered by the court.   We don't do mental health testing, but we can refer you to get tested.   Thank you for choosing us for your care.  For informational purposes only. Not to replace the advice of your health care provider. Copyright   2022 [facility]. All rights reserved. [employer] 894959 - 12/22.

## 2023-08-15 NOTE — PROGRESS NOTES
Social Work - Follow-Up  [facility]    Data/Intervention:    Patient Name: Lang Collins Jr. Goes By: Joss    /Age: 1975 (48 year old)    Reason for Follow-Up:  Returning VM from Joss    Intervention/Education/Resources Provided:  Joss reports that he was outreaching to access contact information for legal support for SSDI application. SARAH provided Lucita Cleary's contact information.     SARAH answered Joss's questions the best that I can about how to enroll in [employer] vs. Public school. Joss wondering about financial assistance available to help with costs of a private school. SW encouraged Joss to bring these questions to the school of choice and see if there are options available.     Assessment/Plan:  Onc SW will continue to be available as needed for ongoing psychosocial support.     Previously provided patient/family with writer's contact information and availability.      Summer Felix, MSW, LICSW, OSW-C  Clinical - Adult Oncology  She/Her/Hers  Phone: [phone]  [facility]: M, Thu  *every other Tue, 8am-4:30pm  [facility]: W, F, *every other Tue, 8am-4:30pm

## 2023-08-15 NOTE — PROGRESS NOTES
"Virtual Visit Details    Type of service:  Video Visit   Video Start Time: 2:02 PM  Video End Time:2:17 PM    Originating Location (pt. Location): Home    Distant Location (provider location):  On-site  Platform used for Video Visit: Fylet         PSYCHIATRIC MEDICATION FOLLOW UP APPT     Name: Lang Collins Jr.   : 1975               Telemedicine Visit: The patient's condition can be safely assessed and treated via synchronous audio and visual telemedicine encounter.      Consent:  The patient/guardian has verbally consented to: the potential risks and benefits of telemedicine (video visit or phone) versus in person care; bill my insurance or make self-payment for services provided; and responsibility for payment of non-covered services.     As the provider I attest to compliance with applicable laws and regulations related to telemedicine.         Source of Referral / Care Team:  Primary Care Provider: Physician No Ref-Primary   Current Psychotherapist: Arturo Salgado PsyD  PCP: Adrián Bowden MD  Case Management: Summer eMng LICSW     The [facility] psychiatry providers act as a specialty service for Primary Care Providers in the [facility] who seek to optimize medications for unstable patients. Once medications have been optimized, [facility] providers discharge the patient back to the referring Primary Care Provider for ongoing medication management. This type of system allows [facility] to serve a high volume of patients.     Patient Identification:  Patient is a 48 year old, single  Black or  Not  or  male  who presents for return visit with me.   Patient prefers to be called: \"Joss\".  Patient is currently unemployed.     Patient attended the session alone.    RECORDS AVAILABLE FOR REVIEW: EHR records through [employer] .       Interim History:  I last saw Lang Collins  for outpatient psychiatry Return Visit one month ago on 23. During that " "appointment, we increased to sertraline 50 mg QD.  Patient reports ADHERING to prescribed medications, on 50 mg for last ~2.5 weeks. He denies any side effects with increasing the dose, feels tolerating very well. He does feel his anxiety has improved somewhat with the medication, although still rates high today (7/10). Anxiety aggravated when \"a whole bunch going on\", feeling overwhelmed jhonatan between appointments, family obligations. Anxiety can be associated with feeling restless, \"jittery\" but no longer notices as side effect of medication. High anxiety can lead to feelings of panic, but this too feels improved since +zoloft. He feels his focus and attention have been better. He denies any significant irritability, agitation. He reports low mood still associated with medical comorbidities, and particularly fatigue at times remains but improving. He feels good motivation most days. Denies any SI/SIB/HI. Denies any kadi or psychosis.        Initial Impression:   7/14/23: At initial appointment 2 weeks ago, we restarted sertraline 25 mg QD. He reports he was able to tolerate the slower sertraline taper, on 25 mg for ~ 1 week. He reports his mood has improved over the last 2 weeks, which he largely attributes to having his children with him. He reports depression has been lower, denies any SI/SIB/HI. His sleep and appetite have also improved, although he does still struggle with daytime fatigue especially after radiation. He feels his anxiety is better as well overall. He feel his irritability has been much better. Does think his forgetfulness (e.g. unsure where he left keys) remains, but feels \"maybe a little\" clearer. Discussed recommendation to increase to dosing range as tolerated, and will plan to do so once finishes current 25 mg rx. Follow-up with this provider in 1 month. Patient agreeable to plan.      6/28/23: Lang Collins Jr. is a 48 year old Black or , male who presents for initial visit " with [facility] for medication management. He denies any recent psychiatric history, reports he previously saw provider and was prescribed ritalin for ADHD as a child, none in many years. No history of psychiatric hospitalizations or suicide attempts. 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 Following surgery, and particularly worsened with recent disease progression and referral for chemotherapy and radiation, patient reports significant anxiety, low mood, changes to memory and forgetfulness. He reports significant depression today. Denies any SI/SIB/HI. No history of kadi or psychosis. Anxiety also very elevated today. Recent trial of zoloft self-discontinued after 1 dose (50 mg) due to side effects. He is open to retrial of medication, but will plan slower taper from low dose to help with side effects. Encouraged continued therapy.  Follow-up with this provider in 2 weeks or sooner PRN. Patient agreeable to plan.        Current medications include:   Current Outpatient Medications   Medication Sig    dexamethasone (DECADRON) 2 MG tablet Take 1 tablet (2 mg) by mouth daily (with breakfast) (Patient not taking: Reported on 7/26/2023)    ondansetron (ZOFRAN) 4 MG tablet Take 1 tablet (4 mg) by mouth At Bedtime Take 30-60 mins before each dose of temozolomide. (Patient not taking: Reported on 8/11/2023)    SENNA-docusate sodium (SENNA S) 8.6-50 MG tablet Take 1 tablet by mouth 2 times daily (Patient not taking: Reported on 8/11/2023)    sertraline (ZOLOFT) 50 MG tablet Take 1 tablet (50 mg) by mouth daily    temozolomide (TEMODAR) 140 MG capsule Take 1 capsule (140 mg) by mouth At Bedtime Daily during radiation. Take a dose of ondansetron 30-60 min before temozolomide. Take on empty stomach.     No current facility-administered medications for this visit.         Side effects: denies    The [place] Prescription Monitoring Program has been reviewed and there are no  "concerns about diversionary activity for controlled substances at this time.     Psychiatric ROS:  Lang Collins Jr. reports mood has been: \"They've been going alright.\"  Depression has been: depressed mood and fatigue of loss of energy self rates as 4/10, where 0 is none at all and 10 being severe depression. Was 3-4/10 at last appt.  SI/SIB/HI: denies all.   Anxiety has been: excessive worry, difficult to control, restlessness / feeling keyed up, and  easily fatigued  self rates as \"alright, haven't really been in places that cause it\" 7/ 10, where 0 is none at all and 10 being severe anxiety. Was 4-6/10 at last appt.  Sleep has been: \"sleeping good\", but frequently tired in AM.   Energy has been: has been lower last few days, needing some naps again.   Appetite has been: \"I've been eating.\" Denies significant change in weight.   Anni sxs: denies  Psychosis sxs: none  ADHD/ADD sxs: history of ADHD diagnosis as a child  Trauma sx: denies    PHQ-9 scores:       6/28/2023     2:11 PM 7/13/2023     2:30 PM   PHQ-9 SCORE   PHQ-9 Total Score MyChart 19 (Moderately severe depression)    PHQ-9 Total Score 19 15       ROSALBA-7 scores:        6/20/2023    12:58 PM   ROSALBA-7 SCORE   Total Score 17 (severe anxiety)   Total Score 17         Current stressors include: Parenting Stress, Financial Difficulties, Occupational Difficulties and medical comorbidity  Coping mechanisms and supports include: Therapy and Family    Vital Signs:   There were no vitals taken for this visit.    Review of Systems:  10 systems (general, cardiovascular, respiratory, eyes, ENT, endocrine, GI, , M/S, neurological) were reviewed. Most pertinent finding(s) is/are: ongoing daily headaches remain (recently 5/10) . Denies fever, malaise, chest pain / tightness / palpitations, nausea / vomiting / diarrhea / constipation, tics / tremors / abnormal muscle mvmts. The remaining systems are all unremarkable.    Labs:  Most recent laboratory results reviewed " "and the pertinent results include: CBC: low WBC (3.5), all within normal limits. CMP all within normal limits.       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    He has no past medical history of Arthritis, Cerebral infarction (H), Congestive heart failure (H), COPD (chronic obstructive pulmonary disease) (H), Diabetes (H), Heart disease, History of blood transfusion, Thyroid disease, or Uncomplicated asthma.    Medication allergies:  No Known Allergies    Social History:  Birth place: Grew up in [place], [place]  Childhood: Raised by mother, reports high amount of substance use in the family growing up  Siblings: 1 Sister   Highest education level was (requires further evaluation)  Employment Status: not working right now.   Relationship status: single, currently going through breakup  Current Living situation: cousin, and 2 of his daughters (8,9). Feels safe at home.  Children: 5 kids and a grandbaby - 2 typically live in [place], 3 in [place]. (8, 9,15,16, 26)   Firearms/Weapons Access: No: Patient denies   Service: No  Support: recent therapist,      Current Use of Drugs/Alcohol: Marijuana - smoking 4 grams a day, not prescribed. Denies any worsening or change. Very rare alcohol.  Tobacco use: Yes - \"cutting down real well\"    Mental Status Exam:  Alertness: alert  and oriented   Appearance: adequately groomed  Behavior/Demeanor: cooperative and pleasant, with fair eye contact   Speech: normal and regular rate and rhythm  Language: intact and no problems  Psychomotor: normal or unremarkable  Mood: anxious  Affect: full " range and appropriate; was congruent to mood; was congruent to content  Thought Process/Associations: unremarkable  Thought Content:  Reports none;  Denies suicidal ideation, violent ideation, and delusions  Perception:  Reports none;  Denies auditory hallucinations and visual hallucinations  Insight: intact  Judgment: intact  Cognition: does  appear grossly intact; formal cognitive testing was not done  Recent and Remote Memory: Intact to interview. Not formally assessed. No amnesia.  Attention Span and Concentration: normal  Fund of Knowledge:  appropriate  Gait and Station: unremarkable via seated video    Suicide Risk Assessment:  Today Lang Collins Jr. reports no suicidal ideation. In addition, there are notable risk factors for self-harm, including anxiety and comorbid medical condition of oligodengolioma. . However, risk is mitigated by commitment to family, absence of past attempts, history of seeking help when needed, future oriented, no access to firearms or weapons, and denies suicidal intent or plan. Therefore, based on all available evidence including the factors cited above, Lang Collins Jr. does not appear to be at imminent risk for self-harm, does not meet criteria for a 72-hr hold, and therefore remains appropriate for ongoing outpatient level of care.  A thorough assessment of risk factors related to suicide and self-harm have been reviewed and are noted above. The patient convincingly denies suicidality on several occasions. Local community safety resources printed and reviewed for patient to use if needed. There was no deceit detected, and the patient presented in a manner that was believable.     Recommended that patient call 911 or go to the local ED should there be a change in any of these risk factors.    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  Known issue that I take into account for their medical decisions, no current exacerbations or new concerns.    Impression:  Lang Collins Jr. is a 48 year old Black or , male who presents for return visit with  [facility] ([facility] for medication management. At last appointment one month ago, we increased to sertraline 50 mg QD. He is tolerating the increase well, denies side effects. Does report some improvement in anxiety, mood since starting the medication, but overall anxiety remains high particularly in context of significant medical stressors. Given good tolerability and recent increase of medication, recommending continuing current dose at this time. Follow-up with this provider in 1 month. Patient agreeable to plan.       Medication side effects and alternatives were reviewed. Health promotion activities recommended and reviewed today. All questions addressed. Education and counseling completed regarding risks and benefits of medications and psychotherapy options. Recommend therapy for additional support.       Treatment Plan:  CONTINUE sertraline 50 mg every day. No script needed (90 day just sent by oncology).  Continue all other medications per primary care / other provider.   Continue therapy with established provider as planned.  Safety plan  reviewed. To the Emergency Department as needed or call after hours crisis line at [phone] or [phone]. [place] Crisis Text Line. Text [place] to 276838 or Suicide LifeLine Chat: suicideDittit.org/chat  Schedule an appointment with me in 4 weeks or sooner as needed. Call [facility] at [phone] to schedule.  Follow up with primary care provider as planned or for acute medical concerns.  Call the psychiatric nurse line with medication questions or concerns at [phone].  MyChart may be used to communicate with your provider, but this is not intended to be used for emergencies.    Patient Education:  Medication side effects and alternatives reviewed. Health promotion activities recommended and reviewed today. All questions addressed. Education and counseling completed regarding risks and benefits of medications and psychotherapy options.  Consent provided by patient/guardian  Call the psychiatric nurse line with medication questions or concerns at [phone].  MyChart may be used to communicate with your provider, but this is not intended to be used for emergencies.  Fangjia.com.gov is information for patients.  It is run by the [employer] and it contains information about all disorders, diseases and all medications.      Community Resources:    National Suicide Prevention Lifeline: 877.878.3884 (TTY: 760.209.4070). Call anytime for help.  (www.suicidepreventionlifeline.org)  National [place] on Mental Illness (www.cayden.org): 476.981.5320 or 826-157-9736.   Mental Health Association (www.mentalhealth.org): 979.297.1460 or 809-312-6245.  [place] Crisis Text [place]: Text [place] to 628466  Suicide LifeLine Chat: suicideDittit.org/chat    Administrative Billing:     Level of Medical Decision Making:   - At least 1 chronic problem that is not stable  - Engaged in prescription drug management during visit (discussed any medication benefits, side effects,  alternatives, etc.)             Patient Status:  [facility] MD/DO/NP/PA providers offer care a specialty service for Primary Care Providers in the [facility] that seek to optimize psychotropic medications for unstable patients.  Once medications have been optimized, our providers discharge the patient back to the referring Primary Care Provider for ongoing medication management.  This type of system allows our providers to serve a high volume of patients.   Patient will continue to be seen for ongoing consultation and stabilization.    Signed:   Batsheva Wright MSN, APRN, PMHNP-BC  [facility] ([facility]  [facility]    Chart documentation done in part with Dragon Voice Recognition software.  Although reviewed after completion, some word and grammatical errors may remain.

## 2023-08-15 NOTE — NURSING NOTE
Is the patient currently in the state of [place]? YES    Visit mode:VIDEO    If the visit is dropped, the patient can be reconnected by: VIDEO VISIT: Text to cell phone: [phone]    Will anyone else be joining the visit? NO      How would you like to obtain your AVS? MyChart    Are changes needed to the allergy or medication list? NO    Reason for visit: SHAKEEL OHARA

## 2023-08-16 ENCOUNTER — DOCUMENTATION ONLY (OUTPATIENT)
Dept: RADIATION ONCOLOGY | Facility: CLINIC | Age: 48
End: 2023-08-16

## 2023-08-16 NOTE — PROGRESS NOTES
Radiotherapy Treatment Summary              PATIENT: Lang Collins Jr.  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     INTENT OF RADIOTHERAPY: Curative    CONCURRENT SYSTEMIC THERAPY: Temodar      SITE OF TREATMENT: Left frontal brain    DATES  OF TREATMENT: 2023 to 2023    TOTAL DOSE OF TREATMENT: 5400 cGy    DOSE PER FRACTION OF TREATMENT: 180 cGy       COMMENT/TOXICITY: Grade 1 alopecia and skin reaction (grade 1) managed with moisturizer.  Patient had persistent low-grade headaches throughout treatment but did not require any additional pain medication while on treatment.  He had progressive nausea while on treatment (grade 2) and was on Zofran 4 mg every 8 by end of treatment. Patient did have mild fatigue relieved by rest.                PAIN MANAGEMENT:  Patient did not require any additional pain medications.                            FOLLOW UP PLAN:  Patient will follow up in radiation oncology clinic in 1 month.  Patient will continue close follow up with neuro-oncology for adjuvant temodar and surveillance.     Yg Bear MD  Department of Radiation Oncology  [facility]     CC  Patient Care Team:  No Ref-Primary, Physician as PCP - General  Elva Park MD as Assigned Neuroscience Provider  Tammy Bear MD as MD (Radiation Oncology)  Arturo Salgado LP as Psychologist (PSYCHOLOGIST CLINICAL)  Adrián Bowden MD as Assigned PCP  Mylene Qureshi, RN as Specialty Care Coordinator (Hematology & Oncology)  Tammy Bear MD as Assigned Cancer Care Provider  Batsheva Wright APRN CNP as Assigned Behavioral  Health Provider

## 2023-09-05 ENCOUNTER — PATIENT OUTREACH (OUTPATIENT)
Dept: CARE COORDINATION | Facility: CLINIC | Age: 48
End: 2023-09-05
Payer: COMMERCIAL

## 2023-09-05 NOTE — PROGRESS NOTES
Social Work - Follow-Up  [facility] [place] [facility]    Data/Intervention:    Patient Name: Lang Collins Jr. Goes By: Joss    /Age: 1975 (48 year old)    Reason for Follow-Up:  SARAH received referral from RNJAEL Chakraborty for health insurance question    Intervention/Education/Resources Provided:  SARAH's colleague Migdalia Stevenson looked Joss up in [facility], and shared that Joss does not have active health insurance. SW called and informed Joss of this and encouraged him to outreach to [facility]  ([phone]) or [facility] ([phone]). Joss to outreach and knows to contact our clinic with issues.     Assessment/Plan:  Onc SW will continue to be available as needed for ongoing psychosocial support.     Previously provided patient/family with writer's contact information and availability.    Summer Felix, MSW, LICSW, OSW-C  Clinical - Adult Oncology  She/Her/Hers  Phone: [phone]  [facility]: M, Thu  *every other Tue, 8am-4:30pm  [facility]: W, F, *every other Tue, 8am-4:30pm          unknown

## 2023-09-19 ENCOUNTER — PATIENT OUTREACH (OUTPATIENT)
Dept: RADIATION ONCOLOGY | Facility: CLINIC | Age: 48
End: 2023-09-19
Payer: COMMERCIAL

## 2023-09-19 NOTE — PROGRESS NOTES
Attempted to contact patient to evaluate how he is feeling after completing radiation therapy. Patient had cancelled his 1 month follow up with Dr. Bear due to no insurance. No answer, busy signal when attempting to contact patient.    Shelley Freitas RN

## 2023-09-27 ENCOUNTER — PATIENT OUTREACH (OUTPATIENT)
Dept: ONCOLOGY | Facility: CLINIC | Age: 48
End: 2023-09-27
Payer: COMMERCIAL

## 2023-10-02 ENCOUNTER — TELEPHONE (OUTPATIENT)
Dept: PHARMACY | Facility: CLINIC | Age: 48
End: 2023-10-02
Payer: COMMERCIAL

## 2023-10-02 NOTE — TELEPHONE ENCOUNTER
PA Initiation    Medication: TEMOZOLOMIDE 140 MG PO CAPS  Insurance Company: [employer] - Phone [phone] Fax [phone]  Pharmacy Filling the Rx:    Filling Pharmacy Phone:    Filling Pharmacy Fax:    Start Date: 10/2/2023

## 2023-10-03 RX ORDER — TEMOZOLOMIDE 140 MG/1
150 CAPSULE ORAL AT BEDTIME
Qty: 10 CAPSULE | Refills: 0 | Status: SHIPPED | OUTPATIENT
Start: 2023-10-17 | End: 2024-01-09

## 2023-10-03 NOTE — TELEPHONE ENCOUNTER
Prior Authorization Approval    Medication: TEMOZOLOMIDE 140 MG PO CAPS  Authorization Effective Date: 10/3/2023  Authorization Expiration Date: 10/2/2024  Approved Dose/Quantity: 10 for 28 days  Reference #:     Insurance Company: [employer] - Phone [phone] Fax [phone]  Expected CoPay: $ 0  CoPay Card Available: No    Financial Assistance Needed: no  Which Pharmacy is filling the prescription: [place], [place] - [facility] [address]  Pharmacy Notified: yes   Patient Notified: yes

## 2023-10-13 NOTE — PROGRESS NOTES
"NEURO-ONCOLOGY VISIT  Oct 17, 2023    CHIEF COMPLAINT: Mr. 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     He was monitored on imaging surveillance until imaging in 12/2022 showed a subtle change concerning for cancer progression. As a result, initiation of cancer-directed therapy was recommended.     He completed radiation with concurrent temozolomide on 8/8/2023. Post-radiation imaging demonstrated no concerning findings with a subtle positive initial response to treatment. The plan is to initiate adjuvant-dosed temozolomide.     Joss is presenting in follow-up today accompanied by his daughter.    HISTORY OF PRESENT ILLNESS:  -Joss is endorsing dizzy spells; precipitated by walking, turning head.    He has not fallen.    Associated with an aura that feels like a full-body shock.    Occurring ~6x a daily.   Lasting for 10 seconds.    Infrequently associated with nausea.    No association with headaches.   -Vision goes blurry/ foggy from time to time.    -Appetite is good. No lingering constipation.  -Energy is low. Feeling more fatigued.   -Feeling more anxious; Zoloft.    Better with marijuana.    Feeling more motivated with the start of Zoloft.   -He is not on dexamethasone.   -Worsening right leg weakness.  -No sensation changes.   -He denies activities concerning for seizures.      MEDICATIONS   Current Outpatient Medications   Medication Sig Dispense Refill    sertraline (ZOLOFT) 100 MG tablet Take 1 tablet (100 mg) by mouth daily 30 tablet 3    temozolomide (TEMODAR) 140 MG capsule Take 2 capsules (280 mg) by mouth At Bedtime for 5 days Take ondansetron 30-60 min before temozolomide. Take on an empty stomach. 10 capsule 0    ondansetron (ZOFRAN) 4 MG tablet Take 1 tablet (4 mg) by mouth at bedtime Take 30-60 mins before each dose of temozolomide. 30 " tablet 3    SENNA-docusate sodium (SENNA S) 8.6-50 MG tablet Take 1 tablet by mouth 2 times daily Can take up to 2 tablets twice daily if needed for constipation. 60 tablet 2     DRUG ALLERGIES No Known Allergies     IMMUNIZATIONS   Immunization History   Administered Date(s) Administered    COVID-19 MONOVALENT 12+ (Pfizer) 11/05/2021    TDAP (Adacel,Boostrix) 12/03/2021       ONCOLOGIC HISTORY  -8/8/2021 PRESENTATION: New onset seizure; generalized event. Started on Keppra.  -8/2021 MR brain imaging with a 2.1cm left frontal non-enhancing mass in the superior frontal gyrus in the expected region of the supplementary motor area.  -9/8/2021 MR brain imaging with no significant change in the nonenhancing T2 hyperintense mass.  -11/12/21 SURGERY: Left frontal-parietal craniotomy for mass resection.   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  Post-operative imaging with interval left frontal craniotomy for resection of abnormal lesion within the left parafalcine frontal lobe.  Hemiparesis, discharged to ARU.  -12/7/2021 NEURO-ONC: Recommending imaging surveillance given low risk factors and need to continue to focus on ongoing rehab. Referral to Dr. Chiang for optimizing rehab. Referral to [facility]/ epilepsy for seizure risk management in relation to future employment. Trial of flexeril 10mg at bedtime for headaches.   -3/8/2022 NEURO-ONC/ MRB: Clinically well; improved from prior. Restarting Keppra; second referral to [facility]. Imaging with post-surgical changes. Continue imaging surveillance.   -6/7/2022 NEURO-ONC/ MRB: Clinically well. Imaging with continued healing post-surgery. Continue imaging surveillance.   -12/6/2022 NEURO-ONC/ MRB: Clinically with no new/ progressive neurological symptoms. Imaging with a subtle increase in T2 FLAIR about the posterior aspect of the resection cavity. Continue imaging surveillance at a shortened interval of 3 months.  "  -2/28/2023 NEURO-ONC/ MRB: Clinically with no new/ progressive neurological symptoms. Imaging largely stable, but close interval imaging was recommended.   -5/30/2023 NEURO-ONC/ MRB: Worsened mood, concentration; starting Zoloft. Imaging with subtle progression noted when comparing to imaging 6 months ago; recommending chemoradiotherapy with referral to radiation oncology. Social work involvement. Primary care referral for management of hypertension.   -6/16/2023 NEURO-ONC: Started Zoloft, stopped r/t feeling jittery. Imaging with subtle progression noted when comparing to imaging 6 months ago; planned start date is 6/27 for chemoradiotherapy. Ongoing social work involvement.   -7/21/2023 NEURO-ONC/ CHEMORADS: Tolerating chemoradiation well.  Tolerating Zoloft with improved mood.  Continues to follow with psychology and social work.  -8/8/2023 CHEMORADS: Completed 5400 cGy in 30 fractions with concurrent temozolomide.   -10/17/2023 NEURO-ONC/ MRB/ CHEMO: Dizziness; low vitamin D. Anxiety; increase Zoloft. Imaging with no concerns; initial subtle positive treatment response. Starting adjuvant temozolomide 150mg/m2 (280mg), cycle 1.      PHYSICAL EXAMINATION  /85   Pulse 61   Resp 16   SpO2 100%    Wt Readings from Last 2 Encounters:   08/11/23 63.5 kg (140 lb)   08/02/23 62.7 kg (138 lb 3.2 oz)      Ht Readings from Last 2 Encounters:   08/11/23 1.829 m (6')   07/21/23 1.803 m (5' 11\")     KPS: 100    -Generally well appearing.  -Respiratory: Speaking in full fluid sentences with no shortness of breath or wheezing noted.  -Psychiatric: Normal mood and affect. Pleasant, talkative.  -Neurologic:   MENTAL STATUS:     Recall: Intact.    Speech fluent.      CRANIAL NERVES:     Pupils are equal, round.    Symmetric facial movements.   Hearing intact.  GAIT: Steady, unassisted.       MEDICAL RECORDS  Personally reviewed; Radiation oncology notes.    LABS  Personally reviewed all available lab results; CBC and " "CMP. Vitamin D 20. Vitamin B12 >500. TSH normal.     IMAGING  Personally reviewed MR brain imaging from today and compared to pre-radiation imaging. To my eye, I note a subtle decrease in the degree of nathan-cavitary T2 FLAIR (below). No concerning contrast enhancement.         Formal read; \"1. No convincing MRI findings of disease progression. 2. Stable treatment-related change. No acute intracranial finding.\"    Imaging was shown to and results were reviewed with Joss.       IMPRESSION  Clinic time of 50 minutes was spent reviewing data, in evaluation, and coordinating care for this high complexity visit. This was in addition to answering questions pertaining to my recommendations and devising the plan as outlined below.      Joss is noting frequent and recurrent episodes of dizziness. There have been recent studies linking vitamin D deficiency with dizziness and vestibular migraines. I ordered a vitamin D level today and results were low at 20. Will initiate supplementation as detailed below.     In addition, Joss is also noting that his energy and mood is low. I ordered fatigue labs; TSH and vitmain B12 returned normal. CMP no concerns. Slightly anemic on CBC today with Hb at 12.4 (previously 14) and MCV at 97. Can consider iron studies in the future if indicated. Joss has noted improvement in his mood and motivation since starting Zoloft. He is currently on 50mg daily and will increase to 100mg daily. Refill sent to pharmacy. Encouraged Joss to continue to follow with psychology and social work.     Otherwise, Joss denies any new or worsening neurologic changes.  He denies any concerns for seizure activity. I personally reviewed the treatment notes from Dr. Bear in radiation oncology.     Blood pressure at goal today.     Imaging as detailed above with no concerning findings with a subtle positive initial response to treatment. The plan is to repeat imaging per NCCN guidelines of every 4 months for now.  "     With regard to cancer-directed therapy, the plan is to start adjuvant temozolomide. In the adjuvant phase, temozolomide is dosed at 150mg/m2 for days 1-5 of a 28 day cycle for the first cycle, and then increased to 200mg/m2 if tolerated. The previously reviewed common side effects of temozolomide can still be anticipated and were discussed as including, but not limited to, fatigue, nausea, and constipation. Bone marrow suppression can result in leukopenia and thrombocytopenia. I reviewed CBC from today and there are no concerns. I alerted pharmacy and [facility] to the plan.    PROBLEM LIST  Oligodendroglioma  Hemiparesis, right  Tension headache  Seizure  Fatigue  Hypertension  Depressed mood, anxiety, irritability    PLAN  -CANCER DIRECTED THERAPY-  Will initiate adjuvant temozolomide at 150mg/m2 (280mg), cycle 1 (start date once the chemotherapy arrives).   -If this dose is well tolerated, will increase to 200mg/m2 for cycle 2.  -Supportive medications; Zofran and bowel regimen.     -Repeat 28 day cycle if WBC >= 3, ANC >= 1.5, HgB >= 10, and platelets >= 100.  -Surveillance labs reviewed, at goal for chemotherapy.   -Will continue every 2 weeks CBC and CMP every 4 weeks.    -Repeat imaging in 4 months (2/2024).     -STEROIDS-  -Currently off dexamethasone.    -SEIZURE MANAGEMENT-  -Off Keppra.   -Following with Dr. Panda.      -Quality of life/ MOOD/ HEADACHE/ FATIGUE-  -Mood is much improved on Zoloft.   Increasing to 100 mg/ day.  -Following with psychology and primary care.    -Social work continued involvement as financial and work stressors present.   -Vitamin D deficiency manifesting as fatigue and dizziness.    Starting Vitamin D 50K international unit(s) weekly x 8.    Vitamin D level recheck in late December.    Then, recommend starting daily supplementation with vitamin D 200-400 international unit(s).    -HEMIPARESIS-  -Following with Dr. Chiang.     -HYPERTENSION-  -Primary care referral for  management.    -TOBACCO DEPENDENCE-  -Previously encouraged continued abstinence from smoking.    Return to clinic in one month with TITA Murray, CNP + labs.    Elva Park MD  Neuro-oncology

## 2023-10-17 ENCOUNTER — ONCOLOGY VISIT (OUTPATIENT)
Dept: ONCOLOGY | Facility: CLINIC | Age: 48
End: 2023-10-17
Attending: PSYCHIATRY & NEUROLOGY
Payer: COMMERCIAL

## 2023-10-17 ENCOUNTER — HOSPITAL ENCOUNTER (OUTPATIENT)
Dept: MRI IMAGING | Facility: CLINIC | Age: 48
Discharge: HOME | End: 2023-10-17
Attending: NURSE PRACTITIONER
Payer: COMMERCIAL

## 2023-10-17 ENCOUNTER — PATIENT OUTREACH (OUTPATIENT)
Dept: CARE COORDINATION | Facility: CLINIC | Age: 48
End: 2023-10-17

## 2023-10-17 ENCOUNTER — LAB (OUTPATIENT)
Dept: LAB | Facility: CLINIC | Age: 48
End: 2023-10-17
Attending: NURSE PRACTITIONER
Payer: COMMERCIAL

## 2023-10-17 VITALS
HEART RATE: 61 BPM | OXYGEN SATURATION: 100 % | RESPIRATION RATE: 16 BRPM | SYSTOLIC BLOOD PRESSURE: 123 MMHG | DIASTOLIC BLOOD PRESSURE: 85 MMHG

## 2023-10-17 DIAGNOSIS — E03.9 HYPOTHYROIDISM, UNSPECIFIED TYPE: ICD-10-CM

## 2023-10-17 DIAGNOSIS — R11.2 CHEMOTHERAPY-INDUCED NAUSEA AND VOMITING: ICD-10-CM

## 2023-10-17 DIAGNOSIS — D61.810 ANTINEOPLASTIC CHEMOTHERAPY INDUCED PANCYTOPENIA (H): ICD-10-CM

## 2023-10-17 DIAGNOSIS — C71.9 OLIGODENDROGLIOMA, WHO GRADE II (H): ICD-10-CM

## 2023-10-17 DIAGNOSIS — T45.1X5A CHEMOTHERAPY-INDUCED NAUSEA AND VOMITING: ICD-10-CM

## 2023-10-17 DIAGNOSIS — R53.83 FATIGUE, UNSPECIFIED TYPE: ICD-10-CM

## 2023-10-17 DIAGNOSIS — E55.9 VITAMIN D DEFICIENCY: ICD-10-CM

## 2023-10-17 DIAGNOSIS — E53.8 VITAMIN B12 DEFICIENCY (NON ANEMIC): ICD-10-CM

## 2023-10-17 DIAGNOSIS — F43.23 ADJUSTMENT DISORDER WITH MIXED ANXIETY AND DEPRESSED MOOD: ICD-10-CM

## 2023-10-17 DIAGNOSIS — C71.9 OLIGODENDROGLIOMA, WHO GRADE II (H): Primary | ICD-10-CM

## 2023-10-17 DIAGNOSIS — T45.1X5A ANTINEOPLASTIC CHEMOTHERAPY INDUCED PANCYTOPENIA (H): ICD-10-CM

## 2023-10-17 LAB
ALBUMIN SERPL BCG-MCNC: 4.2 G/DL (ref 3.5–5.2)
ALP SERPL-CCNC: 66 U/L (ref 40–129)
ALT SERPL W P-5'-P-CCNC: 10 U/L (ref 0–70)
ANION GAP SERPL CALCULATED.3IONS-SCNC: 11 MMOL/L (ref 7–15)
AST SERPL W P-5'-P-CCNC: 13 U/L (ref 0–45)
BASO+EOS+MONOS # BLD AUTO: ABNORMAL 10*3/UL
BASO+EOS+MONOS NFR BLD AUTO: ABNORMAL %
BASOPHILS # BLD AUTO: 0 10E3/UL (ref 0–0.2)
BASOPHILS NFR BLD AUTO: 0 %
BILIRUB SERPL-MCNC: 0.3 MG/DL
BUN SERPL-MCNC: 19.5 MG/DL (ref 6–20)
CALCIUM SERPL-MCNC: 9.1 MG/DL (ref 8.6–10)
CHLORIDE SERPL-SCNC: 108 MMOL/L (ref 98–107)
CREAT SERPL-MCNC: 1.12 MG/DL (ref 0.67–1.17)
DEPRECATED HCO3 PLAS-SCNC: 25 MMOL/L (ref 22–29)
EGFRCR SERPLBLD CKD-EPI 2021: 82 ML/MIN/1.73M2
EOSINOPHIL # BLD AUTO: 0 10E3/UL (ref 0–0.7)
EOSINOPHIL NFR BLD AUTO: 1 %
ERYTHROCYTE [DISTWIDTH] IN BLOOD BY AUTOMATED COUNT: 12.1 % (ref 10–15)
GLUCOSE SERPL-MCNC: 80 MG/DL (ref 70–99)
HCT VFR BLD AUTO: 38.8 % (ref 40–53)
HGB BLD-MCNC: 12.4 G/DL (ref 13.3–17.7)
IMM GRANULOCYTES # BLD: 0 10E3/UL
IMM GRANULOCYTES NFR BLD: 0 %
LYMPHOCYTES # BLD AUTO: 1.5 10E3/UL (ref 0.8–5.3)
LYMPHOCYTES NFR BLD AUTO: 41 %
MCH RBC QN AUTO: 31.1 PG (ref 26.5–33)
MCHC RBC AUTO-ENTMCNC: 32 G/DL (ref 31.5–36.5)
MCV RBC AUTO: 97 FL (ref 78–100)
MONOCYTES # BLD AUTO: 0.3 10E3/UL (ref 0–1.3)
MONOCYTES NFR BLD AUTO: 8 %
NEUTROPHILS # BLD AUTO: 1.8 10E3/UL (ref 1.6–8.3)
NEUTROPHILS NFR BLD AUTO: 50 %
NRBC # BLD AUTO: 0 10E3/UL
NRBC BLD AUTO-RTO: 0 /100
PLATELET # BLD AUTO: 183 10E3/UL (ref 150–450)
POTASSIUM SERPL-SCNC: 4.4 MMOL/L (ref 3.4–5.3)
PROT SERPL-MCNC: 6.6 G/DL (ref 6.4–8.3)
RBC # BLD AUTO: 3.99 10E6/UL (ref 4.4–5.9)
SODIUM SERPL-SCNC: 144 MMOL/L (ref 135–145)
TSH SERPL DL<=0.005 MIU/L-ACNC: 1.27 UIU/ML (ref 0.3–4.2)
VIT B12 SERPL-MCNC: 587 PG/ML (ref 232–1245)
VIT D+METAB SERPL-MCNC: 20 NG/ML (ref 20–50)
WBC # BLD AUTO: 3.7 10E3/UL (ref 4–11)

## 2023-10-17 PROCEDURE — 85025 COMPLETE CBC W/AUTO DIFF WBC: CPT

## 2023-10-17 PROCEDURE — 255N000002 HC RX 255 OP 636: Performed by: NURSE PRACTITIONER

## 2023-10-17 PROCEDURE — 36415 COLL VENOUS BLD VENIPUNCTURE: CPT

## 2023-10-17 PROCEDURE — 82306 VITAMIN D 25 HYDROXY: CPT

## 2023-10-17 PROCEDURE — 84443 ASSAY THYROID STIM HORMONE: CPT

## 2023-10-17 PROCEDURE — 99215 OFFICE O/P EST HI 40 MIN: CPT | Performed by: PSYCHIATRY & NEUROLOGY

## 2023-10-17 PROCEDURE — 70553 MRI BRAIN STEM W/O & W/DYE: CPT

## 2023-10-17 PROCEDURE — 80053 COMPREHEN METABOLIC PANEL: CPT

## 2023-10-17 PROCEDURE — G0463 HOSPITAL OUTPT CLINIC VISIT: HCPCS | Performed by: PSYCHIATRY & NEUROLOGY

## 2023-10-17 PROCEDURE — A9585 GADOBUTROL INJECTION: HCPCS | Performed by: NURSE PRACTITIONER

## 2023-10-17 PROCEDURE — 82607 VITAMIN B-12: CPT

## 2023-10-17 RX ORDER — ONDANSETRON 4 MG/1
4 TABLET, FILM COATED ORAL AT BEDTIME
Qty: 30 TABLET | Refills: 3 | Status: SHIPPED | OUTPATIENT
Start: 2023-10-17 | End: 2024-03-29

## 2023-10-17 RX ORDER — SENNA AND DOCUSATE SODIUM 50; 8.6 MG/1; MG/1
1 TABLET, FILM COATED ORAL 2 TIMES DAILY
Qty: 60 TABLET | Refills: 2 | Status: SHIPPED | OUTPATIENT
Start: 2023-10-17 | End: 2024-03-29

## 2023-10-17 RX ORDER — SERTRALINE HYDROCHLORIDE 100 MG/1
100 TABLET, FILM COATED ORAL DAILY
Qty: 30 TABLET | Refills: 3 | Status: SHIPPED | OUTPATIENT
Start: 2023-10-17 | End: 2023-12-13 | Stop reason: ALTCHOICE

## 2023-10-17 RX ORDER — ERGOCALCIFEROL 1.25 MG/1
50000 CAPSULE, LIQUID FILLED ORAL WEEKLY
Qty: 8 CAPSULE | Refills: 0 | Status: SHIPPED | OUTPATIENT
Start: 2023-10-17 | End: 2023-12-06

## 2023-10-17 RX ORDER — GADOBUTROL 604.72 MG/ML
15 INJECTION INTRAVENOUS ONCE
Status: COMPLETED | OUTPATIENT
Start: 2023-10-17 | End: 2023-10-17

## 2023-10-17 RX ADMIN — GADOBUTROL 15 ML: 604.72 INJECTION INTRAVENOUS at 09:34

## 2023-10-17 NOTE — ORAL ONC MGMT
Oral Chemotherapy Monitoring Program    Lab Monitoring Plan  CMP every 4 weeks  CBC every 2 weeks  Subjective/Objective:  Lang Collins Jr. is a 48 year old male seen in clinic for an initial visit for oral chemotherapy education. I discussed starting adjuvant Temozolomide. We discussed taking Ondansetron 45 mins prior to TMZ. He will take TMZ at bedtime on an empty stomach 2 hrs after eating. Sent in new refills for Ondansetron and Senna/Docusate. Discussed use of both of those medications as well. We discussed the need for lab monitoring CMP every 4 weeks and CBC every 2 weeks. We discussed side effects (nausea, constipation, fatigue, and the lab related side effects). All questions answered.       7/3/2023    11:00 AM 7/5/2023     1:00 PM 7/10/2023     9:00 AM 7/17/2023    10:00 AM 7/24/2023     9:00 AM 7/31/2023    10:00 AM 10/17/2023    11:00 AM   ORAL CHEMOTHERAPY   Assessment Type Initial Follow up Lab Monitoring;Chart Review Lab Monitoring;Chart Review Lab Monitoring;Chart Review Lab Monitoring Lab Monitoring Chart Review;New Teach;Initial Work up;Lab Monitoring   Diagnosis Code       Other   Other       Oligodendroglioma, 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 Xavier Park   Clinic Name/Location [facility]   Drug Name Temodar (temozolomide) Temodar (temozolomide) Temodar (temozolomide) Temodar (temozolomide) Temodar (temozolomide) Temodar (temozolomide) Temodar (temozolomide)   Dose 140 mg 140 mg 140 mg 140 mg 140 mg 140 mg 280 mg   Current Schedule Daily Daily Daily Daily Daily Daily Daily   Cycle Details Continuous for 42 days during XRT Continuous for 42 days during XRT Continuous for 42 days during XRT Continuous for 42 days during XRT Continuous for 42 days during XRT Continuous for 42 days during XRT 5 days on, 23 days off   Start Date of Last Cycle 6/26/2023 6/26/2023 6/26/2023 6/26/2023  6/26/2023 6/26/2023    Planned next cycle start date       10/19/2023   Doses missed in last 2 weeks 0         Adherence Assessment Adherent         Adverse Effects Constipation;Nausea      No AE identified during assessment   Nausea Grade 1         Pharmacist Intervention(nausea) Yes         Intervention(s) Patient education         Constipation Grade 1         Pharmacist Intervention(constipation) Yes         Intervention(s) Patient education         Any new drug interactions?       No   Is the dose as ordered appropriate for the patient?       Yes       Last PHQ-2 Score on record:       7/14/2023     2:20 PM 6/13/2023    12:08 PM   PHQ-2 ( 1999 Pfizer)   Q1: Little interest or pleasure in doing things 1 1   Q2: Feeling down, depressed or hopeless 1 1   PHQ-2 Score 2 2   Q1: Little interest or pleasure in doing things Several days    Q2: Feeling down, depressed or hopeless Several days    PHQ-2 Score 2        Vitals:  BP:   BP Readings from Last 1 Encounters:   10/17/23 123/85     Wt Readings from Last 1 Encounters:   08/11/23 63.5 kg (140 lb)     Estimated body surface area is 1.8 meters squared as calculated from the following:    Height as of 8/11/23: 1.829 m (6').    Weight as of 8/11/23: 63.5 kg (140 lb).    Labs:  _  Result Component Current Result Ref Range   Sodium 144 (10/17/2023) 135 - 145 mmol/L     _  Result Component Current Result Ref Range   Potassium 4.4 (10/17/2023) 3.4 - 5.3 mmol/L     _  Result Component Current Result Ref Range   Calcium 9.1 (10/17/2023) 8.6 - 10.0 mg/dL     No results found for Mag within last 30 days.     No results found for Phos within last 30 days.     _  Result Component Current Result Ref Range   Albumin 4.2 (10/17/2023) 3.5 - 5.2 g/dL     _  Result Component Current Result Ref Range   Urea Nitrogen 19.5 (10/17/2023) 6.0 - 20.0 mg/dL     _  Result Component Current Result Ref Range   Creatinine 1.12 (10/17/2023) 0.67 - 1.17 mg/dL     _  Result Component Current Result  Ref Range   AST 13 (10/17/2023) 0 - 45 U/L     _  Result Component Current Result Ref Range   ALT 10 (10/17/2023) 0 - 70 U/L     _  Result Component Current Result Ref Range   Bilirubin Total 0.3 (10/17/2023) <=1.2 mg/dL     _  Result Component Current Result Ref Range   WBC Count 3.7 (L) (10/17/2023) 4.0 - 11.0 10e3/uL     _  Result Component Current Result Ref Range   Hemoglobin 12.4 (L) (10/17/2023) 13.3 - 17.7 g/dL     _  Result Component Current Result Ref Range   Platelet Count 183 (10/17/2023) 150 - 450 10e3/uL     No results found for ANC within last 30 days.     _  Result Component Current Result Ref Range   Absolute Neutrophils 1.8 (10/17/2023) 1.6 - 8.3 10e3/uL        Assessment:  Patient is appropriate to start therapy. Labs reviewed from today as well, no new concerns.     Plan:  Basic chemotherapy teaching was reviewed with the patient including indication, start date of therapy, dose, administration, adverse effects, missed doses, food and drug interactions, monitoring, side effect management, office contact information, and safe handling. Written materials were provided and all questions answered. I called [facility] pharmacy to verify they have TMZ RX on file and will call patient to set up delivery ASAP. [facility] to [facility] as well.     Follow-Up:  10/18: start adj TMZ C1  10/25: 1 week assessment call  11/1: lab check due     Xavier Machado PharmD  [facility]  [phone]  October 17, 2023

## 2023-10-17 NOTE — LETTER
"    10/17/2023         RE: Lang Collins Jr.  [address]  [facility] [address]        Dear Colleague,    Thank you for referring your patient, Lang Collins Jr., to the [facility]CER [facility]. Please see a copy of my visit note below.    NEURO-ONCOLOGY VISIT  Oct 17, 2023    CHIEF COMPLAINT: Mr. 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     He was monitored on imaging surveillance until imaging in 12/2022 showed a subtle change concerning for cancer progression. As a result, initiation of cancer-directed therapy was recommended.     He completed radiation with concurrent temozolomide on 8/8/2023. Post-radiation imaging demonstrated no concerning findings with a subtle positive initial response to treatment. The plan is to initiate adjuvant-dosed temozolomide.     Joss is presenting in follow-up today accompanied by his daughter.    HISTORY OF PRESENT ILLNESS:  -Joss is endorsing dizzy spells; precipitated by walking, turning head.    He has not fallen.    Associated with an aura that feels like a full-body shock.    Occurring ~6x a daily.   Lasting for 10 seconds.    Infrequently associated with nausea.    No association with headaches.   -Vision goes blurry/ foggy from time to time.    -Appetite is good. No lingering constipation.  -Energy is low. Feeling more fatigued.   -Feeling more anxious; Zoloft.    Better with marijuana.    Feeling more motivated with the start of Zoloft.   -He is not on dexamethasone.   -Worsening right leg weakness.  -No sensation changes.   -He denies activities concerning for seizures.      MEDICATIONS   Current Outpatient Medications   Medication Sig Dispense Refill     sertraline (ZOLOFT) 100 MG tablet Take 1 tablet (100 mg) by mouth daily 30 tablet 3     temozolomide (TEMODAR) 140 MG capsule " Take 2 capsules (280 mg) by mouth At Bedtime for 5 days Take ondansetron 30-60 min before temozolomide. Take on an empty stomach. 10 capsule 0     ondansetron (ZOFRAN) 4 MG tablet Take 1 tablet (4 mg) by mouth at bedtime Take 30-60 mins before each dose of temozolomide. 30 tablet 3     SENNA-docusate sodium (SENNA S) 8.6-50 MG tablet Take 1 tablet by mouth 2 times daily Can take up to 2 tablets twice daily if needed for constipation. 60 tablet 2     DRUG ALLERGIES No Known Allergies     IMMUNIZATIONS   Immunization History   Administered Date(s) Administered     COVID-19 MONOVALENT 12+ (Pfizer) 11/05/2021     TDAP (Adacel,Boostrix) 12/03/2021       ONCOLOGIC HISTORY  -8/8/2021 PRESENTATION: New onset seizure; generalized event. Started on Keppra.  -8/2021 MR brain imaging with a 2.1cm left frontal non-enhancing mass in the superior frontal gyrus in the expected region of the supplementary motor area.  -9/8/2021 MR brain imaging with no significant change in the nonenhancing T2 hyperintense mass.  -11/12/21 SURGERY: Left frontal-parietal craniotomy for mass resection.   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  Post-operative imaging with interval left frontal craniotomy for resection of abnormal lesion within the left parafalcine frontal lobe.  Hemiparesis, discharged to ARU.  -12/7/2021 NEURO-ONC: Recommending imaging surveillance given low risk factors and need to continue to focus on ongoing rehab. Referral to Dr. Chiang for optimizing rehab. Referral to [facility]/ epilepsy for seizure risk management in relation to future employment. Trial of flexeril 10mg at bedtime for headaches.   -3/8/2022 NEURO-ONC/ MRB: Clinically well; improved from prior. Restarting Keppra; second referral to [facility]. Imaging with post-surgical changes. Continue imaging surveillance.   -6/7/2022 NEURO-ONC/ MRB: Clinically well. Imaging with continued healing post-surgery.  "Continue imaging surveillance.   -12/6/2022 NEURO-ONC/ MRB: Clinically with no new/ progressive neurological symptoms. Imaging with a subtle increase in T2 FLAIR about the posterior aspect of the resection cavity. Continue imaging surveillance at a shortened interval of 3 months.   -2/28/2023 NEURO-ONC/ MRB: Clinically with no new/ progressive neurological symptoms. Imaging largely stable, but close interval imaging was recommended.   -5/30/2023 NEURO-ONC/ MRB: Worsened mood, concentration; starting Zoloft. Imaging with subtle progression noted when comparing to imaging 6 months ago; recommending chemoradiotherapy with referral to radiation oncology. Social work involvement. Primary care referral for management of hypertension.   -6/16/2023 NEURO-ONC: Started Zoloft, stopped r/t feeling jittery. Imaging with subtle progression noted when comparing to imaging 6 months ago; planned start date is 6/27 for chemoradiotherapy. Ongoing social work involvement.   -7/21/2023 NEURO-ONC/ CHEMORADS: Tolerating chemoradiation well.  Tolerating Zoloft with improved mood.  Continues to follow with psychology and social work.  -8/8/2023 CHEMORADS: Completed 5400 cGy in 30 fractions with concurrent temozolomide.   -10/17/2023 NEURO-ONC/ MRB/ CHEMO: Dizziness; low vitamin D. Anxiety; increase Zoloft. Imaging with no concerns; initial subtle positive treatment response. Starting adjuvant temozolomide 150mg/m2 (280mg), cycle 1.      PHYSICAL EXAMINATION  /85   Pulse 61   Resp 16   SpO2 100%    Wt Readings from Last 2 Encounters:   08/11/23 63.5 kg (140 lb)   08/02/23 62.7 kg (138 lb 3.2 oz)      Ht Readings from Last 2 Encounters:   08/11/23 1.829 m (6')   07/21/23 1.803 m (5' 11\")     KPS: 100    -Generally well appearing.  -Respiratory: Speaking in full fluid sentences with no shortness of breath or wheezing noted.  -Psychiatric: Normal mood and affect. Pleasant, talkative.  -Neurologic:   MENTAL STATUS:     Recall: Intact. " "   Speech fluent.      CRANIAL NERVES:     Pupils are equal, round.    Symmetric facial movements.   Hearing intact.  GAIT: Steady, unassisted.       MEDICAL RECORDS  Personally reviewed; Radiation oncology notes.    LABS  Personally reviewed all available lab results; CBC and CMP. Vitamin D 20. Vitamin B12 >500. TSH normal.     IMAGING  Personally reviewed MR brain imaging from today and compared to pre-radiation imaging. To my eye, I note a subtle decrease in the degree of nathan-cavitary T2 FLAIR (below). No concerning contrast enhancement.         Formal read; \"1. No convincing MRI findings of disease progression. 2. Stable treatment-related change. No acute intracranial finding.\"    Imaging was shown to and results were reviewed with Joss.       IMPRESSION  Clinic time of 50 minutes was spent reviewing data, in evaluation, and coordinating care for this high complexity visit. This was in addition to answering questions pertaining to my recommendations and devising the plan as outlined below.      Joss is noting frequent and recurrent episodes of dizziness. There have been recent studies linking vitamin D deficiency with dizziness and vestibular migraines. I ordered a vitamin D level today and results were low at 20. Will initiate supplementation as detailed below.     In addition, Joss is also noting that his energy and mood is low. I ordered fatigue labs; TSH and vitmain B12 returned normal. CMP no concerns. Slightly anemic on CBC today with Hb at 12.4 (previously 14) and MCV at 97. Can consider iron studies in the future if indicated. Joss has noted improvement in his mood and motivation since starting Zoloft. He is currently on 50mg daily and will increase to 100mg daily. Refill sent to pharmacy. Encouraged Joss to continue to follow with psychology and social work.     Otherwise, Joss denies any new or worsening neurologic changes.  He denies any concerns for seizure activity. I personally reviewed the " treatment notes from Dr. Bear in radiation oncology.     Blood pressure at goal today.     Imaging as detailed above with no concerning findings with a subtle positive initial response to treatment. The plan is to repeat imaging per NCCN guidelines of every 4 months for now.      With regard to cancer-directed therapy, the plan is to start adjuvant temozolomide. In the adjuvant phase, temozolomide is dosed at 150mg/m2 for days 1-5 of a 28 day cycle for the first cycle, and then increased to 200mg/m2 if tolerated. The previously reviewed common side effects of temozolomide can still be anticipated and were discussed as including, but not limited to, fatigue, nausea, and constipation. Bone marrow suppression can result in leukopenia and thrombocytopenia. I reviewed CBC from today and there are no concerns. I alerted pharmacy and RNCC to the plan.    PROBLEM LIST  Oligodendroglioma  Hemiparesis, right  Tension headache  Seizure  Fatigue  Hypertension  Depressed mood, anxiety, irritability    PLAN  -CANCER DIRECTED THERAPY-  Will initiate adjuvant temozolomide at 150mg/m2 (280mg), cycle 1 (start date once the chemotherapy arrives).   -If this dose is well tolerated, will increase to 200mg/m2 for cycle 2.  -Supportive medications; Zofran and bowel regimen.     -Repeat 28 day cycle if WBC >= 3, ANC >= 1.5, HgB >= 10, and platelets >= 100.  -Surveillance labs reviewed, at goal for chemotherapy.   -Will continue every 2 weeks CBC and CMP every 4 weeks.    -Repeat imaging in 4 months (2/2024).     -STEROIDS-  -Currently off dexamethasone.    -SEIZURE MANAGEMENT-  -Off Keppra.   -Following with Dr. Panda.      -Quality of life/ MOOD/ HEADACHE/ FATIGUE-  -Mood is much improved on Zoloft.   Increasing to 100 mg/ day.  -Following with psychology and primary care.    -Social work continued involvement as financial and work stressors present.   -Vitamin D deficiency manifesting as fatigue and dizziness.    Starting Vitamin D 50K  international unit(s) weekly x 8.    Vitamin D level recheck in late December.    Then, recommend starting daily supplementation with vitamin D 200-400 international unit(s).    -HEMIPARESIS-  -Following with Dr. Chiang.     -HYPERTENSION-  -Primary care referral for management.    -TOBACCO DEPENDENCE-  -Previously encouraged continued abstinence from smoking.    Return to clinic in one month with Cami Redding, TITA, CNP + labs.    Elva Park MD  Neuro-oncology         Again, thank you for allowing me to participate in the care of your patient.        Sincerely,        Elva Park MD

## 2023-10-18 ENCOUNTER — PATIENT OUTREACH (OUTPATIENT)
Dept: ONCOLOGY | Facility: CLINIC | Age: 48
End: 2023-10-18

## 2023-10-24 ENCOUNTER — PATIENT OUTREACH (OUTPATIENT)
Dept: CARE COORDINATION | Facility: CLINIC | Age: 48
End: 2023-10-24

## 2023-10-24 NOTE — PROGRESS NOTES
Social Work - Telephone/MyChart message  [facility]  Data:   Patient Name: Lang Collins Jr.  Goes By: Joss    /Age: 1975 (48 year old)       Reason for Referral: Returning voicemail from Joss    Intervention: Left voice message for patient on 10/24/2023: [phone] .     SARAH mailed [employer] application to Joss to complete as he reported in message that he had not received email from this clinician.   Plan:  will await patient's return phone call/message and provide assistance at that time.      Summer Felix, SHAYY, LICSW, OSW-C  Clinical - Adult Oncology  She/Her/Hers  Phone: [phone]  [facility]: M, Thu  *every other Tue, 8am-4:30pm  [facility]: W, F, *every other Tue, 8am-4:30pm

## 2023-10-25 ENCOUNTER — DOCUMENTATION ONLY (OUTPATIENT)
Dept: ONCOLOGY | Facility: CLINIC | Age: 48
End: 2023-10-25

## 2023-10-25 ENCOUNTER — PATIENT OUTREACH (OUTPATIENT)
Dept: CARE COORDINATION | Facility: CLINIC | Age: 48
End: 2023-10-25

## 2023-10-25 NOTE — PROGRESS NOTES
Oral Chemotherapy Monitoring Program     Placed call to patient in follow up of adjuvant Temozolomide therapy. Calling to assess how cycle 1 of adjuvant TMZ was tolerated. Wanting to ask about side effects and confirm start date of C1. Would also like to ask about dose escalation for cycle 2. Will send [place] message to patient as well.     Left message to please call back otherwise we will attempt a call in the next few days. No patient or drug names were mentioned.     Follow up:  10/26 lab appt & call for assessment  11/10 Labs and provider  11/15 adj C2 TMZ anticipated to start pending labs and provider from 11/10 (unknown yet if pt wanting dose escalation for c2).     Xavier BaconD  [facility] and Oral Chemotherapy  [phone]  October 25, 2023

## 2023-10-26 ENCOUNTER — PATIENT OUTREACH (OUTPATIENT)
Dept: ONCOLOGY | Facility: CLINIC | Age: 48
End: 2023-10-26

## 2023-10-31 ENCOUNTER — PATIENT OUTREACH (OUTPATIENT)
Dept: CARE COORDINATION | Facility: CLINIC | Age: 48
End: 2023-10-31

## 2023-10-31 ENCOUNTER — TELEPHONE (OUTPATIENT)
Dept: PHARMACY | Facility: CLINIC | Age: 48
End: 2023-10-31

## 2023-10-31 NOTE — ORAL ONC MGMT
Oral Chemotherapy Monitoring Program     Patient was scheduled for lab appointment on 10/31 at 11:30am but did not show up. Placed call to patient in follow up of oral chemotherapy. Left message requesting call back and rescheduling lab appointment. No drug names were mentioned. Will update when response received.     Maria M Mercado, PharmD   Pharmacy Resident

## 2023-10-31 NOTE — PROGRESS NOTES
Social Work - Follow-Up  [facility]    Data/Intervention:    Patient Name: Lang Collins Jr. Goes By: Joss    /Age: 1975 (48 year old)    Reason for Follow-Up:  SW outreaching to Sheridan ([phone], [email or web address]) who has been assisting Joss with housing.     Intervention/Education/Resources Provided:  Sheridan requesting Department for Medical Opinion from the Department of Human Services [facility] [id number] is completed. Sheridan reports that if provider anticipates that Joss will be out of work a minimum of 45 days, that he anticipates he might meet eligibility for general assistance from the [facility].     Onc SW collaborated with Dr. Park and RNJAEL Chakraborty, who will work together on completing paperwork and faxing to [facility] on Joss's behalf.     Assessment/Plan:  Onc SW will continue to be available as needed for psychosocial support.     Previously provided patient/family with writer's contact information and availability.      Summer Felix, MSW, LICSW, OSW-C  Clinical - Adult Oncology  She/Her/Hers  Phone: [phone]  [facility]: M, Thu  *every other Tue, 8am-4:30pm  [facility]: W, F, *every other Tue, 8am-4:30pm

## 2023-11-01 ENCOUNTER — DOCUMENTATION ONLY (OUTPATIENT)
Dept: PHARMACY | Facility: CLINIC | Age: 48
End: 2023-11-01

## 2023-11-01 NOTE — PROGRESS NOTES
Oral Chemotherapy Monitoring Program    Call placed to follow-up on first cycle of adjuvant temozolomide and see if patient is able to reschedule 2 week labs. Left voicemail requesting call back. No patient or drug names were mentioned. Multiple attempts to contact the patient have been made. Dr. Park and care team updated that we have not been able to assess first cycle of adj TMZ with the patient and if he would feel comfortable with a dose increase for C2. Pharmacy will review appointment with NP on 11/10 with labs and will look for any updates from the care team in the meantime.     Rachel Moniz, PharmD  Hematology/Oncology Pharmacist Intern  [facility]  [phone]

## 2023-11-03 ENCOUNTER — DOCUMENTATION ONLY (OUTPATIENT)
Dept: ONCOLOGY | Facility: CLINIC | Age: 48
End: 2023-11-03

## 2023-11-03 NOTE — PROGRESS NOTES
Oral Chemotherapy Monitoring Program     Joss called us back in follow up of adj Temozolomide therapy. He reports having some issues getting access to his Ondansetron and Senna/Docusate. He received his TMZ and is ready to start. He now has Ondansetron and Senna/Docusate and plans to start regimen on 11/10. He is aware of the plan and all questions answered. Care team updated on the delay of adj TMZ starting.     11/10: call patient to make sure still planning to start.   11/17: 1 week assessment call  11/24: mid cycle labs gin Machado PharmD  [facility] and Oral Chemotherapy  [phone]  November 3, 2023

## 2023-11-10 ENCOUNTER — PATIENT OUTREACH (OUTPATIENT)
Dept: CARE COORDINATION | Facility: CLINIC | Age: 48
End: 2023-11-10

## 2023-11-10 ENCOUNTER — DOCUMENTATION ONLY (OUTPATIENT)
Dept: PHARMACY | Facility: CLINIC | Age: 48
End: 2023-11-10

## 2023-11-10 NOTE — PROGRESS NOTES
Oral Chemotherapy Monitoring Program    Spoke with patient over the phone and confirmed that he plans to start cycle 1 adjuvant temozolomide tonight. He didn't have any additional questions before starting. Pharmacy will call in 1 week to assess tolerability.    Rachel Moniz, PharmD  Hematology/Oncology Pharmacist  [facility]  [phone]

## 2023-11-10 NOTE — PROGRESS NOTES
Social Work - Follow-Up  [facility]    Data/Intervention:    Patient Name: Lang Collins Jr. Goes By: Joss    /Age: 1975 (48 year old)    Reason for Follow-Up:  Returning voicemail from Joss ([phone])    Intervention/Education/Resources Provided:  Joss reports that he is starting chemotherapy today 11/10/23, due to insurance issue that has now been rectified. Joss reports due to this he is now not scheduled to come in today.     Assessment/Plan:  SARAH and Joss made plan to meet in-person 23 at 10am to complete [facility] application.     Previously provided patient/family with writer's contact information and availability.    Summer Felix, MSW, LICSW, OSW-C  Clinical - Adult Oncology  She/Her/Hers  Phone: [phone]  [facility]: M, Thu  *every other Tue, 8am-4:30pm  [facility]: W, F, *every other Tue, 8am-4:30pm

## 2023-11-14 ENCOUNTER — PATIENT OUTREACH (OUTPATIENT)
Dept: CARE COORDINATION | Facility: CLINIC | Age: 48
End: 2023-11-14

## 2023-11-14 NOTE — PROGRESS NOTES
Social Work - Intervention  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 Park at [facility].     Patient Name: Lang Collins Jr. Goes By: Joss    /Age: 1975 (48 year old)     Visit Type: in person  Reason for Referral: Planned in-person connection to complete [employer] application. Joss accompanied by daughter at time of visit.      Psychosocial Information/Concerns:  Met with Joss to complete [employer] application. This clinician awaiting 3 photos of Joss to complete application, once received this clinician will submit.     SW provided Joss with list of resources for Holiday gifts for children today, as well as $50 [employer] gift cards from [facility].     Joss reports that he has been staying at aunt's residence while she is at [facility] which allows him and children more privacy. Joss reports that he is hopeful about transitioning into his own residence, but is not optimistic about the location that Ali located and is worried about safety of his children. Joss reports that he received update that his CADI case is still under review, and that information received about him not being eligible for CADI was incorrect.      Assessment/Plan:  Onc SW will await photos from Joss and send when application is complete.      Provided patient/family with contact information and availability.    Summer Felix, SHAYY, LICSW, OSW-C  Clinical - Adult Oncology  She/Her/Hers  Phone: [phone]  [facility]: M, Thu  *every other Tue, 8am-4:30pm  [facility]: W, F, *every other Tue, 8am-4:30pm

## 2023-11-15 ENCOUNTER — PATIENT OUTREACH (OUTPATIENT)
Dept: CARE COORDINATION | Facility: CLINIC | Age: 48
End: 2023-11-15

## 2023-11-15 ENCOUNTER — PATIENT OUTREACH (OUTPATIENT)
Dept: ONCOLOGY | Facility: CLINIC | Age: 48
End: 2023-11-15

## 2023-11-15 DIAGNOSIS — F41.9 ANXIETY: Primary | ICD-10-CM

## 2023-11-15 DIAGNOSIS — C71.9 OLIGODENDROGLIOMA, WHO GRADE II (H): Primary | ICD-10-CM

## 2023-11-15 DIAGNOSIS — R45.89 DIFFICULTY COPING: ICD-10-CM

## 2023-11-15 NOTE — PROGRESS NOTES
[facility]: Cancer Care                                                                                          Discussed how the C1 chemo went for him   He is more fatigued and has intermittent nausea and abd cramping. He is not too concerned about it. He does not use any additional doses of Zofran   He takes 8mg of Zofran HS prior to his chemo   He is having BM's every day - taking senna BID   Using marijuana as an appetite stimulant  Briefly discussed increasing chemo dose at the next cycle- he would like to hear more from pharmacy about the benefits of doing that   We discussed Dr. Park's recommendation for not working during his chemotherapy to preserve energy, and mental health. We do recommend that after his chemo is complete that he will likely be cleared to go back to work. His goal is to do that.         Mylene Qureshi, RN, BSN  Specialty Care Coordinator  [facility]  [phone]

## 2023-11-15 NOTE — PROGRESS NOTES
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 Park at [facility].      Patient Name: Lang Collins Jr. Goes By: Joss    /Age: 1975 (48 year old)    Reason for Follow-Up:  Returning voicemail from Joss    Intervention/Education/Resources Provided:  Joss reports that he was distraught by conversation that he had with an oncology team member yesterday, and reports that he feels strongly about a desire to return to work. Joss reports that he feels that his anxiety and focus are the greatest barriers to his working at present time, and would like assistance. Joss receptive to a referral for oncology psychologist for additional support with his anxiety. Joss reports that he is a little \"worried\" about what will happen after the treatment, and had the feeling after discussion yesterday that, \"my time is running short.\" Joss reports that he, \"just want to lead a normal life.\"     SW updated Joss with 's contact information, and reminded him of need to send SW 3 photos of himself to complete [employer].     Assessment/Plan:  1) Onc SW will ask RNCC/MD to send referral for oncology psychologist for anxiety support  2) Onc SW will submit [employer] when photos are received  3) Onc SW encouraged Joss to have more dialogue with Cami Redding and Dr. Park regarding his desire to return to work and if there are any other approaches/supports that might assist with this    Previously provided patient/family with writer's contact information and availability.    Summer Felix, MSW, LICSW, OSW-C  Clinical - Adult Oncology  She/Her/Hers  Phone: [phone]  [facility]: M, Thu  *every other Tue, 8am-4:30pm  [facility]: W, F, *every other Tue, 8am-4:30pm         "

## 2023-11-17 ENCOUNTER — TELEPHONE (OUTPATIENT)
Dept: ONCOLOGY | Facility: CLINIC | Age: 48
End: 2023-11-17

## 2023-11-17 NOTE — ORAL ONC MGMT
Oral Chemotherapy Monitoring Program    Primary Oncologist: Dr Park  Drug: temozolomide (Temodar)  Start Date: 11/10/23      Subjective/Objective:  Lang Collins Jr. is a 48 year old male contacted by phone for a follow-up visit for oral chemotherapy.  Pt confirms they are took their Temodar as prescribed and used Zofran 8mg premed prior to doses. Pt reports no missed doses. Pt reports starting cycle on 11/10/23. Pt reports tolerating the medication well. Joss did note he had some nausea and constipation, but felt these were manageable. We discussed using Zofran as needed during the day and adjusting stool regimen during cycle to help relieve issues (he reported daily use to me, so voiced he could increase to twice daily and adjust as needed). Joss also reported some stomach cramping, but alleviated on own and Joss didn't feel any need for intervention at this time.    We also discussed possible dose increase to 200 mg/m2 for next cycle. Joss voiced he felt he tolerated this cycle okay and is okay with trying higher dose for cycle 2.    Assessment/Plan:  Pt is currently tolerating therapy well. Plan to continue Temodar as prescribed. Pt verbalized understanding and agrees to plan. Encouraged pt to call with any issues or concerns.    Follow-Up:  11/24: mid cycle labs  12/5: labs and provider visit     Refill Due:  Prior to 12/8/23    Chelsi Doe, PharmD  Hematology/Oncology Clinical Pharmacist  [facility] and Oral Chemotherapy  [phone]  November 17, 2023 7/17/2023    10:00 AM 7/24/2023     9:00 AM 7/31/2023    10:00 AM 10/17/2023    11:00 AM 10/25/2023     9:00 AM 11/3/2023    10:00 AM 11/17/2023     1:00 PM   ORAL CHEMOTHERAPY   Assessment Type Lab Monitoring;Chart Review Lab Monitoring Lab Monitoring Chart Review;New Teach;Initial Work up;Lab Monitoring Left Voicemail Incoming phone call Monthly Follow up   Diagnosis Code    Other Other Other    Other    Oligodendroglioma,  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 Xavier Park   Clinic Name/Location [facility]   Drug Name Temodar (temozolomide) Temodar (temozolomide) Temodar (temozolomide) Temodar (temozolomide) Temodar (temozolomide) Temodar (temozolomide) Temodar (temozolomide)   Dose 140 mg 140 mg 140 mg 280 mg 280 mg 280 mg 280 mg   Current Schedule Daily Daily Daily Daily Daily Daily Daily   Cycle Details Continuous for 42 days during XRT Continuous for 42 days during XRT Continuous for 42 days during XRT 5 days on, 23 days off 5 days on, 23 days off 5 days on, 23 days off 5 days on, 23 days off   Start Date of Last Cycle 6/26/2023 6/26/2023 6/26/2023    11/10/2023   Planned next cycle start date    10/19/2023  11/10/2023 12/8/2023   Adverse Effects    No AE identified during assessment No AE identified during assessment No AE identified during assessment Nausea;Constipation   Nausea       Grade 1   Pharmacist Intervention(nausea)       Yes   Intervention(s)       Patient education   Constipation       Grade 1   Pharmacist Intervention(constipation)       Yes   Intervention(s)       Patient education   Any new drug interactions?    No No No No   Is the dose as ordered appropriate for the patient?    Yes Yes Yes Yes       Vitals:  BP:   BP Readings from Last 1 Encounters:   10/17/23 123/85     Wt Readings from Last 1 Encounters:   08/11/23 63.5 kg (140 lb)     Estimated body surface area is 1.8 meters squared as calculated from the following:    Height as of 8/11/23: 1.829 m (6').    Weight as of 8/11/23: 63.5 kg (140 lb).    Labs:  No results found for NA within last 30 days.     No results found for K within last 30 days.     No results found for CA within last 30 days.     No results found for  Mag within last 30 days.     No results found for Phos within last 30 days.     No results found for ALBUMIN within last 30 days.     No results found for BUN within last 30 days.     No results found for CR within last 30 days.       No results found for AST within last 30 days.     No results found for ALT within last 30 days.     No results found for BILITOTAL within last 30 days.       No results found for WBC within last 30 days.     No results found for HGB within last 30 days.     No results found for PLT within last 30 days.     No results found for ANC within last 30 days.

## 2023-11-17 NOTE — ORAL ONC MGMT
Oral Chemotherapy Monitoring Program     Placed call to Lang Collins Jr. in follow up of temozolomide oral chemotherapy.   This call was to complete an assessment and review dosing for next cycle.   I tried both home and mobile numbers on Joss's chart- no answer at either, and unable to leave message on either number. We will reach back out next week.    Chelsi Doe, PharmD  Hematology/Oncology Clinical Pharmacist  [facility] and Oral Chemotherapy  [phone]  November 17, 2023

## 2023-11-20 ENCOUNTER — PATIENT OUTREACH (OUTPATIENT)
Dept: CARE COORDINATION | Facility: CLINIC | Age: 48
End: 2023-11-20

## 2023-11-20 NOTE — PROGRESS NOTES
Social Work - Follow-Up  [facility]    Data/Intervention:    Patient Name: Lang Collins Jr. Goes By: Joss    /Age: 1975 (48 year old)    Reason for Follow-Up:  Planned psychosocial outreach    Intervention/Education/Resources Provided:  This clinician received call from Joss Swartz's . Sheridan reports that Medical Opinion form had not been received by [facility] for consideration of government assistance. Sheridan asked team to re-fax. SARAH collaborated with RNCC Mylene who will re-fax today.     SARAH updated Joss. Joss reports that he received a new denial from [facility] team that his application for CADI waiver was denied. Joss re-submitted this clinician photos so that this clinician can submit [facility] application on Joss's behalf.    Assessment/Plan:  Onc SW will continue to be available as needed for ongoing psychosocial support.     Previously provided patient/family with writer's contact information and availability.    Summer Felix, MSW, LICSW, OSW-C  Clinical - Adult Oncology  She/Her/Hers  Phone: [phone]  [facility]: M, Thu  *every other Tue, 8am-4:30pm  [facility]: W, F, *every other Tue, 8am-4:30pm

## 2023-11-21 ENCOUNTER — PATIENT OUTREACH (OUTPATIENT)
Dept: CARE COORDINATION | Facility: CLINIC | Age: 48
End: 2023-11-21

## 2023-11-21 NOTE — PROGRESS NOTES
Social Work - Intervention  [facility]  Data/Intervention:  Patient Name: Lang Collins Jr. Goes By: Joss    /Age: 1975 (48 year old)     Visit Type: telephone     Psychosocial Information/Concerns:  This clinician received call from Sheridan who reported that Medical Opinion form had not been received by the [facility]. Sheridan asking for this paperwork to be emailed to him so that he could bring into [facility] with Joss 23.     SARAH called Joss who gave verbal permission for this clinician to send protected email to Sheridan with completed Medical Opinion paperwork. SARAH updated that all parts of [email or web address] application had been received.     SARAH submitted [email or web address] application today for consideration.      Assessment/Plan:  Onc SW will continue to be available as needed for ongoing psychosocial support.      Provided patient/family with contact information and availability.    Summer Felix, SHAYY, LICSW, OSW-C  Clinical - Adult Oncology  She/Her/Hers  Phone: [phone]  [facility]: M, Thu  *every other Tue, 8am-4:30pm  [facility]: W, F, *every other Tue, 8am-4:30pm

## 2023-11-22 ENCOUNTER — TELEPHONE (OUTPATIENT)
Dept: FAMILY MEDICINE | Facility: CLINIC | Age: 48
End: 2023-11-22

## 2023-11-22 NOTE — TELEPHONE ENCOUNTER
Patient Quality Outreach    Patient is due for the following:   Colon Cancer Screening  Physical Preventive Adult Physical      Topic Date Due    Hepatitis B Vaccine (1 of 3 - 3-dose series) Never done    Pneumococcal Vaccine (1 - PCV) Never done    COVID-19 Vaccine (2 - Pfizer risk series) 11/26/2021    Flu Vaccine (1) Never done       Next Steps:   Schedule a Adult Preventative    Type of outreach:    Sent [employer] message.      Questions for provider review:    None           Shreya Valenzuela MA

## 2023-11-24 ENCOUNTER — LAB (OUTPATIENT)
Dept: INFUSION THERAPY | Facility: CLINIC | Age: 48
End: 2023-11-24
Attending: PSYCHIATRY & NEUROLOGY
Payer: COMMERCIAL

## 2023-11-24 ENCOUNTER — DOCUMENTATION ONLY (OUTPATIENT)
Dept: PHARMACY | Facility: CLINIC | Age: 48
End: 2023-11-24

## 2023-11-24 DIAGNOSIS — R11.2 CHEMOTHERAPY-INDUCED NAUSEA AND VOMITING: ICD-10-CM

## 2023-11-24 DIAGNOSIS — T45.1X5A ANTINEOPLASTIC CHEMOTHERAPY INDUCED PANCYTOPENIA (H): ICD-10-CM

## 2023-11-24 DIAGNOSIS — T45.1X5A CHEMOTHERAPY-INDUCED NAUSEA AND VOMITING: ICD-10-CM

## 2023-11-24 DIAGNOSIS — D61.810 ANTINEOPLASTIC CHEMOTHERAPY INDUCED PANCYTOPENIA (H): ICD-10-CM

## 2023-11-24 LAB
ALBUMIN SERPL BCG-MCNC: 4.5 G/DL (ref 3.5–5.2)
ALP SERPL-CCNC: 69 U/L (ref 40–150)
ALT SERPL W P-5'-P-CCNC: 11 U/L (ref 0–70)
ANION GAP SERPL CALCULATED.3IONS-SCNC: 8 MMOL/L (ref 7–15)
AST SERPL W P-5'-P-CCNC: 16 U/L (ref 0–45)
BASOPHILS # BLD AUTO: 0 10E3/UL (ref 0–0.2)
BASOPHILS NFR BLD AUTO: 0 %
BILIRUB SERPL-MCNC: 0.6 MG/DL
BUN SERPL-MCNC: 17 MG/DL (ref 6–20)
CALCIUM SERPL-MCNC: 9.1 MG/DL (ref 8.6–10)
CHLORIDE SERPL-SCNC: 109 MMOL/L (ref 98–107)
CREAT SERPL-MCNC: 1.06 MG/DL (ref 0.67–1.17)
DEPRECATED HCO3 PLAS-SCNC: 27 MMOL/L (ref 22–29)
EGFRCR SERPLBLD CKD-EPI 2021: 88 ML/MIN/1.73M2
EOSINOPHIL # BLD AUTO: 0.1 10E3/UL (ref 0–0.7)
EOSINOPHIL NFR BLD AUTO: 2 %
ERYTHROCYTE [DISTWIDTH] IN BLOOD BY AUTOMATED COUNT: 12.4 % (ref 10–15)
GLUCOSE SERPL-MCNC: 92 MG/DL (ref 70–99)
HCT VFR BLD AUTO: 40.8 % (ref 40–53)
HGB BLD-MCNC: 12.9 G/DL (ref 13.3–17.7)
IMM GRANULOCYTES # BLD: 0 10E3/UL
IMM GRANULOCYTES NFR BLD: 0 %
LYMPHOCYTES # BLD AUTO: 1.7 10E3/UL (ref 0.8–5.3)
LYMPHOCYTES NFR BLD AUTO: 43 %
MCH RBC QN AUTO: 30.5 PG (ref 26.5–33)
MCHC RBC AUTO-ENTMCNC: 31.6 G/DL (ref 31.5–36.5)
MCV RBC AUTO: 97 FL (ref 78–100)
MONOCYTES # BLD AUTO: 0.3 10E3/UL (ref 0–1.3)
MONOCYTES NFR BLD AUTO: 7 %
NEUTROPHILS # BLD AUTO: 1.9 10E3/UL (ref 1.6–8.3)
NEUTROPHILS NFR BLD AUTO: 48 %
NRBC # BLD AUTO: 0 10E3/UL
NRBC BLD AUTO-RTO: 0 /100
PLATELET # BLD AUTO: 235 10E3/UL (ref 150–450)
POTASSIUM SERPL-SCNC: 4.2 MMOL/L (ref 3.4–5.3)
PROT SERPL-MCNC: 6.9 G/DL (ref 6.4–8.3)
RBC # BLD AUTO: 4.23 10E6/UL (ref 4.4–5.9)
SODIUM SERPL-SCNC: 144 MMOL/L (ref 135–145)
WBC # BLD AUTO: 3.9 10E3/UL (ref 4–11)

## 2023-11-24 PROCEDURE — 80053 COMPREHEN METABOLIC PANEL: CPT | Performed by: PSYCHIATRY & NEUROLOGY

## 2023-11-24 PROCEDURE — 85004 AUTOMATED DIFF WBC COUNT: CPT | Performed by: PSYCHIATRY & NEUROLOGY

## 2023-11-24 PROCEDURE — 36415 COLL VENOUS BLD VENIPUNCTURE: CPT

## 2023-11-24 NOTE — PROGRESS NOTES
Oral Chemotherapy Monitoring Program  Lab Follow Up    Reviewed lab results from 11/24/23.        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 Xavier Park   Clinic Name/Location [facility]   Drug Name Temodar (temozolomide) Temodar (temozolomide) Temodar (temozolomide) Temodar (temozolomide) Temodar (temozolomide) Temodar (temozolomide) Temodar (temozolomide)   Dose 140 mg 140 mg 280 mg 280 mg 280 mg 280 mg 280 mg   Current Schedule Daily Daily Daily Daily Daily Daily Daily   Cycle Details Continuous for 42 days during XRT Continuous for 42 days during XRT 5 days on, 23 days off 5 days on, 23 days off 5 days on, 23 days off 5 days on, 23 days off 5 days on, 23 days off   Start Date of Last Cycle 6/26/2023 6/26/2023    11/10/2023 11/10/2023   Planned next cycle start date   10/19/2023  11/10/2023 12/8/2023 12/8/2023   Adverse Effects   No AE identified during assessment No AE identified during assessment No AE identified during assessment Nausea;Constipation    Nausea      Grade 1    Pharmacist Intervention(nausea)      Yes    Intervention(s)      Patient education    Constipation      Grade 1    Pharmacist Intervention(constipation)      Yes    Intervention(s)      Patient education    Any new drug interactions?   No No No No    Is the  dose as ordered appropriate for the patient?   Yes Yes Yes Yes        Labs:  _  Result Component Current Result Ref Range   Sodium 144 (11/24/2023) 135 - 145 mmol/L     _  Result Component Current Result Ref Range   Potassium 4.2 (11/24/2023) 3.4 - 5.3 mmol/L     _  Result Component Current Result Ref Range   Calcium 9.1 (11/24/2023) 8.6 - 10.0 mg/dL     No results found for Mag within last 30 days.     No results found for Phos within last 30 days.     _  Result Component Current Result Ref Range   Albumin 4.5 (11/24/2023) 3.5 - 5.2 g/dL     _  Result Component Current Result Ref Range   Urea Nitrogen 17.0 (11/24/2023) 6.0 - 20.0 mg/dL     _  Result Component Current Result Ref Range   Creatinine 1.06 (11/24/2023) 0.67 - 1.17 mg/dL     _  Result Component Current Result Ref Range   AST 16 (11/24/2023) 0 - 45 U/L     _  Result Component Current Result Ref Range   ALT 11 (11/24/2023) 0 - 70 U/L     _  Result Component Current Result Ref Range   Bilirubin Total 0.6 (11/24/2023) <=1.2 mg/dL     _  Result Component Current Result Ref Range   WBC Count 3.9 (L) (11/24/2023) 4.0 - 11.0 10e3/uL     _  Result Component Current Result Ref Range   Hemoglobin 12.9 (L) (11/24/2023) 13.3 - 17.7 g/dL     _  Result Component Current Result Ref Range   Platelet Count 235 (11/24/2023) 150 - 450 10e3/uL     No results found for ANC within last 30 days.     _  Result Component Current Result Ref Range   Absolute Neutrophils 1.9 (11/24/2023) 1.6 - 8.3 10e3/uL        Assessment & Plan:  No concerning abnormalities.  Sent patient [employer] message.    Follow-Up:  12/5/23 Labs and [address]    Moreno Tomlinson, PharmD.  Oral Chemotherapy Monitoring Program  [phone]

## 2023-11-28 ENCOUNTER — PATIENT OUTREACH (OUTPATIENT)
Dept: CARE COORDINATION | Facility: CLINIC | Age: 48
End: 2023-11-28

## 2023-11-28 NOTE — PROGRESS NOTES
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 Park at [facility].       Patient Name: Lang Collins Jr. Goes By: Joss    /Age: 1975 (48 year old)    Reason for Follow-Up:  SW outreaching to Joss today due to outreach from [facility]    Intervention/Education/Resources Provided:  Joss reported that if awarded gift cards he would like the following locations: [employer], [facility], [employer] [place].     SARAH sent email, per Joss's request, to Lucita Cleary at [facility] with Joss's new phone number.     Joss denies psychosocial concern or need today.     Assessment/Plan:  Onc SW will continue to follow for ongoing psychosocial support as needed.     Previously provided patient/family with writer's contact information and availability.    Summer Felix, MSW, LICSW, OSW-C  Clinical - Adult Oncology  She/Her/Hers  Phone: [phone]  [facility]: M, Thu  *every other Tue, 8am-4:30pm  [facility]: W, F, *every other Tue, 8am-4:30pm

## 2023-12-05 ENCOUNTER — LAB (OUTPATIENT)
Dept: INFUSION THERAPY | Facility: CLINIC | Age: 48
End: 2023-12-05
Attending: PSYCHIATRY & NEUROLOGY
Payer: COMMERCIAL

## 2023-12-05 ENCOUNTER — ONCOLOGY VISIT (OUTPATIENT)
Dept: ONCOLOGY | Facility: CLINIC | Age: 48
End: 2023-12-05
Attending: PSYCHIATRY & NEUROLOGY
Payer: COMMERCIAL

## 2023-12-05 VITALS
BODY MASS INDEX: 20.44 KG/M2 | RESPIRATION RATE: 16 BRPM | HEIGHT: 71 IN | TEMPERATURE: 98.5 F | SYSTOLIC BLOOD PRESSURE: 165 MMHG | WEIGHT: 146 LBS | OXYGEN SATURATION: 100 % | HEART RATE: 63 BPM | DIASTOLIC BLOOD PRESSURE: 99 MMHG

## 2023-12-05 DIAGNOSIS — T45.1X5A ANTINEOPLASTIC CHEMOTHERAPY INDUCED PANCYTOPENIA (H): ICD-10-CM

## 2023-12-05 DIAGNOSIS — F41.9 ANXIETY: ICD-10-CM

## 2023-12-05 DIAGNOSIS — T45.1X5A CHEMOTHERAPY-INDUCED NAUSEA AND VOMITING: ICD-10-CM

## 2023-12-05 DIAGNOSIS — C71.9 OLIGODENDROGLIOMA, WHO GRADE II (H): Primary | ICD-10-CM

## 2023-12-05 DIAGNOSIS — R11.2 CHEMOTHERAPY-INDUCED NAUSEA AND VOMITING: ICD-10-CM

## 2023-12-05 DIAGNOSIS — D61.810 ANTINEOPLASTIC CHEMOTHERAPY INDUCED PANCYTOPENIA (H): ICD-10-CM

## 2023-12-05 LAB
ALBUMIN SERPL BCG-MCNC: 4.6 G/DL (ref 3.5–5.2)
ALP SERPL-CCNC: 81 U/L (ref 40–150)
ALT SERPL W P-5'-P-CCNC: 11 U/L (ref 0–70)
ANION GAP SERPL CALCULATED.3IONS-SCNC: 7 MMOL/L (ref 7–15)
AST SERPL W P-5'-P-CCNC: 19 U/L (ref 0–45)
BASOPHILS # BLD AUTO: NORMAL 10*3/UL
BASOPHILS # BLD MANUAL: 0 10E3/UL (ref 0–0.2)
BASOPHILS NFR BLD AUTO: NORMAL %
BASOPHILS NFR BLD MANUAL: 1 %
BILIRUB SERPL-MCNC: 0.9 MG/DL
BUN SERPL-MCNC: 12.3 MG/DL (ref 6–20)
CALCIUM SERPL-MCNC: 9.4 MG/DL (ref 8.6–10)
CHLORIDE SERPL-SCNC: 105 MMOL/L (ref 98–107)
CREAT SERPL-MCNC: 1.15 MG/DL (ref 0.67–1.17)
DEPRECATED HCO3 PLAS-SCNC: 29 MMOL/L (ref 22–29)
EGFRCR SERPLBLD CKD-EPI 2021: 79 ML/MIN/1.73M2
EOSINOPHIL # BLD AUTO: NORMAL 10*3/UL
EOSINOPHIL # BLD MANUAL: 0 10E3/UL (ref 0–0.7)
EOSINOPHIL NFR BLD AUTO: NORMAL %
EOSINOPHIL NFR BLD MANUAL: 1 %
ERYTHROCYTE [DISTWIDTH] IN BLOOD BY AUTOMATED COUNT: 12.3 % (ref 10–15)
GLUCOSE SERPL-MCNC: 106 MG/DL (ref 70–99)
HCT VFR BLD AUTO: 42.2 % (ref 40–53)
HGB BLD-MCNC: 13.5 G/DL (ref 13.3–17.7)
IMM GRANULOCYTES # BLD: NORMAL 10*3/UL
IMM GRANULOCYTES NFR BLD: NORMAL %
LYMPHOCYTES # BLD AUTO: NORMAL 10*3/UL
LYMPHOCYTES # BLD MANUAL: 1.2 10E3/UL (ref 0.8–5.3)
LYMPHOCYTES NFR BLD AUTO: NORMAL %
LYMPHOCYTES NFR BLD MANUAL: 29 %
MCH RBC QN AUTO: 30.5 PG (ref 26.5–33)
MCHC RBC AUTO-ENTMCNC: 32 G/DL (ref 31.5–36.5)
MCV RBC AUTO: 96 FL (ref 78–100)
MONOCYTES # BLD AUTO: NORMAL 10*3/UL
MONOCYTES # BLD MANUAL: 0.2 10E3/UL (ref 0–1.3)
MONOCYTES NFR BLD AUTO: NORMAL %
MONOCYTES NFR BLD MANUAL: 5 %
NEUTROPHILS # BLD AUTO: NORMAL 10*3/UL
NEUTROPHILS # BLD MANUAL: 2.8 10E3/UL (ref 1.6–8.3)
NEUTROPHILS NFR BLD AUTO: NORMAL %
NEUTROPHILS NFR BLD MANUAL: 64 %
NRBC # BLD AUTO: 0 10E3/UL
NRBC BLD AUTO-RTO: 0 /100
PLAT MORPH BLD: ABNORMAL
PLATELET # BLD AUTO: 252 10E3/UL (ref 150–450)
POTASSIUM SERPL-SCNC: 4.4 MMOL/L (ref 3.4–5.3)
PROT SERPL-MCNC: 7.3 G/DL (ref 6.4–8.3)
RBC # BLD AUTO: 4.42 10E6/UL (ref 4.4–5.9)
RBC MORPH BLD: ABNORMAL
SODIUM SERPL-SCNC: 141 MMOL/L (ref 135–145)
VARIANT LYMPHS BLD QL SMEAR: PRESENT
WBC # BLD AUTO: 4.3 10E3/UL (ref 4–11)

## 2023-12-05 PROCEDURE — 80053 COMPREHEN METABOLIC PANEL: CPT | Performed by: PSYCHIATRY & NEUROLOGY

## 2023-12-05 PROCEDURE — G0463 HOSPITAL OUTPT CLINIC VISIT: HCPCS | Performed by: NURSE PRACTITIONER

## 2023-12-05 PROCEDURE — 85007 BL SMEAR W/DIFF WBC COUNT: CPT | Performed by: PSYCHIATRY & NEUROLOGY

## 2023-12-05 PROCEDURE — 36415 COLL VENOUS BLD VENIPUNCTURE: CPT

## 2023-12-05 PROCEDURE — 85041 AUTOMATED RBC COUNT: CPT | Performed by: PSYCHIATRY & NEUROLOGY

## 2023-12-05 PROCEDURE — 99214 OFFICE O/P EST MOD 30 MIN: CPT | Performed by: NURSE PRACTITIONER

## 2023-12-05 RX ORDER — TEMOZOLOMIDE 180 MG/1
200 CAPSULE ORAL DAILY
Qty: 10 CAPSULE | Refills: 0 | Status: SHIPPED | OUTPATIENT
Start: 2023-12-05 | End: 2024-01-09

## 2023-12-05 ASSESSMENT — PAIN SCALES - GENERAL: PAINLEVEL: MODERATE PAIN (4)

## 2023-12-05 NOTE — LETTER
"    12/5/2023         RE: Lang Collins Jr.  [address]  [facility] [address]        Dear Colleague,    Thank you for referring your patient, Lang Collins Jr., to the [facility]. Please see a copy of my visit note below.    NEURO-ONCOLOGY VISIT  Dec 5, 2023    CHIEF COMPLAINT: Mr. 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     He was monitored on imaging surveillance until imaging in 12/2022 showed a subtle change concerning for cancer progression. As a result, initiation of cancer-directed therapy was recommended.     He completed radiation with concurrent temozolomide on 8/8/2023. Post-radiation imaging demonstrated no concerning findings with a subtle positive initial response to treatment. The plan is to initiate adjuvant-dosed temozolomide.     Joss is presenting in follow-up today unaccompanied.     HISTORY OF PRESENT ILLNESS:  -He reports he is feeling okay overall.   -He reports 1 episode of right leg weakness where he had to push himself up off the toilet.  He states this is better today.  -He reports that he did not stop his Zoloft.  He did not like the side effects he was experiencing.  He is willing to try another medication for his anxiety, which is bothersome, but would start with a low dose and only increase slowly due to his past experience with Zoloft.   -He denies any falls or dizziness.   -He is having headaches - using Tylenol and trying to stay hydrated.   -He is not on steroids.   -He reports no activities concerning for seizures.  -His appetite is a bit decreased.  -He denies any nausea.  -He is having mild constipation but is using senna with benefit.    MEDICATIONS   Current Outpatient Medications   Medication Sig Dispense Refill     ondansetron (ZOFRAN) 4 MG tablet Take 1 tablet (4 mg) by " mouth at bedtime Take 30-60 mins before each dose of temozolomide. 30 tablet 3     SENNA-docusate sodium (SENNA S) 8.6-50 MG tablet Take 1 tablet by mouth 2 times daily Can take up to 2 tablets twice daily if needed for constipation. 60 tablet 2     sertraline (ZOLOFT) 100 MG tablet Take 1 tablet (100 mg) by mouth daily 30 tablet 3     temozolomide (TEMODAR) 140 MG capsule Take 2 capsules (280 mg) by mouth At Bedtime for 5 days Take ondansetron 30-60 min before temozolomide. Take on an empty stomach. 10 capsule 0     vitamin D2 (ERGOCALCIFEROL) 47960 units (1250 mcg) capsule Take 1 capsule (50,000 Units) by mouth once a week for 8 doses 8 capsule 0     DRUG ALLERGIES No Known Allergies     IMMUNIZATIONS   Immunization History   Administered Date(s) Administered     COVID-19 MONOVALENT 12+ (Pfizer) 11/05/2021     TDAP (Adacel,Boostrix) 12/03/2021       ONCOLOGIC HISTORY  -8/8/2021 PRESENTATION: New onset seizure; generalized event. Started on Keppra.  -8/2021 MR brain imaging with a 2.1cm left frontal non-enhancing mass in the superior frontal gyrus in the expected region of the supplementary motor area.  -9/8/2021 MR brain imaging with no significant change in the nonenhancing T2 hyperintense mass.  -11/12/21 SURGERY: Left frontal-parietal craniotomy for mass resection.   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  Post-operative imaging with interval left frontal craniotomy for resection of abnormal lesion within the left parafalcine frontal lobe.  Hemiparesis, discharged to ARU.  -12/7/2021 NEURO-ONC: Recommending imaging surveillance given low risk factors and need to continue to focus on ongoing rehab. Referral to Dr. Chiang for optimizing rehab. Referral to [facility]/ epilepsy for seizure risk management in relation to future employment. Trial of flexeril 10mg at bedtime for headaches.   -3/8/2022 NEURO-ONC/ MRB: Clinically well; improved from prior.  Restarting Keppra; second referral to [facility]. Imaging with post-surgical changes. Continue imaging surveillance.   -6/7/2022 NEURO-ONC/ MRB: Clinically well. Imaging with continued healing post-surgery. Continue imaging surveillance.   -12/6/2022 NEURO-ONC/ MRB: Clinically with no new/ progressive neurological symptoms. Imaging with a subtle increase in T2 FLAIR about the posterior aspect of the resection cavity. Continue imaging surveillance at a shortened interval of 3 months.   -2/28/2023 NEURO-ONC/ MRB: Clinically with no new/ progressive neurological symptoms. Imaging largely stable, but close interval imaging was recommended.   -5/30/2023 NEURO-ONC/ MRB: Worsened mood, concentration; starting Zoloft. Imaging with subtle progression noted when comparing to imaging 6 months ago; recommending chemoradiotherapy with referral to radiation oncology. Social work involvement. Primary care referral for management of hypertension.   -6/16/2023 NEURO-ONC: Started Zoloft, stopped r/t feeling jittery. Imaging with subtle progression noted when comparing to imaging 6 months ago; planned start date is 6/27 for chemoradiotherapy. Ongoing social work involvement.   -7/21/2023 NEURO-ONC/ CHEMORADS: Tolerating chemoradiation well.  Tolerating Zoloft with improved mood.  Continues to follow with psychology and social work.  -8/8/2023 CHEMORADS: Completed 5400 cGy in 30 fractions with concurrent temozolomide.   -10/17/2023 NEURO-ONC/ MRB/ CHEMO: Dizziness; low vitamin D. Anxiety; increase Zoloft. Imaging with no concerns; initial subtle positive treatment response. Starting adjuvant temozolomide 150mg/m2 (280mg), cycle 1.  -12/5/2023 NEURO-ONC/ CHEMO: Dizziness; low vitamin D. Anxiety; increase Zoloft. Imaging with no concerns; initial subtle positive treatment response. Continuing adjuvant temozolomide 200mg/m2 (360mg), cycle 2.      PHYSICAL EXAMINATION  BP (!) 165/99   Pulse 63   Temp 98.5  F (36.9  C) (Oral)   Resp 16   Ht  "1.803 m (5' 11\")   Wt 66.2 kg (146 lb)   SpO2 100%   BMI 20.36 kg/m     Wt Readings from Last 2 Encounters:   12/05/23 66.2 kg (146 lb)   08/11/23 63.5 kg (140 lb)      Ht Readings from Last 2 Encounters:   08/11/23 1.829 m (6')   07/21/23 1.803 m (5' 11\")     KPS: 100    -Generally well appearing.  -Respiratory: Speaking in full fluid sentences with no shortness of breath or wheezing noted.  -Psychiatric: Normal mood and affect. Pleasant, talkative.  -Neurologic:   MENTAL STATUS:     Recall: Intact.    Speech fluent.      CRANIAL NERVES:     Pupils are equal, round.    Symmetric facial movements.   Hearing intact.  GAIT: Steady, unassisted.     MEDICAL RECORDS  Personally reviewed; oncology and pharmacy notes.     LABS  Personally reviewed all available lab results; CBC and CMP from today.    IMAGING  No new imaging to review today.    IMPRESSION  As noted above, he is seen for toxicity check prior to initiation of cycle 2 of adjuvant temozolomide.  He reports he is tolerating the chemotherapy itself well overall, with nausea being well-controlled with ondansetron, and constipation well-managed with use of senna.  He is having mild headaches that he reports resolved with Tylenol and hydration.  He does not report any dizzy spells today.  He has not noted any activities concerning for seizures.  He remains off steroids.    He did unfortunately decide to stop his Zoloft.  He feels that he was having too many side effects with the increased dose.  We spent some time discussing his anxiety and management of this today, and I will send in a prescription for him for Lexapro 5 mg.  We will start at this low dose and gradually increase if tolerated by 5 mg at a time.  I would like him to take the Lexapro 5 mg for at least 2 weeks before considering an increase.  He is encouraged to continue to follow with psychology and social work.     Per prior note, his imaging showed no concerning findings with a subtle positive " initial response to treatment. The plan is to repeat imaging per NCCN guidelines of every 4 months.    With regard to cancer-directed therapy, the plan is to continue adjuvant temozolomide.  Because he tolerated the initial dose well, we discussed and he agrees to a trial of the 200 mg/m2 dosing.  The previously reviewed common side effects of temozolomide can still be anticipated and were discussed as including, but not limited to, fatigue, nausea, and constipation. Bone marrow suppression can result in leukopenia and thrombocytopenia.  Team messaged with the plan.     PROBLEM LIST  Oligodendroglioma  Hemiparesis, right  Tension headache  Seizure  Fatigue  Hypertension  Depressed mood, anxiety, irritability    PLAN  -CANCER DIRECTED THERAPY-  Will continue adjuvant temozolomide and increase to the 200mg/m2 (360mg), cycle 2  (start date 12/8).   -Supportive medications; Zofran and bowel regimen.     -Repeat 28 day cycle if WBC >= 3, ANC >= 1.5, HgB >= 10, and platelets >= 100.  -Surveillance labs reviewed, at goal for chemotherapy.   -Will continue every 2 weeks CBC and CMP every 4 weeks.    -Repeat imaging in 2 months (2/2024).     -STEROIDS-  -Currently off dexamethasone.    -SEIZURE MANAGEMENT-  -Off Keppra.   -Following with Dr. Panda.      -Quality of life/ MOOD/ HEADACHE/ FATIGUE-  -Increase in Zoloft caused intolerable side effects for him.  He stopped on his own.  -After discussion, we will try low-dose Lexapro to see if this is helpful.  -Following with psychology and primary care.    -Social work continued involvement as financial and work stressors present.   -Vitamin D deficiency manifesting as fatigue and dizziness.    Starting Vitamin D 50K international unit(s) weekly x 8.    Vitamin D level recheck in late December.    Then, recommend starting daily supplementation with vitamin D 200-400 international unit(s).    -HEMIPARESIS-  -Following with Dr. Chiang.     -HYPERTENSION-  -Primary care referral for  "management.    -TOBACCO DEPENDENCE-  -Previously encouraged continued abstinence from smoking.    Return to clinic in one month with labs and RAUL visit, and then 2 months with labs, imaging and to see Dr. Park.     TITA Crystal [facility]   [phone]      Oncology Rooming Note    December 5, 2023 10:48 AM   Lang Collins Jr. is a 48 year old male who presents for:    Chief Complaint   Patient presents with     Oncology Clinic Visit     Oligodendroglioma, WHO grade II (H)     Initial Vitals: BP (!) 165/99   Pulse 63   Temp 98.5  F (36.9  C) (Oral)   Resp 16   Ht 1.803 m (5' 11\")   Wt 66.2 kg (146 lb)   SpO2 100%   BMI 20.36 kg/m   Estimated body mass index is 20.36 kg/m  as calculated from the following:    Height as of this encounter: 1.803 m (5' 11\").    Weight as of this encounter: 66.2 kg (146 lb). Body surface area is 1.82 meters squared.  Moderate Pain (4) Comment: Data Unavailable   No LMP for male patient.  Allergies reviewed: Yes  Medications reviewed: Yes    Medications: Medication refills not needed today.  Pharmacy name entered into [facility]:    [id number] - [place], [place] - [address] AT [place], [place] - [address]    Clinical concerns: Joss notes that he has a headache today. He has had this consistently for two weeks.       Peggy Delgado MA                Again, thank you for allowing me to participate in the care of your patient.        Sincerely,        TITA Crystal CNP  "

## 2023-12-05 NOTE — PROGRESS NOTES
"Oncology Rooming Note    December 5, 2023 10:48 AM   Lang Collins Jr. is a 48 year old male who presents for:    Chief Complaint   Patient presents with    Oncology Clinic Visit     Oligodendroglioma, WHO grade II (H)     Initial Vitals: BP (!) 165/99   Pulse 63   Temp 98.5  F (36.9  C) (Oral)   Resp 16   Ht 1.803 m (5' 11\")   Wt 66.2 kg (146 lb)   SpO2 100%   BMI 20.36 kg/m   Estimated body mass index is 20.36 kg/m  as calculated from the following:    Height as of this encounter: 1.803 m (5' 11\").    Weight as of this encounter: 66.2 kg (146 lb). Body surface area is 1.82 meters squared.  Moderate Pain (4) Comment: Data Unavailable   No LMP for male patient.  Allergies reviewed: Yes  Medications reviewed: Yes    Medications: Medication refills not needed today.  Pharmacy name entered into [employer]:    [id number] - [place], [place] - [address] AT [place], [place] - [address]    Clinical concerns: Joss notes that he has a headache today. He has had this consistently for two weeks.       Peggy Delgado MA              "

## 2023-12-05 NOTE — PROGRESS NOTES
Medical Assistant Note:  Lang Collins Jr. presents today for blood draw.    Patient seen by provider today: Yes: carroll.   present during visit today: Not Applicable.    Concerns: No Concerns.    Procedure:  Lab draw site: rac, Needle type: bf, Gauge: 23.    Post Assessment:  Labs drawn without difficulty: Yes.    Discharge Plan:  Departure Mode: Ambulatory.    Face to Face Time: 5 min.    Eleanor Mcpherson, CMA

## 2023-12-07 ENCOUNTER — TELEPHONE (OUTPATIENT)
Dept: PHARMACY | Facility: CLINIC | Age: 48
End: 2023-12-07

## 2023-12-07 NOTE — TELEPHONE ENCOUNTER
PA Initiation    Medication: TEMOZOLOMIDE 180 MG PO CAPS  Insurance Company: [employer] - Phone [phone] Fax [phone]  Pharmacy Filling the Rx: [facility] [place], [place] - [address]  Filling Pharmacy Phone:    Filling Pharmacy Fax:    Start Date: 12/7/2023    *marked as urgent

## 2023-12-08 NOTE — TELEPHONE ENCOUNTER
Prior Authorization Approval    Medication: TEMOZOLOMIDE 180 MG PO CAPS  Authorization Effective Date: 12/8/2023  Authorization Expiration Date: 12/7/2024  Approved Dose/Quantity: 10 for 28 days  Reference #:     Insurance Company: [employer] - Phone [phone] Fax [phone]  Expected CoPay: $    CoPay Card Available: No    Financial Assistance Needed: no  Which Pharmacy is filling the prescription: [place], [place] - [address]  Pharmacy Notified: yes  Patient Notified: yes

## 2023-12-11 ENCOUNTER — PATIENT OUTREACH (OUTPATIENT)
Dept: CARE COORDINATION | Facility: CLINIC | Age: 48
End: 2023-12-11

## 2023-12-11 ENCOUNTER — DOCUMENTATION ONLY (OUTPATIENT)
Dept: ONCOLOGY | Facility: CLINIC | Age: 48
End: 2023-12-11

## 2023-12-11 NOTE — PROGRESS NOTES
Oral Chemotherapy Monitoring Program   Placed call to patient in follow up of Temozolomide therapy pending start of adj C2 TMZ. I followed up with Joss today as he stated he did not get his supply of Temozolomide yet. I provided him with the phone number for [facility] who is filling the Temozolomide medication. I verified that we sent the RX for TMZ on 12/5/23. Joss will call that pharmacy and call us back in clinic if any issues. Stand confirmed delivery of TMZ 12/12. He will start cycle 2 12/12 PM.   Follow Up:  12/18 mid cycle 2 lab appt    Xavier Machado PharmD  [facility]  [phone]  December 11, 2023

## 2023-12-11 NOTE — PROGRESS NOTES
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 Park at [facility].        Patient Name: Lang Collins Jr. Goes By: Joss    /Age: 1975 (48 year old)     Visit Type: telephone  Referral Source: Joss  Reason for Referral: Medication Question     Psychosocial Information/Concerns:  This clinician received incoming call from Joss who reported that he had thought he was supposed to start taking oral medication 12/10/23, but had not received any mailing of medication.      Assessment/Plan:  This clinician alerted pharmacy team and RNCC, requesting their outreach to discuss medication plan.      Provided patient/family with contact information and availability.    SHAYY Sharma, LICSW, OSW-C  Clinical - Adult Oncology  She/Her/Hers  Phone: [phone]  [facility]: M, Thu  *every other Tue, 8am-4:30pm  [facility]: W, F, *every other Tue, 8am-4:30pm

## 2023-12-13 RX ORDER — ESCITALOPRAM OXALATE 5 MG/1
5 TABLET ORAL DAILY
Qty: 30 TABLET | Refills: 3 | Status: SHIPPED | OUTPATIENT
Start: 2023-12-13 | End: 2024-02-13

## 2023-12-14 ENCOUNTER — PATIENT OUTREACH (OUTPATIENT)
Dept: CARE COORDINATION | Facility: CLINIC | Age: 48
End: 2023-12-14

## 2023-12-14 NOTE — PROGRESS NOTES
Social Work - Follow-Up  [facility]    Data/Intervention:    Patient Name: Lang Collins Jr. Goes By: Joss    /Age: 1975 (48 year old)    Reason for Follow-Up:  SARAH received message from [employer] asking to confirm Joss's address for awarded funds    Intervention/Education/Resources Provided:  This clinician called to confirm preferred mailing address to Joss. Joss looking forward to receiving $800 award from [employer] [place]     Joss reports that he learned yesterday he was approved for CADI waiver and is awaiting update from [facility] about next steps for home support.     Joss confirmed that he did receive oral medication.     No concerns expressed at time of call. Appreciative of team support.     Assessment/Plan:  Onc SW will continue to be available as needed for psychosocial support.     Previously provided patient/family with writer's contact information and availability.      Summer Felix, SHAYY, LICSW, OSW-C  Clinical - Adult Oncology  She/Her/Hers  Phone: [phone]  [facility]: M, Thu  *every other Tue, 8am-4:30pm  [facility]: W, F, *every other Tue, 8am-4:30pm

## 2023-12-19 ENCOUNTER — DOCUMENTATION ONLY (OUTPATIENT)
Dept: ONCOLOGY | Facility: CLINIC | Age: 48
End: 2023-12-19

## 2023-12-19 NOTE — PROGRESS NOTES
Oral Chemotherapy Monitoring Program     Placed call to patient in follow up of adjuvant Temozolomide therapy. Joss was due for lab appt today, lab appt canceled. I also informed RNCC about appt cancel. Hoping to get it rescheduled soon.     Left message to please call back otherwise we will attempt a call in the next few days. No patient or drug names were mentioned.     Xavier Machado PharmD  [facility]otherapy  [phone]  December 19, 2023

## 2023-12-29 ENCOUNTER — PATIENT OUTREACH (OUTPATIENT)
Dept: ONCOLOGY | Facility: CLINIC | Age: 48
End: 2023-12-29

## 2023-12-29 NOTE — PROGRESS NOTES
[facility]: Cancer Care                                                                                          Patient missed appt this morning with carroll/labs  Attempted to call and offer same day appt at 1:30, no answer    Signature:  Mylene Qureshi RN

## 2024-01-01 NOTE — PROGRESS NOTES
HISTORY AND PHYSICAL    PRENATAL COURSE / MATERNAL DATA:     Aden Serrano is a Birth Weight: 2.166 kg (4 lb 12.4 oz) male  born at Gestational Age: 36w4d on 2022 at 5:09 PM    Information for the patient's mother:  Desi Serrano [[id number]]   21 y.o.   OB History          2    Para   1    Term   1       0    AB   0    Living   1         SAB   0    IAB   0    Ectopic   0    Molar   0    Multiple   0    Live Births   1                   Prenatal labs:  Information for the patient's mother:  Desi Serrano [[id number]]     Rubella Antibody IgG   Date Value Ref Range Status   2024 IU/mL Final     Comment:     Patient's result indicates immunity.  Default Normal Ranges    >=10 Presumed Immune  <10  Presumed Not immune       Group B Strep Culture   Date Value Ref Range Status   2024   Preliminary    Rule Out Grp.B Strep:  CULTURE IN PROGRESS  Performed at [place] [id number] ([phone]        - HBsAg: negative  - GBS: PENDING; mother did not receive intrapartum prophylaxis  - HIV: negative  - Chlamydia: negative  - GC: negative  - Rubella: immune  - RPR: negative  - Hepatits C: negative  - HSV: not reported  - UDS: negative  - Glucose tolerance test:  negative  - Other screenings:     Maternal blood type:   Information for the patient's mother:  Desi Serrano [[id number]]   O POS   BBT: BLOOD TYPE: pending  Prenatal care: adequate  Prenatal medications: PNV  Pregnancy complications: premature labor with tocolysis, twin pregnancy  Mother   Information for the patient's mother:  Desi Serrano [[id number]]    has no past medical history on file.      Alcohol use: denied  Tobacco use: denied  Drug use: denied      DELIVERY HISTORY:      Delivery date and time: 2024 at 5:09 PM  Delivery Method: , Low Transverse  OB:       complications: none  Maternal antibiotics: cefazolin x1, given for surgical  Social Work - Follow-Up  [facility]    Data/Intervention:    Patient Name: Lang Collins Jr. Goes By: Joss    /Age: 1975 (48 year old)    Reason for Follow-Up:  This clinician met with Joss today at RNCC Mylene's request.     Intervention/Education/Resources Provided:  Joss reports that lapse of health insurance impacted his ability to access HSS programming. Joss reports that he is working with [employer] ([phone]), and is awaiting determination for CADI waiver. Joss reports that he is receiving food stamps ($750/month), and continues to reside with sister. SARAH provided contact information for Lucita Cleary at [facility], who has been assisting Joss with SSDI.     SARAH attempted outreach to [employer], unable to leave message as VM full. SW called Cole and LVM requesting return call.     Brief discussion with Joss regarding his anxiety and frequency of anxiety attacks. Joss denies interest in therapeutic engagement around anxiety at present time, and wants to focus efforts on locating residence where he can live with children as he likes to have them in [place]. Provided emotional support regarding Joss's coping with difficult communication at times from family.     Per Chart review, previous grants received:    [employer]- approved 2023  [facility]- 2023  [address]- ?  [employer]- ?    This clinician attempted outreach by phone after  appointment, left voicemail with social work contact information and availability.     Assessment/Plan:  Onc SW will continue to be available as needed for ongoing psychosocial support.     Previously provided patient/family with writer's contact information and availability.    Summer Felix, SHAYY, LICSW, OSW-C  Clinical - Adult Oncology  She/Her/Hers  Phone: [phone]  [facility]: M, Thu  *every other Tue, 8am-4:30pm  [facility]: W, F, *every other Tue, 8am-4:30pm     Received return call from Joss  late in day. Joss reports that he has not applied for [employer] or [id number]. SARAH sent application to Joss to review and complete. [id number] not accepting applications at present time due to limited funding. SARAH provided education about [employer]. Joss knows to outreach in case of concern or need.

## 2024-01-08 ENCOUNTER — PATIENT OUTREACH (OUTPATIENT)
Dept: CARE COORDINATION | Facility: CLINIC | Age: 49
End: 2024-01-08
Payer: COMMERCIAL

## 2024-01-08 NOTE — PROGRESS NOTES
Social Work - Intervention  [facility]  Data/Intervention:    Patient Name: Lang Collins Jr. Goes By: Joss    /Age: 1975 (48 year old)     Visit Type: telephone  Referral Source: Cami/Mylene  Reason for Referral: Missed appointments, ? Of transportation concern    Collaborated with:   [employer]- [phone]  [facility]- [phone]  [place] Financial Counselor- Hesham Coreas- patient     Psychosocial Information/Concerns:  SW updated Joss that his insurance plan has changed from a Medicaid/PMAP plan to a [employer] plan. SARAH expressed that it is my understanding that with a [employer] plan, there is no ride benefit available to him. Joss expressed that he wonders if getting approved for SSDI changed his eligibility for Medicaid/PMAP plan (which has ride benefit). SW advised Joss to outreach to [facility] ([phone]) and inquire if he can transition back on [place] for ride benefit. Joss expressed understanding.     SARAH scheduled ride for 24 with [employer] (#[phone] [id number] #[phone]). SARAH sent email reviewing these steps per his request.      Intervention/Education/Resources Provided:  [employer] [phone]  [facility] [phone]     Assessment/Plan:  Joss is aware of how to outreach to this clinician for transportation concerns if they arise.      Provided patient/family with contact information and availability.    Summer Felix, MSW, LICSW, OSW-C  Clinical - Adult Oncology  She/Her/Hers  Phone: [phone]  [facility]: M, Thu  *every other Tue, 8am-4:30pm  [facility]: W, F, *every other Tue, 8am-4:30pm

## 2024-01-09 ENCOUNTER — LAB (OUTPATIENT)
Dept: INFUSION THERAPY | Facility: CLINIC | Age: 49
End: 2024-01-09
Attending: PSYCHIATRY & NEUROLOGY
Payer: COMMERCIAL

## 2024-01-09 ENCOUNTER — TELEPHONE (OUTPATIENT)
Dept: PHARMACY | Facility: CLINIC | Age: 49
End: 2024-01-09

## 2024-01-09 ENCOUNTER — ONCOLOGY VISIT (OUTPATIENT)
Dept: ONCOLOGY | Facility: CLINIC | Age: 49
End: 2024-01-09
Attending: PSYCHIATRY & NEUROLOGY
Payer: COMMERCIAL

## 2024-01-09 VITALS
HEART RATE: 68 BPM | TEMPERATURE: 98.4 F | RESPIRATION RATE: 16 BRPM | OXYGEN SATURATION: 98 % | SYSTOLIC BLOOD PRESSURE: 119 MMHG | HEIGHT: 71 IN | DIASTOLIC BLOOD PRESSURE: 53 MMHG | BODY MASS INDEX: 19.99 KG/M2 | WEIGHT: 142.8 LBS

## 2024-01-09 DIAGNOSIS — R11.2 CHEMOTHERAPY-INDUCED NAUSEA AND VOMITING: ICD-10-CM

## 2024-01-09 DIAGNOSIS — C71.9 OLIGODENDROGLIOMA, WHO GRADE II (H): Primary | ICD-10-CM

## 2024-01-09 DIAGNOSIS — T45.1X5A ANTINEOPLASTIC CHEMOTHERAPY INDUCED PANCYTOPENIA (H): ICD-10-CM

## 2024-01-09 DIAGNOSIS — T45.1X5A CHEMOTHERAPY-INDUCED NAUSEA AND VOMITING: ICD-10-CM

## 2024-01-09 DIAGNOSIS — D61.810 ANTINEOPLASTIC CHEMOTHERAPY INDUCED PANCYTOPENIA (H): ICD-10-CM

## 2024-01-09 LAB
ALBUMIN SERPL BCG-MCNC: 4.4 G/DL (ref 3.5–5.2)
ALP SERPL-CCNC: 73 U/L (ref 40–150)
ALT SERPL W P-5'-P-CCNC: 11 U/L (ref 0–70)
ANION GAP SERPL CALCULATED.3IONS-SCNC: 3 MMOL/L (ref 7–15)
AST SERPL W P-5'-P-CCNC: 19 U/L (ref 0–45)
BASOPHILS # BLD AUTO: 0 10E3/UL (ref 0–0.2)
BASOPHILS NFR BLD AUTO: 0 %
BILIRUB SERPL-MCNC: 0.6 MG/DL
BUN SERPL-MCNC: 16.7 MG/DL (ref 6–20)
CALCIUM SERPL-MCNC: 9.2 MG/DL (ref 8.6–10)
CHLORIDE SERPL-SCNC: 108 MMOL/L (ref 98–107)
CREAT SERPL-MCNC: 1.16 MG/DL (ref 0.67–1.17)
DEPRECATED HCO3 PLAS-SCNC: 31 MMOL/L (ref 22–29)
EGFRCR SERPLBLD CKD-EPI 2021: 79 ML/MIN/1.73M2
EOSINOPHIL # BLD AUTO: 0 10E3/UL (ref 0–0.7)
EOSINOPHIL NFR BLD AUTO: 1 %
ERYTHROCYTE [DISTWIDTH] IN BLOOD BY AUTOMATED COUNT: 12.2 % (ref 10–15)
GLUCOSE SERPL-MCNC: 89 MG/DL (ref 70–99)
HCT VFR BLD AUTO: 38.7 % (ref 40–53)
HGB BLD-MCNC: 12.7 G/DL (ref 13.3–17.7)
IMM GRANULOCYTES # BLD: 0 10E3/UL
IMM GRANULOCYTES NFR BLD: 0 %
LYMPHOCYTES # BLD AUTO: 1 10E3/UL (ref 0.8–5.3)
LYMPHOCYTES NFR BLD AUTO: 24 %
MCH RBC QN AUTO: 30.8 PG (ref 26.5–33)
MCHC RBC AUTO-ENTMCNC: 32.8 G/DL (ref 31.5–36.5)
MCV RBC AUTO: 94 FL (ref 78–100)
MONOCYTES # BLD AUTO: 0.2 10E3/UL (ref 0–1.3)
MONOCYTES NFR BLD AUTO: 5 %
NEUTROPHILS # BLD AUTO: 2.9 10E3/UL (ref 1.6–8.3)
NEUTROPHILS NFR BLD AUTO: 70 %
NRBC # BLD AUTO: 0 10E3/UL
NRBC BLD AUTO-RTO: 0 /100
PLATELET # BLD AUTO: 168 10E3/UL (ref 150–450)
POTASSIUM SERPL-SCNC: 4.5 MMOL/L (ref 3.4–5.3)
PROT SERPL-MCNC: 6.8 G/DL (ref 6.4–8.3)
RBC # BLD AUTO: 4.12 10E6/UL (ref 4.4–5.9)
SODIUM SERPL-SCNC: 142 MMOL/L (ref 135–145)
WBC # BLD AUTO: 4 10E3/UL (ref 4–11)

## 2024-01-09 PROCEDURE — G0463 HOSPITAL OUTPT CLINIC VISIT: HCPCS | Performed by: NURSE PRACTITIONER

## 2024-01-09 PROCEDURE — 99213 OFFICE O/P EST LOW 20 MIN: CPT | Performed by: NURSE PRACTITIONER

## 2024-01-09 PROCEDURE — 85025 COMPLETE CBC W/AUTO DIFF WBC: CPT | Performed by: PSYCHIATRY & NEUROLOGY

## 2024-01-09 PROCEDURE — 80053 COMPREHEN METABOLIC PANEL: CPT | Performed by: PSYCHIATRY & NEUROLOGY

## 2024-01-09 PROCEDURE — 36415 COLL VENOUS BLD VENIPUNCTURE: CPT

## 2024-01-09 RX ORDER — TEMOZOLOMIDE 140 MG/1
280 CAPSULE ORAL DAILY
Qty: 10 CAPSULE | Refills: 0 | Status: SHIPPED | OUTPATIENT
Start: 2024-01-10 | End: 2024-03-19

## 2024-01-09 RX ORDER — TEMOZOLOMIDE 180 MG/1
200 CAPSULE ORAL DAILY
Qty: 10 CAPSULE | Refills: 0 | OUTPATIENT
Start: 2024-01-10 | End: 2024-01-09

## 2024-01-09 ASSESSMENT — PAIN SCALES - GENERAL: PAINLEVEL: NO PAIN (0)

## 2024-01-09 NOTE — PROGRESS NOTES
Medical Assistant Note:  Lang Collins Jr. presents today for blood draw.    Patient seen by provider today: Yes: lisbeth.   present during visit today: Not Applicable.    Concerns: No Concerns.    Procedure:  Lab draw site: rac, Needle type: bf, Gauge: 23.    Post Assessment:  Labs drawn without difficulty: Yes.    Discharge Plan:  Departure Mode: Ambulatory.    Face to Face Time: 5 min.    Eleanor Mcpherson, CMA         Statement Selected

## 2024-01-09 NOTE — TELEPHONE ENCOUNTER
Prior Authorization Not Needed per Insurance    Medication: TEMOZOLOMIDE 140 MG PO CAPS  Insurance Company: [employer] - Phone [phone] Fax [phone]  Expected CoPay: $ 10  Pharmacy Filling the Rx: [facility] - [place], [place] - [address]   Pharmacy Notified: yes  Patient Notified: yes

## 2024-01-09 NOTE — PROGRESS NOTES
"Oncology/Hematology Visit Note  Jan 9, 2024    Reason for Visit: follow up of oligodendroglioma   Follows with Dr. Park  Status post radiation with concurrent temozolomide on August 2023  Postradiation imaging revealed positive response to treatment  Patient currently undergoing adjuvant temozolomide    Patient comes here today prior to cycle 3        Interval History:  Patient reports he had excessive fatigue nausea with cycle 2 increased dose of temozolomide he reports he was taking antinausea medication.  He would like to go back to reduce dose of temozolomide that was given to him with cycle 1.    Today , patient reports feeling well.  Denies headache dizziness seizure denies changes in gait or vision denies any neurological deficits      Review of Systems:  14 point ROS of systems including Constitutional, Eyes, Respiratory, Cardiovascular, Gastroenterology, Genitourinary, Integumentary, Muscularskeletal, Psychiatric were all negative except for pertinent positives noted in my HPI.      Physical Examination:  General: The patient is a pleasant male in no acute distress.  /53   Pulse 68   Temp 98.4  F (36.9  C)   Resp 16   Ht 1.803 m (5' 11\")   Wt 64.8 kg (142 lb 12.8 oz)   SpO2 98%   BMI 19.92 kg/m    HEENT: EOMI, PERRL. Sclerae are anicteric. Oral mucosa is pink and moist with no lesions or thrush.   Lymph: Neck is supple with no lymphadenopathy in the cervical or supraclavicular areas.   Heart: Regular rate and rhythm.   Lungs: Clear to auscultation bilaterally.   GI: Bowel sounds present, soft, nontender with no palpable hepatosplenomegaly or masses.   Extremities: No lower extremity edema noted bilaterally.   Skin: No rashes, petechiae, or bruising noted on exposed skin.  NEURO:  equal  strength, neg Romberg, DTR II/IV bilaterally (UE and LE), finger to nose normal, CN intact, ambulates without difficulty.  no focal deficits noted.      Laboratory Data:  CBC CMP results " reviewed      Assessment and Plan:    oligodendroglioma   Follows with Dr. Park  Status post radiation with concurrent temozolomide on August 2023  Postradiation imaging revealed positive response to treatment  Patient currently undergoing adjuvant temozolomide  -Patient did not tolerate well cycle 2 temozolomide 200 mg/m  dose  Patient request to go back to the dosing with cycle 1  Reviewed with Dr. Xavier pond to reduce the dosing to 150 mg/m   Labs reviewed proceed with cycle 3 as planned  Per pharmacy plan to review labs in 2 weeks  Schedule for MRI of the brain and follow-up appointment with Dr. Park in 4 weeks     Anemia mild   continue to monitor for now  As above plan to repeat labs in 2 weeks      Tobacco dependence  Patient is not ready to quit       Please call clinic with any changes in health condition or questions        TITA Myers CNP  [facility]     Chart documentation with Dragon Voice recognition Software. Although reviewed after completion, some words and grammatical errors may remain.

## 2024-01-09 NOTE — PROGRESS NOTES
"Oncology Rooming Note    January 9, 2024 10:24 AM   Lang Collins Jr. is a 48 year old male who presents for:    Chief Complaint   Patient presents with    Oncology Clinic Visit     Initial Vitals: There were no vitals taken for this visit. Estimated body mass index is 20.36 kg/m  as calculated from the following:    Height as of 12/5/23: 1.803 m (5' 11\").    Weight as of 12/5/23: 66.2 kg (146 lb). There is no height or weight on file to calculate BSA.  Data Unavailable Comment: Data Unavailable   No LMP for male patient.  Allergies reviewed: Yes  Medications reviewed: Yes    Medications: MEDICATION REFILLS NEEDED TODAY. Provider was notified.  Pharmacy name entered into [employer]:    [id number] - [place], [place] - [address] AT [place], [place] - [address]    Frailty Screening:   Is the patient here for a new oncology consult visit in cancer care? 2. No      Clinical concerns: refill on Zofran and Senna  NP was notified.      Cherelle Hugo MA            "

## 2024-01-09 NOTE — LETTER
"    1/9/2024         RE: Lang Collins Jr.  [address]  [place] [address]        Dear Colleague,    Thank you for referring your patient, Lang Collins Jr., to the [facility]. Please see a copy of my visit note below.    Oncology Rooming Note    January 9, 2024 10:24 AM   Lang Collins Jr. is a 48 year old male who presents for:    Chief Complaint   Patient presents with     Oncology Clinic Visit     Initial Vitals: There were no vitals taken for this visit. Estimated body mass index is 20.36 kg/m  as calculated from the following:    Height as of 12/5/23: 1.803 m (5' 11\").    Weight as of 12/5/23: 66.2 kg (146 lb). There is no height or weight on file to calculate BSA.  Data Unavailable Comment: Data Unavailable   No LMP for male patient.  Allergies reviewed: Yes  Medications reviewed: Yes    Medications: MEDICATION REFILLS NEEDED TODAY. Provider was notified.  Pharmacy name entered into [employer]:    [id number] - [place], [place] - [address] AT [place], [place] - [address]    Frailty Screening:   Is the patient here for a new oncology consult visit in cancer care? 2. No      Clinical concerns: refill on Zofran and Senna  NP was notified.      Cherelle Hugo MA              Oncology/Hematology Visit Note  Jan 9, 2024    Reason for Visit: follow up of oligodendroglioma   Follows with Dr. Park  Status post radiation with concurrent temozolomide on August 2023  Postradiation imaging revealed positive response to treatment  Patient currently undergoing adjuvant temozolomide    Patient comes here today prior to cycle 3        Interval History:  Patient reports he had excessive fatigue nausea with cycle 2 increased dose of temozolomide he reports he was taking antinausea medication.  He would like to go back to reduce dose of temozolomide that was given to him with cycle 1.    Today , " "patient reports feeling well.  Denies headache dizziness seizure denies changes in gait or vision denies any neurological deficits      Review of Systems:  14 point ROS of systems including Constitutional, Eyes, Respiratory, Cardiovascular, Gastroenterology, Genitourinary, Integumentary, Muscularskeletal, Psychiatric were all negative except for pertinent positives noted in my HPI.      Physical Examination:  General: The patient is a pleasant male in no acute distress.  /53   Pulse 68   Temp 98.4  F (36.9  C)   Resp 16   Ht 1.803 m (5' 11\")   Wt 64.8 kg (142 lb 12.8 oz)   SpO2 98%   BMI 19.92 kg/m    HEENT: EOMI, PERRL. Sclerae are anicteric. Oral mucosa is pink and moist with no lesions or thrush.   Lymph: Neck is supple with no lymphadenopathy in the cervical or supraclavicular areas.   Heart: Regular rate and rhythm.   Lungs: Clear to auscultation bilaterally.   GI: Bowel sounds present, soft, nontender with no palpable hepatosplenomegaly or masses.   Extremities: No lower extremity edema noted bilaterally.   Skin: No rashes, petechiae, or bruising noted on exposed skin.  NEURO:  equal  strength, neg Romberg, DTR II/IV bilaterally (UE and LE), finger to nose normal, CN intact, ambulates without difficulty.  no focal deficits noted.      Laboratory Data:  CBC CMP results reviewed      Assessment and Plan:    oligodendroglioma   Follows with Dr. Park  Status post radiation with concurrent temozolomide on August 2023  Postradiation imaging revealed positive response to treatment  Patient currently undergoing adjuvant temozolomide  -Patient did not tolerate well cycle 2 temozolomide 200 mg/m  dose  Patient request to go back to the dosing with cycle 1  Reviewed with Dr. Xavier pond to reduce the dosing to 150 mg/m   Labs reviewed proceed with cycle 3 as planned  Schedule for MRI of the brain and follow-up appointment with Dr. Park in 4 weeks     Anemia mild   continue to monitor for now        Tobacco " dependence  Patient is not ready to quit       Please call clinic with any changes in health condition or questions        TITA Myers CNP  [facility]     Chart documentation with Dragon Voice recognition Software. Although reviewed after completion, some words and grammatical errors may remain.          Again, thank you for allowing me to participate in the care of your patient.        Sincerely,        TITA Myers CNP

## 2024-01-09 NOTE — ORAL ONC MGMT
Oral Chemotherapy Monitoring Program     Placed call to patient in follow up of Temozolomide therapy. Calling to let Joss know that [facility] can now fill his adj TMZ. He is due to start cycle 3 when medication arrives.     Left message to please call back otherwise we will attempt a call in the next few days. No patient or drug names were mentioned.   1/10 call patient to see if set up delivery of [facility] TMZ  1/25 lab appt due    Xavier Machado PharmD  [facility] and Oral Chemotherapy  [phone]  January 9, 2024

## 2024-01-10 ENCOUNTER — PATIENT OUTREACH (OUTPATIENT)
Dept: CARE COORDINATION | Facility: CLINIC | Age: 49
End: 2024-01-10
Payer: COMMERCIAL

## 2024-01-10 NOTE — Clinical Note
Naveen Salinas  Joss is experiencing co-payment for the first time. He will not have an income until Feb. I am assuming this is due to insurance change (from Medicaid to [employer]). I have encouraged him to call the county to explore if [place] can return [place]. If you have any other guidance/recommendations for assisting with the co-payment I know he would be appreciative.  Thanks,  Summer

## 2024-01-10 NOTE — PROGRESS NOTES
Social Work - Follow-Up  [facility]    Data/Intervention:    Patient Name: Lang Collins Jr. Goes By: Joss    /Age: 1975 (48 year old)    Reason for Follow-Up:  This clinician returning voicemail from Joss    Intervention/Education/Resources Provided:  Joss expresses concern about co-payment for chemo medication that he has never needed to pay before. Joss reports that he has some difficulty affording $10 co-payment at present time due to no income. Joss reports that he is anticipating his first check from [facility] 24, and is uncertain how much money it will be for.     SARAH again advised that he outreach to [facility] to explore if he can transition back to [place] [facility]. Joss reports he will outreach today and update care team. Joss denies need of $10 assistance at present time.     Assessment/Plan:  This clinician will continue to be available as needed for ongoing psychosocial support.     Previously provided patient/family with writer's contact information and availability.      Summer Felix, MSW, LICSW, OSW-C  Clinical - Adult Oncology  She/Her/Hers  Phone: [phone]  [facility]: M, Thu  *every other Tue, 8am-4:30pm  [facility]: W, F, *every other Tue, 8am-4:30pm

## 2024-01-11 ENCOUNTER — DOCUMENTATION ONLY (OUTPATIENT)
Dept: ONCOLOGY | Facility: CLINIC | Age: 49
End: 2024-01-11
Payer: COMMERCIAL

## 2024-01-11 NOTE — PROGRESS NOTES
Oral Chemotherapy Monitoring Program     Placed call to patient in follow up of adjuvant Temozolomide therapy. Joss is due to start adj TMZ C3 tonight. He reported feeling some signs/symptoms of cold yesterday evening and preferred to wait to start C3 TMZ until he woke up today to see how he was feeling. Anticipate if no fever or worsening s/s of cold, will start adj C3 TMZ tonight 1/11 PM.      Left message to please call back otherwise we will attempt a call in the next few days. No patient or drug names were mentioned.   UPDATE:  Joss reports feeling ok but a little congested. No fever or chills reported. Instructed him to start C3 adj TMZ 1/11 PM. He is aware of needing labs ~1/25.     Follow Up:  1/18: review for lab appt or contact RNCC/scheduling to help coordinate mid C3 lab draw.     Xavier Machado PharmD  [facility] and Oral Chemotherapy  [phone]  January 11, 2024

## 2024-01-16 ENCOUNTER — PATIENT OUTREACH (OUTPATIENT)
Dept: CARE COORDINATION | Facility: CLINIC | Age: 49
End: 2024-01-16
Payer: COMMERCIAL

## 2024-01-16 NOTE — PROGRESS NOTES
Social Work - Intervention  [facility]  Data/Intervention:    Patient Name: Lang Collins Jr. Goes By: Joss    /Age: 1975 (48 year old)     Visit Type: telephone  Referral Source: CRISS Chakraborty  Reason for Referral: Transportation Support     Psychosocial Information/Concerns:  Joss reports that he has not received more clarity from [facility] regarding his health insurance, but plans to outreach again today. Joss reports that he is anticipating a significant reduction in his SSDI income due to health insurance issue. Joss reports that he will outreach to social work for additional support if needed.     Joss endorsed that he would be appreciative of ride assistance for upcoming appointments on  and  as he makes a plan regarding health insurance.      Assessment/Plan:  1) Onc SW will continue to be available as needed for ongoing psychosocial support. Joss knows to outreach in case of psychosocial concern or need.   2) Ongoing collaboration with multidisciplinary care team     Provided patient/family with contact information and availability.    Summer Felix, MSW, LICSW, OSW-C  Clinical - Adult Oncology  She/Her/Hers  Phone: [phone]  [facility]: M, Thu  *every other Tue, 8am-4:30pm  [facility]: W, F, *every other Tue, 8am-4:30pm

## 2024-01-25 ENCOUNTER — LAB (OUTPATIENT)
Dept: INFUSION THERAPY | Facility: CLINIC | Age: 49
End: 2024-01-25
Attending: PSYCHIATRY & NEUROLOGY
Payer: COMMERCIAL

## 2024-01-25 ENCOUNTER — DOCUMENTATION ONLY (OUTPATIENT)
Dept: ONCOLOGY | Facility: CLINIC | Age: 49
End: 2024-01-25

## 2024-01-25 DIAGNOSIS — D61.810 ANTINEOPLASTIC CHEMOTHERAPY INDUCED PANCYTOPENIA (H): ICD-10-CM

## 2024-01-25 DIAGNOSIS — T45.1X5A ANTINEOPLASTIC CHEMOTHERAPY INDUCED PANCYTOPENIA (H): ICD-10-CM

## 2024-01-25 DIAGNOSIS — T45.1X5A CHEMOTHERAPY-INDUCED NAUSEA AND VOMITING: ICD-10-CM

## 2024-01-25 DIAGNOSIS — R11.2 CHEMOTHERAPY-INDUCED NAUSEA AND VOMITING: ICD-10-CM

## 2024-01-25 LAB
ALBUMIN SERPL BCG-MCNC: 4.4 G/DL (ref 3.5–5.2)
ALP SERPL-CCNC: 63 U/L (ref 40–150)
ALT SERPL W P-5'-P-CCNC: 11 U/L (ref 0–70)
ANION GAP SERPL CALCULATED.3IONS-SCNC: 6 MMOL/L (ref 7–15)
AST SERPL W P-5'-P-CCNC: 19 U/L (ref 0–45)
BASOPHILS # BLD AUTO: 0 10E3/UL (ref 0–0.2)
BASOPHILS NFR BLD AUTO: 0 %
BILIRUB SERPL-MCNC: 0.6 MG/DL
BUN SERPL-MCNC: 17.5 MG/DL (ref 6–20)
CALCIUM SERPL-MCNC: 9.2 MG/DL (ref 8.6–10)
CHLORIDE SERPL-SCNC: 107 MMOL/L (ref 98–107)
CREAT SERPL-MCNC: 1 MG/DL (ref 0.67–1.17)
DEPRECATED HCO3 PLAS-SCNC: 30 MMOL/L (ref 22–29)
EGFRCR SERPLBLD CKD-EPI 2021: >90 ML/MIN/1.73M2
EOSINOPHIL # BLD AUTO: 0 10E3/UL (ref 0–0.7)
EOSINOPHIL NFR BLD AUTO: 1 %
ERYTHROCYTE [DISTWIDTH] IN BLOOD BY AUTOMATED COUNT: 12.5 % (ref 10–15)
GLUCOSE SERPL-MCNC: 89 MG/DL (ref 70–99)
HCT VFR BLD AUTO: 36 % (ref 40–53)
HGB BLD-MCNC: 11.7 G/DL (ref 13.3–17.7)
IMM GRANULOCYTES # BLD: 0 10E3/UL
IMM GRANULOCYTES NFR BLD: 0 %
LYMPHOCYTES # BLD AUTO: 1.7 10E3/UL (ref 0.8–5.3)
LYMPHOCYTES NFR BLD AUTO: 43 %
MCH RBC QN AUTO: 30.8 PG (ref 26.5–33)
MCHC RBC AUTO-ENTMCNC: 32.5 G/DL (ref 31.5–36.5)
MCV RBC AUTO: 95 FL (ref 78–100)
MONOCYTES # BLD AUTO: 0.2 10E3/UL (ref 0–1.3)
MONOCYTES NFR BLD AUTO: 5 %
NEUTROPHILS # BLD AUTO: 2 10E3/UL (ref 1.6–8.3)
NEUTROPHILS NFR BLD AUTO: 51 %
NRBC # BLD AUTO: 0 10E3/UL
NRBC BLD AUTO-RTO: 0 /100
PLATELET # BLD AUTO: 269 10E3/UL (ref 150–450)
POTASSIUM SERPL-SCNC: 3.9 MMOL/L (ref 3.4–5.3)
PROT SERPL-MCNC: 6.4 G/DL (ref 6.4–8.3)
RBC # BLD AUTO: 3.8 10E6/UL (ref 4.4–5.9)
SODIUM SERPL-SCNC: 143 MMOL/L (ref 135–145)
WBC # BLD AUTO: 3.9 10E3/UL (ref 4–11)

## 2024-01-25 PROCEDURE — 80053 COMPREHEN METABOLIC PANEL: CPT | Performed by: PSYCHIATRY & NEUROLOGY

## 2024-01-25 PROCEDURE — 36415 COLL VENOUS BLD VENIPUNCTURE: CPT

## 2024-01-25 PROCEDURE — 85004 AUTOMATED DIFF WBC COUNT: CPT | Performed by: PSYCHIATRY & NEUROLOGY

## 2024-01-25 NOTE — PROGRESS NOTES
Oral Chemotherapy Monitoring Program  Lab Follow Up    Reviewed lab results from 1/25/24.        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 Xavier Park   Clinic Name/Location [facility]   Drug Name Temodar (temozolomide) Temodar (temozolomide) Temodar (temozolomide) Temodar (temozolomide) Temodar (temozolomide) Temodar (temozolomide) Temodar (temozolomide)   Dose 280 mg 280 mg 280 mg 280 mg 280 mg 280 mg 280 mg   Current Schedule Daily Daily Daily Daily Daily Daily Daily   Cycle Details 5 days on, 23 days off 5 days on, 23 days off 5 days on, 23 days off 5 days on, 23 days off 5 days on, 23 days off 5 days on, 23 days off 5 days on, 23 days off   Start Date of Last Cycle 11/10/2023 11/10/2023 12/13/2023 12/13/2023 1/11/2024 1/11/2024 1/11/2024   Planned next cycle start date 12/8/2023 12/8/2023 1/10/2024 1/10/2024 2/9/2024 2/8/2024 2/13/2024   Adverse Effects Nausea;Constipation  No AE identified during assessment No AE identified during assessment No AE identified during assessment No AE identified during assessment No AE identified during assessment   Nausea Grade 1         Pharmacist Intervention(nausea) Yes          Intervention(s) Patient education         Constipation Grade 1         Pharmacist Intervention(constipation) Yes         Intervention(s) Patient education         Any new drug interactions? No    No No    Is the dose as ordered appropriate for the patient? Yes    Yes Yes        Labs:  _  Result Component Current Result Ref Range   Sodium 143 (1/25/2024) 135 - 145 mmol/L     _  Result Component Current Result Ref Range   Potassium 3.9 (1/25/2024) 3.4 - 5.3 mmol/L     _  Result Component Current Result Ref Range   Calcium 9.2 (1/25/2024) 8.6 - 10.0 mg/dL     No results found for Mag within last 30 days.     No results found for Phos within last 30 days.     _  Result Component Current Result Ref Range   Albumin 4.4 (1/25/2024) 3.5 - 5.2 g/dL     _  Result Component Current Result Ref Range   Urea Nitrogen 17.5 (1/25/2024) 6.0 - 20.0 mg/dL     _  Result Component Current Result Ref Range   Creatinine 1.00 (1/25/2024) 0.67 - 1.17 mg/dL     _  Result Component Current Result Ref Range   AST 19 (1/25/2024) 0 - 45 U/L     _  Result Component Current Result Ref Range   ALT 11 (1/25/2024) 0 - 70 U/L     _  Result Component Current Result Ref Range   Bilirubin Total 0.6 (1/25/2024) <=1.2 mg/dL     _  Result Component Current Result Ref Range   WBC Count 3.9 (L) (1/25/2024) 4.0 - 11.0 10e3/uL     _  Result Component Current Result Ref Range   Hemoglobin 11.7 (L) (1/25/2024) 13.3 - 17.7 g/dL     _  Result Component Current Result Ref Range   Platelet Count 269 (1/25/2024) 150 - 450 10e3/uL     No results found for ANC within last 30 days.     _  Result Component Current Result Ref Range   Absolute Neutrophils 2.0 (1/25/2024) 1.6 - 8.3 10e3/uL        Assessment & Plan:  No concerning abnormalities. Next cycle would start on 2/8 but doesn't see Dr. Park until 2/13 so will likely start next cycle after that appointment.     Follow-Up:  2/13: review labs, scan and appt with Dr. Xavier King, PharmD, MS  Hematology/Oncology  Clinical Pharmacist  [facility]  [phone]

## 2024-01-25 NOTE — PROGRESS NOTES
Medical Assistant Note:  Lang Collins Jr. presents today for blood draw.    Patient seen by provider today: No.   present during visit today: Not Applicable.    Concerns: No Concerns.    Procedure:  Lab draw site: lac, Needle type: bf, Gauge: 23.    Post Assessment:  Labs drawn without difficulty: Yes.    Discharge Plan:  Departure Mode: Ambulatory.    Face to Face Time: 5 min.    Eleanor Mcpherson, CMA

## 2024-01-31 DIAGNOSIS — C71.9 OLIGODENDROGLIOMA, WHO GRADE II (H): Primary | ICD-10-CM

## 2024-01-31 RX ORDER — TEMOZOLOMIDE 140 MG/1
280 CAPSULE ORAL DAILY
Qty: 10 CAPSULE | Refills: 0 | Status: SHIPPED | OUTPATIENT
Start: 2024-02-07 | End: 2024-03-19

## 2024-02-06 ENCOUNTER — PATIENT OUTREACH (OUTPATIENT)
Dept: CARE COORDINATION | Facility: CLINIC | Age: 49
End: 2024-02-06
Payer: COMMERCIAL

## 2024-02-06 NOTE — PROGRESS NOTES
Social Work - Follow-Up  [facility]    Data/Intervention:    Patient Name: Lang Collins Jr. Goes By: Joss    /Age: 1975 (48 year old)    Reason for Follow-Up:  This clinician returning voicemail from Joss requesting social work outreach and support prior to upcoming visit with dentist    Intervention/Education/Resources Provided:  This clinician returned call and LVM requesting return call at earliest convenience.     Assessment/Plan:  SW will await return call.     Previously provided patient/family with writer's contact information and availability.    Summer Felix, MSW, LICSW, OSW-C  Clinical - Adult Oncology  She/Her/Hers  Phone: [phone]  [facility]: M, Thu  *every other Tue, 8am-4:30pm  [facility]: W, F, *every other Tue, 8am-4:30pm

## 2024-02-07 ENCOUNTER — PATIENT OUTREACH (OUTPATIENT)
Dept: CARE COORDINATION | Facility: CLINIC | Age: 49
End: 2024-02-07
Payer: COMMERCIAL

## 2024-02-07 NOTE — PROGRESS NOTES
Social Work - Follow-Up  [facility]    Data/Intervention:    Patient Name: Lang Collins Jr. Goes By: Joss    /Age: 1975 (48 year old)    Reason for Follow-Up:  This clinician returning Joss's call    Intervention/Education/Resources Provided:  Joss reported that his aunt (whom he had been staying with) is transitioning to LTC and that he and daughter's need to be out of residence by this weekend. Joss reports that he is going to move into a friend's residence for a few weeks before S apartment is ready for him to move into.     Joss reports that he plans to  medication at current residence on Friday, and will hold it until he needs to initiate. Joss reports after this he plans to get a PO Box for future deliveries.     No other concerns reported at present time. Joss reports that he was able to fix issue with dentist.     Assessment/Plan:  Onc SW will continue to be available as needed. Joss knows to outreach in case of psychosocial concern or need.     Previously provided patient/family with writer's contact information and availability.      Summer Felix, SHAYY, LICSW, OSW-C  Clinical - Adult Oncology  She/Her/Hers  Phone: [phone]  [facility]: M, Thu  *every other Tue, 8am-4:30pm  [facility]: W, F, *every other Tue, 8am-4:30pm

## 2024-02-09 ENCOUNTER — PATIENT OUTREACH (OUTPATIENT)
Dept: CARE COORDINATION | Facility: CLINIC | Age: 49
End: 2024-02-09
Payer: COMMERCIAL

## 2024-02-09 NOTE — PROGRESS NOTES
Social Work - Follow-Up  [facility]    Data/Intervention:    Patient Name: Lang Collins Jr. Goes By: Joss    /Age: 1975 (48 year old)    Reason for Follow-Up:  Community Resources    Intervention/Education/Resources Provided:  SARAH discussed [employer] with Joss today for support in furnishing new apartment. Joss reports that he is going to stay in apartment building but move into 1BR. Plans to sign a 6 month lease with assistance of [employer] program.    Assessment/Plan:  Onc SW will continue to be available as needed for ongoing psychosocial support.     Previously provided patient/family with writer's contact information and availability.    Summer Felix, MSW, LICSW, OSW-C  Clinical - Adult Oncology  She/Her/Hers  Phone: [phone]  [facility]: M, Thu  *every other Tue, 8am-4:30pm  [facility]: W, F, *every other Tue, 8am-4:30pm

## 2024-02-12 NOTE — PROGRESS NOTES
"NEURO-ONCOLOGY VISIT  Feb 13, 2024    CHIEF COMPLAINT: Mr. 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     He was monitored on imaging surveillance until imaging in 12/2022 showed a subtle change concerning for cancer progression. As a result, initiation of cancer-directed therapy was recommended.     He completed radiation with concurrent temozolomide on 8/8/2023. Post-radiation imaging demonstrated no concerning findings with a subtle positive initial response to treatment. Joss was initiated on adjuvant-dosed temozolomide in 10/2023 and he remains on this treatment. Repeat imaging in 2/2024 showed overall stability.      Joss is presenting in follow-up today unaccompanied.     HISTORY OF PRESENT ILLNESS:  -Joss is looking forward to celebrating his birthday later this week. He plans to go to dinner with his daughters!   -He reports mild, lingering headaches, which he feels are triggered by not wearing his glasses.   -He reports worsening strength in the right leg; more issues in getting up off the toilet. Able to walk with good stability, but fatigues with standing.    Pain in the right foot. Denies cramping, increased muscle spasticity.   -Mood is good. Depression is heavy, but not motivated at times. Reduction in anxiety.   -He reports no activities concerning for seizures.  -His appetite is up and down.   Experiencing dental pain. Planning to have a tooth pulled.   -Overall, tolerating chemotherapy well.    Nausea is well controlled on supportive medications.   Mild issues with constipation on current bowel regimen.    Did not tolerated higher dosing.    Noting ongoing fatigue with each cycle.       MEDICATIONS   Current Outpatient Medications   Medication Sig Dispense Refill    ondansetron (ZOFRAN) 4 MG tablet Take 1 tablet (4 mg) by mouth at bedtime " Take 30-60 mins before each dose of temozolomide. 30 tablet 3    SENNA-docusate sodium (SENNA S) 8.6-50 MG tablet Take 1 tablet by mouth 2 times daily Can take up to 2 tablets twice daily if needed for constipation. 60 tablet 2    temozolomide (TEMODAR) 140 MG capsule Take 2 capsules (280 mg) by mouth daily for 5 doses Take ondansetron 30-60 min before temozolomide. Take at bedtime on an empty stomach. 10 capsule 0    temozolomide (TEMODAR) 140 MG capsule Take 2 capsules (280 mg) by mouth daily for 5 doses Take ondansetron 30-60 min before temozolomide. Take at bedtime on an empty stomach. 10 capsule 0     DRUG ALLERGIES No Known Allergies     IMMUNIZATIONS   Immunization History   Administered Date(s) Administered    COVID-19 MONOVALENT 12+ (Pfizer) 11/05/2021    TDAP (Adacel,Boostrix) 12/03/2021       ONCOLOGIC HISTORY  -8/8/2021 PRESENTATION: New onset seizure; generalized event. Started on Keppra.  -8/2021 MR brain imaging with a 2.1cm left frontal non-enhancing mass in the superior frontal gyrus in the expected region of the supplementary motor area.  -9/8/2021 MR brain imaging with no significant change in the nonenhancing T2 hyperintense mass.  -11/12/21 SURGERY: Left frontal-parietal craniotomy for mass resection.   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  Post-operative imaging with interval left frontal craniotomy for resection of abnormal lesion within the left parafalcine frontal lobe.  Hemiparesis, discharged to ARU.  -12/7/2021 NEURO-ONC: Recommending imaging surveillance given low risk factors and need to continue to focus on ongoing rehab. Referral to Dr. Chiang for optimizing rehab. [facility] seizure risk management in relation to future employment. Trial of flexeril 10mg at bedtime for headaches.   -3/8/2022 NEURO-ONC/ MRB: Clinically well; improved from prior. Restarting Keppra; second referral to [facility].  Imaging with post-surgical changes. Continue imaging surveillance.   -6/7/2022 NEURO-ONC/ MRB: Clinically well. Imaging with continued healing post-surgery. Continue imaging surveillance.   -12/6/2022 NEURO-ONC/ MRB: Clinically with no new/ progressive neurological symptoms. Imaging with a subtle increase in T2 FLAIR about the posterior aspect of the resection cavity. Continue imaging surveillance at a shortened interval of 3 months.   -2/28/2023 NEURO-ONC/ MRB: Clinically with no new/ progressive neurological symptoms. Imaging largely stable, but close interval imaging was recommended.   -5/30/2023 NEURO-ONC/ MRB: Worsened mood, concentration; starting Zoloft. Imaging with subtle progression noted when comparing to imaging 6 months ago; recommending chemoradiotherapy with referral to radiation oncology. Social work involvement. Primary care referral for management of hypertension.   -6/16/2023 NEURO-ONC: Started Zoloft, stopped r/t feeling jittery. Imaging with subtle progression noted when comparing to imaging 6 months ago; planned start date is 6/27 for chemoradiotherapy. Ongoing social work involvement.   -7/21/2023 NEURO-ONC/ CHEMORADS: Tolerating chemoradiation well.  Tolerating Zoloft with improved mood.  Continues to follow with psychology and social work.  -8/8/2023 CHEMORADS: Completed 5400 cGy in 30 fractions with concurrent temozolomide.   -10/17/2023 NEURO-ONC/ MRB/ CHEMO: Dizziness; low vitamin D. Anxiety; increase Zoloft. Imaging with no concerns; initial subtle positive treatment response. Starting adjuvant temozolomide 150mg/m2 (280mg), cycle 1.  -12/5/2023 NEURO-ONC/ CHEMO: Dizziness; low vitamin D. Anxiety; increase Zoloft. Imaging with no concerns; initial subtle positive treatment response. Continuing adjuvant temozolomide 200mg/m2 (360mg), cycle 2.  -1/10/2024 CHEMO: Continuing adjuvant temozolomide 150mg/m2 (180mg), cycle 3.  -10/17/2023 NEURO-ONC/ MRB/ CHEMO: Headaches, low mood at times,  "right left weakness. Imaging stable. Needing to have a tooth pulled; holding adjuvant temozolomide 150mg/m2 (280mg), cycle 4 until after dental procedure.      PHYSICAL EXAMINATION  /86 (BP Location: Right arm, Patient Position: Sitting, Cuff Size: Adult Large)   Pulse 79   Temp 97.8  F (36.6  C) (Oral)   Resp 18   Wt 66.9 kg (147 lb 6.4 oz)   SpO2 98%   BMI 20.56 kg/m     Wt Readings from Last 2 Encounters:   02/13/24 66.9 kg (147 lb 6.4 oz)   01/09/24 64.8 kg (142 lb 12.8 oz)      Ht Readings from Last 2 Encounters:   01/09/24 1.803 m (5' 11\")   12/05/23 1.803 m (5' 11\")     KPS: 100    -Generally well appearing.  -Respiratory: Speaking in full fluid sentences with no shortness of breath or wheezing noted.  -Psychiatric: Normal mood and affect. Pleasant, talkative.  -Neurologic:   MENTAL STATUS:     Recall: Intact.    Speech fluent.      CRANIAL NERVES:     Pupils are equal, round.    Symmetric facial movements.   Hearing intact.  GAIT: Steady, unassisted.       MEDICAL RECORDS  Personally reviewed; oncology and pharmacy notes.     LABS  Personally reviewed all available lab results; CBC (platelets 183, WBC 4.5) and CMP (AST/ ALT 12/9) from today.    IMAGING  Personally reviewed MR brain imaging from today and compared to post-radiation imaging.    1. Stable treatment related changes. No evidence for progressive neoplasm.  2. No evidence of superimposed acute intracranial finding.    Imaging was shown to and results were reviewed with Joss.     IMPRESSION  On date of service, 42 minutes was spent in clinic and 9 minutes was spent preparing for the visit through extensive chart review and coordinating care for this high complexity visit. The following is in explanation for the recommendations used to define the plan.       Joss endorses that 150mg/m2 adjuvant dosing has remained well tolerated with limited side effects, mainly fatigue. As before, Joss notes mild headaches and ongoing right leg weakness. " He denies any episodes concerning for a seizure. He states that his mental health is good and that he did not start Lexapro. I agree with recommendation to continue to follow with psychology and social work.     Joss's imaging from today showed no concerning findings. The plan is to repeat imaging per NCCN guidelines of every 4 months.    In the meantime, the plan is to continue adjuvant temozolomide at 150 mg/m2 dosing. The previously reviewed common side effects of temozolomide can still be anticipated and were discussed as including, but not limited to, fatigue, nausea, and constipation. Bone marrow suppression can result in leukopenia and thrombocytopenia. However, Joss is in need to have a tooth pulled. Will hold starting cycle 4 of adjuvant temozolomide until after this dental procedure. Team messaged with the plan.     PROBLEM LIST  Oligodendroglioma  Hemiparesis, right  Tension headache  Seizure  Fatigue  Hypertension  Depressed mood, anxiety, irritability    PLAN  -CANCER DIRECTED THERAPY-  Will continue adjuvant temozolomide and increase to the 150mg/m2 (180mg), cycle 4  (start date after dental procedure).   -Supportive medications; Zofran and bowel regimen.     -Repeat 28 day cycle if WBC >= 3, ANC >= 1.5, HgB >= 10, and platelets >= 100.  -Surveillance labs reviewed, at goal for chemotherapy.   -Will continue every 2 weeks CBC and CMP every 4 weeks.    -Repeat imaging in 4 months (6/2024).     -STEROIDS-  -Currently off dexamethasone.    -SEIZURE MANAGEMENT-  -Off Keppra.   -Following with Dr. Panda.      -Quality of life/ MOOD/ HEADACHE/ FATIGUE-  -Self-discontinued Zoloft due to intolerable side effects. Did not start Lexapro.   -Following with psychology and primary care.    -Social work continued involvement as financial and work stressors present.   -Vitamin D deficiency manifesting as fatigue and dizziness.    Recommend starting daily supplementation with vitamin D 200-400 international  unit(s).    -HEMIPARESIS-  -Following with Dr. Chiang.     -HYPERTENSION-  -Primary care referral for management.    -TOBACCO DEPENDENCE-  -Previously encouraged continued abstinence from smoking.    Return to clinic in one month with TITA Crystal + emily.     Elva Park MD  Neuro-oncology

## 2024-02-13 ENCOUNTER — HOSPITAL ENCOUNTER (OUTPATIENT)
Dept: MRI IMAGING | Facility: CLINIC | Age: 49
Discharge: HOME | End: 2024-02-13
Attending: NURSE PRACTITIONER
Payer: COMMERCIAL

## 2024-02-13 ENCOUNTER — LAB (OUTPATIENT)
Dept: LAB | Facility: CLINIC | Age: 49
End: 2024-02-13
Attending: NURSE PRACTITIONER
Payer: COMMERCIAL

## 2024-02-13 ENCOUNTER — ONCOLOGY VISIT (OUTPATIENT)
Dept: ONCOLOGY | Facility: CLINIC | Age: 49
End: 2024-02-13
Attending: PSYCHIATRY & NEUROLOGY
Payer: COMMERCIAL

## 2024-02-13 VITALS
BODY MASS INDEX: 20.56 KG/M2 | RESPIRATION RATE: 18 BRPM | SYSTOLIC BLOOD PRESSURE: 131 MMHG | TEMPERATURE: 97.8 F | DIASTOLIC BLOOD PRESSURE: 86 MMHG | OXYGEN SATURATION: 98 % | WEIGHT: 147.4 LBS | HEART RATE: 79 BPM

## 2024-02-13 DIAGNOSIS — T45.1X5A ANTINEOPLASTIC CHEMOTHERAPY INDUCED PANCYTOPENIA (H): ICD-10-CM

## 2024-02-13 DIAGNOSIS — T45.1X5A CHEMOTHERAPY-INDUCED NAUSEA AND VOMITING: ICD-10-CM

## 2024-02-13 DIAGNOSIS — C71.9 OLIGODENDROGLIOMA, WHO GRADE II (H): ICD-10-CM

## 2024-02-13 DIAGNOSIS — D61.810 ANTINEOPLASTIC CHEMOTHERAPY INDUCED PANCYTOPENIA (H): ICD-10-CM

## 2024-02-13 DIAGNOSIS — C71.9 OLIGODENDROGLIOMA, WHO GRADE II (H): Primary | ICD-10-CM

## 2024-02-13 DIAGNOSIS — R11.2 CHEMOTHERAPY-INDUCED NAUSEA AND VOMITING: ICD-10-CM

## 2024-02-13 LAB
ALBUMIN SERPL BCG-MCNC: 4.3 G/DL (ref 3.5–5.2)
ALP SERPL-CCNC: 69 U/L (ref 40–150)
ALT SERPL W P-5'-P-CCNC: 9 U/L (ref 0–70)
ANION GAP SERPL CALCULATED.3IONS-SCNC: 4 MMOL/L (ref 7–15)
AST SERPL W P-5'-P-CCNC: 12 U/L (ref 0–45)
BASOPHILS # BLD AUTO: 0 10E3/UL (ref 0–0.2)
BASOPHILS NFR BLD AUTO: 0 %
BILIRUB SERPL-MCNC: 0.4 MG/DL
BUN SERPL-MCNC: 13.5 MG/DL (ref 6–20)
CALCIUM SERPL-MCNC: 9.5 MG/DL (ref 8.6–10)
CHLORIDE SERPL-SCNC: 105 MMOL/L (ref 98–107)
CREAT SERPL-MCNC: 1.07 MG/DL (ref 0.67–1.17)
DEPRECATED HCO3 PLAS-SCNC: 32 MMOL/L (ref 22–29)
EGFRCR SERPLBLD CKD-EPI 2021: 87 ML/MIN/1.73M2
EOSINOPHIL # BLD AUTO: 0.1 10E3/UL (ref 0–0.7)
EOSINOPHIL NFR BLD AUTO: 1 %
ERYTHROCYTE [DISTWIDTH] IN BLOOD BY AUTOMATED COUNT: 12.4 % (ref 10–15)
GLUCOSE SERPL-MCNC: 81 MG/DL (ref 70–99)
HCT VFR BLD AUTO: 38.4 % (ref 40–53)
HGB BLD-MCNC: 12.5 G/DL (ref 13.3–17.7)
IMM GRANULOCYTES # BLD: 0 10E3/UL
IMM GRANULOCYTES NFR BLD: 0 %
LYMPHOCYTES # BLD AUTO: 1.5 10E3/UL (ref 0.8–5.3)
LYMPHOCYTES NFR BLD AUTO: 34 %
MCH RBC QN AUTO: 30.9 PG (ref 26.5–33)
MCHC RBC AUTO-ENTMCNC: 32.6 G/DL (ref 31.5–36.5)
MCV RBC AUTO: 95 FL (ref 78–100)
MONOCYTES # BLD AUTO: 0.3 10E3/UL (ref 0–1.3)
MONOCYTES NFR BLD AUTO: 6 %
NEUTROPHILS # BLD AUTO: 2.6 10E3/UL (ref 1.6–8.3)
NEUTROPHILS NFR BLD AUTO: 59 %
NRBC # BLD AUTO: 0 10E3/UL
NRBC BLD AUTO-RTO: 0 /100
PLATELET # BLD AUTO: 183 10E3/UL (ref 150–450)
POTASSIUM SERPL-SCNC: 4.5 MMOL/L (ref 3.4–5.3)
PROT SERPL-MCNC: 6.6 G/DL (ref 6.4–8.3)
RBC # BLD AUTO: 4.04 10E6/UL (ref 4.4–5.9)
SODIUM SERPL-SCNC: 141 MMOL/L (ref 135–145)
WBC # BLD AUTO: 4.5 10E3/UL (ref 4–11)

## 2024-02-13 PROCEDURE — 70553 MRI BRAIN STEM W/O & W/DYE: CPT

## 2024-02-13 PROCEDURE — 255N000002 HC RX 255 OP 636: Performed by: NURSE PRACTITIONER

## 2024-02-13 PROCEDURE — G0463 HOSPITAL OUTPT CLINIC VISIT: HCPCS | Performed by: PSYCHIATRY & NEUROLOGY

## 2024-02-13 PROCEDURE — 99215 OFFICE O/P EST HI 40 MIN: CPT | Performed by: PSYCHIATRY & NEUROLOGY

## 2024-02-13 PROCEDURE — 80053 COMPREHEN METABOLIC PANEL: CPT

## 2024-02-13 PROCEDURE — 85025 COMPLETE CBC W/AUTO DIFF WBC: CPT

## 2024-02-13 PROCEDURE — 36415 COLL VENOUS BLD VENIPUNCTURE: CPT

## 2024-02-13 PROCEDURE — A9585 GADOBUTROL INJECTION: HCPCS | Performed by: NURSE PRACTITIONER

## 2024-02-13 RX ORDER — GADOBUTROL 604.72 MG/ML
15 INJECTION INTRAVENOUS ONCE
Status: COMPLETED | OUTPATIENT
Start: 2024-02-13 | End: 2024-02-13

## 2024-02-13 RX ADMIN — GADOBUTROL 15 ML: 604.72 INJECTION INTRAVENOUS at 13:21

## 2024-02-13 ASSESSMENT — PAIN SCALES - GENERAL: PAINLEVEL: NO PAIN (0)

## 2024-02-13 NOTE — PROGRESS NOTES
"Oncology Rooming Note    February 13, 2024 2:17 PM   Lang Collins Jr. is a 48 year old male who presents for:    Chief Complaint   Patient presents with    Oncology Clinic Visit       Oligodendroglioma, WHO grade II (H        Initial Vitals: /86 (BP Location: Right arm, Patient Position: Sitting, Cuff Size: Adult Large)   Pulse 79   Temp 97.8  F (36.6  C) (Oral)   Resp 18   Wt 66.9 kg (147 lb 6.4 oz)   SpO2 98%   BMI 20.56 kg/m   Estimated body mass index is 20.56 kg/m  as calculated from the following:    Height as of 1/9/24: 1.803 m (5' 11\").    Weight as of this encounter: 66.9 kg (147 lb 6.4 oz). Body surface area is 1.83 meters squared.  No Pain (0) Comment: Data Unavailable   No LMP for male patient.  Allergies reviewed: Yes  Medications reviewed: Yes    Medications: Medication refills not needed today.  Pharmacy name entered into [email or web address]:    [id number] - [place], [place] - [address] AT [place], [place] - [address]    Frailty Screening:   Is the patient here for a new oncology consult visit in cancer care? 2. No      Clinical concerns: NO       Debra Lockett CMA              "

## 2024-02-13 NOTE — LETTER
"    2/13/2024         RE: Lang Collins Jr.  [address]  [place] [address]        Dear Colleague,    Thank you for referring your patient, Lang Collins Jr., to the [facility]. Please see a copy of my visit note below.    NEURO-ONCOLOGY VISIT  Feb 13, 2024    CHIEF COMPLAINT: Mr. Croft \"Mirza Collins Jr. 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     He was monitored on imaging surveillance until imaging in 12/2022 showed a subtle change concerning for cancer progression. As a result, initiation of cancer-directed therapy was recommended.     He completed radiation with concurrent temozolomide on 8/8/2023. Post-radiation imaging demonstrated no concerning findings with a subtle positive initial response to treatment. Joss was initiated on adjuvant-dosed temozolomide in 10/2023 and he remains on this treatment. Repeat imaging in 2/2024 showed overall stability.      oJss is presenting in follow-up today unaccompanied.     HISTORY OF PRESENT ILLNESS:  -Joss is looking forward to celebrating his birthday later this week. He plans to go to dinner with his daughters!   -He reports mild, lingering headaches, which he feels are triggered by not wearing his glasses.   -He reports worsening strength in the right leg; more issues in getting up off the toilet. Able to walk with good stability, but fatigues with standing.    Pain in the right foot. Denies cramping, increased muscle spasticity.   -Mood is good. Depression is heavy, but not motivated at times. Reduction in anxiety.   -He reports no activities concerning for seizures.  -His appetite is up and down.   Experiencing dental pain. Planning to have a tooth pulled.   -Overall, tolerating chemotherapy well.    Nausea is well controlled on supportive medications.   Mild issues with " constipation on current bowel regimen.    Did not tolerated higher dosing.    Noting ongoing fatigue with each cycle.       MEDICATIONS   Current Outpatient Medications   Medication Sig Dispense Refill     ondansetron (ZOFRAN) 4 MG tablet Take 1 tablet (4 mg) by mouth at bedtime Take 30-60 mins before each dose of temozolomide. 30 tablet 3     SENNA-docusate sodium (SENNA S) 8.6-50 MG tablet Take 1 tablet by mouth 2 times daily Can take up to 2 tablets twice daily if needed for constipation. 60 tablet 2     temozolomide (TEMODAR) 140 MG capsule Take 2 capsules (280 mg) by mouth daily for 5 doses Take ondansetron 30-60 min before temozolomide. Take at bedtime on an empty stomach. 10 capsule 0     temozolomide (TEMODAR) 140 MG capsule Take 2 capsules (280 mg) by mouth daily for 5 doses Take ondansetron 30-60 min before temozolomide. Take at bedtime on an empty stomach. 10 capsule 0     DRUG ALLERGIES No Known Allergies     IMMUNIZATIONS   Immunization History   Administered Date(s) Administered     COVID-19 MONOVALENT 12+ (Pfizer) 11/05/2021     TDAP (Adacel,Boostrix) 12/03/2021       ONCOLOGIC HISTORY  -8/8/2021 PRESENTATION: New onset seizure; generalized event. Started on Keppra.  -8/2021 MR brain imaging with a 2.1cm left frontal non-enhancing mass in the superior frontal gyrus in the expected region of the supplementary motor area.  -9/8/2021 MR brain imaging with no significant change in the nonenhancing T2 hyperintense mass.  -11/12/21 SURGERY: Left frontal-parietal craniotomy for mass resection.   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  Post-operative imaging with interval left frontal craniotomy for resection of abnormal lesion within the left parafalcine frontal lobe.  Hemiparesis, discharged to ARU.  -12/7/2021 NEURO-ONC: Recommending imaging surveillance given low risk factors and need to continue to focus on ongoing rehab. Referral to   [facility] for optimizing rehab. [facility] seizure risk management in relation to future employment. Trial of flexeril 10mg at bedtime for headaches.   -3/8/2022 NEURO-ONC/ MRB: Clinically well; improved from prior. Restarting Keppra; [facility] Imaging with post-surgical changes. Continue imaging surveillance.   -6/7/2022 NEURO-ONC/ MRB: Clinically well. Imaging with continued healing post-surgery. Continue imaging surveillance.   -12/6/2022 NEURO-ONC/ MRB: Clinically with no new/ progressive neurological symptoms. Imaging with a subtle increase in T2 FLAIR about the posterior aspect of the resection cavity. Continue imaging surveillance at a shortened interval of 3 months.   -2/28/2023 NEURO-ONC/ MRB: Clinically with no new/ progressive neurological symptoms. Imaging largely stable, but close interval imaging was recommended.   -5/30/2023 NEURO-ONC/ MRB: Worsened mood, concentration; starting Zoloft. Imaging with subtle progression noted when comparing to imaging 6 months ago; recommending chemoradiotherapy with referral to radiation oncology. Social work involvement. Primary care referral for management of hypertension.   -6/16/2023 NEURO-ONC: Started Zoloft, stopped r/t feeling jittery. Imaging with subtle progression noted when comparing to imaging 6 months ago; planned start date is 6/27 for chemoradiotherapy. Ongoing social work involvement.   -7/21/2023 NEURO-ONC/ CHEMORADS: Tolerating chemoradiation well.  Tolerating Zoloft with improved mood.  Continues to follow with psychology and social work.  -8/8/2023 CHEMORADS: Completed 5400 cGy in 30 fractions with concurrent temozolomide.   -10/17/2023 NEURO-ONC/ MRB/ CHEMO: Dizziness; low vitamin D. Anxiety; increase Zoloft. Imaging with no concerns; initial subtle positive treatment response. Starting adjuvant temozolomide 150mg/m2 (280mg), cycle 1.  -12/5/2023 NEURO-ONC/ CHEMO: Dizziness; low vitamin D. Anxiety; increase Zoloft.  "Imaging with no concerns; initial subtle positive treatment response. Continuing adjuvant temozolomide 200mg/m2 (360mg), cycle 2.  -1/10/2024 CHEMO: Continuing adjuvant temozolomide 150mg/m2 (180mg), cycle 3.  -10/17/2023 NEURO-ONC/ MRB/ CHEMO: Headaches, low mood at times, right left weakness. Imaging stable. Needing to have a tooth pulled; holding adjuvant temozolomide 150mg/m2 (280mg), cycle 4 until after dental procedure.      PHYSICAL EXAMINATION  /86 (BP Location: Right arm, Patient Position: Sitting, Cuff Size: Adult Large)   Pulse 79   Temp 97.8  F (36.6  C) (Oral)   Resp 18   Wt 66.9 kg (147 lb 6.4 oz)   SpO2 98%   BMI 20.56 kg/m     Wt Readings from Last 2 Encounters:   02/13/24 66.9 kg (147 lb 6.4 oz)   01/09/24 64.8 kg (142 lb 12.8 oz)      Ht Readings from Last 2 Encounters:   01/09/24 1.803 m (5' 11\")   12/05/23 1.803 m (5' 11\")     KPS: 100    -Generally well appearing.  -Respiratory: Speaking in full fluid sentences with no shortness of breath or wheezing noted.  -Psychiatric: Normal mood and affect. Pleasant, talkative.  -Neurologic:   MENTAL STATUS:     Recall: Intact.    Speech fluent.      CRANIAL NERVES:     Pupils are equal, round.    Symmetric facial movements.   Hearing intact.  GAIT: Steady, unassisted.       MEDICAL RECORDS  Personally reviewed; oncology and pharmacy notes.     LABS  Personally reviewed all available lab results; CBC (platelets 183, WBC 4.5) and CMP (AST/ ALT 12/9) from today.    IMAGING  Personally reviewed MR brain imaging from today and compared to post-radiation imaging.    1. Stable treatment related changes. No evidence for progressive neoplasm.  2. No evidence of superimposed acute intracranial finding.    Imaging was shown to and results were reviewed with Joss.     IMPRESSION  On date of service, 42 minutes was spent in clinic and 9 minutes was spent preparing for the visit through extensive chart review and coordinating care for this high complexity " visit. The following is in explanation for the recommendations used to define the plan.       Joss endorses that 150mg/m2 adjuvant dosing has remained well tolerated with limited side effects, mainly fatigue. As before, Joss notes mild headaches and ongoing right leg weakness. He denies any episodes concerning for a seizure. He states that his mental health is good and that he did not start Lexapro. I agree with recommendation to continue to follow with psychology and social work.     Joss's imaging from today showed no concerning findings. The plan is to repeat imaging per NCCN guidelines of every 4 months.    In the meantime, the plan is to continue adjuvant temozolomide at 150 mg/m2 dosing. The previously reviewed common side effects of temozolomide can still be anticipated and were discussed as including, but not limited to, fatigue, nausea, and constipation. Bone marrow suppression can result in leukopenia and thrombocytopenia. However, Joss is in need to have a tooth pulled. Will hold starting cycle 4 of adjuvant temozolomide until after this dental procedure. Team messaged with the plan.     PROBLEM LIST  Oligodendroglioma  Hemiparesis, right  Tension headache  Seizure  Fatigue  Hypertension  Depressed mood, anxiety, irritability    PLAN  -CANCER DIRECTED THERAPY-  Will continue adjuvant temozolomide and increase to the 150mg/m2 (180mg), cycle 4  (start date after dental procedure).   -Supportive medications; Zofran and bowel regimen.     -Repeat 28 day cycle if WBC >= 3, ANC >= 1.5, HgB >= 10, and platelets >= 100.  -Surveillance labs reviewed, at goal for chemotherapy.   -Will continue every 2 weeks CBC and CMP every 4 weeks.    -Repeat imaging in 4 months (6/2024).     -STEROIDS-  -Currently off dexamethasone.    -SEIZURE MANAGEMENT-  -Off Keppra.   -Following with Dr. Panda.      -Quality of life/ MOOD/ HEADACHE/ FATIGUE-  -Self-discontinued Zoloft due to intolerable side effects. Did not start Lexapro.  "  -Following with psychology and primary care.    -Social work continued involvement as financial and work stressors present.   -Vitamin D deficiency manifesting as fatigue and dizziness.    Recommend starting daily supplementation with vitamin D 200-400 international unit(s).    -HEMIPARESIS-  -Following with Dr. Chiang.     -HYPERTENSION-  -Primary care referral for management.    -TOBACCO DEPENDENCE-  -Previously encouraged continued abstinence from smoking.    Return to clinic in one month with TITA Crystal + labs.     Elva Park MD  Neuro-oncology      Oncology Rooming Note    February 13, 2024 2:17 PM   Lang Collins Jr. is a 48 year old male who presents for:    Chief Complaint   Patient presents with     Oncology Clinic Visit       Oligodendroglioma, WHO grade II (H        Initial Vitals: /86 (BP Location: Right arm, Patient Position: Sitting, Cuff Size: Adult Large)   Pulse 79   Temp 97.8  F (36.6  C) (Oral)   Resp 18   Wt 66.9 kg (147 lb 6.4 oz)   SpO2 98%   BMI 20.56 kg/m   Estimated body mass index is 20.56 kg/m  as calculated from the following:    Height as of 1/9/24: 1.803 m (5' 11\").    Weight as of this encounter: 66.9 kg (147 lb 6.4 oz). Body surface area is 1.83 meters squared.  No Pain (0) Comment: Data Unavailable   No LMP for male patient.  Allergies reviewed: Yes  Medications reviewed: Yes    Medications: Medication refills not needed today.  Pharmacy name entered into [employer]:    "[id number] - [place], [place] - [address] AT [place], [place] - [address]    Frailty Screening:   Is the patient here for a new oncology consult visit in cancer care? 2. No      Clinical concerns: NO       Debra Lockett CMA                Again, thank you for allowing me to participate in the care of your patient.        Sincerely,        Elva Park MD  "

## 2024-02-21 ENCOUNTER — PATIENT OUTREACH (OUTPATIENT)
Dept: ONCOLOGY | Facility: CLINIC | Age: 49
End: 2024-02-21
Payer: COMMERCIAL

## 2024-02-21 NOTE — PROGRESS NOTES
"[facility]: Cancer Care                                                                                          Dental procedure scheduled to be done today  \"As long as there is no continued bleeding or worsening pain come Friday 2/23, ok to start next cycle that evening or any night between 2/23 and 2/25. \"      Mylene Qureshi, RN, BSN  Specialty Care Coordinator  [facility]  [phone]    "

## 2024-02-23 ENCOUNTER — PATIENT OUTREACH (OUTPATIENT)
Dept: CARE COORDINATION | Facility: CLINIC | Age: 49
End: 2024-02-23
Payer: COMMERCIAL

## 2024-02-23 NOTE — PROGRESS NOTES
Social Work - Follow-Up  [facility]    Data/Intervention:  Patient Name: Lang Collins Jr. Goes By: Joss    /Age: 1975 (49 year old)    Reason for Follow-Up:  This clinician called to explore transportation need for upcoming appointments    Intervention/Education/Resources Provided:  Joss denied transportation need, reported that he has purchased a car with income from [employer].     Joss reports that he would like to return to work, and is wondering how to do this. Receptive to SW sending additional resources with information about the Ticket to Work Program.     Triage Cancer  Cancer and Careers  [employer] Ticket to Work Program    Assessment/Plan:  1) Onc SW will continue to be available as needed for ongoing psychosocial support.   2) Ongoing collaboration with multidisciplinary care team.     Previously provided patient/family with writer's contact information and availability.      Summer Felix, SHAYY, LICSW, OSW-C  Clinical - Adult Oncology  She/Her/Hers  Phone: [phone]  [facility]: M, Thu  *every other Tue, 8am-4:30pm  [facility]: W, F, *every other Tue, 8am-4:30pm

## 2024-03-01 ENCOUNTER — TRANSFERRED RECORDS (OUTPATIENT)
Dept: HEALTH INFORMATION MANAGEMENT | Facility: CLINIC | Age: 49
End: 2024-03-01
Payer: COMMERCIAL

## 2024-03-06 ENCOUNTER — MYC MEDICAL ADVICE (OUTPATIENT)
Dept: FAMILY MEDICINE | Facility: CLINIC | Age: 49
End: 2024-03-06
Payer: COMMERCIAL

## 2024-03-06 ENCOUNTER — TELEPHONE (OUTPATIENT)
Dept: FAMILY MEDICINE | Facility: CLINIC | Age: 49
End: 2024-03-06
Payer: COMMERCIAL

## 2024-03-06 NOTE — TELEPHONE ENCOUNTER
Patient Quality Outreach    Patient is due for the following:   Colon Cancer Screening  Physical Preventive Adult Physical      Topic Date Due    Pneumococcal Vaccine (1 of 2 - PCV) Never done    Hepatitis B Vaccine (1 of 3 - 19+ 3-dose series) Never done    COVID-19 Vaccine (2 - Pfizer risk series) 11/26/2021    Flu Vaccine (1) Never done       Next Steps:   Schedule a Adult Preventative    Type of outreach:    Sent gifted[employer]you message.      Questions for provider review:    None           Shreya Valenzuela MA

## 2024-03-08 ENCOUNTER — DOCUMENTATION ONLY (OUTPATIENT)
Dept: PHARMACY | Facility: CLINIC | Age: 49
End: 2024-03-08

## 2024-03-08 ENCOUNTER — LAB (OUTPATIENT)
Dept: INFUSION THERAPY | Facility: CLINIC | Age: 49
End: 2024-03-08
Attending: PSYCHIATRY & NEUROLOGY
Payer: COMMERCIAL

## 2024-03-08 DIAGNOSIS — T45.1X5A CHEMOTHERAPY-INDUCED NAUSEA AND VOMITING: ICD-10-CM

## 2024-03-08 DIAGNOSIS — D61.810 ANTINEOPLASTIC CHEMOTHERAPY INDUCED PANCYTOPENIA (H): ICD-10-CM

## 2024-03-08 DIAGNOSIS — R11.2 CHEMOTHERAPY-INDUCED NAUSEA AND VOMITING: ICD-10-CM

## 2024-03-08 DIAGNOSIS — T45.1X5A ANTINEOPLASTIC CHEMOTHERAPY INDUCED PANCYTOPENIA (H): ICD-10-CM

## 2024-03-08 LAB
ALBUMIN SERPL BCG-MCNC: 4.2 G/DL (ref 3.5–5.2)
ALP SERPL-CCNC: 76 U/L (ref 40–150)
ALT SERPL W P-5'-P-CCNC: 12 U/L (ref 0–70)
ANION GAP SERPL CALCULATED.3IONS-SCNC: 10 MMOL/L (ref 7–15)
AST SERPL W P-5'-P-CCNC: 19 U/L (ref 0–45)
BASOPHILS # BLD AUTO: 0 10E3/UL (ref 0–0.2)
BASOPHILS NFR BLD AUTO: 0 %
BILIRUB SERPL-MCNC: 0.4 MG/DL
BUN SERPL-MCNC: 12.8 MG/DL (ref 6–20)
CALCIUM SERPL-MCNC: 8.5 MG/DL (ref 8.6–10)
CHLORIDE SERPL-SCNC: 108 MMOL/L (ref 98–107)
CREAT SERPL-MCNC: 1.03 MG/DL (ref 0.67–1.17)
DEPRECATED HCO3 PLAS-SCNC: 25 MMOL/L (ref 22–29)
EGFRCR SERPLBLD CKD-EPI 2021: 90 ML/MIN/1.73M2
EOSINOPHIL # BLD AUTO: 0 10E3/UL (ref 0–0.7)
EOSINOPHIL NFR BLD AUTO: 1 %
ERYTHROCYTE [DISTWIDTH] IN BLOOD BY AUTOMATED COUNT: 12.5 % (ref 10–15)
GLUCOSE SERPL-MCNC: 93 MG/DL (ref 70–99)
HCT VFR BLD AUTO: 38.8 % (ref 40–53)
HGB BLD-MCNC: 12.3 G/DL (ref 13.3–17.7)
IMM GRANULOCYTES # BLD: 0 10E3/UL
IMM GRANULOCYTES NFR BLD: 0 %
LYMPHOCYTES # BLD AUTO: 1.3 10E3/UL (ref 0.8–5.3)
LYMPHOCYTES NFR BLD AUTO: 54 %
MCH RBC QN AUTO: 30.4 PG (ref 26.5–33)
MCHC RBC AUTO-ENTMCNC: 31.7 G/DL (ref 31.5–36.5)
MCV RBC AUTO: 96 FL (ref 78–100)
MONOCYTES # BLD AUTO: 0.2 10E3/UL (ref 0–1.3)
MONOCYTES NFR BLD AUTO: 8 %
NEUTROPHILS # BLD AUTO: 0.9 10E3/UL (ref 1.6–8.3)
NEUTROPHILS NFR BLD AUTO: 37 %
NRBC # BLD AUTO: 0 10E3/UL
NRBC BLD AUTO-RTO: 0 /100
PLATELET # BLD AUTO: 211 10E3/UL (ref 150–450)
POTASSIUM SERPL-SCNC: 4.5 MMOL/L (ref 3.4–5.3)
PROT SERPL-MCNC: 6.5 G/DL (ref 6.4–8.3)
RBC # BLD AUTO: 4.05 10E6/UL (ref 4.4–5.9)
SODIUM SERPL-SCNC: 143 MMOL/L (ref 135–145)
WBC # BLD AUTO: 2.4 10E3/UL (ref 4–11)

## 2024-03-08 PROCEDURE — 36415 COLL VENOUS BLD VENIPUNCTURE: CPT

## 2024-03-08 PROCEDURE — 84155 ASSAY OF PROTEIN SERUM: CPT | Performed by: PSYCHIATRY & NEUROLOGY

## 2024-03-08 PROCEDURE — 85004 AUTOMATED DIFF WBC COUNT: CPT | Performed by: PSYCHIATRY & NEUROLOGY

## 2024-03-08 NOTE — PROGRESS NOTES
Oral Chemotherapy Monitoring Program  Lab Follow Up    Reviewed lab results from 03/08/2024.        12/11/2023    10:00 AM 12/19/2023    12:00 PM 1/9/2024    10:00 AM 1/11/2024    11:00 AM 1/25/2024    12:00 PM 1/31/2024     3:00 PM 3/8/2024     1:00 PM   ORAL CHEMOTHERAPY   Assessment Type Monthly Follow up Monthly Follow up;Lab Monitoring;Left Voicemail Monthly Follow up;Lab Monitoring;Refill Monthly Follow up;Lab Monitoring;Refill Lab Monitoring Refill Lab Monitoring   Diagnosis Code     Other  Other   Other Oligodendroglioma, WHO grade II (H) (C71.9) Oligodendroglioma, WHO grade II (H) (C71.9) Oligodendroglioma, WHO grade II (H) (C71.9) Oligodendroglioma, 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 Xavier Park   Clinic Name/Location [facility] [place]   Drug Name Temodar (temozolomide) Temodar (temozolomide) Temodar (temozolomide) Temodar (temozolomide) Temodar (temozolomide) Temodar (temozolomide) Temodar (temozolomide)   Dose 280 mg 280 mg 280 mg 280 mg 280 mg 280 mg 280 mg   Current Schedule Daily Daily Daily Daily Daily Daily    Cycle Details 5 days on, 23 days off 5 days on, 23 days off 5 days on, 23 days off 5 days on, 23 days off 5 days on, 23 days off 5 days on, 23 days off 5 days on, 23 days off   Start Date of Last Cycle 12/13/2023 12/13/2023 1/11/2024 1/11/2024 1/11/2024 2/25/2024   Planned next cycle start date 1/10/2024 1/10/2024 2/9/2024 2/8/2024 2/13/2024  3/24/2024   Adverse Effects No AE identified during assessment No AE identified during assessment No AE identified during assessment No AE identified during assessment No AE identified during assessment  Neutropenia;Anemia   Anemia       Grade 1   Pharmacist Intervention(anemia)       No   Neutropenia       Grade 3    Pharmacist Intervention(neutropenia)       Yes   Intervention(s)       Increased lab monitoring   Any new drug interactions?   No No      Is the dose as ordered appropriate for the patient?   Yes Yes        Labs:  _  Result Component Current Result Ref Range   Sodium 143 (3/8/2024) 135 - 145 mmol/L     _  Result Component Current Result Ref Range   Potassium 4.5 (3/8/2024) 3.4 - 5.3 mmol/L     _  Result Component Current Result Ref Range   Calcium 8.5 (L) (3/8/2024) 8.6 - 10.0 mg/dL     No results found for Mag within last 30 days.     No results found for Phos within last 30 days.     _  Result Component Current Result Ref Range   Albumin 4.2 (3/8/2024) 3.5 - 5.2 g/dL     _  Result Component Current Result Ref Range   Urea Nitrogen 12.8 (3/8/2024) 6.0 - 20.0 mg/dL     _  Result Component Current Result Ref Range   Creatinine 1.03 (3/8/2024) 0.67 - 1.17 mg/dL     _  Result Component Current Result Ref Range   AST 19 (3/8/2024) 0 - 45 U/L     _  Result Component Current Result Ref Range   ALT 12 (3/8/2024) 0 - 70 U/L     _  Result Component Current Result Ref Range   Bilirubin Total 0.4 (3/8/2024) <=1.2 mg/dL     _  Result Component Current Result Ref Range   WBC Count 2.4 (L) (3/8/2024) 4.0 - 11.0 10e3/uL     _  Result Component Current Result Ref Range   Hemoglobin 12.3 (L) (3/8/2024) 13.3 - 17.7 g/dL     _  Result Component Current Result Ref Range   Platelet Count 211 (3/8/2024) 150 - 450 10e3/uL     No results found for ANC within last 30 days.     _  Result Component Current Result Ref Range   Absolute Neutrophils 0.9 (L) (3/8/2024) 1.6 - 8.3 10e3/uL        Assessment & Plan:  Results are concerning for grade 3 neutropenia, grade 2 leukopenia, and grade 1 anemia. While Joss's anemia has been consistent, the grade 2 leukopenia and grade 3 neutropenia is of concern. His fourth cycle began 2/25 so he already should have completed taking the 5 days of Temodar. Dr. Park requested a repeat CBC in about a week  (~3/15) to monitor his neutropenia and see if it improves.    From Dr. Park's note on 2/13/24: repeat 28 day cycle if WBC >= 3, ANC >= 1.5, HgB >= 10, and platelets >= 100. Once we get Joss's labs back and he is within parameters, we can move forward with the next steps of therapy.    Follow-Up:  Repeat labs in 1 week (~3/15)   Has upcoming appointment with Cami + labs on 4/5     Thank you,  Sonja Garcia, Pharmacy Intern   [facility]

## 2024-03-08 NOTE — PROGRESS NOTES
Medical Assistant Note:  Lang Collins Jr. presents today for blood draw.    Patient seen by provider today: No.   present during visit today: Not Applicable.    Concerns: No Concerns.    Procedure:  Lab draw site: rac, Needle type: bf, Gauge: 23.    Post Assessment:  Labs drawn without difficulty: Yes.    Discharge Plan:  Departure Mode: Ambulatory.    Face to Face Time: 5 min.    Eleanor Mcpherson, CMA

## 2024-03-09 ENCOUNTER — HEALTH MAINTENANCE LETTER (OUTPATIENT)
Age: 49
End: 2024-03-09

## 2024-03-09 ASSESSMENT — PATIENT HEALTH QUESTIONNAIRE - PHQ9
10. IF YOU CHECKED OFF ANY PROBLEMS, HOW DIFFICULT HAVE THESE PROBLEMS MADE IT FOR YOU TO DO YOUR WORK, TAKE CARE OF THINGS AT HOME, OR GET ALONG WITH OTHER PEOPLE: VERY DIFFICULT
SUM OF ALL RESPONSES TO PHQ QUESTIONS 1-9: 12
SUM OF ALL RESPONSES TO PHQ QUESTIONS 1-9: 12

## 2024-03-15 ENCOUNTER — DOCUMENTATION ONLY (OUTPATIENT)
Dept: ONCOLOGY | Facility: CLINIC | Age: 49
End: 2024-03-15

## 2024-03-15 ENCOUNTER — LAB (OUTPATIENT)
Dept: INFUSION THERAPY | Facility: CLINIC | Age: 49
End: 2024-03-15
Attending: PSYCHIATRY & NEUROLOGY
Payer: COMMERCIAL

## 2024-03-15 DIAGNOSIS — T45.1X5A CHEMOTHERAPY-INDUCED NAUSEA AND VOMITING: ICD-10-CM

## 2024-03-15 DIAGNOSIS — R11.2 CHEMOTHERAPY-INDUCED NAUSEA AND VOMITING: ICD-10-CM

## 2024-03-15 DIAGNOSIS — D61.810 ANTINEOPLASTIC CHEMOTHERAPY INDUCED PANCYTOPENIA (H): ICD-10-CM

## 2024-03-15 DIAGNOSIS — T45.1X5A ANTINEOPLASTIC CHEMOTHERAPY INDUCED PANCYTOPENIA (H): ICD-10-CM

## 2024-03-15 LAB
ALBUMIN SERPL BCG-MCNC: 4.9 G/DL (ref 3.5–5.2)
ALP SERPL-CCNC: 83 U/L (ref 40–150)
ALT SERPL W P-5'-P-CCNC: 10 U/L (ref 0–70)
ANION GAP SERPL CALCULATED.3IONS-SCNC: 10 MMOL/L (ref 7–15)
AST SERPL W P-5'-P-CCNC: 17 U/L (ref 0–45)
BASOPHILS # BLD AUTO: 0 10E3/UL (ref 0–0.2)
BASOPHILS NFR BLD AUTO: 0 %
BILIRUB SERPL-MCNC: 0.6 MG/DL
BUN SERPL-MCNC: 11 MG/DL (ref 6–20)
CALCIUM SERPL-MCNC: 9.6 MG/DL (ref 8.6–10)
CHLORIDE SERPL-SCNC: 104 MMOL/L (ref 98–107)
CREAT SERPL-MCNC: 0.97 MG/DL (ref 0.67–1.17)
DEPRECATED HCO3 PLAS-SCNC: 28 MMOL/L (ref 22–29)
EGFRCR SERPLBLD CKD-EPI 2021: >90 ML/MIN/1.73M2
EOSINOPHIL # BLD AUTO: 0 10E3/UL (ref 0–0.7)
EOSINOPHIL NFR BLD AUTO: 1 %
ERYTHROCYTE [DISTWIDTH] IN BLOOD BY AUTOMATED COUNT: 12 % (ref 10–15)
GLUCOSE SERPL-MCNC: 101 MG/DL (ref 70–99)
HCT VFR BLD AUTO: 41.4 % (ref 40–53)
HGB BLD-MCNC: 13.4 G/DL (ref 13.3–17.7)
IMM GRANULOCYTES # BLD: 0 10E3/UL
IMM GRANULOCYTES NFR BLD: 1 %
LYMPHOCYTES # BLD AUTO: 1.5 10E3/UL (ref 0.8–5.3)
LYMPHOCYTES NFR BLD AUTO: 38 %
MCH RBC QN AUTO: 30.8 PG (ref 26.5–33)
MCHC RBC AUTO-ENTMCNC: 32.4 G/DL (ref 31.5–36.5)
MCV RBC AUTO: 95 FL (ref 78–100)
MONOCYTES # BLD AUTO: 0.2 10E3/UL (ref 0–1.3)
MONOCYTES NFR BLD AUTO: 4 %
NEUTROPHILS # BLD AUTO: 2.2 10E3/UL (ref 1.6–8.3)
NEUTROPHILS NFR BLD AUTO: 56 %
NRBC # BLD AUTO: 0 10E3/UL
NRBC BLD AUTO-RTO: 0 /100
PLATELET # BLD AUTO: 258 10E3/UL (ref 150–450)
POTASSIUM SERPL-SCNC: 4.3 MMOL/L (ref 3.4–5.3)
PROT SERPL-MCNC: 7.5 G/DL (ref 6.4–8.3)
RBC # BLD AUTO: 4.35 10E6/UL (ref 4.4–5.9)
SODIUM SERPL-SCNC: 142 MMOL/L (ref 135–145)
WBC # BLD AUTO: 3.9 10E3/UL (ref 4–11)

## 2024-03-15 PROCEDURE — 80053 COMPREHEN METABOLIC PANEL: CPT | Performed by: PSYCHIATRY & NEUROLOGY

## 2024-03-15 PROCEDURE — 36415 COLL VENOUS BLD VENIPUNCTURE: CPT

## 2024-03-15 PROCEDURE — 85004 AUTOMATED DIFF WBC COUNT: CPT | Performed by: PSYCHIATRY & NEUROLOGY

## 2024-03-15 NOTE — PROGRESS NOTES
Oral Chemotherapy Monitoring Program  Lab Follow Up     Patient currently on adj Temozolomide (TMZ) treatment. C4 started 2/25/24. Mid cycle repeat labs today 3/15.   Reviewed lab results from today.     Lab Results   Component Value Date    WBC 3.9 03/15/2024     Lab Results   Component Value Date    RBC 4.35 03/15/2024     Lab Results   Component Value Date    HGB 13.4 03/15/2024     Lab Results   Component Value Date    HCT 41.4 03/15/2024     Lab Results   Component Value Date    MCV 95 03/15/2024     Lab Results   Component Value Date    MCH 30.8 03/15/2024     Lab Results   Component Value Date    MCHC 32.4 03/15/2024     Lab Results   Component Value Date    RDW 12.0 03/15/2024     Lab Results   Component Value Date     03/15/2024       Last Comprehensive Metabolic Panel:  Sodium   Date Value Ref Range Status   03/15/2024 142 135 - 145 mmol/L Final     Comment:     Reference intervals for this test were updated on 09/26/2023 to more accurately reflect our healthy population. There may be differences in the flagging of prior results with similar values performed with this method. Interpretation of those prior results can be made in the context of the updated reference intervals.      Potassium   Date Value Ref Range Status   03/15/2024 4.3 3.4 - 5.3 mmol/L Final   11/17/2021 4.0 3.4 - 5.3 mmol/L Final     Chloride   Date Value Ref Range Status   03/15/2024 104 98 - 107 mmol/L Final   11/17/2021 106 94 - 109 mmol/L Final     Carbon Dioxide (CO2)   Date Value Ref Range Status   03/15/2024 28 22 - 29 mmol/L Final   11/17/2021 29 20 - 32 mmol/L Final     Anion Gap   Date Value Ref Range Status   03/15/2024 10 7 - 15 mmol/L Final   11/17/2021 1 (L) 3 - 14 mmol/L Final     Glucose   Date Value Ref Range Status   03/15/2024 101 (H) 70 - 99 mg/dL Final   11/17/2021 97 70 - 99 mg/dL Final     Urea Nitrogen   Date Value Ref Range Status   03/15/2024 11.0 6.0 - 20.0 mg/dL Final   11/17/2021 20 7 - 30 mg/dL Final      Creatinine   Date Value Ref Range Status   03/15/2024 0.97 0.67 - 1.17 mg/dL Final     GFR Estimate   Date Value Ref Range Status   03/15/2024 >90 >60 mL/min/1.73m2 Final     Calcium   Date Value Ref Range Status   03/15/2024 9.6 8.6 - 10.0 mg/dL Final     Bilirubin Total   Date Value Ref Range Status   03/15/2024 0.6 <=1.2 mg/dL Final     Alkaline Phosphatase   Date Value Ref Range Status   03/15/2024 83 40 - 150 U/L Final     Comment:     Reference intervals for this test were updated on 11/14/2023 to more accurately reflect our healthy population. There may be differences in the flagging of prior results with similar values performed with this method. Interpretation of those prior results can be made in the context of the updated reference intervals.     ALT   Date Value Ref Range Status   03/15/2024 10 0 - 70 U/L Final     Comment:     Reference intervals for this test were updated on 6/12/2023 to more accurately reflect our healthy population. There may be differences in the flagging of prior results with similar values performed with this method. Interpretation of those prior results can be made in the context of the updated reference intervals.       AST   Date Value Ref Range Status   03/15/2024 17 0 - 45 U/L Final     Comment:     Reference intervals for this test were updated on 6/12/2023 to more accurately reflect our healthy population. There may be differences in the flagging of prior results with similar values performed with this method. Interpretation of those prior results can be made in the context of the updated reference intervals.     Assessment & Plan:  CMP and CBC reviewed, no new concerns. Labs tending back up. ANC WNL. Patient took cycle 4 adj TMZ on 2/25/24. Labs on 3/8, ANC = 0.9 so repeated labs again today 3/15. Next planned adj TMZ cycle 5 for 3/26 d/t provider/lab appt. Continue adj TMZ treatment as planned. Continue lab monitoring CMP every 4 weeks w/ each cycle and CBC every 2  weeks (Repeat 28 day cycle if WBC >= 3, ANC >= 1.5, HgB >= 10, and platelets >= 100).      Follow-Up:  3/26: provider appt & lab & C5 TMZ (RNCC working to move 4/5 appt to 3/26).     Xavier BaconD  [facility]  [phone]

## 2024-03-19 DIAGNOSIS — C71.9 OLIGODENDROGLIOMA, WHO GRADE II (H): Primary | ICD-10-CM

## 2024-03-19 RX ORDER — TEMOZOLOMIDE 140 MG/1
280 CAPSULE ORAL DAILY
Qty: 10 CAPSULE | Refills: 0 | Status: SHIPPED | OUTPATIENT
Start: 2024-03-26 | End: 2024-06-27

## 2024-03-22 ENCOUNTER — TELEPHONE (OUTPATIENT)
Dept: ONCOLOGY | Facility: CLINIC | Age: 49
End: 2024-03-22

## 2024-03-22 NOTE — TELEPHONE ENCOUNTER
Professional statement of need forms received via [employer] from [facility].      Forms sent to [facility]     Fax #:  [phone]    Ellie Dorado

## 2024-03-29 ENCOUNTER — ONCOLOGY VISIT (OUTPATIENT)
Dept: ONCOLOGY | Facility: CLINIC | Age: 49
End: 2024-03-29
Attending: PSYCHIATRY & NEUROLOGY
Payer: COMMERCIAL

## 2024-03-29 ENCOUNTER — LAB (OUTPATIENT)
Dept: INFUSION THERAPY | Facility: CLINIC | Age: 49
End: 2024-03-29
Attending: PSYCHIATRY & NEUROLOGY
Payer: COMMERCIAL

## 2024-03-29 VITALS
HEART RATE: 67 BPM | SYSTOLIC BLOOD PRESSURE: 168 MMHG | BODY MASS INDEX: 20.31 KG/M2 | RESPIRATION RATE: 16 BRPM | WEIGHT: 145.6 LBS | OXYGEN SATURATION: 99 % | DIASTOLIC BLOOD PRESSURE: 92 MMHG

## 2024-03-29 DIAGNOSIS — C71.9 OLIGODENDROGLIOMA, WHO GRADE II (H): ICD-10-CM

## 2024-03-29 DIAGNOSIS — D61.810 ANTINEOPLASTIC CHEMOTHERAPY INDUCED PANCYTOPENIA (H): ICD-10-CM

## 2024-03-29 DIAGNOSIS — R11.2 CHEMOTHERAPY-INDUCED NAUSEA AND VOMITING: ICD-10-CM

## 2024-03-29 DIAGNOSIS — T45.1X5A CHEMOTHERAPY-INDUCED NAUSEA AND VOMITING: ICD-10-CM

## 2024-03-29 DIAGNOSIS — T45.1X5A ANTINEOPLASTIC CHEMOTHERAPY INDUCED PANCYTOPENIA (H): ICD-10-CM

## 2024-03-29 LAB
ALBUMIN SERPL BCG-MCNC: 4.4 G/DL (ref 3.5–5.2)
ALP SERPL-CCNC: 84 U/L (ref 40–150)
ALT SERPL W P-5'-P-CCNC: 14 U/L (ref 0–70)
ANION GAP SERPL CALCULATED.3IONS-SCNC: 10 MMOL/L (ref 7–15)
AST SERPL W P-5'-P-CCNC: 21 U/L (ref 0–45)
BASOPHILS # BLD AUTO: 0 10E3/UL (ref 0–0.2)
BASOPHILS NFR BLD AUTO: 0 %
BILIRUB SERPL-MCNC: 0.8 MG/DL
BUN SERPL-MCNC: 16.7 MG/DL (ref 6–20)
CALCIUM SERPL-MCNC: 9.4 MG/DL (ref 8.6–10)
CHLORIDE SERPL-SCNC: 109 MMOL/L (ref 98–107)
CREAT SERPL-MCNC: 0.95 MG/DL (ref 0.67–1.17)
DEPRECATED HCO3 PLAS-SCNC: 26 MMOL/L (ref 22–29)
EGFRCR SERPLBLD CKD-EPI 2021: >90 ML/MIN/1.73M2
EOSINOPHIL # BLD AUTO: 0 10E3/UL (ref 0–0.7)
EOSINOPHIL NFR BLD AUTO: 1 %
ERYTHROCYTE [DISTWIDTH] IN BLOOD BY AUTOMATED COUNT: 12.3 % (ref 10–15)
GLUCOSE SERPL-MCNC: 103 MG/DL (ref 70–99)
HCT VFR BLD AUTO: 38.9 % (ref 40–53)
HGB BLD-MCNC: 12.6 G/DL (ref 13.3–17.7)
IMM GRANULOCYTES # BLD: 0 10E3/UL
IMM GRANULOCYTES NFR BLD: 0 %
LYMPHOCYTES # BLD AUTO: 1.8 10E3/UL (ref 0.8–5.3)
LYMPHOCYTES NFR BLD AUTO: 47 %
MCH RBC QN AUTO: 30.7 PG (ref 26.5–33)
MCHC RBC AUTO-ENTMCNC: 32.4 G/DL (ref 31.5–36.5)
MCV RBC AUTO: 95 FL (ref 78–100)
MONOCYTES # BLD AUTO: 0.2 10E3/UL (ref 0–1.3)
MONOCYTES NFR BLD AUTO: 6 %
NEUTROPHILS # BLD AUTO: 1.8 10E3/UL (ref 1.6–8.3)
NEUTROPHILS NFR BLD AUTO: 46 %
NRBC # BLD AUTO: 0 10E3/UL
NRBC BLD AUTO-RTO: 0 /100
PLATELET # BLD AUTO: 194 10E3/UL (ref 150–450)
POTASSIUM SERPL-SCNC: 4.5 MMOL/L (ref 3.4–5.3)
PROT SERPL-MCNC: 6.9 G/DL (ref 6.4–8.3)
RBC # BLD AUTO: 4.1 10E6/UL (ref 4.4–5.9)
SODIUM SERPL-SCNC: 145 MMOL/L (ref 135–145)
WBC # BLD AUTO: 3.9 10E3/UL (ref 4–11)

## 2024-03-29 PROCEDURE — 85025 COMPLETE CBC W/AUTO DIFF WBC: CPT | Performed by: PSYCHIATRY & NEUROLOGY

## 2024-03-29 PROCEDURE — 99214 OFFICE O/P EST MOD 30 MIN: CPT | Performed by: NURSE PRACTITIONER

## 2024-03-29 PROCEDURE — 80053 COMPREHEN METABOLIC PANEL: CPT | Performed by: PSYCHIATRY & NEUROLOGY

## 2024-03-29 PROCEDURE — 36415 COLL VENOUS BLD VENIPUNCTURE: CPT

## 2024-03-29 PROCEDURE — G0463 HOSPITAL OUTPT CLINIC VISIT: HCPCS | Performed by: NURSE PRACTITIONER

## 2024-03-29 PROCEDURE — G2211 COMPLEX E/M VISIT ADD ON: HCPCS | Performed by: NURSE PRACTITIONER

## 2024-03-29 RX ORDER — SENNA AND DOCUSATE SODIUM 50; 8.6 MG/1; MG/1
1 TABLET, FILM COATED ORAL 2 TIMES DAILY
Qty: 60 TABLET | Refills: 2 | Status: SHIPPED | OUTPATIENT
Start: 2024-03-29 | End: 2024-06-28

## 2024-03-29 RX ORDER — ONDANSETRON 4 MG/1
4 TABLET, FILM COATED ORAL AT BEDTIME
Qty: 30 TABLET | Refills: 3 | Status: SHIPPED | OUTPATIENT
Start: 2024-03-29 | End: 2024-04-30

## 2024-03-29 ASSESSMENT — PAIN SCALES - GENERAL: PAINLEVEL: MODERATE PAIN (5)

## 2024-03-29 NOTE — PROGRESS NOTES
"NEURO-ONCOLOGY VISIT  Mar 29, 2024    CHIEF COMPLAINT: Mr. 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     He was monitored on imaging surveillance until imaging in 12/2022 showed a subtle change concerning for cancer progression. As a result, initiation of cancer-directed therapy was recommended.     He completed radiation with concurrent temozolomide on 8/8/2023. Post-radiation imaging demonstrated no concerning findings with a subtle positive initial response to treatment. Joss was initiated on adjuvant-dosed temozolomide in 10/2023 and he remains on this treatment. Repeat imaging in 2/2024 showed overall stability.      Joss is presenting in follow-up today accompanied by Eden PITTMAN).     HISTORY OF PRESENT ILLNESS:  -He does report that this last cycle cause more fatigue than previous cycles.  -He did also have an upper respiratory infection in this last month.  -He otherwise tolerated his last cycle of temozolomide well.  He denies any issues with nausea or constipation.  -His appetite is good.  -He is trying to quit smoking.  -His mood is good overall, but he does acknowledge he is feeling a bit more anxious/agitated at times.  -He continues to have right leg weakness, which is stable.  -He denies any new or worsening headaches or any activities concerning for seizure.    MEDICATIONS   Current Outpatient Medications   Medication Sig Dispense Refill    ondansetron (ZOFRAN) 4 MG tablet Take 1 tablet (4 mg) by mouth at bedtime Take 30-60 mins before each dose of temozolomide. 30 tablet 3    SENNA-docusate sodium (SENNA S) 8.6-50 MG tablet Take 1 tablet by mouth 2 times daily Can take up to 2 tablets twice daily if needed for constipation. 60 tablet 2    temozolomide (TEMODAR) 140 MG capsule Take 2 capsules (280 mg) by mouth daily for 5 doses Take " ondansetron 30-60 min before temozolomide. Take at bedtime on an empty stomach. 10 capsule 0     DRUG ALLERGIES No Known Allergies     IMMUNIZATIONS   Immunization History   Administered Date(s) Administered    COVID-19 MONOVALENT 12+ (Pfizer) 11/05/2021    TDAP (Adacel,Boostrix) 12/03/2021       ONCOLOGIC HISTORY  -8/8/2021 PRESENTATION: New onset seizure; generalized event. Started on Keppra.  -8/2021 MR brain imaging with a 2.1cm left frontal non-enhancing mass in the superior frontal gyrus in the expected region of the supplementary motor area.  -9/8/2021 MR brain imaging with no significant change in the nonenhancing T2 hyperintense mass.  -11/12/21 SURGERY: Left frontal-parietal craniotomy for mass resection.   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  Post-operative imaging with interval left frontal craniotomy for resection of abnormal lesion within the left parafalcine frontal lobe.  Hemiparesis, discharged to ARU.  -12/7/2021 NEURO-ONC: Recommending imaging surveillance given low risk factors and need to continue to focus on ongoing rehab. Referral to Dr. Chiang for optimizing rehab. Referral to [facility]/ epilepsy for seizure risk management in relation to future employment. Trial of flexeril 10mg at bedtime for headaches.   -3/8/2022 NEURO-ONC/ MRB: Clinically well; improved from prior. Restarting Keppra; second referral to [facility]. Imaging with post-surgical changes. Continue imaging surveillance.   -6/7/2022 NEURO-ONC/ MRB: Clinically well. Imaging with continued healing post-surgery. Continue imaging surveillance.   -12/6/2022 NEURO-ONC/ MRB: Clinically with no new/ progressive neurological symptoms. Imaging with a subtle increase in T2 FLAIR about the posterior aspect of the resection cavity. Continue imaging surveillance at a shortened interval of 3 months.   -2/28/2023 NEURO-ONC/ MRB: Clinically with no new/ progressive neurological  symptoms. Imaging largely stable, but close interval imaging was recommended.   -5/30/2023 NEURO-ONC/ MRB: Worsened mood, concentration; starting Zoloft. Imaging with subtle progression noted when comparing to imaging 6 months ago; recommending chemoradiotherapy with referral to radiation oncology. Social work involvement. Primary care referral for management of hypertension.   -6/16/2023 NEURO-ONC: Started Zoloft, stopped r/t feeling jittery. Imaging with subtle progression noted when comparing to imaging 6 months ago; planned start date is 6/27 for chemoradiotherapy. Ongoing social work involvement.   -7/21/2023 NEURO-ONC/ CHEMORADS: Tolerating chemoradiation well.  Tolerating Zoloft with improved mood.  Continues to follow with psychology and social work.  -8/8/2023 CHEMORADS: Completed 5400 cGy in 30 fractions with concurrent temozolomide.   -10/17/2023 NEURO-ONC/ MRB/ CHEMO: Dizziness; low vitamin D. Anxiety; increase Zoloft. Imaging with no concerns; initial subtle positive treatment response. Starting adjuvant temozolomide 150mg/m2 (280mg), cycle 1.  -12/5/2023 NEURO-ONC/ CHEMO: Dizziness; low vitamin D. Anxiety; increase Zoloft. Imaging with no concerns; initial subtle positive treatment response. Continuing adjuvant temozolomide 200mg/m2 (360mg), cycle 2.  -1/10/2024 CHEMO: Continuing adjuvant temozolomide 150mg/m2 (180mg), cycle 3.  -2/13/2023 NEURO-ONC/ MRB/ CHEMO: Headaches, low mood at times, right left weakness. Imaging stable. Needing to have a tooth pulled; holding adjuvant temozolomide 150mg/m2 (280mg), cycle 4 until after dental procedure.  -3/26/2023 NEURO-ONC/ CHEMO: Continue adjuvant temozolomide 150mg/m2 (280mg), cycle 5.      PHYSICAL EXAMINATION  BP (!) 168/92   Pulse 67   Resp 16   Wt 66 kg (145 lb 9.6 oz)   SpO2 99%   BMI 20.31 kg/m     Wt Readings from Last 2 Encounters:   03/29/24 66 kg (145 lb 9.6 oz)   02/13/24 66.9 kg (147 lb 6.4 oz)      Ht Readings from Last 2 Encounters:  "  01/09/24 1.803 m (5' 11\")   12/05/23 1.803 m (5' 11\")     KPS: 100    -Generally well appearing.  -Respiratory: Speaking in full fluid sentences with no shortness of breath or wheezing noted.  -Psychiatric: Normal mood and affect. Pleasant, talkative.  -Neurologic:   MENTAL STATUS:     Recall: Intact.    Speech fluent.      CRANIAL NERVES:     Pupils are equal, round.    Symmetric facial movements.   Hearing intact.  GAIT: Steady, unassisted.       MEDICAL RECORDS  Personally reviewed; oncology and pharmacy notes.     LABS  Personally reviewed all available lab results; CBC (platelets 194, WBC 3.9 with ANC 1.8) and CMP (AST/ ALT 21/14) from today.    IMAGING  No new imaging to review today.    IMPRESSION  Joss reports that the 150mg/m2 adjuvant dosing has remained well tolerated with limited side effects, mainly fatigue. As before, Joss notes mild headaches and ongoing right leg weakness. He denies any episodes concerning for a seizure.  We do review that his ANC did drop to 0.9 after his last cycle, so I have asked him to monitor for signs of infection, and call if he develops a temperature greater than 100.4.    Per prior notes, his imaging will be scheduled per NCCN guidelines every 4 months.    In the meantime, the plan is to continue adjuvant temozolomide at 150 mg/m2 dosing. The previously reviewed common side effects of temozolomide can still be anticipated and were discussed as including, but not limited to, fatigue, nausea, and constipation. Bone marrow suppression can result in leukopenia and thrombocytopenia.  His labs today are adequate to start treatment.  Message sent to team.    PROBLEM LIST  Oligodendroglioma  Hemiparesis, right  Tension headache  Seizure  Fatigue  Hypertension  Depressed mood, anxiety, irritability    PLAN  -CANCER DIRECTED THERAPY-  Will continue adjuvant temozolomide and increase to the 150mg/m2 (180mg), cycle 5.  -Supportive medications; Zofran and bowel regimen.     -Repeat 28 " day cycle if WBC >= 3, ANC >= 1.5, HgB >= 10, and platelets >= 100.  -Surveillance labs reviewed, at goal for chemotherapy.   -Will continue every 2 weeks CBC and CMP every 4 weeks.    -Repeat imaging in 3 months (6/2024).     -STEROIDS-  -Currently off dexamethasone.    -SEIZURE MANAGEMENT-  -Off Keppra.   -Following with Dr. Panda.      -Quality of life/ MOOD/ HEADACHE/ FATIGUE-  -Self-discontinued Zoloft due to intolerable side effects. Did not start Lexapro.   -Following with psychology and primary care.    -Social work continued involvement as financial and work stressors present.   -Vitamin D deficiency manifesting as fatigue and dizziness.    Recommend starting daily supplementation with vitamin D 200-400 international unit(s).    -HEMIPARESIS-  -Following with Dr. Chiang.     -HYPERTENSION-  -Primary care referral for management.    -TOBACCO DEPENDENCE-  -Previously encouraged continued abstinence from smoking.  He states today that he continues to be abstinent and is congratulated on this.    Return to clinic in 4 weeks to see me with labs, and then in 8 weeks to see Dr. Park with labs and MRI.    Cami Redding [facility]   [facility] [place]   [phone]    38 minutes spent on the date of the encounter doing chart review, review of test results, interpretation of tests, patient visit, and documentation. The longitudinal plan of care for the diagnosis(es)/condition(s) as documented were addressed during this visit. Due to the added complexity in care, I will continue to support Joss in the subsequent management and with ongoing continuity of care.

## 2024-03-29 NOTE — LETTER
"    3/29/2024         RE: Lang Collins Jr.  [address]  [facility] [address]        Dear Colleague,    Thank you for referring your patient, Lang Collins Jr., to the [facility]. Please see a copy of my visit note below.    NEURO-ONCOLOGY VISIT  Mar 29, 2024    CHIEF COMPLAINT: Mr. 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     He was monitored on imaging surveillance until imaging in 12/2022 showed a subtle change concerning for cancer progression. As a result, initiation of cancer-directed therapy was recommended.     He completed radiation with concurrent temozolomide on 8/8/2023. Post-radiation imaging demonstrated no concerning findings with a subtle positive initial response to treatment. Joss was initiated on adjuvant-dosed temozolomide in 10/2023 and he remains on this treatment. Repeat imaging in 2/2024 showed overall stability.      Joss is presenting in follow-up today accompanied by Eden PITTMAN).     HISTORY OF PRESENT ILLNESS:  -He does report that this last cycle cause more fatigue than previous cycles.  -He did also have an upper respiratory infection in this last month.  -He otherwise tolerated his last cycle of temozolomide well.  He denies any issues with nausea or constipation.  -His appetite is good.  -He is trying to quit smoking.  -His mood is good overall, but he does acknowledge he is feeling a bit more anxious/agitated at times.  -He continues to have right leg weakness, which is stable.  -He denies any new or worsening headaches or any activities concerning for seizure.    MEDICATIONS   Current Outpatient Medications   Medication Sig Dispense Refill     ondansetron (ZOFRAN) 4 MG tablet Take 1 tablet (4 mg) by mouth at bedtime Take 30-60 mins before each dose of temozolomide. 30 tablet 3 "     SENNA-docusate sodium (SENNA S) 8.6-50 MG tablet Take 1 tablet by mouth 2 times daily Can take up to 2 tablets twice daily if needed for constipation. 60 tablet 2     temozolomide (TEMODAR) 140 MG capsule Take 2 capsules (280 mg) by mouth daily for 5 doses Take ondansetron 30-60 min before temozolomide. Take at bedtime on an empty stomach. 10 capsule 0     DRUG ALLERGIES No Known Allergies     IMMUNIZATIONS   Immunization History   Administered Date(s) Administered     COVID-19 MONOVALENT 12+ (Pfizer) 11/05/2021     TDAP (Adacel,Boostrix) 12/03/2021       ONCOLOGIC HISTORY  -8/8/2021 PRESENTATION: New onset seizure; generalized event. Started on Keppra.  -8/2021 MR brain imaging with a 2.1cm left frontal non-enhancing mass in the superior frontal gyrus in the expected region of the supplementary motor area.  -9/8/2021 MR brain imaging with no significant change in the nonenhancing T2 hyperintense mass.  -11/12/21 SURGERY: Left frontal-parietal craniotomy for mass resection.   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  Post-operative imaging with interval left frontal craniotomy for resection of abnormal lesion within the left parafalcine frontal lobe.  Hemiparesis, discharged to ARU.  -12/7/2021 NEURO-ONC: Recommending imaging surveillance given low risk factors and need to continue to focus on ongoing rehab. Referral to Dr. Chiang for optimizing rehab. [facility] for seizure risk management in relation to future employment. Trial of flexeril 10mg at bedtime for headaches.   -3/8/2022 NEURO-ONC/ MRB: Clinically well; improved from prior. Restarting Keppra; second referral to [facility]. Imaging with post-surgical changes. Continue imaging surveillance.   -6/7/2022 NEURO-ONC/ MRB: Clinically well. Imaging with continued healing post-surgery. Continue imaging surveillance.   -12/6/2022 NEURO-ONC/ MRB: Clinically with no new/ progressive  neurological symptoms. Imaging with a subtle increase in T2 FLAIR about the posterior aspect of the resection cavity. Continue imaging surveillance at a shortened interval of 3 months.   -2/28/2023 NEURO-ONC/ MRB: Clinically with no new/ progressive neurological symptoms. Imaging largely stable, but close interval imaging was recommended.   -5/30/2023 NEURO-ONC/ MRB: Worsened mood, concentration; starting Zoloft. Imaging with subtle progression noted when comparing to imaging 6 months ago; recommending chemoradiotherapy with referral to radiation oncology. Social work involvement. Primary care referral for management of hypertension.   -6/16/2023 NEURO-ONC: Started Zoloft, stopped r/t feeling jittery. Imaging with subtle progression noted when comparing to imaging 6 months ago; planned start date is 6/27 for chemoradiotherapy. Ongoing social work involvement.   -7/21/2023 NEURO-ONC/ CHEMORADS: Tolerating chemoradiation well.  Tolerating Zoloft with improved mood.  Continues to follow with psychology and social work.  -8/8/2023 CHEMORADS: Completed 5400 cGy in 30 fractions with concurrent temozolomide.   -10/17/2023 NEURO-ONC/ MRB/ CHEMO: Dizziness; low vitamin D. Anxiety; increase Zoloft. Imaging with no concerns; initial subtle positive treatment response. Starting adjuvant temozolomide 150mg/m2 (280mg), cycle 1.  -12/5/2023 NEURO-ONC/ CHEMO: Dizziness; low vitamin D. Anxiety; increase Zoloft. Imaging with no concerns; initial subtle positive treatment response. Continuing adjuvant temozolomide 200mg/m2 (360mg), cycle 2.  -1/10/2024 CHEMO: Continuing adjuvant temozolomide 150mg/m2 (180mg), cycle 3.  -2/13/2023 NEURO-ONC/ MRB/ CHEMO: Headaches, low mood at times, right left weakness. Imaging stable. Needing to have a tooth pulled; holding adjuvant temozolomide 150mg/m2 (280mg), cycle 4 until after dental procedure.  -3/26/2023 NEURO-ONC/ CHEMO: Continue adjuvant temozolomide 150mg/m2 (280mg), cycle 5.      PHYSICAL  "EXAMINATION  BP (!) 168/92   Pulse 67   Resp 16   Wt 66 kg (145 lb 9.6 oz)   SpO2 99%   BMI 20.31 kg/m     Wt Readings from Last 2 Encounters:   03/29/24 66 kg (145 lb 9.6 oz)   02/13/24 66.9 kg (147 lb 6.4 oz)      Ht Readings from Last 2 Encounters:   01/09/24 1.803 m (5' 11\")   12/05/23 1.803 m (5' 11\")     KPS: 100    -Generally well appearing.  -Respiratory: Speaking in full fluid sentences with no shortness of breath or wheezing noted.  -Psychiatric: Normal mood and affect. Pleasant, talkative.  -Neurologic:   MENTAL STATUS:     Recall: Intact.    Speech fluent.      CRANIAL NERVES:     Pupils are equal, round.    Symmetric facial movements.   Hearing intact.  GAIT: Steady, unassisted.       MEDICAL RECORDS  Personally reviewed; oncology and pharmacy notes.     LABS  Personally reviewed all available lab results; CBC (platelets 194, WBC 3.9 with ANC 1.8) and CMP (AST/ ALT 21/14) from today.    IMAGING  No new imaging to review today.    IMPRESSION  Joss reports that the 150mg/m2 adjuvant dosing has remained well tolerated with limited side effects, mainly fatigue. As before, Joss notes mild headaches and ongoing right leg weakness. He denies any episodes concerning for a seizure.  We do review that his ANC did drop to 0.9 after his last cycle, so I have asked him to monitor for signs of infection, and call if he develops a temperature greater than 100.4.    Per prior notes, his imaging will be scheduled per NCCN guidelines every 4 months.    In the meantime, the plan is to continue adjuvant temozolomide at 150 mg/m2 dosing. The previously reviewed common side effects of temozolomide can still be anticipated and were discussed as including, but not limited to, fatigue, nausea, and constipation. Bone marrow suppression can result in leukopenia and thrombocytopenia.  His labs today are adequate to start treatment.  Message sent to team.    PROBLEM LIST  Oligodendroglioma  Hemiparesis, right  Tension " headache  Seizure  Fatigue  Hypertension  Depressed mood, anxiety, irritability    PLAN  -CANCER DIRECTED THERAPY-  Will continue adjuvant temozolomide and increase to the 150mg/m2 (180mg), cycle 5.  -Supportive medications; Zofran and bowel regimen.     -Repeat 28 day cycle if WBC >= 3, ANC >= 1.5, HgB >= 10, and platelets >= 100.  -Surveillance labs reviewed, at goal for chemotherapy.   -Will continue every 2 weeks CBC and CMP every 4 weeks.    -Repeat imaging in 3 months (6/2024).     -STEROIDS-  -Currently off dexamethasone.    -SEIZURE MANAGEMENT-  -Off Keppra.   -Following with Dr. Panda.      -Quality of life/ MOOD/ HEADACHE/ FATIGUE-  -Self-discontinued Zoloft due to intolerable side effects. Did not start Lexapro.   -Following with psychology and primary care.    -Social work continued involvement as financial and work stressors present.   -Vitamin D deficiency manifesting as fatigue and dizziness.    Recommend starting daily supplementation with vitamin D 200-400 international unit(s).    -HEMIPARESIS-  -Following with Dr. Chiang.     -HYPERTENSION-  -Primary care referral for management.    -TOBACCO DEPENDENCE-  -Previously encouraged continued abstinence from smoking.  He states today that he continues to be abstinent and is congratulated on this.    Return to clinic in 4 weeks to see me with labs, and then in 8 weeks to see Dr. Park with labs and MRI.    [facility]   [phone]    38 minutes spent on the date of the encounter doing chart review, review of test results, interpretation of tests, patient visit, and documentation. The longitudinal plan of care for the diagnosis(es)/condition(s) as documented were addressed during this visit. Due to the added complexity in care, I will continue to support Joss in the subsequent management and with ongoing continuity of care.        Oncology Rooming Note    March 29, 2024 12:57 PM   Lang Collins Jr. is  "a 49 year old male who presents for:    Chief Complaint   Patient presents with     Oncology Clinic Visit     Initial Vitals: BP (!) 168/92   Pulse 67   Resp 16   Wt 66 kg (145 lb 9.6 oz)   SpO2 99%   BMI 20.31 kg/m   Estimated body mass index is 20.31 kg/m  as calculated from the following:    Height as of 1/9/24: 1.803 m (5' 11\").    Weight as of this encounter: 66 kg (145 lb 9.6 oz). Body surface area is 1.82 meters squared.  Moderate Pain (5) Comment: Data Unavailable   No LMP for male patient.  Allergies reviewed: Yes  Medications reviewed: Yes    Medications: Medication refills not needed today.  Pharmacy name entered into [employer]:    [id number] - [place], [place] - [address] AT [place], [place] - [address]  [facility] - [place], [place] - [address]    Frailty Screening:   Is the patient here for a new oncology consult visit in cancer care? 2. No      Clinical concerns:   PA was notified.      Eleanor Mcpherson CMA                Again, thank you for allowing me to participate in the care of your patient.        Sincerely,        TITA Crystal CNP  "

## 2024-03-29 NOTE — PROGRESS NOTES
"Oncology Rooming Note    March 29, 2024 12:57 PM   Lang Collins Jr. is a 49 year old male who presents for:    Chief Complaint   Patient presents with    Oncology Clinic Visit     Initial Vitals: BP (!) 168/92   Pulse 67   Resp 16   Wt 66 kg (145 lb 9.6 oz)   SpO2 99%   BMI 20.31 kg/m   Estimated body mass index is 20.31 kg/m  as calculated from the following:    Height as of 1/9/24: 1.803 m (5' 11\").    Weight as of this encounter: 66 kg (145 lb 9.6 oz). Body surface area is 1.82 meters squared.  Moderate Pain (5) Comment: Data Unavailable   No LMP for male patient.  Allergies reviewed: Yes  Medications reviewed: Yes    Medications: Medication refills not needed today.  Pharmacy name entered into [employer]:    [id number] - [place], [place] - [address] AT [place], [place] - [address]  [facility] - [place], [place] - [address]    Frailty Screening:   Is the patient here for a new oncology consult visit in cancer care? 2. No      Clinical concerns:   PA was notified.      Eleanor Mcpherson CMA              "

## 2024-04-09 ENCOUNTER — PATIENT OUTREACH (OUTPATIENT)
Dept: CARE COORDINATION | Facility: CLINIC | Age: 49
End: 2024-04-09
Payer: COMMERCIAL

## 2024-04-09 NOTE — PROGRESS NOTES
Social Work - Follow-Up  [facility]    Data/Intervention:    Patient Name: Lang Collins Jr. Goes By: Joss    /Age: 1975 (49 year old)    Reason for Follow-Up:  SARAH returning voicemail from Joss     Intervention/Education/Resources Provided:  Joss reports that mother of his children is considering relocation to [place]. Family with resource questions for this clinician. SW provided contact information.     Assessment/Plan:  Previously provided patient/family with writer's contact information and availability.      Summer Felix, SHAYY, LICSW, OSW-C  Clinical - Adult Oncology  She/Her/Hers  Phone: [phone]  [facility]: M, Thu  *every other Tue, 8am-4:30pm  [facility]: W, F, *every other Tue, 8am-4:30pm

## 2024-04-12 ENCOUNTER — DOCUMENTATION ONLY (OUTPATIENT)
Dept: PHARMACY | Facility: CLINIC | Age: 49
End: 2024-04-12

## 2024-04-12 ENCOUNTER — PATIENT OUTREACH (OUTPATIENT)
Dept: CARE COORDINATION | Facility: CLINIC | Age: 49
End: 2024-04-12

## 2024-04-12 ENCOUNTER — LAB (OUTPATIENT)
Dept: INFUSION THERAPY | Facility: CLINIC | Age: 49
End: 2024-04-12
Attending: PSYCHIATRY & NEUROLOGY

## 2024-04-12 DIAGNOSIS — D61.810 ANTINEOPLASTIC CHEMOTHERAPY INDUCED PANCYTOPENIA (H): ICD-10-CM

## 2024-04-12 DIAGNOSIS — T45.1X5A ANTINEOPLASTIC CHEMOTHERAPY INDUCED PANCYTOPENIA (H): ICD-10-CM

## 2024-04-12 DIAGNOSIS — R11.2 CHEMOTHERAPY-INDUCED NAUSEA AND VOMITING: ICD-10-CM

## 2024-04-12 DIAGNOSIS — T45.1X5A CHEMOTHERAPY-INDUCED NAUSEA AND VOMITING: ICD-10-CM

## 2024-04-12 LAB
ALBUMIN SERPL BCG-MCNC: 4.4 G/DL (ref 3.5–5.2)
ALP SERPL-CCNC: 79 U/L (ref 40–150)
ALT SERPL W P-5'-P-CCNC: 12 U/L (ref 0–70)
ANION GAP SERPL CALCULATED.3IONS-SCNC: 7 MMOL/L (ref 7–15)
AST SERPL W P-5'-P-CCNC: 20 U/L (ref 0–45)
BASOPHILS # BLD AUTO: 0 10E3/UL (ref 0–0.2)
BASOPHILS NFR BLD AUTO: 0 %
BILIRUB SERPL-MCNC: 0.6 MG/DL
BUN SERPL-MCNC: 15.6 MG/DL (ref 6–20)
CALCIUM SERPL-MCNC: 9 MG/DL (ref 8.6–10)
CHLORIDE SERPL-SCNC: 106 MMOL/L (ref 98–107)
CREAT SERPL-MCNC: 1.04 MG/DL (ref 0.67–1.17)
DEPRECATED HCO3 PLAS-SCNC: 29 MMOL/L (ref 22–29)
EGFRCR SERPLBLD CKD-EPI 2021: 89 ML/MIN/1.73M2
EOSINOPHIL # BLD AUTO: 0 10E3/UL (ref 0–0.7)
EOSINOPHIL NFR BLD AUTO: 1 %
ERYTHROCYTE [DISTWIDTH] IN BLOOD BY AUTOMATED COUNT: 12.6 % (ref 10–15)
GLUCOSE SERPL-MCNC: 110 MG/DL (ref 70–99)
HCT VFR BLD AUTO: 38.3 % (ref 40–53)
HGB BLD-MCNC: 12.4 G/DL (ref 13.3–17.7)
IMM GRANULOCYTES # BLD: 0 10E3/UL
IMM GRANULOCYTES NFR BLD: 0 %
LYMPHOCYTES # BLD AUTO: 1.5 10E3/UL (ref 0.8–5.3)
LYMPHOCYTES NFR BLD AUTO: 45 %
MCH RBC QN AUTO: 30.3 PG (ref 26.5–33)
MCHC RBC AUTO-ENTMCNC: 32.4 G/DL (ref 31.5–36.5)
MCV RBC AUTO: 94 FL (ref 78–100)
MONOCYTES # BLD AUTO: 0.2 10E3/UL (ref 0–1.3)
MONOCYTES NFR BLD AUTO: 6 %
NEUTROPHILS # BLD AUTO: 1.6 10E3/UL (ref 1.6–8.3)
NEUTROPHILS NFR BLD AUTO: 48 %
NRBC # BLD AUTO: 0 10E3/UL
NRBC BLD AUTO-RTO: 0 /100
PLATELET # BLD AUTO: 197 10E3/UL (ref 150–450)
POTASSIUM SERPL-SCNC: 4.8 MMOL/L (ref 3.4–5.3)
PROT SERPL-MCNC: 6.9 G/DL (ref 6.4–8.3)
RBC # BLD AUTO: 4.09 10E6/UL (ref 4.4–5.9)
SODIUM SERPL-SCNC: 142 MMOL/L (ref 135–145)
WBC # BLD AUTO: 3.3 10E3/UL (ref 4–11)

## 2024-04-12 PROCEDURE — 80053 COMPREHEN METABOLIC PANEL: CPT | Performed by: PSYCHIATRY & NEUROLOGY

## 2024-04-12 PROCEDURE — 36415 COLL VENOUS BLD VENIPUNCTURE: CPT

## 2024-04-12 PROCEDURE — 85025 COMPLETE CBC W/AUTO DIFF WBC: CPT | Performed by: PSYCHIATRY & NEUROLOGY

## 2024-04-12 NOTE — PROGRESS NOTES
Oral Chemotherapy Monitoring Program  Lab Follow Up    Reviewed lab results from 4/12/24.        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 Xavier Park   Clinic Name/Location [facility]   Drug Name Temodar (temozolomide) Temodar (temozolomide) Temodar (temozolomide) Temodar (temozolomide) Temodar (temozolomide) Temodar (temozolomide) Temodar (temozolomide)   Dose 280 mg 280 mg 280 mg 280 mg 280 mg 280 mg 280 mg   Current Schedule Daily Daily Daily       Cycle Details 5 days on, 23 days off 5 days on, 23 days off 5 days on, 23 days off 5 days on, 23 days off 5 days on, 23 days off 5 days on, 23 days off 5 days on, 23 days off   Start Date of Last Cycle 1/11/2024 1/11/2024  2/25/2024 2/25/2024  3/29/2024   Planned next cycle start date 2/8/2024 2/13/2024  3/24/2024 3/26/2024 3/26/2024 4/26/2024   Adverse Effects No AE identified during assessment No AE identified during assessment  Neutropenia;Anemia      Anemia    Grade 1      Pharmacist Intervention(anemia)    No      Neutropenia    Grade 3      Pharmacist Intervention(neutropenia)    Yes      Intervention(s)    Increased lab monitoring      Any new drug interactions? No    No     Is the dose as ordered appropriate for the patient?  Yes    Yes Yes        Labs:  _  Result Component Current Result Ref Range   Sodium 142 (4/12/2024) 135 - 145 mmol/L     _  Result Component Current Result Ref Range   Potassium 4.8 (4/12/2024) 3.4 - 5.3 mmol/L     _  Result Component Current Result Ref Range   Calcium 9.0 (4/12/2024) 8.6 - 10.0 mg/dL     No results found for Mag within last 30 days.     No results found for Phos within last 30 days.     _  Result Component Current Result Ref Range   Albumin 4.4 (4/12/2024) 3.5 - 5.2 g/dL     _  Result Component Current Result Ref Range   Urea Nitrogen 15.6 (4/12/2024) 6.0 - 20.0 mg/dL     _  Result Component Current Result Ref Range   Creatinine 1.04 (4/12/2024) 0.67 - 1.17 mg/dL     _  Result Component Current Result Ref Range   AST 20 (4/12/2024) 0 - 45 U/L     _  Result Component Current Result Ref Range   ALT 12 (4/12/2024) 0 - 70 U/L     _  Result Component Current Result Ref Range   Bilirubin Total 0.6 (4/12/2024) <=1.2 mg/dL     _  Result Component Current Result Ref Range   WBC Count 3.3 (L) (4/12/2024) 4.0 - 11.0 10e3/uL     _  Result Component Current Result Ref Range   Hemoglobin 12.4 (L) (4/12/2024) 13.3 - 17.7 g/dL     _  Result Component Current Result Ref Range   Platelet Count 197 (4/12/2024) 150 - 450 10e3/uL     No results found for ANC within last 30 days.     _  Result Component Current Result Ref Range   Absolute Neutrophils 1.6 (4/12/2024) 1.6 - 8.3 10e3/uL        Assessment & Plan:  No concerning abnormalities. Repeat labs and see provider in 2 weeks for consideration of C6.    Questions answered to patient's satisfaction.    Follow-Up:  4/30/24    Vandana Vines PharmD  April 12, 2024

## 2024-04-12 NOTE — PROGRESS NOTES
Social Work - Follow-Up  [facility]    Data/Intervention:    Patient Name: Lang Collins Jr. Goes By: Joss    /Age: 1975 (49 year old)    Reason for Follow-Up:  This clinician returning voicemail from Joss    Intervention/Education/Resources Provided:  Joss reports that he relocated to a hotel 1 month ago, daughters are staying with cousin. Joss reports that he is working with a new  through his CADI waiver to locate housing option.     Joss calls to ask that address is updated to the following in chart:   [address] [place] [id number]    Assessment/Plan:  Previously provided patient/family with writer's contact information and availability.    Summer Felix, MSW, LICSW, OSW-C  Clinical - Adult Oncology  She/Her/Hers  Phone: [phone]  [facility]: M, Thu  *every other Tue, 8am-4:30pm  [facility]: W, F, *every other Tue, 8am-4:30pm

## 2024-04-17 ENCOUNTER — TELEPHONE (OUTPATIENT)
Dept: PHARMACY | Facility: CLINIC | Age: 49
End: 2024-04-17
Payer: COMMERCIAL

## 2024-04-30 ENCOUNTER — ONCOLOGY VISIT (OUTPATIENT)
Dept: ONCOLOGY | Facility: CLINIC | Age: 49
End: 2024-04-30
Attending: NURSE PRACTITIONER
Payer: COMMERCIAL

## 2024-04-30 ENCOUNTER — LAB (OUTPATIENT)
Dept: INFUSION THERAPY | Facility: CLINIC | Age: 49
End: 2024-04-30
Attending: NURSE PRACTITIONER
Payer: COMMERCIAL

## 2024-04-30 VITALS
RESPIRATION RATE: 16 BRPM | HEART RATE: 67 BPM | SYSTOLIC BLOOD PRESSURE: 153 MMHG | BODY MASS INDEX: 21 KG/M2 | DIASTOLIC BLOOD PRESSURE: 97 MMHG | OXYGEN SATURATION: 99 % | WEIGHT: 150.6 LBS | TEMPERATURE: 98.8 F

## 2024-04-30 DIAGNOSIS — D61.810 ANTINEOPLASTIC CHEMOTHERAPY INDUCED PANCYTOPENIA (H): ICD-10-CM

## 2024-04-30 DIAGNOSIS — T45.1X5A ANTINEOPLASTIC CHEMOTHERAPY INDUCED PANCYTOPENIA (H): ICD-10-CM

## 2024-04-30 DIAGNOSIS — C71.9 OLIGODENDROGLIOMA, WHO GRADE II (H): Primary | ICD-10-CM

## 2024-04-30 DIAGNOSIS — R11.2 CHEMOTHERAPY-INDUCED NAUSEA AND VOMITING: ICD-10-CM

## 2024-04-30 DIAGNOSIS — T45.1X5A CHEMOTHERAPY-INDUCED NAUSEA AND VOMITING: ICD-10-CM

## 2024-04-30 LAB
ALBUMIN SERPL BCG-MCNC: 4.2 G/DL (ref 3.5–5.2)
ALP SERPL-CCNC: 68 U/L (ref 40–150)
ALT SERPL W P-5'-P-CCNC: 10 U/L (ref 0–70)
ANION GAP SERPL CALCULATED.3IONS-SCNC: 9 MMOL/L (ref 7–15)
AST SERPL W P-5'-P-CCNC: 20 U/L (ref 0–45)
BASOPHILS # BLD AUTO: 0 10E3/UL (ref 0–0.2)
BASOPHILS NFR BLD AUTO: 0 %
BILIRUB SERPL-MCNC: 0.4 MG/DL
BUN SERPL-MCNC: 14.3 MG/DL (ref 6–20)
CALCIUM SERPL-MCNC: 9.2 MG/DL (ref 8.6–10)
CHLORIDE SERPL-SCNC: 108 MMOL/L (ref 98–107)
CREAT SERPL-MCNC: 1.12 MG/DL (ref 0.67–1.17)
DEPRECATED HCO3 PLAS-SCNC: 28 MMOL/L (ref 22–29)
EGFRCR SERPLBLD CKD-EPI 2021: 82 ML/MIN/1.73M2
EOSINOPHIL # BLD AUTO: 0.1 10E3/UL (ref 0–0.7)
EOSINOPHIL NFR BLD AUTO: 1 %
ERYTHROCYTE [DISTWIDTH] IN BLOOD BY AUTOMATED COUNT: 12.4 % (ref 10–15)
GLUCOSE SERPL-MCNC: 75 MG/DL (ref 70–99)
HCT VFR BLD AUTO: 39.3 % (ref 40–53)
HGB BLD-MCNC: 12.7 G/DL (ref 13.3–17.7)
IMM GRANULOCYTES # BLD: 0 10E3/UL
IMM GRANULOCYTES NFR BLD: 0 %
LYMPHOCYTES # BLD AUTO: 1.7 10E3/UL (ref 0.8–5.3)
LYMPHOCYTES NFR BLD AUTO: 43 %
MCH RBC QN AUTO: 30.7 PG (ref 26.5–33)
MCHC RBC AUTO-ENTMCNC: 32.3 G/DL (ref 31.5–36.5)
MCV RBC AUTO: 95 FL (ref 78–100)
MONOCYTES # BLD AUTO: 0.3 10E3/UL (ref 0–1.3)
MONOCYTES NFR BLD AUTO: 7 %
NEUTROPHILS # BLD AUTO: 1.9 10E3/UL (ref 1.6–8.3)
NEUTROPHILS NFR BLD AUTO: 49 %
NRBC # BLD AUTO: 0 10E3/UL
NRBC BLD AUTO-RTO: 0 /100
PLATELET # BLD AUTO: 191 10E3/UL (ref 150–450)
POTASSIUM SERPL-SCNC: 4.6 MMOL/L (ref 3.4–5.3)
PROT SERPL-MCNC: 6.5 G/DL (ref 6.4–8.3)
RBC # BLD AUTO: 4.14 10E6/UL (ref 4.4–5.9)
SODIUM SERPL-SCNC: 145 MMOL/L (ref 135–145)
WBC # BLD AUTO: 3.9 10E3/UL (ref 4–11)

## 2024-04-30 PROCEDURE — 85025 COMPLETE CBC W/AUTO DIFF WBC: CPT | Performed by: PSYCHIATRY & NEUROLOGY

## 2024-04-30 PROCEDURE — 99214 OFFICE O/P EST MOD 30 MIN: CPT | Performed by: NURSE PRACTITIONER

## 2024-04-30 PROCEDURE — 36415 COLL VENOUS BLD VENIPUNCTURE: CPT

## 2024-04-30 PROCEDURE — G0463 HOSPITAL OUTPT CLINIC VISIT: HCPCS | Performed by: NURSE PRACTITIONER

## 2024-04-30 PROCEDURE — 80053 COMPREHEN METABOLIC PANEL: CPT | Performed by: PSYCHIATRY & NEUROLOGY

## 2024-04-30 RX ORDER — TEMOZOLOMIDE 140 MG/1
280 CAPSULE ORAL DAILY
Qty: 10 CAPSULE | Refills: 0 | Status: SHIPPED | OUTPATIENT
Start: 2024-04-30 | End: 2024-06-27

## 2024-04-30 RX ORDER — ONDANSETRON 4 MG/1
4 TABLET, FILM COATED ORAL AT BEDTIME
Qty: 30 TABLET | Refills: 3 | Status: SHIPPED | OUTPATIENT
Start: 2024-04-30 | End: 2024-06-28

## 2024-04-30 ASSESSMENT — PAIN SCALES - GENERAL: PAINLEVEL: NO PAIN (0)

## 2024-04-30 NOTE — NURSING NOTE
"Oncology Rooming Note    April 30, 2024 3:16 PM   Lang Collins Jr. is a 49 year old male who presents for:    Chief Complaint   Patient presents with    Oncology Clinic Visit     Initial Vitals: BP (!) 153/97   Pulse 67   Temp 98.8  F (37.1  C) (Oral)   Resp 16   Wt 68.3 kg (150 lb 9.6 oz)   SpO2 99%   BMI 21.00 kg/m   Estimated body mass index is 21 kg/m  as calculated from the following:    Height as of 1/9/24: 1.803 m (5' 11\").    Weight as of this encounter: 68.3 kg (150 lb 9.6 oz). Body surface area is 1.85 meters squared.  No Pain (0) Comment: Data Unavailable   No LMP for male patient.  Allergies reviewed: Yes  Medications reviewed: Yes    Medications: Medication refills not needed today.  Pharmacy name entered into [employer]:    [id number] - [place], [place] - [address] [place], [place] - [address]  [facility] - [place], [place] - [address]   [id number] - [place], [place] - [address] AT [facility] [facility]    Frailty Screening:   Is the patient here for a new oncology consult visit in cancer care? 2. No      Clinical concerns: follow up        Yaneth Gonzalez            " Tazorac Counseling:  Patient advised that medication is irritating and drying.  Patient may need to apply sparingly and wash off after an hour before eventually leaving it on overnight.  The patient verbalized understanding of the proper use and possible adverse effects of tazorac.  All of the patient's questions and concerns were addressed.

## 2024-04-30 NOTE — PROGRESS NOTES
"Brain mri NEURO-ONCOLOGY VISIT  Apr 30, 2024    CHIEF COMPLAINT: Mr. 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     He was monitored on imaging surveillance until imaging in 12/2022 showed a subtle change concerning for cancer progression. As a result, initiation of cancer-directed therapy was recommended.     He completed radiation with concurrent temozolomide on 8/8/2023. Post-radiation imaging demonstrated no concerning findings with a subtle positive initial response to treatment. Joss was initiated on adjuvant-dosed temozolomide in 10/2023 and he remains on this treatment. Repeat imaging in 2/2024 showed overall stability.      Joss is presenting in follow-up today unaccompanied.    HISTORY OF PRESENT ILLNESS:  -He is seen today unaccompanied for evaluation and toxicity check prior to cycle 6 adjuvant temozolomide.  -No issues with nausea or constipation with his last cycle.  -His fatigue was notable but not too bad; not as bad as cycle 4.    -No headaches, dizziness, or vision changes.  -No seizures or neurologic changes  -Has been feeling a bit depressed/anxious, but reports that when he starts to feel anxious, he stops what he is doing and takes some deep breaths and feels that he is managing this well overall.    -His appetite is good.  -He continues to have right leg weakness, which is stable.    MEDICATIONS   Current Outpatient Medications   Medication Sig Dispense Refill    ondansetron (ZOFRAN) 4 MG tablet Take 1 tablet (4 mg) by mouth at bedtime Take 30-60 mins before each dose of temozolomide. 30 tablet 3    SENNA-docusate sodium (SENNA S) 8.6-50 MG tablet Take 1 tablet by mouth 2 times daily Can take up to 2 tablets twice daily if needed for constipation. 60 tablet 2    temozolomide (TEMODAR) 140 MG capsule Take 2 capsules (280 mg) by mouth " daily for 5 doses Take ondansetron 30-60 min before temozolomide. Take at bedtime on an empty stomach. 10 capsule 0     DRUG ALLERGIES No Known Allergies     IMMUNIZATIONS   Immunization History   Administered Date(s) Administered    COVID-19 MONOVALENT 12+ (Pfizer) 11/05/2021    TDAP (Adacel,Boostrix) 12/03/2021       ONCOLOGIC HISTORY  -8/8/2021 PRESENTATION: New onset seizure; generalized event. Started on Keppra.  -8/2021 MR brain imaging with a 2.1cm left frontal non-enhancing mass in the superior frontal gyrus in the expected region of the supplementary motor area.  -9/8/2021 MR brain imaging with no significant change in the nonenhancing T2 hyperintense mass.  -11/12/21 SURGERY: Left frontal-parietal craniotomy for mass resection.   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  Post-operative imaging with interval left frontal craniotomy for resection of abnormal lesion within the left parafalcine frontal lobe.  Hemiparesis, discharged to ARU.  -12/7/2021 NEURO-ONC: Recommending imaging surveillance given low risk factors and need to continue to focus on ongoing rehab. Referral to Dr. Chiang for optimizing rehab. Referral to [facility]/ epilepsy for seizure risk management in relation to future employment. Trial of flexeril 10mg at bedtime for headaches.   -3/8/2022 NEURO-ONC/ MRB: Clinically well; improved from prior. Restarting Keppra; second referral to [facility]. Imaging with post-surgical changes. Continue imaging surveillance.   -6/7/2022 NEURO-ONC/ MRB: Clinically well. Imaging with continued healing post-surgery. Continue imaging surveillance.   -12/6/2022 NEURO-ONC/ MRB: Clinically with no new/ progressive neurological symptoms. Imaging with a subtle increase in T2 FLAIR about the posterior aspect of the resection cavity. Continue imaging surveillance at a shortened interval of 3 months.   -2/28/2023 NEURO-ONC/ MRB: Clinically with no new/  progressive neurological symptoms. Imaging largely stable, but close interval imaging was recommended.   -5/30/2023 NEURO-ONC/ MRB: Worsened mood, concentration; starting Zoloft. Imaging with subtle progression noted when comparing to imaging 6 months ago; recommending chemoradiotherapy with referral to radiation oncology. Social work involvement. Primary care referral for management of hypertension.   -6/16/2023 NEURO-ONC: Started Zoloft, stopped r/t feeling jittery. Imaging with subtle progression noted when comparing to imaging 6 months ago; planned start date is 6/27 for chemoradiotherapy. Ongoing social work involvement.   -7/21/2023 NEURO-ONC/ CHEMORADS: Tolerating chemoradiation well.  Tolerating Zoloft with improved mood.  Continues to follow with psychology and social work.  -8/8/2023 CHEMORADS: Completed 5400 cGy in 30 fractions with concurrent temozolomide.   -10/17/2023 NEURO-ONC/ MRB/ CHEMO: Dizziness; low vitamin D. Anxiety; increase Zoloft. Imaging with no concerns; initial subtle positive treatment response. Starting adjuvant temozolomide 150mg/m2 (280mg), cycle 1.  -12/5/2023 NEURO-ONC/ CHEMO: Dizziness; low vitamin D. Anxiety; increase Zoloft. Imaging with no concerns; initial subtle positive treatment response. Continuing adjuvant temozolomide 200mg/m2 (360mg), cycle 2.  -1/10/2024 CHEMO: Continuing adjuvant temozolomide 150mg/m2 (180mg), cycle 3.  -2/13/2023 NEURO-ONC/ MRB/ CHEMO: Headaches, low mood at times, right left weakness. Imaging stable. Needing to have a tooth pulled; holding adjuvant temozolomide 150mg/m2 (280mg), cycle 4 until after dental procedure.  -3/26/2023 NEURO-ONC/ CHEMO: Continue adjuvant temozolomide 150mg/m2 (280mg), cycle 5.  -4/30/2023 NEURO-ONC/ CHEMO: Doing well with no new changes.  Stable right leg weakness.  Continue adjuvant temozolomide 150mg/m2 (280mg), cycle 6.      PHYSICAL EXAMINATION  BP (!) 153/97   Pulse 67   Temp 98.8  F (37.1  C) (Oral)   Resp 16   Wt  "68.3 kg (150 lb 9.6 oz)   SpO2 99%   BMI 21.00 kg/m     Wt Readings from Last 2 Encounters:   04/30/24 68.3 kg (150 lb 9.6 oz)   03/29/24 66 kg (145 lb 9.6 oz)      Ht Readings from Last 2 Encounters:   01/09/24 1.803 m (5' 11\")   12/05/23 1.803 m (5' 11\")     KPS: 100    -Generally well appearing.  -Respiratory: Speaking in full fluid sentences with no shortness of breath or wheezing noted.  -Psychiatric: Normal mood and affect. Pleasant, talkative.  -Neurologic:   MENTAL STATUS:     Recall: Intact.    Speech fluent.      CRANIAL NERVES:     Pupils are equal, round.    Symmetric facial movements.   Hearing intact.  GAIT: Steady, unassisted.       MEDICAL RECORDS  Personally reviewed; oncology and pharmacy notes.     LABS  Personally reviewed all available lab results; CBC (platelets 191, WBC 3.9 with ANC 1.9) and CMP (AST/ ALT 20/10) from today.    IMAGING  No new imaging to review today.    IMPRESSION  Joss reports that the 150mg/m2 adjuvant dosing has remained well tolerated with limited side effects, mainly fatigue He does have ongoing right leg weakness But this is stable to slightly improved overall. He denies any episodes concerning for a seizure.  His labs are stable today and adequate to continue treatment.    Per prior notes, his imaging will be scheduled per NCCN guidelines every 4 months.  He is due in June, and I will order the MRI today.    In the meantime, the plan is to continue adjuvant temozolomide at 150 mg/m2 dosing, and he will start his sixth and final cycle this week.  The previously reviewed common side effects of temozolomide can still be anticipated and were discussed as including, but not limited to, fatigue, nausea, and constipation. Bone marrow suppression can result in leukopenia and thrombocytopenia.  His labs today are adequate to start treatment.  Message sent to team.    PROBLEM LIST  Oligodendroglioma  Hemiparesis, right  Tension " headache  Seizure  Fatigue  Hypertension  Depressed mood, anxiety, irritability    PLAN  -CANCER DIRECTED THERAPY-  Will continue adjuvant temozolomide and increase to the 150mg/m2 (180mg), cycle 6.  -Supportive medications; Zofran and bowel regimen.     -Repeat 28 day cycle if WBC >= 3, ANC >= 1.5, HgB >= 10, and platelets >= 100.  -Surveillance labs reviewed, at goal for chemotherapy.   -Will continue every 2 weeks CBC and CMP every 4 weeks.    -Repeat imaging in June 2024.     -STEROIDS-  -Currently off dexamethasone.    -SEIZURE MANAGEMENT-  -Off Keppra.   -Following with Dr. Panda.      -Quality of life/ MOOD/ HEADACHE/ FATIGUE-  -Self-discontinued Zoloft due to intolerable side effects. Did not start Lexapro.   -Following with psychology and primary care.    -Social work continued involvement as financial and work stressors present.   -Vitamin D deficiency was manifesting as fatigue and dizziness this has improved.   Recommend starting daily supplementation with vitamin D 200-400 international unit(s), but he has not done that yet.    -HEMIPARESIS-  -Following with Dr. Chiang.     -HYPERTENSION-  -Primary care referral for management.    -TOBACCO DEPENDENCE-  -Previously encouraged continued abstinence from smoking.  He states today that he continues to be abstinent and is congratulated on this.    Return to clinic in ~ 4 weeks to see Dr. Park with labs and MRI.    Cami Redding [facility] [place]   [phone]    27 minutes spent on the date of the encounter doing chart review, review of test results, interpretation of tests, patient visit, and documentation. The longitudinal plan of care for the diagnosis(es)/condition(s) as documented were addressed during this visit. Due to the added complexity in care, I will continue to support Joss in the subsequent management and with ongoing continuity of care.

## 2024-04-30 NOTE — LETTER
"    4/30/2024         RE: Lang Collins Jr.  [address]  [place] [address]        Dear Colleague,    Thank you for referring your patient, Lang Collins Jr., to the [facility]. Please see a copy of my visit note below.    Brain mri NEURO-ONCOLOGY VISIT  Apr 30, 2024    CHIEF COMPLAINT: Mr. 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     He was monitored on imaging surveillance until imaging in 12/2022 showed a subtle change concerning for cancer progression. As a result, initiation of cancer-directed therapy was recommended.     He completed radiation with concurrent temozolomide on 8/8/2023. Post-radiation imaging demonstrated no concerning findings with a subtle positive initial response to treatment. Joss was initiated on adjuvant-dosed temozolomide in 10/2023 and he remains on this treatment. Repeat imaging in 2/2024 showed overall stability.      Joss is presenting in follow-up today unaccompanied.    HISTORY OF PRESENT ILLNESS:  -He is seen today unaccompanied for evaluation and toxicity check prior to cycle 6 adjuvant temozolomide.  -No issues with nausea or constipation with his last cycle.  -His fatigue was notable but not too bad; not as bad as cycle 4.    -No headaches, dizziness, or vision changes.  -No seizures or neurologic changes  -Has been feeling a bit depressed/anxious, but reports that when he starts to feel anxious, he stops what he is doing and takes some deep breaths and feels that he is managing this well overall.    -His appetite is good.  -He continues to have right leg weakness, which is stable.    MEDICATIONS   Current Outpatient Medications   Medication Sig Dispense Refill     ondansetron (ZOFRAN) 4 MG tablet Take 1 tablet (4 mg) by mouth at bedtime Take 30-60 mins before each dose of " temozolomide. 30 tablet 3     SENNA-docusate sodium (SENNA S) 8.6-50 MG tablet Take 1 tablet by mouth 2 times daily Can take up to 2 tablets twice daily if needed for constipation. 60 tablet 2     temozolomide (TEMODAR) 140 MG capsule Take 2 capsules (280 mg) by mouth daily for 5 doses Take ondansetron 30-60 min before temozolomide. Take at bedtime on an empty stomach. 10 capsule 0     DRUG ALLERGIES No Known Allergies     IMMUNIZATIONS   Immunization History   Administered Date(s) Administered     COVID-19 MONOVALENT 12+ (Pfizer) 11/05/2021     TDAP (Adacel,Boostrix) 12/03/2021       ONCOLOGIC HISTORY  -8/8/2021 PRESENTATION: New onset seizure; generalized event. Started on Keppra.  -8/2021 MR brain imaging with a 2.1cm left frontal non-enhancing mass in the superior frontal gyrus in the expected region of the supplementary motor area.  -9/8/2021 MR brain imaging with no significant change in the nonenhancing T2 hyperintense mass.  -11/12/21 SURGERY: Left frontal-parietal craniotomy for mass resection.   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  Post-operative imaging with interval left frontal craniotomy for resection of abnormal lesion within the left parafalcine frontal lobe.  Hemiparesis, discharged to ARU.  -12/7/2021 NEURO-ONC: Recommending imaging surveillance given low risk factors and need to continue to focus on ongoing rehab. Referral to Dr. Chiang for optimizing rehab. [facility] for seizure risk management in relation to future employment. Trial of flexeril 10mg at bedtime for headaches.   -3/8/2022 NEURO-ONC/ MRB: Clinically well; improved from prior. Restarting Keppra; second referral to [facility]. Imaging with post-surgical changes. Continue imaging surveillance.   -6/7/2022 NEURO-ONC/ MRB: Clinically well. Imaging with continued healing post-surgery. Continue imaging surveillance.   -12/6/2022 NEURO-ONC/ MRB: Clinically  with no new/ progressive neurological symptoms. Imaging with a subtle increase in T2 FLAIR about the posterior aspect of the resection cavity. Continue imaging surveillance at a shortened interval of 3 months.   -2/28/2023 NEURO-ONC/ MRB: Clinically with no new/ progressive neurological symptoms. Imaging largely stable, but close interval imaging was recommended.   -5/30/2023 NEURO-ONC/ MRB: Worsened mood, concentration; starting Zoloft. Imaging with subtle progression noted when comparing to imaging 6 months ago; recommending chemoradiotherapy with referral to radiation oncology. Social work involvement. Primary care referral for management of hypertension.   -6/16/2023 NEURO-ONC: Started Zoloft, stopped r/t feeling jittery. Imaging with subtle progression noted when comparing to imaging 6 months ago; planned start date is 6/27 for chemoradiotherapy. Ongoing social work involvement.   -7/21/2023 NEURO-ONC/ CHEMORADS: Tolerating chemoradiation well.  Tolerating Zoloft with improved mood.  Continues to follow with psychology and social work.  -8/8/2023 CHEMORADS: Completed 5400 cGy in 30 fractions with concurrent temozolomide.   -10/17/2023 NEURO-ONC/ MRB/ CHEMO: Dizziness; low vitamin D. Anxiety; increase Zoloft. Imaging with no concerns; initial subtle positive treatment response. Starting adjuvant temozolomide 150mg/m2 (280mg), cycle 1.  -12/5/2023 NEURO-ONC/ CHEMO: Dizziness; low vitamin D. Anxiety; increase Zoloft. Imaging with no concerns; initial subtle positive treatment response. Continuing adjuvant temozolomide 200mg/m2 (360mg), cycle 2.  -1/10/2024 CHEMO: Continuing adjuvant temozolomide 150mg/m2 (180mg), cycle 3.  -2/13/2023 NEURO-ONC/ MRB/ CHEMO: Headaches, low mood at times, right left weakness. Imaging stable. Needing to have a tooth pulled; holding adjuvant temozolomide 150mg/m2 (280mg), cycle 4 until after dental procedure.  -3/26/2023 NEURO-ONC/ CHEMO: Continue adjuvant temozolomide 150mg/m2  "(280mg), cycle 5.  -4/30/2023 NEURO-ONC/ CHEMO: Doing well with no new changes.  Stable right leg weakness.  Continue adjuvant temozolomide 150mg/m2 (280mg), cycle 6.      PHYSICAL EXAMINATION  BP (!) 153/97   Pulse 67   Temp 98.8  F (37.1  C) (Oral)   Resp 16   Wt 68.3 kg (150 lb 9.6 oz)   SpO2 99%   BMI 21.00 kg/m     Wt Readings from Last 2 Encounters:   04/30/24 68.3 kg (150 lb 9.6 oz)   03/29/24 66 kg (145 lb 9.6 oz)      Ht Readings from Last 2 Encounters:   01/09/24 1.803 m (5' 11\")   12/05/23 1.803 m (5' 11\")     KPS: 100    -Generally well appearing.  -Respiratory: Speaking in full fluid sentences with no shortness of breath or wheezing noted.  -Psychiatric: Normal mood and affect. Pleasant, talkative.  -Neurologic:   MENTAL STATUS:     Recall: Intact.    Speech fluent.      CRANIAL NERVES:     Pupils are equal, round.    Symmetric facial movements.   Hearing intact.  GAIT: Steady, unassisted.       MEDICAL RECORDS  Personally reviewed; oncology and pharmacy notes.     LABS  Personally reviewed all available lab results; CBC (platelets 191, WBC 3.9 with ANC 1.9) and CMP (AST/ ALT 20/10) from today.    IMAGING  No new imaging to review today.    IMPRESSION  Joss reports that the 150mg/m2 adjuvant dosing has remained well tolerated with limited side effects, mainly fatigue He does have ongoing right leg weakness But this is stable to slightly improved overall. He denies any episodes concerning for a seizure.  His labs are stable today and adequate to continue treatment.    Per prior notes, his imaging will be scheduled per NCCN guidelines every 4 months.  He is due in June, and I will order the MRI today.    In the meantime, the plan is to continue adjuvant temozolomide at 150 mg/m2 dosing, and he will start his sixth and final cycle this week.  The previously reviewed common side effects of temozolomide can still be anticipated and were discussed as including, but not limited to, fatigue, nausea, and " constipation. Bone marrow suppression can result in leukopenia and thrombocytopenia.  His labs today are adequate to start treatment.  Message sent to team.    PROBLEM LIST  Oligodendroglioma  Hemiparesis, right  Tension headache  Seizure  Fatigue  Hypertension  Depressed mood, anxiety, irritability    PLAN  -CANCER DIRECTED THERAPY-  Will continue adjuvant temozolomide and increase to the 150mg/m2 (180mg), cycle 6.  -Supportive medications; Zofran and bowel regimen.     -Repeat 28 day cycle if WBC >= 3, ANC >= 1.5, HgB >= 10, and platelets >= 100.  -Surveillance labs reviewed, at goal for chemotherapy.   -Will continue every 2 weeks CBC and CMP every 4 weeks.    -Repeat imaging in June 2024.     -STEROIDS-  -Currently off dexamethasone.    -SEIZURE MANAGEMENT-  -Off Keppra.   -Following with Dr. Panda.      -Quality of life/ MOOD/ HEADACHE/ FATIGUE-  -Self-discontinued Zoloft due to intolerable side effects. Did not start Lexapro.   -Following with psychology and primary care.    -Social work continued involvement as financial and work stressors present.   -Vitamin D deficiency was manifesting as fatigue and dizziness this has improved.   Recommend starting daily supplementation with vitamin D 200-400 international unit(s), but he has not done that yet.    -HEMIPARESIS-  -Following with Dr. Chiang.     -HYPERTENSION-  -Primary care referral for management.    -TOBACCO DEPENDENCE-  -Previously encouraged continued abstinence from smoking.  He states today that he continues to be abstinent and is congratulated on this.    Return to clinic in ~ 4 weeks to see Dr. Park with labs and MRI.    [facility]   [phone]    27 minutes spent on the date of the encounter doing chart review, review of test results, interpretation of tests, patient visit, and documentation. The longitudinal plan of care for the diagnosis(es)/condition(s) as documented were addressed during  this visit. Due to the added complexity in care, I will continue to support Joss in the subsequent management and with ongoing continuity of care.        Again, thank you for allowing me to participate in the care of your patient.        Sincerely,        TITA Crystal CNP

## 2024-05-20 ENCOUNTER — LAB (OUTPATIENT)
Dept: INFUSION THERAPY | Facility: CLINIC | Age: 49
End: 2024-05-20
Attending: PSYCHIATRY & NEUROLOGY
Payer: COMMERCIAL

## 2024-05-20 ENCOUNTER — DOCUMENTATION ONLY (OUTPATIENT)
Dept: ONCOLOGY | Facility: CLINIC | Age: 49
End: 2024-05-20

## 2024-05-20 DIAGNOSIS — T45.1X5A ANTINEOPLASTIC CHEMOTHERAPY INDUCED PANCYTOPENIA (H): ICD-10-CM

## 2024-05-20 DIAGNOSIS — D61.810 ANTINEOPLASTIC CHEMOTHERAPY INDUCED PANCYTOPENIA (H): ICD-10-CM

## 2024-05-20 DIAGNOSIS — R11.2 CHEMOTHERAPY-INDUCED NAUSEA AND VOMITING: ICD-10-CM

## 2024-05-20 DIAGNOSIS — T45.1X5A CHEMOTHERAPY-INDUCED NAUSEA AND VOMITING: ICD-10-CM

## 2024-05-20 LAB
ALBUMIN SERPL BCG-MCNC: 4.1 G/DL (ref 3.5–5.2)
ALP SERPL-CCNC: 75 U/L (ref 40–150)
ALT SERPL W P-5'-P-CCNC: 8 U/L (ref 0–70)
ANION GAP SERPL CALCULATED.3IONS-SCNC: 8 MMOL/L (ref 7–15)
AST SERPL W P-5'-P-CCNC: 15 U/L (ref 0–45)
BASOPHILS # BLD AUTO: 0 10E3/UL (ref 0–0.2)
BASOPHILS NFR BLD AUTO: 0 %
BILIRUB SERPL-MCNC: 0.7 MG/DL
BUN SERPL-MCNC: 12.5 MG/DL (ref 6–20)
CALCIUM SERPL-MCNC: 9.1 MG/DL (ref 8.6–10)
CHLORIDE SERPL-SCNC: 106 MMOL/L (ref 98–107)
CREAT SERPL-MCNC: 1.15 MG/DL (ref 0.67–1.17)
DEPRECATED HCO3 PLAS-SCNC: 27 MMOL/L (ref 22–29)
EGFRCR SERPLBLD CKD-EPI 2021: 79 ML/MIN/1.73M2
EOSINOPHIL # BLD AUTO: 0 10E3/UL (ref 0–0.7)
EOSINOPHIL NFR BLD AUTO: 0 %
ERYTHROCYTE [DISTWIDTH] IN BLOOD BY AUTOMATED COUNT: 12.5 % (ref 10–15)
GLUCOSE SERPL-MCNC: 95 MG/DL (ref 70–99)
HCT VFR BLD AUTO: 38.1 % (ref 40–53)
HGB BLD-MCNC: 12.3 G/DL (ref 13.3–17.7)
IMM GRANULOCYTES # BLD: 0 10E3/UL
IMM GRANULOCYTES NFR BLD: 0 %
LYMPHOCYTES # BLD AUTO: 1.3 10E3/UL (ref 0.8–5.3)
LYMPHOCYTES NFR BLD AUTO: 27 %
MCH RBC QN AUTO: 30.4 PG (ref 26.5–33)
MCHC RBC AUTO-ENTMCNC: 32.3 G/DL (ref 31.5–36.5)
MCV RBC AUTO: 94 FL (ref 78–100)
MONOCYTES # BLD AUTO: 0.3 10E3/UL (ref 0–1.3)
MONOCYTES NFR BLD AUTO: 5 %
NEUTROPHILS # BLD AUTO: 3.3 10E3/UL (ref 1.6–8.3)
NEUTROPHILS NFR BLD AUTO: 68 %
NRBC # BLD AUTO: 0 10E3/UL
NRBC BLD AUTO-RTO: 0 /100
PLATELET # BLD AUTO: 231 10E3/UL (ref 150–450)
POTASSIUM SERPL-SCNC: 4.3 MMOL/L (ref 3.4–5.3)
PROT SERPL-MCNC: 6.4 G/DL (ref 6.4–8.3)
RBC # BLD AUTO: 4.05 10E6/UL (ref 4.4–5.9)
SODIUM SERPL-SCNC: 141 MMOL/L (ref 135–145)
WBC # BLD AUTO: 4.9 10E3/UL (ref 4–11)

## 2024-05-20 PROCEDURE — 80053 COMPREHEN METABOLIC PANEL: CPT | Performed by: PSYCHIATRY & NEUROLOGY

## 2024-05-20 PROCEDURE — 36415 COLL VENOUS BLD VENIPUNCTURE: CPT

## 2024-05-20 PROCEDURE — 85025 COMPLETE CBC W/AUTO DIFF WBC: CPT | Performed by: PSYCHIATRY & NEUROLOGY

## 2024-05-20 NOTE — PROGRESS NOTES
Oral Chemotherapy Monitoring Program  Lab Follow Up    Reviewed lab results from 5/20/24.        1/25/2024    12:00 PM 1/31/2024     3:00 PM 3/8/2024     1:00 PM 3/15/2024     3:00 PM 3/19/2024    10:00 AM 4/12/2024    11:00 AM 5/20/2024    11:00 AM   ORAL CHEMOTHERAPY   Assessment Type Lab Monitoring Refill Lab Monitoring Lab Monitoring Refill Lab Monitoring Lab Monitoring   Diagnosis Code Other  Other Other Other Other    Other Oligodendroglioma, WHO grade II (H) (C71.9) Oligodendroglioma, WHO grade II (H) (C71.9) Oligodendroglioma, WHO grade II (H) (C71.9) Oligodendroglioma, WHO grade II (H) (C71.9) Oligodendroglioma, WHO grade II (H) (C71.9) Oligodendroglioma, WHO grade II (H) (C71.9)    Providers Dr. Xavier Park   Clinic Name/Location [facility]   Drug Name Temodar (temozolomide) Temodar (temozolomide) Temodar (temozolomide) Temodar (temozolomide) Temodar (temozolomide) Temodar (temozolomide) Temodar (temozolomide)   Dose 280 mg 280 mg 280 mg 280 mg 280 mg 280 mg 280 mg   Current Schedule Daily Daily     Daily   Cycle Details 5 days on, 23 days off 5 days on, 23 days off 5 days on, 23 days off 5 days on, 23 days off 5 days on, 23 days off 5 days on, 23 days off 5 days on, 23 days off   Start Date of Last Cycle 1/11/2024  2/25/2024 2/25/2024  3/29/2024 5/1/2024   Planned next cycle start date 2/13/2024  3/24/2024 3/26/2024 3/26/2024 4/26/2024    Adverse Effects No AE identified during assessment  Neutropenia;Anemia       Anemia   Grade 1       Pharmacist Intervention(anemia)   No       Neutropenia   Grade 3       Pharmacist Intervention(neutropenia)   Yes       Intervention(s)   Increased lab monitoring       Any new drug interactions?    No      Is the dose as ordered appropriate for the patient?    Yes Yes         Labs:  _  Result Component Current Result Ref Range   Sodium 141 (5/20/2024) 135 - 145 mmol/L      _  Result Component Current Result Ref Range   Potassium 4.3 (5/20/2024) 3.4 - 5.3 mmol/L     _  Result Component Current Result Ref Range   Calcium 9.1 (5/20/2024) 8.6 - 10.0 mg/dL     No results found for Mag within last 30 days.     No results found for Phos within last 30 days.     _  Result Component Current Result Ref Range   Albumin 4.1 (5/20/2024) 3.5 - 5.2 g/dL     _  Result Component Current Result Ref Range   Urea Nitrogen 12.5 (5/20/2024) 6.0 - 20.0 mg/dL     _  Result Component Current Result Ref Range   Creatinine 1.15 (5/20/2024) 0.67 - 1.17 mg/dL     _  Result Component Current Result Ref Range   AST 15 (5/20/2024) 0 - 45 U/L     _  Result Component Current Result Ref Range   ALT 8 (5/20/2024) 0 - 70 U/L     _  Result Component Current Result Ref Range   Bilirubin Total 0.7 (5/20/2024) <=1.2 mg/dL     _  Result Component Current Result Ref Range   WBC Count 4.9 (5/20/2024) 4.0 - 11.0 10e3/uL     _  Result Component Current Result Ref Range   Hemoglobin 12.3 (L) (5/20/2024) 13.3 - 17.7 g/dL     _  Result Component Current Result Ref Range   Platelet Count 231 (5/20/2024) 150 - 450 10e3/uL     No results found for ANC within last 30 days.     _  Result Component Current Result Ref Range   Absolute Neutrophils 3.3 (5/20/2024) 1.6 - 8.3 10e3/uL        Assessment & Plan:  No concerning abnormalities. Labs are stable. Repeat labs in the next 2 weeks with provider follow up. Patient started 6th and final cycle on 5/1/24. Review if there is any additional chemo at that time.     Questions answered to patient's satisfaction.    Follow-Up:  6/4/24 with Dr. Park, imaging and labs.     Vandana BaconD  May 20, 2024

## 2024-06-04 ENCOUNTER — DOCUMENTATION ONLY (OUTPATIENT)
Dept: PHARMACY | Facility: CLINIC | Age: 49
End: 2024-06-04
Payer: COMMERCIAL

## 2024-06-04 NOTE — PROGRESS NOTES
Thank you for the opportunity to be a part in the care of this patient's oral chemotherapy. The oncology pharmacy will no longer be following this patient for oral chemotherapy. If there are any questions or the plan changes, please feel free to contact us.     Thank you    Андрей Gleason  Pharmacy Intern   [facility]   [phone]

## 2024-06-27 NOTE — PROGRESS NOTES
"Brain mri NEURO-ONCOLOGY VISIT  Jun 28, 2024    CHIEF COMPLAINT: Mr. 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     He was monitored on imaging surveillance until imaging in 12/2022 showed a subtle change concerning for cancer progression. As a result, initiation of cancer-directed therapy was recommended.     He completed radiation with concurrent temozolomide on 8/8/2023. Post-radiation imaging demonstrated no concerning findings with a subtle positive initial response to treatment. Joss was initiated on adjuvant-dosed temozolomide in 10/2023 and he remains on this treatment. Repeat imaging in 2/2024 and again in 6/2024 showed overall stability with evidence of evolving post-radiation injury.      Joss is presenting in follow-up today with his two children.    HISTORY OF PRESENT ILLNESS:  -Joss notes overall he is doing much better compared to last visit.   -No issues with lingering nausea, constipation, urination.  -Having poor appetite, eats once a day. Sometimes forgets he hasn't eating. Losing weight, weighed 150lbs last visit now at 141 lbs. Tries to eat.   -Can tell the difference between right and left leg, states still having problems with function of the right foot/leg.   -His fatigue has improved, better compared to last visit. Not having any problems with it now. Goes to sleep a little later than usual, usually sleeps about 6 hours which is enough.  Doesn't wake up feeling tired. Hasn't had to take a nap for about a month.   -Not having headaches, dizziness, or vision changes.  -No seizures, no falls, no seizures.   -Has noticed some increased tingling in the hands and right leg \"like they fall asleep/pins/needles\". No pain.   -Continues to have anxiety, seems to get worse when he has a lot on his plate or many things going on at once, sometimes " feels anxious in crowds, gets nervous for no reason, can't sit still; initially thought this was related to the brain surgery.   -Patient was recently in casino with his cousin, had to leave and sit in the car, feels paranoid and increased cautiousness. - - -Patient feels like he needs to keep looking behind him to check the people behind him.   -Memory has been waxing and waning, forgets his keys and phone.    -Forgot to put water to boil before cooking noodles, got distracted to answer door bell, put the noodles in the pot but didn't have any boiling water;  forgets to put deodorant on after the shower, having continuous episodes like this.  -Hasn't had memory issues like this in the past. Overall memory is little bit better now.   -Feels like he has communication problems, feels like he is slurring or not explaining things right, has to second guess what he is saying and ask himself if he is saying stuff right.   -Not taking any medications right now.    MEDICATIONS   No current outpatient medications on file.     DRUG ALLERGIES No Known Allergies     IMMUNIZATIONS   Immunization History   Administered Date(s) Administered    COVID-19 MONOVALENT 12+ (Pfizer) 11/05/2021    TDAP (Adacel,Boostrix) 12/03/2021       ONCOLOGIC HISTORY  -8/8/2021 PRESENTATION: New onset seizure; generalized event. Started on Keppra.  -8/2021 MR brain imaging with a 2.1cm left frontal non-enhancing mass in the superior frontal gyrus in the expected region of the supplementary motor area.  -9/8/2021 MR brain imaging with no significant change in the nonenhancing T2 hyperintense mass.  -11/12/21 SURGERY: Left frontal-parietal craniotomy for mass resection.   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  Post-operative imaging with interval left frontal craniotomy for resection of abnormal lesion within the left parafalcine frontal lobe.  Hemiparesis, discharged to  [facility].  -12/7/2021 NEURO-ONC: Recommending imaging surveillance given low risk factors and need to continue to focus on ongoing rehab. Referral to Dr. Chiang for optimizing rehab. Referral to [facility]/ epilepsy for seizure risk management in relation to future employment. Trial of flexeril 10mg at bedtime for headaches.   -3/8/2022 NEURO-ONC/ MRB: Clinically well; improved from prior. Restarting Keppra; second referral to [facility]. Imaging with post-surgical changes. Continue imaging surveillance.   -6/7/2022 NEURO-ONC/ MRB: Clinically well. Imaging with continued healing post-surgery. Continue imaging surveillance.   -12/6/2022 NEURO-ONC/ MRB: Clinically with no new/ progressive neurological symptoms. Imaging with a subtle increase in T2 FLAIR about the posterior aspect of the resection cavity. Continue imaging surveillance at a shortened interval of 3 months.   -2/28/2023 NEURO-ONC/ MRB: Clinically with no new/ progressive neurological symptoms. Imaging largely stable, but close interval imaging was recommended.   -5/30/2023 NEURO-ONC/ MRB: Worsened mood, concentration; starting Zoloft. Imaging with subtle progression noted when comparing to imaging 6 months ago; recommending chemoradiotherapy with referral to radiation oncology. Social work involvement. Primary care referral for management of hypertension.   -6/16/2023 NEURO-ONC: Started Zoloft, stopped r/t feeling jittery. Imaging with subtle progression noted when comparing to imaging 6 months ago; planned start date is 6/27 for chemoradiotherapy. Ongoing social work involvement.   -7/21/2023 NEURO-ONC: Tolerating chemoradiation well.  Tolerating Zoloft with improved mood.  Continues to follow with psychology and social work.  -8/8/2023 CHEMORADS: Completed 5400 cGy in 30 fractions with concurrent temozolomide.   -10/17/2023 NEURO-ONC/ MRB/ CHEMO: Dizziness; low vitamin D. Anxiety; increase Zoloft. Imaging with no concerns; initial subtle positive treatment response.  "Starting adjuvant temozolomide 150mg/m2 (280mg), cycle 1.  -12/5/2023 NEURO-ONC/ CHEMO: Dizziness; low vitamin D. Anxiety; increase Zoloft. Imaging with no concerns; initial subtle positive treatment response. Continuing adjuvant temozolomide 200mg/m2 (360mg), cycle 2.  -1/10/2024 CHEMO: Continuing adjuvant temozolomide 150mg/m2 (180mg), cycle 3.  -2/13/2024 NEURO-ONC/ MRB/ CHEMO: Headaches, low mood at times, right left weakness. Imaging stable. Needing to have a tooth pulled; holding adjuvant temozolomide 150mg/m2 (280mg), cycle 4 until after dental procedure.  -3/26/2024 NEURO-ONC/ CHEMO: Continue adjuvant temozolomide 150mg/m2 (280mg), cycle 5.  -4/30/2024 NEURO-ONC/ CHEMO: Doing well with no new changes.  Stable right leg weakness.  Continue adjuvant temozolomide 150mg/m2 (280mg), cycle 6.  -6/28/2024 NEURO-ONC/ MRB: No neurological concerns, but notes low mood with increased anxiety and paranoia, cognitive concerns, and low appetite. Imaging stable with evolving post-radiation changes.      PHYSICAL EXAMINATION  BP (!) 139/103 (BP Location: Right arm, Patient Position: Sitting, Cuff Size: Adult Regular)   Pulse 57   Temp 98  F (36.7  C) (Oral)   Resp 12   Wt 64.4 kg (141 lb 14.4 oz)   SpO2 99%   BMI 19.79 kg/m     Wt Readings from Last 2 Encounters:   06/28/24 64.4 kg (141 lb 14.4 oz)   04/30/24 68.3 kg (150 lb 9.6 oz)      Ht Readings from Last 2 Encounters:   01/09/24 1.803 m (5' 11\")   12/05/23 1.803 m (5' 11\")     KPS: 100    -Generally well appearing.  -Respiratory: Speaking in full fluid sentences with no shortness of breath or wheezing noted.  -Psychiatric: Normal mood and affect. Pleasant, talkative.  -Neurologic:   MENTAL STATUS:     Recall: Intact.    Speech fluent.       Cranial Nerves:   (II, III, IV, VI) Visual acuity 20/20 bilaterally. Visual fields normal in all quadrants. Pupils are round, reactive to light and accommodation. Extraocular movements are intact without ptosis.   (V) Facial " "sensation is intact bilaterally in the three distributions in the   (VII) Facial muscle strength is normal and equal bilaterally.   (VIII) Hearing is normal bilaterally.   (IX, X) Palate and uvula elevate symmetrically, with intact gag reflex. Voice is normal.   (XI) Shoulder shrug strong, and equal bilaterally.   (XII) Tongue protrudes midline and moves symmetrically.     Reflexes: Biceps, brachioradialis, triceps, patellar, and Achilles are 2+/4 bilaterally. No clonus. Plantar reflex is downward bilaterally.    Sensation: Sensation is intact bilaterally to light touch in the bilateral upper and lower extremities.     Motor: Good muscle tone. Strength is 5/5 bilaterally at the deltoid, biceps, triceps, quadriceps, and hamstrings.     Cerebellar: Finger-to-nose and heel-to-shin test normal bilaterally. Balances with eyes closed (Romberg). Rapid alternating movements normal, mildly reduced speed. Gait is steady with a normal base. Coordination is intact as measured by heel walk and toe walk. Having some problems with right leg/foot functionality, has been compensating with his left foot/leg.       MEDICAL RECORDS  Personally reviewed; oncology pharmacy notes.     LABS  Personally reviewed all available lab results; CBC (platelets 231) and CMP (AST/ ALT 15/8) from 5/20.    IMAGING  Personally reviewed MR brain imaging from today and compared to prior imaging.    Formal read; \"1. Slightly increased T2 hyperintense signal surrounding the resection cavity, with stable benign-appearing enhancement within the cavity, suggesting evolution of postsurgical changes. No definite evidence for progression of disease. 2. No acute intracranial pathology.\"     Imaging was shown to and results were reviewed with Joss.       IMPRESSION  On date of service, 41 minutes was spent in clinic and 11 minutes was spent preparing for the visit through extensive chart review and coordinating care for this high complexity visit. The following is " in explanation for the recommendations used to define the plan.       Joss reports no lingering side effects from chemotherapy, aside from ongoing fatigue. His right leg weakness is stable. Today, Joss endorses a worsening in anxiety and new concerns for paranoid thoughts. We discussed how in the setting of brain surgery and radiation/ chemotherapy exposure to the brain, this can lead to an imbalance in neurotransmitters and thus, the combination of symptoms that he is experiencing. Starting a medication for mood can restore the chemical balance and improve his status. Joss was previously on Zoloft and this medication was not well tolerated. We discussed the option of starting Lexapro and Joss voiced hesitancy. Therefore, I spoke of the benefits of seeing Dr. Campbell in consultation to discuss options for improving mood/ anxiety and coping. Joss was open to this referral.      Imaging as detailed above was stable with no new concerns. There was overall stability with evidence of evolving post-radiation injury. In the setting of both clinical and radiographic stability plus having completed the planned 6 cycles of adjuvant chemotherapy, the plan is to initiate management on imaging surveillance per NCCN guidelines of every 3-6 months for the first 5 years (at least through 10/2028) and then, every 6 months as clinically indicated. In discussing with Joss the plan for his imaging interval, he is in agreement to repeat imaging in 4 months for now. In the meantime, Joss knows to call the clinic with any concerns and appointments can be moved up if needed.       CBC and CMP from May reviewed and without concerns. There is no need to continue to monitor labs while off chemotherapy.      PROBLEM LIST  Oligodendroglioma  Hemiparesis, right  Tension headache  Seizure  Fatigue  Hypertension  Depressed mood, anxiety, irritability    PLAN  -CANCER DIRECTED THERAPY-  -Starting imaging surveillance; Repeat imaging in 4  months.      -STEROIDS-  -Currently off dexamethasone.    -SEIZURE MANAGEMENT-  -Off Keppra.   -Following with Dr. Panda.      -Quality of life/ MOOD/ HEADACHE/ FATIGUE-  -Self-discontinued Zoloft due to intolerable side effects.    Did not start Lexapro.   -Using marijuana for anxiety.   -Following with psychology and primary care.    -Social work continued involvement as financial and work stressors present.   -Vitamin D deficiency was manifesting as fatigue and dizziness this has improved.   Recommend starting daily supplementation with vitamin D 200-400 international unit(s), but he has not done that yet.  -Referral to palliative care; Dr. Campbell.     -HEMIPARESIS-  -Following with Dr. Chiang.     -HYPERTENSION-  -Primary care referral for management.    -TOBACCO DEPENDENCE-  -Previously encouraged continued abstinence from smoking.  He states today that he continues to be abstinent and is congratulated on this.    Return to clinic in ~ 4 months + MRI.    The longitudinal plan of care for the diagnosis(es)/condition(s) as documented were addressed during this visit. Due to the added complexity in care, I will continue to support Joss in the subsequent management and with ongoing continuity of care.     This patient was also seen and evaluated by Wilton Noble MS4 under my direct supervision.     Elva Park MD  Neuro-oncology

## 2024-06-28 ENCOUNTER — HOSPITAL ENCOUNTER (OUTPATIENT)
Dept: MRI IMAGING | Facility: CLINIC | Age: 49
Discharge: HOME | End: 2024-06-28
Attending: NURSE PRACTITIONER
Payer: COMMERCIAL

## 2024-06-28 ENCOUNTER — ONCOLOGY VISIT (OUTPATIENT)
Dept: ONCOLOGY | Facility: CLINIC | Age: 49
End: 2024-06-28
Attending: PSYCHIATRY & NEUROLOGY
Payer: COMMERCIAL

## 2024-06-28 VITALS
OXYGEN SATURATION: 99 % | DIASTOLIC BLOOD PRESSURE: 103 MMHG | BODY MASS INDEX: 19.79 KG/M2 | WEIGHT: 141.9 LBS | SYSTOLIC BLOOD PRESSURE: 139 MMHG | HEART RATE: 57 BPM | TEMPERATURE: 98 F | RESPIRATION RATE: 12 BRPM

## 2024-06-28 DIAGNOSIS — F41.9 ANXIETY: ICD-10-CM

## 2024-06-28 DIAGNOSIS — C71.9 OLIGODENDROGLIOMA, WHO GRADE II (H): Primary | ICD-10-CM

## 2024-06-28 DIAGNOSIS — R45.89 DIFFICULTY COPING: ICD-10-CM

## 2024-06-28 DIAGNOSIS — C71.9 OLIGODENDROGLIOMA, WHO GRADE II (H): ICD-10-CM

## 2024-06-28 PROCEDURE — 70553 MRI BRAIN STEM W/O & W/DYE: CPT | Mod: 26 | Performed by: RADIOLOGY

## 2024-06-28 PROCEDURE — 255N000002 HC RX 255 OP 636: Performed by: NURSE PRACTITIONER

## 2024-06-28 PROCEDURE — 70553 MRI BRAIN STEM W/O & W/DYE: CPT

## 2024-06-28 PROCEDURE — 99215 OFFICE O/P EST HI 40 MIN: CPT | Performed by: PSYCHIATRY & NEUROLOGY

## 2024-06-28 PROCEDURE — G2211 COMPLEX E/M VISIT ADD ON: HCPCS | Performed by: PSYCHIATRY & NEUROLOGY

## 2024-06-28 PROCEDURE — A9585 GADOBUTROL INJECTION: HCPCS | Performed by: NURSE PRACTITIONER

## 2024-06-28 PROCEDURE — G0463 HOSPITAL OUTPT CLINIC VISIT: HCPCS | Performed by: PSYCHIATRY & NEUROLOGY

## 2024-06-28 RX ORDER — GADOBUTROL 604.72 MG/ML
7.5 INJECTION INTRAVENOUS ONCE
Status: COMPLETED | OUTPATIENT
Start: 2024-06-28 | End: 2024-06-28

## 2024-06-28 RX ADMIN — GADOBUTROL 7 ML: 604.72 INJECTION INTRAVENOUS at 08:03

## 2024-06-28 ASSESSMENT — PAIN SCALES - GENERAL: PAINLEVEL: NO PAIN (0)

## 2024-06-28 NOTE — NURSING NOTE
"Oncology Rooming Note    June 28, 2024 9:46 AM   Lang Collins Jr. is a 49 year old male who presents for:    Chief Complaint   Patient presents with    Oncology Clinic Visit     Oligodendroglioma, WHO grade II     Initial Vitals: BP (!) 139/103 (BP Location: Right arm, Patient Position: Sitting, Cuff Size: Adult Regular)   Pulse 57   Temp 98  F (36.7  C) (Oral)   Resp 12   Wt 64.4 kg (141 lb 14.4 oz)   SpO2 99%   BMI 19.79 kg/m   Estimated body mass index is 19.79 kg/m  as calculated from the following:    Height as of 1/9/24: 1.803 m (5' 11\").    Weight as of this encounter: 64.4 kg (141 lb 14.4 oz). Body surface area is 1.8 meters squared.  No Pain (0) Comment: Data Unavailable   No LMP for male patient.  Allergies reviewed: Yes  Medications reviewed: Yes    Medications: Medication refills not needed today.  Pharmacy name entered into [facility]:    [id number] - [place], [place] - [address] [place] [place] - [address]  [facility] - [place], [place] - [address]  [id number] - [place], [place] - [address] AT [facility]    Frailty Screening:   Is the patient here for a new oncology consult visit in cancer care? 2. No      Clinical concerns: none       Kel Ferrell"

## 2024-06-28 NOTE — LETTER
"6/28/2024      Lang Collins Jr.  [address]  [place] [address]      Dear Colleague,    Thank you for referring your patient, Lang Collins Jr., to the [facility]. Please see a copy of my visit note below.    Brain mri NEURO-ONCOLOGY VISIT  Jun 28, 2024    CHIEF COMPLAINT: Mr. 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     He was monitored on imaging surveillance until imaging in 12/2022 showed a subtle change concerning for cancer progression. As a result, initiation of cancer-directed therapy was recommended.     He completed radiation with concurrent temozolomide on 8/8/2023. Post-radiation imaging demonstrated no concerning findings with a subtle positive initial response to treatment. Joss was initiated on adjuvant-dosed temozolomide in 10/2023 and he remains on this treatment. Repeat imaging in 2/2024 and again in 6/2024 showed overall stability with evidence of evolving post-radiation injury.      Joss is presenting in follow-up today with his two children.    HISTORY OF PRESENT ILLNESS:  -Jsos notes overall he is doing much better compared to last visit.   -No issues with lingering nausea, constipation, urination.  -Having poor appetite, eats once a day. Sometimes forgets he hasn't eating. Losing weight, weighed 150lbs last visit now at 141 lbs. Tries to eat.   -Can tell the difference between right and left leg, states still having problems with function of the right foot/leg.   -His fatigue has improved, better compared to last visit. Not having any problems with it now. Goes to sleep a little later than usual, usually sleeps about 6 hours which is enough.  Doesn't wake up feeling tired. Hasn't had to take a nap for about a month.   -Not having headaches, dizziness, or vision changes.  -No seizures, no falls, " "no seizures.   -Has noticed some increased tingling in the hands and right leg \"like they fall asleep/pins/needles\". No pain.   -Continues to have anxiety, seems to get worse when he has a lot on his plate or many things going on at once, sometimes feels anxious in crowds, gets nervous for no reason, can't sit still; initially thought this was related to the brain surgery.   -Patient was recently in casino with his cousin, had to leave and sit in the car, feels paranoid and increased cautiousness. - - -Patient feels like he needs to keep looking behind him to check the people behind him.   -Memory has been waxing and waning, forgets his keys and phone.    -Forgot to put water to boil before cooking noodles, got distracted to answer door bell, put the noodles in the pot but didn't have any boiling water;  forgets to put deodorant on after the shower, having continuous episodes like this.  -Hasn't had memory issues like this in the past. Overall memory is little bit better now.   -Feels like he has communication problems, feels like he is slurring or not explaining things right, has to second guess what he is saying and ask himself if he is saying stuff right.   -Not taking any medications right now.    MEDICATIONS   No current outpatient medications on file.     DRUG ALLERGIES No Known Allergies     IMMUNIZATIONS   Immunization History   Administered Date(s) Administered     COVID-19 MONOVALENT 12+ (Pfizer) 11/05/2021     TDAP (Adacel,Boostrix) 12/03/2021       ONCOLOGIC HISTORY  -8/8/2021 PRESENTATION: New onset seizure; generalized event. Started on Keppra.  -8/2021 MR brain imaging with a 2.1cm left frontal non-enhancing mass in the superior frontal gyrus in the expected region of the supplementary motor area.  -9/8/2021 MR brain imaging with no significant change in the nonenhancing T2 hyperintense mass.  -11/12/21 SURGERY: Left frontal-parietal craniotomy for mass resection.   PATHOLOGY: WHO grade 2 " oligodendroglioma; IDH1 mutated, ATRX retained, 1p19q co-deletional status pending.  Post-operative imaging with interval left frontal craniotomy for resection of abnormal lesion within the left parafalcine frontal lobe.  Hemiparesis, discharged to ARU.  -12/7/2021 NEURO-ONC: Recommending imaging surveillance given low risk factors and need to continue to focus on ongoing rehab. Referral to Dr. Chiang for optimizing rehab. Referral to [facility]/ epilepsy for seizure risk management in relation to future employment. Trial of flexeril 10mg at bedtime for headaches.   -3/8/2022 NEURO-ONC/ MRB: Clinically well; improved from prior. Restarting Keppra; second referral to [facility]. Imaging with post-surgical changes. Continue imaging surveillance.   -6/7/2022 NEURO-ONC/ MRB: Clinically well. Imaging with continued healing post-surgery. Continue imaging surveillance.   -12/6/2022 NEURO-ONC/ MRB: Clinically with no new/ progressive neurological symptoms. Imaging with a subtle increase in T2 FLAIR about the posterior aspect of the resection cavity. Continue imaging surveillance at a shortened interval of 3 months.   -2/28/2023 NEURO-ONC/ MRB: Clinically with no new/ progressive neurological symptoms. Imaging largely stable, but close interval imaging was recommended.   -5/30/2023 NEURO-ONC/ MRB: Worsened mood, concentration; starting Zoloft. Imaging with subtle progression noted when comparing to imaging 6 months ago; recommending chemoradiotherapy with referral to radiation oncology. Social work involvement. Primary care referral for management of hypertension.   -6/16/2023 NEURO-ONC: Started Zoloft, stopped r/t feeling jittery. Imaging with subtle progression noted when comparing to imaging 6 months ago; planned start date is 6/27 for chemoradiotherapy. Ongoing social work involvement.   -7/21/2023 NEURO-ONC: Tolerating chemoradiation well.  Tolerating Zoloft with improved mood.  Continues to follow with psychology and social  "work.  -8/8/2023 CHEMORADS: Completed 5400 cGy in 30 fractions with concurrent temozolomide.   -10/17/2023 NEURO-ONC/ MRB/ CHEMO: Dizziness; low vitamin D. Anxiety; increase Zoloft. Imaging with no concerns; initial subtle positive treatment response. Starting adjuvant temozolomide 150mg/m2 (280mg), cycle 1.  -12/5/2023 NEURO-ONC/ CHEMO: Dizziness; low vitamin D. Anxiety; increase Zoloft. Imaging with no concerns; initial subtle positive treatment response. Continuing adjuvant temozolomide 200mg/m2 (360mg), cycle 2.  -1/10/2024 CHEMO: Continuing adjuvant temozolomide 150mg/m2 (180mg), cycle 3.  -2/13/2024 NEURO-ONC/ MRB/ CHEMO: Headaches, low mood at times, right left weakness. Imaging stable. Needing to have a tooth pulled; holding adjuvant temozolomide 150mg/m2 (280mg), cycle 4 until after dental procedure.  -3/26/2024 NEURO-ONC/ CHEMO: Continue adjuvant temozolomide 150mg/m2 (280mg), cycle 5.  -4/30/2024 NEURO-ONC/ CHEMO: Doing well with no new changes.  Stable right leg weakness.  Continue adjuvant temozolomide 150mg/m2 (280mg), cycle 6.  -6/28/2024 NEURO-ONC/ MRB: No neurological concerns, but notes low mood with increased anxiety and paranoia, cognitive concerns, and low appetite. Imaging stable with evolving post-radiation changes.      PHYSICAL EXAMINATION  BP (!) 139/103 (BP Location: Right arm, Patient Position: Sitting, Cuff Size: Adult Regular)   Pulse 57   Temp 98  F (36.7  C) (Oral)   Resp 12   Wt 64.4 kg (141 lb 14.4 oz)   SpO2 99%   BMI 19.79 kg/m     Wt Readings from Last 2 Encounters:   06/28/24 64.4 kg (141 lb 14.4 oz)   04/30/24 68.3 kg (150 lb 9.6 oz)      Ht Readings from Last 2 Encounters:   01/09/24 1.803 m (5' 11\")   12/05/23 1.803 m (5' 11\")     KPS: 100    -Generally well appearing.  -Respiratory: Speaking in full fluid sentences with no shortness of breath or wheezing noted.  -Psychiatric: Normal mood and affect. Pleasant, talkative.  -Neurologic:   MENTAL STATUS:     Recall: " "Intact.    Speech fluent.       Cranial Nerves:   (II, III, IV, VI) Visual acuity 20/20 bilaterally. Visual fields normal in all quadrants. Pupils are round, reactive to light and accommodation. Extraocular movements are intact without ptosis.   (V) Facial sensation is intact bilaterally in the three distributions in the   (VII) Facial muscle strength is normal and equal bilaterally.   (VIII) Hearing is normal bilaterally.   (IX, X) Palate and uvula elevate symmetrically, with intact gag reflex. Voice is normal.   (XI) Shoulder shrug strong, and equal bilaterally.   (XII) Tongue protrudes midline and moves symmetrically.     Reflexes: Biceps, brachioradialis, triceps, patellar, and Achilles are 2+/4 bilaterally. No clonus. Plantar reflex is downward bilaterally.    Sensation: Sensation is intact bilaterally to light touch in the bilateral upper and lower extremities.     Motor: Good muscle tone. Strength is 5/5 bilaterally at the deltoid, biceps, triceps, quadriceps, and hamstrings.     Cerebellar: Finger-to-nose and heel-to-shin test normal bilaterally. Balances with eyes closed (Romberg). Rapid alternating movements normal, mildly reduced speed. Gait is steady with a normal base. Coordination is intact as measured by heel walk and toe walk. Having some problems with right leg/foot functionality, has been compensating with his left foot/leg.       MEDICAL RECORDS  Personally reviewed; oncology pharmacy notes.     LABS  Personally reviewed all available lab results; CBC (platelets 231) and CMP (AST/ ALT 15/8) from 5/20.    IMAGING  Personally reviewed MR brain imaging from today and compared to prior imaging.    Formal read; \"1. Slightly increased T2 hyperintense signal surrounding the resection cavity, with stable benign-appearing enhancement within the cavity, suggesting evolution of postsurgical changes. No definite evidence for progression of disease. 2. No acute intracranial pathology.\"     Imaging was shown to " and results were reviewed with Joss.       IMPRESSION  On date of service, 41 minutes was spent in clinic and 11 minutes was spent preparing for the visit through extensive chart review and coordinating care for this high complexity visit. The following is in explanation for the recommendations used to define the plan.       Joss reports no lingering side effects from chemotherapy, aside from ongoing fatigue. His right leg weakness is stable. Today, Joss endorses a worsening in anxiety and new concerns for paranoid thoughts. We discussed how in the setting of brain surgery and radiation/ chemotherapy exposure to the brain, this can lead to an imbalance in neurotransmitters and thus, the combination of symptoms that he is experiencing. Starting a medication for mood can restore the chemical balance and improve his status. Joss was previously on Zoloft and this medication was not well tolerated. We discussed the option of starting Lexapro and Joss voiced hesitancy. Therefore, I spoke of the benefits of seeing Dr. Campbell in consultation to discuss options for improving mood/ anxiety and coping. Joss was open to this referral.      Imaging as detailed above was stable with no new concerns. There was overall stability with evidence of evolving post-radiation injury. In the setting of both clinical and radiographic stability plus having completed the planned 6 cycles of adjuvant chemotherapy, the plan is to initiate management on imaging surveillance per NCCN guidelines of every 3-6 months for the first 5 years (at least through 10/2028) and then, every 6 months as clinically indicated. In discussing with Joss the plan for his imaging interval, he is in agreement to repeat imaging in 4 months for now. In the meantime, Joss knows to call the clinic with any concerns and appointments can be moved up if needed.       CBC and CMP from May reviewed and without concerns. There is no need to continue to monitor labs  while off chemotherapy.      PROBLEM LIST  Oligodendroglioma  Hemiparesis, right  Tension headache  Seizure  Fatigue  Hypertension  Depressed mood, anxiety, irritability    PLAN  -CANCER DIRECTED THERAPY-  -Starting imaging surveillance; Repeat imaging in 4 months.      -STEROIDS-  -Currently off dexamethasone.    -SEIZURE MANAGEMENT-  -Off Keppra.   -Following with Dr. Panda.      -Quality of life/ MOOD/ HEADACHE/ FATIGUE-  -Self-discontinued Zoloft due to intolerable side effects.    Did not start Lexapro.   -Using marijuana for anxiety.   -Following with psychology and primary care.    -Social work continued involvement as financial and work stressors present.   -Vitamin D deficiency was manifesting as fatigue and dizziness this has improved.   Recommend starting daily supplementation with vitamin D 200-400 international unit(s), but he has not done that yet.  -Referral to palliative care; Dr. Campbell.     -HEMIPARESIS-  -Following with Dr. Chiang.     -HYPERTENSION-  -Primary care referral for management.    -TOBACCO DEPENDENCE-  -Previously encouraged continued abstinence from smoking.  He states today that he continues to be abstinent and is congratulated on this.    Return to clinic in ~ 4 months + MRI.    The longitudinal plan of care for the diagnosis(es)/condition(s) as documented were addressed during this visit. Due to the added complexity in care, I will continue to support Joss in the subsequent management and with ongoing continuity of care.     This patient was also seen and evaluated by Wilton Noble MS4 under my direct supervision.     Elva Park MD  Neuro-oncology    Again, thank you for allowing me to participate in the care of your patient.        Sincerely,        Elva Park MD

## 2024-07-05 ENCOUNTER — PATIENT OUTREACH (OUTPATIENT)
Dept: CARE COORDINATION | Facility: CLINIC | Age: 49
End: 2024-07-05
Payer: COMMERCIAL

## 2024-07-05 NOTE — PROGRESS NOTES
Social Work - Follow-Up  [facility]    Data/Intervention:    Patient Name: Lang Collins Jr. Goes By: Joss    /Age: 1975 (49 year old)    Reason for Follow-Up:  This clinician called Joss as requested from [facility].    Intervention/Education/Resources Provided:  Joss reported that he has relocated into his new residence:   [address]  [place]   [address]    Joss reports that he is proud of his new residence, and feels that his daughters are adjusting well.     Joss with questions today about how to adjust back to work on SSDI. SW to send additional resources for his consideration to [email or web address]     Assessment/Plan:  Previously provided patient/family with writer's contact information and availability.      SHAYY Sharma, LICSW, OSW-C  Clinical - Adult Oncology  Phone: [phone]  She/Her/Hers  [facility]: Nancy HILL  8am-4:30pm  [facility]: BRET Goodson F 8am-4:30pm   Support Groups at [facility]: Social Work Services for Cancer Patients ([email or web address])

## 2024-07-30 ENCOUNTER — TELEPHONE (OUTPATIENT)
Dept: PALLIATIVE CARE | Facility: CLINIC | Age: 49
End: 2024-07-30

## 2024-07-30 NOTE — TELEPHONE ENCOUNTER
Patient states he is unable to do video visit today. He would like someone from scheduling to call him to reschedule appointment.

## 2024-08-05 ENCOUNTER — TELEPHONE (OUTPATIENT)
Dept: FAMILY MEDICINE | Facility: CLINIC | Age: 49
End: 2024-08-05
Payer: COMMERCIAL

## 2024-08-05 NOTE — LETTER
August 5, 2024    Lang Collins Jr.  [address]  [place] [address]    Dear Joss    At [facility] we care about your health and are committed to providing quality patient care.     Here is a list of Health Maintenance topics that are due now or due soon:  Health Maintenance Due   Topic Date Due    NICOTINE/TOBACCO CESSATION COUNSELING Q 1 YR  Never done    YEARLY PREVENTIVE VISIT  Never done    ANNUAL REVIEW OF HM ORDERS  Never done    ADVANCE CARE PLANNING  Never done    Pneumococcal Vaccine: Pediatrics (0 to 5 Years) and At-Risk Patients (6 to 64 Years) (1 of 2 - PCV) Never done    COLORECTAL CANCER SCREENING  Never done    HIV SCREENING  Never done    HEPATITIS C SCREENING  Never done    HEPATITIS B IMMUNIZATION (1 of 3 - 19+ 3-dose series) Never done    COVID-19 Vaccine (2 - Pfizer risk series) 11/26/2021        We are recommending that you:  Schedule a WELLNESS (Preventative/Physical) APPOINTMENT with your primary care provider. If you go elsewhere for your wellness appointments then please disregard this reminder    ,   Schedule a COLONOSCOPY to assess for colon cancer (due every 10 years or 5 years in higher risk situations.)       Colon cancer is now the second leading cause of cancer-related deaths in the [place] for both men and women and there are over 130,000 new cases and 50,000 deaths per year from colon cancer.  Colonoscopies can prevent 90-95% of these deaths.  Problem lesions can be removed before they ever become cancer.  This test is not only looking for cancer, but also getting rid of precancerious lesions.    If you are under/uninsured, we recommend you contact the [employer] program. [employer] is a free colorectal cancer screening program that provides colonoscopies for eligible under/uninsured [place] men and women. If you are interested in receiving a free colonoscopy, please call [employer] at 1-513.486.8854 (mention code  [employer]) to see if you re eligible.     If you do not wish to do a colonoscopy or cannot afford to do one, at this time, there is another option. It is called a FIT test or Fecal Immunochemical Occult Blood Test (take home stool sample kit).  It does not replace the colonoscopy for colorectal cancer screening, but it can detect hidden bleeding in the lower colon.  It does need to be repeated every year and if a positive result is obtained, you would be referred for a colonoscopy.    If you have completed either one of these tests at another facility, please call/respond to this message with the details of when and where the tests were done and if they were normal or not. Or have the records sent to our clinic so that we can best coordinate your care.  ,   Hyptertension: If you have a home blood pressure monitor, please respond to this message with your latest home blood pressure reading. If you do not, please schedule a free blood pressure check with our clinic.    , and   Schedule a Nurse-Only appointment to update your immunizations: Your records indicate that you are not up to date with your immunizations, please schedule a nurse-only appointment to get these updated or update them at your next office visit. If this is incorrect, please disregard.    To schedule an appointment or discuss this further, you may contact us by phone at the [facility] at [phone] or online through the patient [email or web address] @ [email or web address]    Thank you for trusting [facility] and we appreciate the opportunity to serve you.  We look forward to supporting your healthcare needs in the future.    Your partners in health,      Quality Committee at [facility]

## 2024-08-05 NOTE — TELEPHONE ENCOUNTER
Patient Quality Outreach    Patient is due for the following:   Hypertension -  BP check  Colon Cancer Screening  Physical Preventive Adult Physical      Topic Date Due    Pneumococcal Vaccine (1 of 2 - PCV) Never done    Hepatitis B Vaccine (1 of 3 - 19+ 3-dose series) Never done    COVID-19 Vaccine (2 - Pfizer risk series) 11/26/2021       Next Steps:   Schedule a Adult Preventative    Type of outreach:    Sent letter.      Questions for provider review:    None           Shreya Valenzuela MA

## 2024-09-06 ENCOUNTER — OFFICE VISIT (OUTPATIENT)
Dept: PALLIATIVE CARE | Facility: CLINIC | Age: 49
End: 2024-09-06
Attending: PSYCHIATRY & NEUROLOGY
Payer: COMMERCIAL

## 2024-09-06 VITALS
SYSTOLIC BLOOD PRESSURE: 134 MMHG | RESPIRATION RATE: 20 BRPM | HEART RATE: 77 BPM | BODY MASS INDEX: 20.61 KG/M2 | DIASTOLIC BLOOD PRESSURE: 93 MMHG | TEMPERATURE: 98.7 F | OXYGEN SATURATION: 100 % | WEIGHT: 147.8 LBS

## 2024-09-06 DIAGNOSIS — C71.9 OLIGODENDROGLIOMA, WHO GRADE II (H): Primary | ICD-10-CM

## 2024-09-06 DIAGNOSIS — F41.9 ANXIETY: ICD-10-CM

## 2024-09-06 DIAGNOSIS — R45.86 MOOD AND AFFECT DISTURBANCE: ICD-10-CM

## 2024-09-06 PROCEDURE — 99205 OFFICE O/P NEW HI 60 MIN: CPT | Performed by: INTERNAL MEDICINE

## 2024-09-06 PROCEDURE — G0463 HOSPITAL OUTPT CLINIC VISIT: HCPCS | Performed by: INTERNAL MEDICINE

## 2024-09-06 PROCEDURE — 99417 PROLNG OP E/M EACH 15 MIN: CPT | Performed by: INTERNAL MEDICINE

## 2024-09-06 PROCEDURE — G2211 COMPLEX E/M VISIT ADD ON: HCPCS | Performed by: INTERNAL MEDICINE

## 2024-09-06 RX ORDER — ESCITALOPRAM OXALATE 5 MG/1
2.5 TABLET ORAL DAILY
Qty: 30 TABLET | Refills: 0 | Status: SHIPPED | OUTPATIENT
Start: 2024-09-06

## 2024-09-06 ASSESSMENT — PAIN SCALES - GENERAL: PAINLEVEL: NO PAIN (0)

## 2024-09-06 NOTE — PROGRESS NOTES
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 9 & 10 yo (in 2024) kids live in [place] with their mother. Joss's mother lives here in a NH & has dementia.  [employer] --on disability since shortly after his surgery    Care Planning -       History:  History gathered today from: patient, medical chart    I reviewed the patient's medical history in the chart and confirmed key details today with them; summarized above.  I reviewed with them the various roles of our palliative care program; qol support, sx management, mood/coping/counseling, and care planning.  The patient was referred with a focus on mood.    He describes a variety of concerns.  He was an active, employed man who took care of his family, reliable, responsible.  Since his surgery he's more or less been unable to work. 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 He eventually gave up and also doesn't want to jeopardize his disability/SSD so hasn't worked.  He has no real friends or family here he's in contact with apart from his mother with dementia.  His kids live in [place] and lived with him last year which was great--he was active, cooked for them, felt like he did a pretty good job of parenting them, but they are back with mom and he's alone again.  Has trouble dating--feels shy, just not interested in people like he was.  Same with friends.    Mood is low: feels bad about himself, more irritable  Anxiety--gets episodes of agitation where he feels threatened and needs to flee. Sounds like he's fled the bus several times and even left his bag and phone on the bus he was  so agitated he needed to get out of there. This is on a busy bus that was chaotic with people arguing/shouting etc.   Feels very sensitive to people judging him, looking at him, menacing him, gesturing rudely at him. He understands much of the time people probably aren't doing that but he sees people looking at him and immediately feels a sense of threat or judgmetn. All this is new since the surgery; he wasn't like this before hand.     Tries to make plans--maybe move his mom to [place], etc but can't organize himself to even begin that huge process of eg changing insurance, etc.     Saw a psychotherrapist but he admits to me he lied to him and didn't really tell him what was going on.    Took zoloft a year ago--dysphoric on it; stopped    No hallucinations, no suidical ideation or thoughts of self harm.        PE: There were no vitals taken for this visit.   Wt Readings from Last 3 Encounters:   06/28/24 64.4 kg (141 lb 14.4 oz)   04/30/24 68.3 kg (150 lb 9.6 oz)   03/29/24 66 kg (145 lb 9.6 oz)     Alert NAD      Data reviewed:  I reviewed recent labs and imaging, my comments:  [facility]    Brain MRI June--[facility]    [facility] database reviewed: y      Impression & Recommendations:  50 yo with treated L frontal oligodendroglioma currently on maintenance chemotherapy  Long term complications of a cluster of symptoms and concerns that I suspect are: depression and anxiety, grief, existential losses, loneliness and boredom; plus neurocognitive impairments that are long-term after his surgery -- poor concentration, memory, and executive functioning; personality changes also which may be depression or 'organic'--more shy, less social, more socially sensitive to 'judgement' from others, etc.    Had a vasquez discussion with him about all this. A lot is going on.  Rec'd:   Psychotherapy  OT eval to possibly renew neuro rehab  2.5 mg escitalopram    Boredom and loneliness, feeling idle is also aggravating his situation; I think if he can  return to work a bit that will help--it sounds like he's connecting with a sort of 'back to work' program for patients on SSD and I hope he can continue with that.    The longitudinal plan of care for the diagnosis(es)/condition(s) as documented were addressed during this visit. Due to the added complexity in care, I will continue to support Joss in the subsequent management and with ongoing continuity of care.    Follow-up 6 weeks.     Over 80 minutes spent on the date of the encounter doing chart review, history and exam, patient education & counseling, documentation and other activities as noted above.    Thank you for involving us in the patient's care.   Florin Baig MD / Palliative Medicine / Text me via [employer]  This note may have been composed with voice recognition software and there may be mistranscriptions.

## 2024-09-06 NOTE — LETTER
9/6/2024       RE: Lang Collins Jr.  [address]  [facility] [address]     Dear Colleague,    Thank you for referring your patient, Lang Collins Jr., [facility]. Please see a copy of my visit note below.    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 9 & 10 yo (in 2024) kids live in [place] with their mother. Joss's mother lives here in a NH & has dementia.  [employer] --on disability since shortly after his surgery    Care Planning -       History:  History gathered today from: patient, medical chart    I reviewed the patient's medical history in the chart and confirmed key details today with them; summarized above.  I reviewed with them the various roles of our palliative care program; qol support, sx management, mood/coping/counseling, and care planning.  The patient was referred with a focus on mood.    He describes a variety of concerns.  He was an active, employed man who took care of his family, reliable, responsible.  Since his surgery he's more or less been unable to work. 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 He eventually gave up and also doesn't want to jeopardize his disability/SSD so hasn't worked.  He has no real friends or family here he's in contact with apart from his mother with dementia.  His kids live in [place] and lived with him last year which was great--he was active, cooked for them, felt like he did a pretty good job of parenting them, but they are back with mom and he's alone  again.  Has trouble dating--feels shy, just not interested in people like he was.  Same with friends.    Mood is low: feels bad about himself, more irritable  Anxiety--gets episodes of agitation where he feels threatened and needs to flee. Sounds like he's fled the bus several times and even left his bag and phone on the bus he was so agitated he needed to get out of there. This is on a busy bus that was chaotic with people arguing/shouting etc.   Feels very sensitive to people judging him, looking at him, menacing him, gesturing rudely at him. He understands much of the time people probably aren't doing that but he sees people looking at him and immediately feels a sense of threat or judgmetn. All this is new since the surgery; he wasn't like this before hand.     Tries to make plans--maybe move his mom to [place], etc but can't organize himself to even begin that huge process of eg changing insurance, etc.     Saw a psychotherrapist but he admits to me he lied to him and didn't really tell him what was going on.    Took zoloft a year ago--dysphoric on it; stopped    No hallucinations, no suidical ideation or thoughts of self harm.        PE: There were no vitals taken for this visit.   Wt Readings from Last 3 Encounters:   06/28/24 64.4 kg (141 lb 14.4 oz)   04/30/24 68.3 kg (150 lb 9.6 oz)   03/29/24 66 kg (145 lb 9.6 oz)     Alert NAD      Data reviewed:  I reviewed recent labs and imaging, my comments:  [facility]    Brain MRI June--MYNOR    [facility] database reviewed: y      Impression & Recommendations:  50 yo with treated L frontal oligodendroglioma currently on maintenance chemotherapy  Long term complications of a cluster of symptoms and concerns that I suspect are: depression and anxiety, grief, existential losses, loneliness and boredom; plus neurocognitive impairments that are long-term after his surgery -- poor concentration, memory, and executive functioning; personality changes also which may be depression or  'organic'--more shy, less social, more socially sensitive to 'judgement' from others, etc.    Had a vasquez discussion with him about all this. A lot is going on.  Rec'd:   Psychotherapy  OT eval to possibly renew neuro rehab  2.5 mg escitalopram    Boredom and loneliness, feeling idle is also aggravating his situation; I think if he can return to work a bit that will help--it sounds like he's connecting with a sort of 'back to work' program for patients on SSD and I hope he can continue with that.    The longitudinal plan of care for the diagnosis(es)/condition(s) as documented were addressed during this visit. Due to the added complexity in care, I will continue to support Joss in the subsequent management and with ongoing continuity of care.    Follow-up 6 weeks.     Over 80 minutes spent on the date of the encounter doing chart review, history and exam, patient education & counseling, documentation and other activities as noted above.    Thank you for involving us in the patient's care.   Florin Baig MD / Palliative Medicine / Text me via [employer]  This note may have been composed with voice recognition software and there may be mistranscriptions.      Again, thank you for allowing me to participate in the care of your patient.      Sincerely,    Florin Baig MD

## 2024-09-06 NOTE — NURSING NOTE
"Oncology Rooming Note    September 6, 2024 3:08 PM   Lang Collins Jr. is a 49 year old male who presents for:    Chief Complaint   Patient presents with    Oncology Clinic Visit     Oligodendroglioma, WHO grade II     Initial Vitals: BP (!) 134/93 (BP Location: Right arm, Patient Position: Sitting, Cuff Size: Adult Regular)   Pulse 77   Temp 98.7  F (37.1  C) (Oral)   Resp 20   Wt 67 kg (147 lb 12.8 oz)   SpO2 100%   BMI 20.61 kg/m   Estimated body mass index is 20.61 kg/m  as calculated from the following:    Height as of 1/9/24: 1.803 m (5' 11\").    Weight as of this encounter: 67 kg (147 lb 12.8 oz). Body surface area is 1.83 meters squared.  No Pain (0) Comment: Data Unavailable   No LMP for male patient.  Allergies reviewed: Yes  Medications reviewed: Yes    Medications: Medication refills not needed today.  Pharmacy name entered into [facility]:    "[id number] - [place], [place] - [address], [place] - [address]  [facility] [place], [place] [facility] [address] [id number] - [place], [place] - [address] [id number] - [place], [place] - [address] AT [address] [place] [address]    Frailty Screening:   Is the patient here for a new oncology consult visit in cancer care? 1. Yes. Over the past month, have you experienced difficulty or required a caregiver to assist with:   1. Balance, walking or general mobility (including any falls)? NO  2. Completion of self-care tasks such as bathing, dressing, toileting, grooming/hygiene?  NO  3. Concentration or memory that affects your daily life?  YES       Clinical concerns: Patient states no new concerns to discuss with provider.       Rahel Varghese EMT            "

## 2024-09-13 ENCOUNTER — THERAPY VISIT (OUTPATIENT)
Dept: OCCUPATIONAL THERAPY | Facility: CLINIC | Age: 49
End: 2024-09-13
Attending: INTERNAL MEDICINE
Payer: COMMERCIAL

## 2024-09-13 DIAGNOSIS — Z98.890 S/P CRANIOTOMY: ICD-10-CM

## 2024-09-13 DIAGNOSIS — C71.9 OLIGODENDROGLIOMA, WHO GRADE II (H): ICD-10-CM

## 2024-09-13 DIAGNOSIS — C71.9 OLIGODENDROGLIOMA OF BRAIN (H): Primary | ICD-10-CM

## 2024-09-13 DIAGNOSIS — Z78.9 IMPAIRED INSTRUMENTAL ACTIVITIES OF DAILY LIVING (IADL): ICD-10-CM

## 2024-09-13 PROCEDURE — 97535 SELF CARE MNGMENT TRAINING: CPT | Mod: GO | Performed by: OCCUPATIONAL THERAPIST

## 2024-09-13 PROCEDURE — 97166 OT EVAL MOD COMPLEX 45 MIN: CPT | Mod: GO | Performed by: OCCUPATIONAL THERAPIST

## 2024-09-16 NOTE — PROGRESS NOTES
OCCUPATIONAL THERAPY EVALUATION  Type of Visit: Evaluation             Subjective      Presenting condition or subjective complaint: Anxiety and depression and focus  Date of onset: 09/06/24 (order date)    Relevant medical history: Cancer; Depression; High blood pressure; Radiation treatment; Seizures; Significant weakness; Smoking; Vision problems   Past Medical History:   Diagnosis Date    Depressive disorder 2021    Hypertension     Oligodendroglioma, WHO grade II (H) 12/07/2021    Primary hypertension 11/08/2021    S/P craniotomy 11/16/2021       Dates & types of surgery:    Past Surgical History:   Procedure Laterality Date    OPTICAL TRACKING SYSTEM CRANIOTOMY, EXCISE TUMOR WITH MAPPING, COMBINED Left 11/12/2021    Procedure: Left frontal craniotomy for tumor resection with asleep motor mapping;  Surgeon: Kuldeep Beltrán MD;  Location: [facility] OR    ORTHOPEDIC SURGERY           Prior diagnostic imaging/testing results: MRI     Prior therapy history for the same diagnosis, illness or injury: Yes      Prior Level of Function  Transfers: Independent  Ambulation: Independent  ADL: Independent  IADL: Childcare, Driving, Finances, Housekeeping, Laundry, Meal preparation, Medication management, Work    Living Environment  Social support: Alone   Type of home: Apartment/condo; Basement   Stairs to enter the home: Yes 10 Is there a railing: Yes     Ramp: No   Stairs inside the home: No       Help at home: Assist for driving and community activities  Equipment owned:       Employment: Yes    Hobbies/Interests: Being with my kids    Patient goals for therapy: Work to hold a job down    Pain assessment: Pain denied     Objective     Cognitive Status Examination  Orientation: Oriented to person, place and time   Level of Consciousness: Alert  Follows Commands and Answers Questions: 100% of the time  Personal Safety and Judgement:   Memory: Impaired Pt recalled 2/5 novel words after a five minute delay  Attention: Reports  problems attending  Organization/Problem Solving: Reports problems with problem solving during eval  Executive Function: No deficits identified    VISUAL SKILLS  Visual Acuity: Wears glasses  Visual Field: Appears normal  Visual Attention: Appears normal  Oculomotor:     SENSATION:     POSTURE:   RANGE OF MOTION: UE AROM WFL  STRENGTH: UE Strength WFL  MUSCLE TONE:   COORDINATION: WFL  BALANCE:     Pt requires increased time and effort to complete is self cares.     BATHING: Independent  Equipment:     UPPER BODY DRESSING: Independent  Equipment:     LOWER BODY DRESSING: Independent  Equipment:     TOILETING: Independent  Equipment:     GROOMING: Independent  Equipment:     EATING/SELF FEEDING: Independent   Equipment:     ACTIVITY TOLERANCE: decreased    INSTRUMENTAL ACTIVITIES OF DAILY LIVING (IADL): Pt completes meal planning/prep, home management  but requires increased time and effort to complete                Assessment & Plan   CLINICAL IMPRESSIONS  Medical Diagnosis: Oligodendroglioma, WHO grade II    Treatment Diagnosis: Decreased ADLs/IADLs    Impression/Assessment: Pt is a 49 year old male presenting to Occupational Therapy due to oligodendroglioma.  The following significant findings have been identified: Impaired activity tolerance, Impaired cognition, and Anxiety and/or fear.  These identified deficits interfere with their ability to perform self care tasks, work tasks, household chores, driving , and  yard work as compared to previous level of function.     Clinical Decision Making (Complexity):  Assessment of Occupational Performance: 3-5 Performance Deficits  Occupational Performance Limitations: bathing/showering, driving and community mobility, home establishment and management, meal preparation and cleanup, sleep, work, and social participation  Clinical Decision Making (Complexity): Moderate complexity    PLAN OF CARE  Treatment Interventions:  Interventions: Self care/home management  Long  Term Goals   OT Goal 1  Goal Identifier: Fatigue managment strategies  Goal Description: Patient to verbalize and independently utilize 3 strategies for energy conservation/work simplification and fatigue management for improved independence with ADL/IADLs at home and in the community, and demonstrate use of these tasks during session (Errands List, taking breaks prn during session, etc.)  Target Date: 11/08/24  OT Goal 2  Goal Identifier: Safety with community mobility  Goal Description: Patient will demonstrate WFLs visual scanning (organized scanning pattern), visual reaction time and divided attention skills using compensatory strategies prn, for safe independent community mobility (crossing the street, driving etc.) and increased safety with ADL/IADLs at home  by scoring WNLs on all modes of the [employer] and pen/paper scanning tasks.  Target Date: 11/08/24  OT Goal 3  Goal Identifier: Problem solving/memory compensation strategies.  Goal Description: Pt will complete problem solving,  numerical reasoning and complex short-term memory tasks (using compensatory strategies prn) in the appropriate amount of time and with 90% accuracy to improve problem solving ability and increase safety and independence with ADL/IADLs at home, work and in the community.  Target Date: 11/08/24  OT Goal 4  Goal Identifier: Relaxation strategies  Goal Description: Pt will verbalize and demonstrated three new relaxation strategies to address fatigue and anxiety symptoms.  Target Date: 11/08/24  OT Goal 5  Goal Identifier: Community engagement  Goal Description: Pt to be able to alternate between 2-3 different tasks ( eg bill paying, word puzzle and making phone calls) x 15 minutes with 90% accuracy on all three tasks, to stimulate return to higher level work, cooking/home care, ability to grocery shop, and maneuver with passengers in the community  Target Date: 11/08/24      Frequency of Treatment: 1x/week  Duration of Treatment: 56  days     Recommended Referrals to Other Professionals:   Education Assessment: Learner/Method: Patient;Listening  Education Comments: Pt instructed in role of OT, plan of care and goals.     Risks and benefits of evaluation/treatment have been explained.   Patient/Family/caregiver agrees with Plan of Care.     Evaluation Time:    OT Eval, Moderate Complexity Minutes (22416): 30       Signing Clinician: SE Christianson [facility]                                                                                   OUTPATIENT OCCUPATIONAL THERAPY      PLAN OF TREATMENT FOR OUTPATIENT REHABILITATION   Patient's Last Name, First Name, Lang Garvin    YOB: 1975   Provider's Name   [facility]   Medical Record No.  [id number]     Onset Date: 09/06/24 (order date) Start of Care Date: 09/13/24     Medical Diagnosis:  Oligodendroglioma, WHO grade II      OT Treatment Diagnosis:  Decreased ADLs/IADLs Plan of Treatment  Frequency/Duration:1x/week/56 days    Certification date from 09/13/24   To 11/08/24        See note for plan of treatment details and functional goals     Nora Awad OT                         I CERTIFY THE NEED FOR THESE SERVICES FURNISHED UNDER        THIS PLAN OF TREATMENT AND WHILE UNDER MY CARE     (Physician attestation of this document indicates review and certification of the therapy plan).              Referring Provider:  Florin Baig    Initial Assessment  See Epic Evaluation- 09/13/24

## 2024-09-25 ENCOUNTER — THERAPY VISIT (OUTPATIENT)
Dept: OCCUPATIONAL THERAPY | Facility: CLINIC | Age: 49
End: 2024-09-25
Attending: INTERNAL MEDICINE
Payer: COMMERCIAL

## 2024-09-25 DIAGNOSIS — C71.9 OLIGODENDROGLIOMA, WHO GRADE II (H): Primary | ICD-10-CM

## 2024-09-25 DIAGNOSIS — Z78.9 IMPAIRED INSTRUMENTAL ACTIVITIES OF DAILY LIVING (IADL): ICD-10-CM

## 2024-09-25 DIAGNOSIS — Z98.890 S/P CRANIOTOMY: ICD-10-CM

## 2024-09-25 PROCEDURE — 97535 SELF CARE MNGMENT TRAINING: CPT | Mod: GO | Performed by: OCCUPATIONAL THERAPIST

## 2024-10-09 PROBLEM — G81.91 RIGHT HEMIPARESIS (H): Status: ACTIVE | Noted: 2024-10-09

## 2024-10-15 ENCOUNTER — MEDICAL CORRESPONDENCE (OUTPATIENT)
Dept: HEALTH INFORMATION MANAGEMENT | Facility: CLINIC | Age: 49
End: 2024-10-15
Payer: COMMERCIAL

## 2024-10-23 NOTE — PROGRESS NOTES
"Brain mri NEURO-ONCOLOGY VISIT  Oct 28, 2024    CHIEF COMPLAINT: Mr. 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     He was monitored on imaging surveillance until imaging in 12/2022 showed a subtle change concerning for cancer progression. As a result, initiation of cancer-directed therapy was recommended.     He completed radiation with concurrent temozolomide on 8/8/2023. Post-radiation imaging demonstrated no concerning findings with a subtle positive initial response to treatment. Joss was initiated on adjuvant-dosed temozolomide in 10/2023 and he remains on this treatment. Repeat imaging in 2/2024 and again in 6/2024 showed overall stability with evidence of evolving post-radiation injury.      Imaging in 10/2024 showed continued overall stability with no new concerns.     Joss is presenting in follow-up today.    HISTORY OF PRESENT ILLNESS:  -Joss states that his children have moved back to [place] to live with their mother.    He feels that he has lost a sense of purpose.   -Joss states that his sleep is off; going to bed 7pm, but awaking at midnight.   Feeling fatigued.    Equates his early bedtime to being bored and having limited social interactions.    Sending much time sitting in his house, he has no interest in socializing. His house is his \"safe place\".   He is not depressed, but lacks motivation.    Experiencing episodes of PTSD.    Feeling more emotional. Crying more frequently. Feeling more anxious.    Interested in meeting with Dr. Ulloa again.    Following with palliative care.   -Infrequent headaches; triggered by fatigue.   -Still having problems with function of the right foot/leg; stable.   -Not having any vision changes.  -No seizures, no falls.   -Memory waxes and wanes, but able to live independently.       MEDICATIONS "   Current Outpatient Medications   Medication Sig Dispense Refill    escitalopram (LEXAPRO) 5 MG tablet Take 0.5 tablets (2.5 mg) by mouth daily. (Patient taking differently: Take 5 mg by mouth daily.) 30 tablet 0     DRUG ALLERGIES No Known Allergies     IMMUNIZATIONS   Immunization History   Administered Date(s) Administered    COVID-19 MONOVALENT 12+ (Pfizer) 11/05/2021    TDAP (Adacel,Boostrix) 12/03/2021       ONCOLOGIC HISTORY  -8/8/2021 PRESENTATION: New onset seizure; generalized event. Started on Keppra.  -8/2021 MR brain imaging with a 2.1cm left frontal non-enhancing mass in the superior frontal gyrus in the expected region of the supplementary motor area.  -9/8/2021 MR brain imaging with no significant change in the nonenhancing T2 hyperintense mass.  -11/12/21 SURGERY: Left frontal-parietal craniotomy for mass resection.   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  Post-operative imaging with interval left frontal craniotomy for resection of abnormal lesion within the left parafalcine frontal lobe.  Hemiparesis, discharged to ARU.  -12/7/2021 NEURO-ONC: Recommending imaging surveillance given low risk factors and need to continue to focus on ongoing rehab. Referral to Dr. Chiang for optimizing rehab. Referral to [facility]/ epilepsy for seizure risk management in relation to future employment. Trial of flexeril 10mg at bedtime for headaches.   -3/8/2022 NEURO-ONC/ MRB: Clinically well; improved from prior. Restarting Keppra; second referral to [facility]. Imaging with post-surgical changes. Continue imaging surveillance.   -6/7/2022 NEURO-ONC/ MRB: Clinically well. Imaging with continued healing post-surgery. Continue imaging surveillance.   -12/6/2022 NEURO-ONC/ MRB: Clinically with no new/ progressive neurological symptoms. Imaging with a subtle increase in T2 FLAIR about the posterior aspect of the resection cavity. Continue imaging surveillance  at a shortened interval of 3 months.   -2/28/2023 NEURO-ONC/ MRB: Clinically with no new/ progressive neurological symptoms. Imaging largely stable, but close interval imaging was recommended.   -5/30/2023 NEURO-ONC/ MRB: Worsened mood, concentration; starting Zoloft. Imaging with subtle progression noted when comparing to imaging 6 months ago; recommending chemoradiotherapy with referral to radiation oncology. Social work involvement. Primary care referral for management of hypertension.   -6/16/2023 NEURO-ONC: Started Zoloft, stopped r/t feeling jittery. Imaging with subtle progression noted when comparing to imaging 6 months ago; planned start date is 6/27 for chemoradiotherapy. Ongoing social work involvement.   -7/21/2023 NEURO-ONC: Tolerating chemoradiation well.  Tolerating Zoloft with improved mood.  Continues to follow with psychology and social work.  -8/8/2023 CHEMORADS: Completed 5400 cGy in 30 fractions with concurrent temozolomide.   -10/17/2023 NEURO-ONC/ MRB/ CHEMO: Dizziness; low vitamin D. Anxiety; increase Zoloft. Imaging with no concerns; initial subtle positive treatment response. Starting adjuvant temozolomide 150mg/m2 (280mg), cycle 1.  -12/5/2023 NEURO-ONC/ CHEMO: Dizziness; low vitamin D. Anxiety; increase Zoloft. Imaging with no concerns; initial subtle positive treatment response. Continuing adjuvant temozolomide 200mg/m2 (360mg), cycle 2.  -1/10/2024 CHEMO: Continuing adjuvant temozolomide 150mg/m2 (180mg), cycle 3.  -2/13/2024 NEURO-ONC/ MRB/ CHEMO: Headaches, low mood at times, right left weakness. Imaging stable. Needing to have a tooth pulled; holding adjuvant temozolomide 150mg/m2 (280mg), cycle 4 until after dental procedure.  -3/26/2024 NEURO-ONC/ CHEMO: Continue adjuvant temozolomide 150mg/m2 (280mg), cycle 5.  -4/30/2024 NEURO-ONC/ CHEMO: Doing well with no new changes.  Stable right leg weakness.  Continue adjuvant temozolomide 150mg/m2 (280mg), cycle 6.  -6/28/2024 NEURO-ONC/  "MRB: No neurological concerns, but notes low mood with increased anxiety and paranoia, cognitive concerns, and low appetite. Imaging stable with evolving post-radiation changes.  -10/28/2024 NEURO-ONC/ MRB: No neurological concerns; stable degree of poor coordination in his right foot/ leg. Notes low mood with increased anxiety/ PTSD and boredom. Following with palliative care, referral to Dr. Ulloa, SARAH engagement for community involvement. Imaging stable with evolving post-radiation changes. Discussed the option of starting Voranigo; holding for now.       PHYSICAL EXAMINATION  BP (!) 149/110 (BP Location: Right arm, Patient Position: Sitting, Cuff Size: Adult Regular)   Pulse 56   Temp 97.9  F (36.6  C) (Oral)   Resp 18   Wt 69.6 kg (153 lb 6.4 oz)   SpO2 100%   BMI 21.39 kg/m     Wt Readings from Last 2 Encounters:   10/28/24 69.6 kg (153 lb 6.4 oz)   09/06/24 67 kg (147 lb 12.8 oz)      Ht Readings from Last 2 Encounters:   01/09/24 1.803 m (5' 11\")   12/05/23 1.803 m (5' 11\")     KPS: 100    -Generally well appearing.  -Psychiatric: Normal mood and affect. Pleasant, talkative.  -Neurologic:   MENTAL STATUS:     Alert, oriented.    Recall: Intact.    Speech fluent.    Comprehension intact.     CRANIAL NERVES:      Symmetric facial movements.   Hearing intact.   With normal phonation, no dysfunction of the palate or tongue.   GAIT: Steady, unassisted.         MEDICAL RECORDS  Personally reviewed; Clinic visit from Dr. Baig in September; recommendations included \"Psychotherapy.  OT eval to possibly renew neuro rehab. 2.5 mg escitalopram. Boredom and loneliness, feeling idle is also aggravating his situation; I think if he can return to work a bit that will help--it sounds like he's connecting with a sort of 'back to work' program for patients on SSD and I hope he can continue with that.\"    LABS  Personally reviewed all available lab results; No recent results.    IMAGING  Personally reviewed MR brain " "imaging from today and compared to prior imaging.    Formal read; \"This patient with left frontal grade 2 oligodendroglioma status post surgical resection and chemoradiotherapy, there is no definite evidence for progression. T2 hyperintense signal surrounding the  resection cavity is unchanged compared to June 2024, however slightly increased since 10/17/2023, as previously noted.\"     Imaging was shown to and results were reviewed with Joss.       IMPRESSION  On date of service, 42 minutes was spent in clinic and 10 minutes was spent preparing for the visit through extensive chart review and coordinating care for this high complexity visit. The following is in explanation for the recommendations used to define the plan.       Joss states that since he is no longer caring for his children, he has lost his sense of purpose. He not eating as well as he should. His sleep cycle is also negatively impacted and he is feeling fatigued most of the day. Joss equates his early bedtime to being bored, as he has no interest in leaving his house and socializing. While he states that he is not \"depressed\", he endorses feeling more emotional, noting a lack in motivation, feeling more anxious, and experiencing episodes consistent with PTSD. Joss states that he is interested in meeting with Dr. Ulloa again and I alerted [facility] to assist. Joss will continue to follow with Dr. Baig. Joss started Lexapro, but has not benefited from this medication. Finally, I will again connect Joss with Social Work to see if there are connections/ organizations to get Joss involved in his community. 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      Otherwise, his right leg weakness is stable and he denies any recurrent seizure nor concerns regarding headaches.     Imaging as detailed above was stable as " "compare to earlier this year with no new concerns. Therefore, the plan is to continue imaging surveillance per NCCN guidelines of every 3-6 months for the first 5 years (at least through 10/2028) and then, every 6 months as clinically indicated. At this time, will repeat imaging in 4 months. In the meantime, Joss knows to call the clinic with any concerns and appointments can be moved up if needed.    Finally today, I had a discussion with Joss about the option of starting \"maintenance\" cancer-directed therapy with an IDH inhibitor; Voranigo (vorasidenib) 40MG daily. Voranigo is now FDA approved for treatment of IDH-mutated low grade/ grade 2 astrocytomas and oligodendrogliomas. This is based on results from the phase 3 INDIGO study that showed prolonged imaging stability for patients with an IDH-mutated grade 2 glioma treated with an IDH inhibitor. Side effects of Voranigo can include fatigue, diarrhea, muscle pain, seizure, reduced neutrophils, and hepatic toxicity. Since the INDIGO study only enrolled treatment-naive patients, there is a lack of supportive evidence to know if Voranigo will have the same benefit in patients that have already received therapy. As seen currently, Joss's imaging demonstrates no concerning findings. In addition, Joss is not at a point where he would best be able to tolerate chemotherapy. Therefore, after weighing the risks/ benefits plus lack of specific evidence-based support, we are all in agreement to hold on the use of this therapy at this point, but can have another discussion at follow-up in early 2025.     PROBLEM LIST  Oligodendroglioma  Hemiparesis, right  Tension headache  Seizure  Fatigue  Hypertension  Depressed mood, anxiety, irritability    PLAN  -CANCER DIRECTED THERAPY-  -Continue imaging surveillance; Repeat imaging in 4 months.      -STEROIDS-  -Currently off dexamethasone.    -SEIZURE MANAGEMENT-  -Off Keppra.   -Following with Dr. Panda.      -Quality of life/ MOOD/ " HEADACHE/ FATIGUE-  -Vitamin D deficiency.   Recommend starting daily supplementation with vitamin D 200-400 international unit(s).  -Following with palliative care; Dr. Baig.     Lexapro with limited to no benefit.    Self-discontinued Zoloft due to intolerable side effects.   -Recommending scheduling an appointment with Denilson Ulloa Psy.D., LP   Licensed Psychologist with [facility]  [email or web address]  [phone]  -Using marijuana for anxiety.   -Social work following.     -HEMIPARESIS-  -Following with Dr. Chiang.     -HYPERTENSION-  -Primary care referral for management.    -TOBACCO DEPENDENCE-  -Previously encouraged continued abstinence from smoking.  He states today that he continues to be abstinent and is congratulated on this.    Return to clinic in early March 2025 + MRI.    The longitudinal plan of care for the diagnosis(es)/condition(s) as documented were addressed during this visit. Due to the added complexity in care, I will continue to support Joss in the subsequent management and with ongoing continuity of care.     Elva Park MD  Neuro-oncology

## 2024-10-28 ENCOUNTER — ANCILLARY PROCEDURE (OUTPATIENT)
Dept: MRI IMAGING | Facility: CLINIC | Age: 49
End: 2024-10-28
Attending: PSYCHIATRY & NEUROLOGY
Payer: COMMERCIAL

## 2024-10-28 ENCOUNTER — ONCOLOGY VISIT (OUTPATIENT)
Dept: ONCOLOGY | Facility: CLINIC | Age: 49
End: 2024-10-28
Attending: PSYCHIATRY & NEUROLOGY
Payer: COMMERCIAL

## 2024-10-28 ENCOUNTER — TUMOR CONFERENCE (OUTPATIENT)
Dept: ONCOLOGY | Facility: CLINIC | Age: 49
End: 2024-10-28
Payer: COMMERCIAL

## 2024-10-28 VITALS
WEIGHT: 153.4 LBS | SYSTOLIC BLOOD PRESSURE: 149 MMHG | OXYGEN SATURATION: 100 % | TEMPERATURE: 97.9 F | BODY MASS INDEX: 21.39 KG/M2 | RESPIRATION RATE: 18 BRPM | HEART RATE: 56 BPM | DIASTOLIC BLOOD PRESSURE: 110 MMHG

## 2024-10-28 DIAGNOSIS — C71.9 OLIGODENDROGLIOMA, WHO GRADE II (H): Primary | ICD-10-CM

## 2024-10-28 DIAGNOSIS — C71.9 OLIGODENDROGLIOMA, WHO GRADE II (H): ICD-10-CM

## 2024-10-28 PROCEDURE — A9585 GADOBUTROL INJECTION: HCPCS | Performed by: RADIOLOGY

## 2024-10-28 PROCEDURE — 70553 MRI BRAIN STEM W/O & W/DYE: CPT | Performed by: RADIOLOGY

## 2024-10-28 PROCEDURE — 99215 OFFICE O/P EST HI 40 MIN: CPT | Performed by: PSYCHIATRY & NEUROLOGY

## 2024-10-28 PROCEDURE — G0463 HOSPITAL OUTPT CLINIC VISIT: HCPCS | Performed by: PSYCHIATRY & NEUROLOGY

## 2024-10-28 RX ORDER — GADOBUTROL 604.72 MG/ML
7.5 INJECTION INTRAVENOUS ONCE
Status: COMPLETED | OUTPATIENT
Start: 2024-10-28 | End: 2024-10-28

## 2024-10-28 RX ADMIN — GADOBUTROL 7 ML: 604.72 INJECTION INTRAVENOUS at 08:47

## 2024-10-28 ASSESSMENT — PAIN SCALES - GENERAL: PAINLEVEL_OUTOF10: NO PAIN (0)

## 2024-10-28 NOTE — NURSING NOTE
"Oncology Rooming Note    October 28, 2024 10:20 AM   Lang Collins Jr. is a 49 year old male who presents for:    Chief Complaint   Patient presents with    Oncology Clinic Visit     Oligodendroglioma, WHO grade II     Initial Vitals: BP (!) 149/110 (BP Location: Right arm, Patient Position: Sitting, Cuff Size: Adult Regular)   Pulse 56   Temp 97.9  F (36.6  C) (Oral)   Resp 18   Wt 69.6 kg (153 lb 6.4 oz)   SpO2 100%   BMI 21.39 kg/m   Estimated body mass index is 21.39 kg/m  as calculated from the following:    Height as of 1/9/24: 1.803 m (5' 11\").    Weight as of this encounter: 69.6 kg (153 lb 6.4 oz). Body surface area is 1.87 meters squared.  No Pain (0) Comment: Data Unavailable   No LMP for male patient.  Allergies reviewed: Yes  Medications reviewed: Yes    Medications: MEDICATION REFILLS NEEDED TODAY. Provider was notified.  Pharmacy name entered into [employer]:    [id number] - [place], [place] - [address] [place] [address], [place] - [address]  [facility] - [place], [place] - [address] [id number] - [place], [place] - [address] [id number] - [place], [place] - [address] AT [address] [place] [address]    Frailty Screening:   Is the patient here for a new oncology consult visit in cancer care? 2. No      Clinical concerns: Patient states that the escitalopram does not seem to be working, as he does not feel very different from before he started the medication.       Rahel Varghese EMT            "

## 2024-10-28 NOTE — DISCHARGE INSTRUCTIONS
MRI Contrast Discharge Instructions    The IV contrast you received today will pass out of your body in your  urine. This will happen in the next 24 hours. You will not feel this process.  Your urine will not change color.    Drink at least 4 extra glasses of water or juice today (unless your doctor  has restricted your fluids). This reduces the stress on your kidneys.  You may take your regular medicines.    If you are on dialysis: It is best to have dialysis today.    If you have a reaction: Most reactions happen right away. If you have  any new symptoms after leaving the hospital (such as hives or swelling),  call your hospital at the correct number below. Or call your family doctor.  If you have breathing distress or wheezing, call 911.    Special instructions: ***    I have read and understand the above information.    Signature:______________________________________ Date:___________    Staff:__________________________________________ Date:___________     Time:__________    [facility] Departments:    ___[place]: [phone]  ___[facility]: [phone]  ___[place]: [phone] ___[facility]: [phone]  ___[facility]: [phone]  ___[facility]: [phone]  ___[phone]  ___[facility]: [phone]  ___[phone]

## 2024-10-28 NOTE — LETTER
"10/28/2024      Lang Collins Jr.  [address]  [facility] [address]      Dear Colleague,    Thank you for referring your patient, Lang Collins Jr., to the [facility]. Please see a copy of my visit note below.    Brain mri NEURO-ONCOLOGY VISIT  Oct 28, 2024    CHIEF COMPLAINT: Mr. 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     He was monitored on imaging surveillance until imaging in 12/2022 showed a subtle change concerning for cancer progression. As a result, initiation of cancer-directed therapy was recommended.     He completed radiation with concurrent temozolomide on 8/8/2023. Post-radiation imaging demonstrated no concerning findings with a subtle positive initial response to treatment. Joss was initiated on adjuvant-dosed temozolomide in 10/2023 and he remains on this treatment. Repeat imaging in 2/2024 and again in 6/2024 showed overall stability with evidence of evolving post-radiation injury.      Imaging in 10/2024 showed continued overall stability with no new concerns.     Joss is presenting in follow-up today.    HISTORY OF PRESENT ILLNESS:  -Joss states that his children have moved back to [place] to live with their mother.    He feels that he has lost a sense of purpose.   -Joss states that his sleep is off; going to bed 7pm, but awaking at midnight.   Feeling fatigued.    Equates his early bedtime to being bored and having limited social interactions.    Sending much time sitting in his house, he has no interest in socializing. His house is his \"safe place\".   He is not depressed, but lacks motivation.    Experiencing episodes of PTSD.    Feeling more emotional. Crying more frequently. Feeling more anxious.    Interested in meeting with Dr. Ulloa again.    Following with palliative care. "   -Infrequent headaches; triggered by fatigue.   -Still having problems with function of the right foot/leg; stable.   -Not having any vision changes.  -No seizures, no falls.   -Memory waxes and wanes, but able to live independently.       MEDICATIONS   Current Outpatient Medications   Medication Sig Dispense Refill     escitalopram (LEXAPRO) 5 MG tablet Take 0.5 tablets (2.5 mg) by mouth daily. (Patient taking differently: Take 5 mg by mouth daily.) 30 tablet 0     DRUG ALLERGIES No Known Allergies     IMMUNIZATIONS   Immunization History   Administered Date(s) Administered     COVID-19 MONOVALENT 12+ (Pfizer) 11/05/2021     TDAP (Adacel,Boostrix) 12/03/2021       ONCOLOGIC HISTORY  -8/8/2021 PRESENTATION: New onset seizure; generalized event. Started on Keppra.  -8/2021 MR brain imaging with a 2.1cm left frontal non-enhancing mass in the superior frontal gyrus in the expected region of the supplementary motor area.  -9/8/2021 MR brain imaging with no significant change in the nonenhancing T2 hyperintense mass.  -11/12/21 SURGERY: Left frontal-parietal craniotomy for mass resection.   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  Post-operative imaging with interval left frontal craniotomy for resection of abnormal lesion within the left parafalcine frontal lobe.  Hemiparesis, discharged to ARU.  -12/7/2021 NEURO-ONC: Recommending imaging surveillance given low risk factors and need to continue to focus on ongoing rehab. Referral to Dr. Chiang for optimizing rehab. Referral to [facility]/ epilepsy for seizure risk management in relation to future employment. Trial of flexeril 10mg at bedtime for headaches.   -3/8/2022 NEURO-ONC/ MRB: Clinically well; improved from prior. Restarting Keppra; second referral to [facility]. Imaging with post-surgical changes. Continue imaging surveillance.   -6/7/2022 NEURO-ONC/ MRB: Clinically well. Imaging with continued healing  post-surgery. Continue imaging surveillance.   -12/6/2022 NEURO-ONC/ MRB: Clinically with no new/ progressive neurological symptoms. Imaging with a subtle increase in T2 FLAIR about the posterior aspect of the resection cavity. Continue imaging surveillance at a shortened interval of 3 months.   -2/28/2023 NEURO-ONC/ MRB: Clinically with no new/ progressive neurological symptoms. Imaging largely stable, but close interval imaging was recommended.   -5/30/2023 NEURO-ONC/ MRB: Worsened mood, concentration; starting Zoloft. Imaging with subtle progression noted when comparing to imaging 6 months ago; recommending chemoradiotherapy with referral to radiation oncology. Social work involvement. Primary care referral for management of hypertension.   -6/16/2023 NEURO-ONC: Started Zoloft, stopped r/t feeling jittery. Imaging with subtle progression noted when comparing to imaging 6 months ago; planned start date is 6/27 for chemoradiotherapy. Ongoing social work involvement.   -7/21/2023 NEURO-ONC: Tolerating chemoradiation well.  Tolerating Zoloft with improved mood.  Continues to follow with psychology and social work.  -8/8/2023 CHEMORADS: Completed 5400 cGy in 30 fractions with concurrent temozolomide.   -10/17/2023 NEURO-ONC/ MRB/ CHEMO: Dizziness; low vitamin D. Anxiety; increase Zoloft. Imaging with no concerns; initial subtle positive treatment response. Starting adjuvant temozolomide 150mg/m2 (280mg), cycle 1.  -12/5/2023 NEURO-ONC/ CHEMO: Dizziness; low vitamin D. Anxiety; increase Zoloft. Imaging with no concerns; initial subtle positive treatment response. Continuing adjuvant temozolomide 200mg/m2 (360mg), cycle 2.  -1/10/2024 CHEMO: Continuing adjuvant temozolomide 150mg/m2 (180mg), cycle 3.  -2/13/2024 NEURO-ONC/ MRB/ CHEMO: Headaches, low mood at times, right left weakness. Imaging stable. Needing to have a tooth pulled; holding adjuvant temozolomide 150mg/m2 (280mg), cycle 4 until after dental  "procedure.  -3/26/2024 NEURO-ONC/ CHEMO: Continue adjuvant temozolomide 150mg/m2 (280mg), cycle 5.  -4/30/2024 NEURO-ONC/ CHEMO: Doing well with no new changes.  Stable right leg weakness.  Continue adjuvant temozolomide 150mg/m2 (280mg), cycle 6.  -6/28/2024 NEURO-ONC/ MRB: No neurological concerns, but notes low mood with increased anxiety and paranoia, cognitive concerns, and low appetite. Imaging stable with evolving post-radiation changes.  -10/28/2024 NEURO-ONC/ MRB: No neurological concerns; stable degree of poor coordination in his right foot/ leg. Notes low mood with increased anxiety/ PTSD and boredom. Following with palliative care, referral to Dr. Ulloa, SARAH estes for community involvement. Imaging stable with evolving post-radiation changes. Discussed the option of starting Voranigo; holding for now.       PHYSICAL EXAMINATION  BP (!) 149/110 (BP Location: Right arm, Patient Position: Sitting, Cuff Size: Adult Regular)   Pulse 56   Temp 97.9  F (36.6  C) (Oral)   Resp 18   Wt 69.6 kg (153 lb 6.4 oz)   SpO2 100%   BMI 21.39 kg/m     Wt Readings from Last 2 Encounters:   10/28/24 69.6 kg (153 lb 6.4 oz)   09/06/24 67 kg (147 lb 12.8 oz)      Ht Readings from Last 2 Encounters:   01/09/24 1.803 m (5' 11\")   12/05/23 1.803 m (5' 11\")     KPS: 100    -Generally well appearing.  -Psychiatric: Normal mood and affect. Pleasant, talkative.  -Neurologic:   MENTAL STATUS:     Alert, oriented.    Recall: Intact.    Speech fluent.    Comprehension intact.     CRANIAL NERVES:      Symmetric facial movements.   Hearing intact.   With normal phonation, no dysfunction of the palate or tongue.   GAIT: Steady, unassisted.         MEDICAL RECORDS  Personally reviewed; Clinic visit from Dr. Baig in September; recommendations included \"Psychotherapy.  OT eval to possibly renew neuro rehab. 2.5 mg escitalopram. Boredom and loneliness, feeling idle is also aggravating his situation; I think if he can return to " "work a bit that will help--it sounds like he's connecting with a sort of 'back to work' program for patients on SSD and I hope he can continue with that.\"    LABS  Personally reviewed all available lab results; No recent results.    IMAGING  Personally reviewed MR brain imaging from today and compared to prior imaging.    Formal read; \"This patient with left frontal grade 2 oligodendroglioma status post surgical resection and chemoradiotherapy, there is no definite evidence for progression. T2 hyperintense signal surrounding the  resection cavity is unchanged compared to June 2024, however slightly increased since 10/17/2023, as previously noted.\"     Imaging was shown to and results were reviewed with Joss.       IMPRESSION  On date of service, 42 minutes was spent in clinic and 10 minutes was spent preparing for the visit through extensive chart review and coordinating care for this high complexity visit. The following is in explanation for the recommendations used to define the plan.       Joss states that since he is no longer caring for his children, he has lost his sense of purpose. He not eating as well as he should. His sleep cycle is also negatively impacted and he is feeling fatigued most of the day. Joss equates his early bedtime to being bored, as he has no interest in leaving his house and socializing. While he states that he is not \"depressed\", he endorses feeling more emotional, noting a lack in motivation, feeling more anxious, and experiencing episodes consistent with PTSD. Joss states that he is interested in meeting with Dr. Ulloa again and I alerted [facility] to assist. Joss will continue to follow with Dr. Baig. Joss started Lexapro, but has not benefited from this medication. Finally, I will again connect Joss with Social Work to see if there are connections/ organizations to get Joss involved in his community. 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      Otherwise, his right leg weakness is stable and he denies any recurrent seizure nor concerns regarding headaches.     Imaging as detailed above was stable as compare to earlier this year with no new concerns. Therefore, the plan is to continue imaging surveillance per NCCN guidelines of every 3-6 months for the first 5 years (at least through 10/2028) and then, every 6 months as clinically indicated. At this time, will repeat imaging in 4 months. In the meantime, Joss knows to call the clinic with any concerns and appointments can be moved up if needed.    Finally today, I had a discussion with Joss about the option of starting \"maintenance\" cancer-directed therapy with an IDH inhibitor; Voranigo (vorasidenib) 40MG daily. Voranigo is now FDA approved for treatment of IDH-mutated low grade/ grade 2 astrocytomas and oligodendrogliomas. This is based on results from the phase 3 INDIGO study that showed prolonged imaging stability for patients with an IDH-mutated grade 2 glioma treated with an IDH inhibitor. Side effects of Voranigo can include fatigue, diarrhea, muscle pain, seizure, reduced neutrophils, and hepatic toxicity. Since the INDIGO study only enrolled treatment-naive patients, there is a lack of supportive evidence to know if Voranigo will have the same benefit in patients that have already received therapy. As seen currently, Joss's imaging demonstrates no concerning findings. In addition, Joss is not at a point where he would best be able to tolerate chemotherapy. Therefore, after weighing the risks/ benefits plus lack of specific evidence-based support, we are all in agreement to hold on the use of this therapy at this point, but can have another discussion at follow-up in early 2025.     PROBLEM LIST  Oligodendroglioma  Hemiparesis, right  Tension " headache  Seizure  Fatigue  Hypertension  Depressed mood, anxiety, irritability    PLAN  -CANCER DIRECTED THERAPY-  -Continue imaging surveillance; Repeat imaging in 4 months.      -STEROIDS-  -Currently off dexamethasone.    -SEIZURE MANAGEMENT-  -Off Keppra.   -Following with Dr. Panda.      -Quality of life/ MOOD/ HEADACHE/ FATIGUE-  -Vitamin D deficiency.   Recommend starting daily supplementation with vitamin D 200-400 international unit(s).  -Following with palliative care; Dr. Baig.     Lexapro with limited to no benefit.    Self-discontinued Zoloft due to intolerable side effects.   -Recommending scheduling an appointment with Denilson Ulloa Psy.D., LP   Licensed Psychologist with [facility]  [email or web address]  [phone]  -Using marijuana for anxiety.   -Social work following.     -HEMIPARESIS-  -Following with Dr. Chiang.     -HYPERTENSION-  -Primary care referral for management.    -TOBACCO DEPENDENCE-  -Previously encouraged continued abstinence from smoking.  He states today that he continues to be abstinent and is congratulated on this.    Return to clinic in early March 2025 + MRI.    The longitudinal plan of care for the diagnosis(es)/condition(s) as documented were addressed during this visit. Due to the added complexity in care, I will continue to support Joss in the subsequent management and with ongoing continuity of care.     Elva Park MD  Neuro-oncology      Again, thank you for allowing me to participate in the care of your patient.        Sincerely,        Elva Park MD

## 2024-10-29 ENCOUNTER — VIRTUAL VISIT (OUTPATIENT)
Dept: PALLIATIVE CARE | Facility: CLINIC | Age: 49
End: 2024-10-29
Attending: INTERNAL MEDICINE
Payer: COMMERCIAL

## 2024-10-29 ENCOUNTER — PRE VISIT (OUTPATIENT)
Dept: PSYCHIATRY | Facility: CLINIC | Age: 49
End: 2024-10-29

## 2024-10-29 VITALS
HEIGHT: 71 IN | BODY MASS INDEX: 21 KG/M2 | WEIGHT: 150 LBS | DIASTOLIC BLOOD PRESSURE: 110 MMHG | SYSTOLIC BLOOD PRESSURE: 142 MMHG

## 2024-10-29 DIAGNOSIS — R45.86 MOOD AND AFFECT DISTURBANCE: ICD-10-CM

## 2024-10-29 DIAGNOSIS — F41.9 ANXIETY: ICD-10-CM

## 2024-10-29 DIAGNOSIS — C71.9 OLIGODENDROGLIOMA, WHO GRADE II (H): Primary | ICD-10-CM

## 2024-10-29 PROCEDURE — G2211 COMPLEX E/M VISIT ADD ON: HCPCS | Mod: 95 | Performed by: INTERNAL MEDICINE

## 2024-10-29 PROCEDURE — 99215 OFFICE O/P EST HI 40 MIN: CPT | Mod: 95 | Performed by: INTERNAL MEDICINE

## 2024-10-29 ASSESSMENT — PAIN SCALES - GENERAL: PAINLEVEL_OUTOF10: NO PAIN (0)

## 2024-10-29 NOTE — LETTER
10/29/2024       RE: Lang Collins Jr.  [address]  [facility] [address]     Dear Colleague,    Thank you for referring your patient, Lang Collins Jr., [facility]. Please see a copy of my visit note below.      Is the patient currently in the state of [place]? YES    Visit mode:VIDEO    If the visit is dropped, the patient can be reconnected by: VIDEO VISIT: Send to e-mail at: [email or web address]    Will anyone else be joining the visit? NO  (If patient encounters technical issues they should call [phone])    Are changes needed to the allergy or medication list? No    Are refills needed on medications prescribed by this physician? NO    Rooming Documentation:  Questionnaire(s) completed    Reason for visit: Video Visit (Follow Up )    Flory Gallagher [facility]       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; progression late 2022 s/p chemoradiotherapy with temozolomide. We follow for mood, personality, cognitive changes post-treatment.  6/2024 completing maintenance temozolomide; MYNOR  9/2024 starts OT  10/2024      Social - Lives alone. 9 & 10 yo (in 2024) kids live in [place] with their mother. Joss's mother lives here in a NH & has dementia.  [place] life --on disability since shortly after his surgery.   Hx of incarceration--spent a lengthy period of time in solitary isolation.     Care Planning -       History:  History gathered today from: patient, medical chart    I met him 2 mo ago.   I rec'd psychotherapy, OT/neuro rehab, 2.5 mg escitalopram.  He has not established with Denilson Ulloa and doesn't seem to know how to do that.    He is not leaving the house much but he does visit a friend sometimes. When he is around strangers he worries people find his presence 'weird'. Sounds like people have told him  "that since his surgery; that he is weird. He discusses not knowing how he should be in society. It sounds like he spent 18 mo in something like solitary confinement and he had a hard time adjusting back to socializing and how he feels now reminds him of that.    He did some OT but then stopped going. \"I got a lot of good ideas from them\" which helped.    Got what sounds like worry beads or a ball which helps him with anxiety.     He took 5 mg escitalopram not 2.5 mg. He did not feel it made any difference. Ran out and didn't miss it.    Dr Park asked SW to see if he can connect with volunteer opportunities: Mr Collins is excited by that possibility.    Dr Park discussed another adjuvant chemotherapy he may try next year and he's processing that.    PE: BP (!) 142/110   Ht 1.803 m (5' 11\")   Wt 68 kg (150 lb)   BMI 20.92 kg/m     Wt Readings from Last 3 Encounters:   10/29/24 68 kg (150 lb)   10/28/24 69.6 kg (153 lb 6.4 oz)   09/06/24 67 kg (147 lb 12.8 oz)     Alert NAD    Data reviewed:  I reviewed recent labs and imaging, my comments:  MRI this mo; no evidence recurrence     database reviewed: y      Impression & Recommendations:  48 yo with treated L frontal oligodendroglioma currently on maintenance chemotherapy  Long term complications of a cluster of symptoms and concerns that I suspect are: depression and anxiety, grief, existential losses, loneliness and boredom; plus neurocognitive impairments that are long-term after his surgery -- poor concentration, memory, and executive functioning; personality changes also which may be depression or 'organic'--more shy, less social, more socially sensitive to/afraid of 'judgement' from others, etc.    He should start psychotherapy.   Denilson Ulloa: I gave him his contact info as he's had before and encouraged him to call.  I'll refer to psychiatry to discuss meds given he's not had a great experience with two SSRIs although he hasn't really given them a good try. " On the other hand his situation is not a typical depression or mood disorder by any means.     It sounds like he is deliberately trying to get out eg go to his friend's house. We discussed opportunities for getting out. Sounds like Dr Park is working on that too.      Will follow-up in 2 mo    Over 40 minutes spent on the date of the encounter doing chart review, history and exam, patient education & counseling, documentation and other activities as noted above. The longitudinal plan of care for the diagnosis(es)/condition(s) as documented were addressed during this visit. Due to the added complexity in care, I will continue to support Joss in the subsequent management and with ongoing continuity of care.    Thank you for involving us in the patient's care.   Florin Baig MD / Palliative Medicine / Text me via [employer]  This note may have been composed with voice recognition software and there may be mistranscriptions.    Video-Visit Details  Video Start Time: 10:02 AM  Video End Time:10:20 AM  Originating Location (pt. Location): Home  Distant Location (provider location):  Off-site  Platform used for Video Visit: [facility]        Again, thank you for allowing me to participate in the care of your patient.      Sincerely,    Florin Baig MD

## 2024-10-29 NOTE — PROGRESS NOTES
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; progression late 2022 s/p chemoradiotherapy with temozolomide. We follow for mood, personality, cognitive changes post-treatment.  6/2024 completing maintenance temozolomide; MYNOR  9/2024 starts OT  10/2024      Social - Lives alone. 9 & 10 yo (in 2024) kids live in [place] with their mother. Joss's mother lives here in a NH & has dementia.  [place] life --on disability since shortly after his surgery.   Hx of incarceration--spent a lengthy period of time in solitary isolation.     Care Planning -       History:  History gathered today from: patient, medical chart    I met him 2 mo ago.   I rec'd psychotherapy, OT/neuro rehab, 2.5 mg escitalopram.  He has not established with Denilson Ulloa and doesn't seem to know how to do that.    He is not leaving the house much but he does visit a friend sometimes. When he is around strangers he worries people find his presence 'weird'. Sounds like people have told him that since his surgery; that he is weird. He discusses not knowing how he should be in society. It sounds like he spent 18 mo in something like solitary confinement and he had a hard time adjusting back to socializing and how he feels now reminds him of that.    He did some OT but then stopped going. \"I got a lot of good ideas from them\" which helped.    Got what sounds like worry beads or a ball which helps him with anxiety.     He took 5 mg escitalopram not 2.5 mg. He did not feel it made any difference. Ran out and didn't miss it.    Dr Park asked SW to see if he can connect with volunteer opportunities: Mr Collins is excited by that possibility.    Dr Park discussed another adjuvant chemotherapy he may try next year and he's processing that.    PE: BP (!) 142/110   Ht 1.803 m (5' 11\")   Wt 68 kg (150 lb)   BMI 20.92 kg/m     Wt Readings from Last 3 Encounters:   10/29/24 68 " kg (150 lb)   10/28/24 69.6 kg (153 lb 6.4 oz)   09/06/24 67 kg (147 lb 12.8 oz)     Alert NAD    Data reviewed:  I reviewed recent labs and imaging, my comments:  MRI this mo; no evidence recurrence     database reviewed: y      Impression & Recommendations:  48 yo with treated L frontal oligodendroglioma currently on maintenance chemotherapy  Long term complications of a cluster of symptoms and concerns that I suspect are: depression and anxiety, grief, existential losses, loneliness and boredom; plus neurocognitive impairments that are long-term after his surgery -- poor concentration, memory, and executive functioning; personality changes also which may be depression or 'organic'--more shy, less social, more socially sensitive to/afraid of 'judgement' from others, etc.    He should start psychotherapy.   Denilson Ulloa: I gave him his contact info as he's had before and encouraged him to call.  I'll refer to psychiatry to discuss meds given he's not had a great experience with two SSRIs although he hasn't really given them a good try. On the other hand his situation is not a typical depression or mood disorder by any means.     It sounds like he is deliberately trying to get out eg go to his friend's house. We discussed opportunities for getting out. Sounds like Dr Park is working on that too.      Will follow-up in 2 mo    Over 40 minutes spent on the date of the encounter doing chart review, history and exam, patient education & counseling, documentation and other activities as noted above. The longitudinal plan of care for the diagnosis(es)/condition(s) as documented were addressed during this visit. Due to the added complexity in care, I will continue to support Joss in the subsequent management and with ongoing continuity of care.    Thank you for involving us in the patient's care.   Florin Baig MD / Palliative Medicine / Text me via [employer]  This note may have been composed with voice recognition  software and there may be mistranscriptions.    Video-Visit Details  Video Start Time: 10:02 AM  Video End Time:10:20 AM  Originating Location (pt. Location): Home  Distant Location (provider location):  Off-site  Platform used for Video Visit: [employer]

## 2024-10-29 NOTE — TELEPHONE ENCOUNTER
PSYCHIATRY CLINIC PHONE INTAKE     SERVICES REQUESTED / INTERESTED IN          Med Management    Presenting Problem and Brief History                              What would you like to be seen for? (brief description):  Patient stated he is looking for a psychiatrist to manage meds for his social anxiety and depression.Patient stated he had brain surgery 2 years ago due to having brain cancer. Patient stated he went through chemo therapy and will be doing so again in February 2025. Patient stated things that happened in his past are starting to resuface.   Have you received a mental health diagnosis? Yes   Which one (s): Depression, anxiety and ADHD  Is there any history of developmental delay?  No   Are you currently seeing a mental health provider?  No            Who / month last seen:  N/A  Do you have mental health records elsewhere?  Yes  Will you sign a release so we can obtain them?  Yes    Have you ever been hospitalized for psychiatric reasons?  Yes  Describe:  Patient stated he was hospitalized when he was a teenager for behavioral issues     Do you have current thoughts of self-harm?  No    Do you currently have thoughts of harming others?  No    Do you have any safety concerns? No   If yes to these, offer to reach out to a  for follow up.      Substance Use History     Do you have any history of alcohol  or other substance use?  Yes  Describe:  Patient stated he drunk pretty heavy at one point in his life. Patient he still smokes marijuana everyday.  Have you ever received treatment for this?  No    Describe:  N/A     Social History     Who is the patient's a guardian?   Patient is his own guardian     Name / number: N/A  Have you had an ACT team in last 12 months?  Yes  Describe: N/A   OK to leave a detailed voicemail?  Yes    Would you be interested in learning more about research opportunities for which you or your child may qualify? We can connect you with a team member for more  information.  No  If yes, send an inbasket message to Keyona Frank    Medical/ Surgical History                                   Patient Active Problem List   Diagnosis    S/P craniotomy    Oligodendroglioma, WHO grade II (H)    Primary hypertension    Adjustment disorder with mixed anxiety and depressed mood    Right hemiparesis (H)          Medications             Have you taken >3 psychiatric medications in your past?  Yes  Do you currently take 5 or more medications, including prescriptions, supplements, and other over the counter products?  No    If YES to at least one of these questions:   As part of your evaluation in our clinic, we have specially trained pharmacists as part of your care team. Your provider would like for you to meet with one of our pharmacists to review your current and past medications, ensure your med list is up to date, and queue up any questions or concerns you have about medications. They will review all of your medications, not just for mental health, to help ensure you know what you re taking and that everything is working together.     Please schedule patient in [facility] PSYCHIATRY (Lata Cardoza or Rohini Broussard) for 60m MTM in any green space as virtual (video), telephone, or in person (designated in person days per Epic templates).  -Appt notes can say  Psych eval on xx/xx   -Route telephone encounter to the pharmacist who will be seeing the patient.  If patient has questions about insurance coverage or billing, please still schedule the visit and refer them to call the [facility] coordinators at [phone].    Current Outpatient Medications   Medication Sig Dispense Refill    escitalopram (LEXAPRO) 5 MG tablet Take 0.5 tablets (2.5 mg) by mouth daily. (Patient taking differently: Take 5 mg by mouth daily.) 30 tablet 0         DISPOSITION      10/29/2024: Intake complete appointment

## 2024-10-29 NOTE — PROGRESS NOTES
Is the patient currently in the state of [place]? YES    Visit mode:VIDEO    If the visit is dropped, the patient can be reconnected by: VIDEO VISIT: Send to e-mail at: [email or web address]    Will anyone else be joining the visit? NO  (If patient encounters technical issues they should call [phone]    Are changes needed to the allergy or medication list? No    Are refills needed on medications prescribed by this physician? NO    Rooming Documentation:  Questionnaire(s) completed    Reason for visit: Video Visit (Follow Up )    Flory VELARDE

## 2024-10-29 NOTE — PATIENT INSTRUCTIONS
Recommending scheduling an appointment with Denilson Ulloa Psy.D., LP   Licensed Psychologist with [facility]  [email or web address]  [phone]

## 2024-10-30 ENCOUNTER — PATIENT OUTREACH (OUTPATIENT)
Dept: CARE COORDINATION | Facility: CLINIC | Age: 49
End: 2024-10-30
Payer: COMMERCIAL

## 2024-10-30 DIAGNOSIS — Z71.89 COORDINATION OF COMPLEX CARE: Primary | ICD-10-CM

## 2024-10-30 NOTE — PROGRESS NOTES
"Social Work - Distress Screen Intervention  [facility]    Identified Concern and Score from Distress Screenin. How concerned are you about your ability to eat? 4     2. How concerned are you about unintended weight loss or your current weight? 0     3. How concerned are you about feeling depressed or very sad?  (!) 10     4. How concerned are you about feeling anxious or very scared?  (!) 10     5. Do you struggle with the loss of meaning and polo in your life?  Quite a bit     6. How concerned are you about work and home life issues that may be affected by your cancer?  (!) 8     7. How concerned are you about knowing what resources are available to help you?  0     8. Do you currently have what you would describe as Congregation or spiritual struggles? Not at all     9. If you want to be contacted by one of our professionals, I can send a message to them right now.  No data recorded     Date of Distress Screen: 10/29/24  Data: At time of last visit, patient scored positive on distress screening. SARAH also received referral from Dr. Park for resource support.   Intervention/Education provided:  contacted patient by phone to discuss distress screening results.     Provided safe place for Joss to reflect upon how anxiety is getting in the way of his ability to engage with people in his life. Joss reflected he is having a similar feelings to re-entry after social isolation in nursing home. Joss awaiting scheduling with ongoing talk therapist.     Daughters returned to mother's home in [place], he is looking forward to seeing them for upcoming holidays.     Joss receptive to resources that support a \"purpose\" where he can share his story and help kids from \"making my mistakes.\"    Follow-up Required:   Onc SARAH to send Joss email with resources for support. Joss to outreach with any further resource support needs.     Summer Felix, MSW, LICSW, OSW-C  Clinical - Adult Oncology  Phone: " [phone]  She/Her/Hers  [facility]: Nancy HILL  8am-4:30pm  [facility]: BRET Goodson F 8am-4:30pm   Support Groups at [facility]: Social Work Services for Cancer Patients ([email or web address])

## 2024-11-03 DIAGNOSIS — F41.9 ANXIETY: ICD-10-CM

## 2024-11-04 NOTE — TELEPHONE ENCOUNTER
Received Midatechhart message from pharmacy requesting refill of Lexapro.       Last office visit: 10/29/2024  Scheduled for follow up pending per check out request     Will route request to MD/ for review.     Reviewed [place]  Report.

## 2024-11-05 RX ORDER — ESCITALOPRAM OXALATE 5 MG/1
5 TABLET ORAL DAILY
Qty: 30 TABLET | Refills: 2 | Status: SHIPPED | OUTPATIENT
Start: 2024-11-05

## 2024-12-12 ENCOUNTER — VIRTUAL VISIT (OUTPATIENT)
Dept: PSYCHIATRY | Facility: CLINIC | Age: 49
End: 2024-12-12
Attending: PSYCHIATRY & NEUROLOGY
Payer: COMMERCIAL

## 2024-12-12 DIAGNOSIS — F43.10 PTSD (POST-TRAUMATIC STRESS DISORDER): ICD-10-CM

## 2024-12-12 DIAGNOSIS — F41.1 GAD (GENERALIZED ANXIETY DISORDER): ICD-10-CM

## 2024-12-12 DIAGNOSIS — F33.2 SEVERE EPISODE OF RECURRENT MAJOR DEPRESSIVE DISORDER, WITHOUT PSYCHOTIC FEATURES (H): Primary | ICD-10-CM

## 2024-12-12 RX ORDER — MIRTAZAPINE 15 MG/1
15 TABLET, FILM COATED ORAL AT BEDTIME
Qty: 90 TABLET | Refills: 1 | Status: SHIPPED | OUTPATIENT
Start: 2024-12-12

## 2024-12-12 ASSESSMENT — ANXIETY QUESTIONNAIRES
2. NOT BEING ABLE TO STOP OR CONTROL WORRYING: NEARLY EVERY DAY
1. FEELING NERVOUS, ANXIOUS, OR ON EDGE: NEARLY EVERY DAY
7. FEELING AFRAID AS IF SOMETHING AWFUL MIGHT HAPPEN: NEARLY EVERY DAY
6. BECOMING EASILY ANNOYED OR IRRITABLE: NEARLY EVERY DAY
4. TROUBLE RELAXING: SEVERAL DAYS
GAD7 TOTAL SCORE: 19
8. IF YOU CHECKED OFF ANY PROBLEMS, HOW DIFFICULT HAVE THESE MADE IT FOR YOU TO DO YOUR WORK, TAKE CARE OF THINGS AT HOME, OR GET ALONG WITH OTHER PEOPLE?: VERY DIFFICULT
IF YOU CHECKED OFF ANY PROBLEMS ON THIS QUESTIONNAIRE, HOW DIFFICULT HAVE THESE PROBLEMS MADE IT FOR YOU TO DO YOUR WORK, TAKE CARE OF THINGS AT HOME, OR GET ALONG WITH OTHER PEOPLE: VERY DIFFICULT
GAD7 TOTAL SCORE: 19
3. WORRYING TOO MUCH ABOUT DIFFERENT THINGS: NEARLY EVERY DAY
5. BEING SO RESTLESS THAT IT IS HARD TO SIT STILL: NEARLY EVERY DAY

## 2024-12-12 ASSESSMENT — PATIENT HEALTH QUESTIONNAIRE - PHQ9
SUM OF ALL RESPONSES TO PHQ QUESTIONS 1-9: 16
10. IF YOU CHECKED OFF ANY PROBLEMS, HOW DIFFICULT HAVE THESE PROBLEMS MADE IT FOR YOU TO DO YOUR WORK, TAKE CARE OF THINGS AT HOME, OR GET ALONG WITH OTHER PEOPLE: SOMEWHAT DIFFICULT
SUM OF ALL RESPONSES TO PHQ QUESTIONS 1-9: 16

## 2024-12-12 ASSESSMENT — PAIN SCALES - GENERAL: PAINLEVEL_OUTOF10: NO PAIN (0)

## 2024-12-12 NOTE — PROGRESS NOTES
Virtual Visit Details    Type of service:  Video Visit   Video Start Time: 8:03 AM  Video End Time: 0930    Originating Location (pt. Location): Home    Distant Location (provider location):  Off-site  Platform used for Video Visit: ISORG    Answers submitted by the patient for this visit:  Patient Health Questionnaire (Submitted on 12/12/2024)  If you checked off any problems, how difficult have these problems made it for you to do your work, take care of things at home, or get along with other people?: Somewhat difficult  PHQ9 TOTAL SCORE: 16  Patient Health Questionnaire (G7) (Submitted on 12/12/2024)  ROSALBA 7 TOTAL SCORE: 19       [facility]  Psychiatry Clinic  PSYCHIATRY NEW PATIENT EVALUATION     CARE TEAM:  PCP- No Ref-Primary, Physician   Oncology- Elva Park MD, Palliative- Florin Baig MD, and Therapist- none currently Shanita Coreas is a 49 year old who prefers the name Joss and uses pronouns he, him.     DIAGNOSES                                                                                        MAJOR DEPRESSIVE DISORDER   GENERALIZED ANXIETY DISORDER     ASSESSMENT                                                                                          Mr. Collins presented today at intake with symptoms that support a diagnosis of PTSD. He also has a history of MAJOR DEPRESSIVE DISORDER and GENERALIZED ANXIETY DISORDER, which are both currently active and poorly controlled. He has minimal psychiatric history. Reports a diagnosis of ADHD in childhood and treatment with Ritalin, but no treatment in adulthood. His depression and anxiety worsened after he was diagnosed with oligodendoglioma in 2021. He is s/p resection and radiation. He is currently on Temozolimide treatment. He reports occasional nausea, but no other physical concerns today. Mr. Collins has a significant trauma history, primarily stemming from his time in [facility]. Today, he endorses hypervigilance, nightmares,  "flashbacks, exaggerated startle response, and emotional dysregulation related to experiences in assisted. He possesses many strengths including good insight, help seeking behaviors, motivation to work on his mental health and return to the workforce, and an admirable dedication to his five children.     In the past, Mr. Collins has tried and failed to SSRIs (sertraline and escitalopram). He experienced nausea and found them minimally helpful at best. He has not tried any other mental health medications in adulthood. He currently self-medicates anxiety with up to 4 grams of marijuana per day. He acknowledges that sometimes this is helpful, and at times some THC products have worsened his anxiety. He has no other substance use history. He had a few sessions with Hilario Salgado for psychotherapy, but reports he is no longer seeing Mr. Salgado. He reports he would like to proceed with both medication management and supportive psychotherapy in these appointments.     Today, collaboratively agreed to initial mirtazapine 15 mg daily at bedtime to target sleep, mood, and anxiety. Joss reports his appetite has started to return, but he is still down approximately 20 pounds from his pre-cancer weight, so he is not bothered by the potential for weight gain with mirtazapine. We also discussed olanzapine and prazosin as future options to add on. Will follow-up for medication management on 1/7/25 and schedule several longer appointments in early 2025 to continue supportive psychotherapy.     No social or safety concerns identified today. No referrals placed.     [facility] review was not needed today.    PLAN                                                                                                       1) Meds  START Mirtazapine 15 mg daily at bedtime     2) Other: None today  3) RTC: 1/7 at 8:30 am  4) CRISIS Numbers:   Provided routinely in AVS        CHIEF CONCERN                              \" Establishing care. \"    PERTINENT " BACKGROUND                                         [initial eval 12/12/24]   Mental Health History:  Diagnosis of ADHD in school age years; was treated with Ritalin as a child.     Oncology History:   Hx of oligodendoglioma s/p resection on 11/12/21.   Worsening anxiety, emotional regulation, memory, cognitive fatigue over the last year in the setting of disease progression.     Psych pertinent item history includes trauma hx    Past Medication Trials: Sertraline 100 mg; escitalopram 5 mg (could not tolerate nausea)    HISTORY OF PRESENT ILLNESS                                                      Mood = emotional dysregulation, labile, sad, cries easily  Anxiety = constant worry, feels nervous/hypervigilant in public  Panic = triggered by hearing fights in public, also sometimes triggered by being alone in apartment (reminds him of solitary confinement); Panic symptoms- shaking, losing control, disoriented, heart palpitations. Helped by removing himself from a situation.     Sleep = sleeping 4 hours or less most nights; endorses difficulty with sleep maintenance. Is able to fall asleep about 1900, but wakes up four hours later and has difficulty falling back to sleep. Does take naps throughout the day. Endorses dreams about time spent in solitary confinement in longterm.     PTSD = endorses nightmares, hypervigilance, exaggerated startle response, flashbacks, anger/emotional outbursts, difficulty with emotional regulation.    Energy = good for 5-6 hours per day, then gets fatigued  Appetite = good, intermittent nausea. Eating three meals per day plus snacks. Maintaining weight. Maybe 20 pounds below pre-diagnosis weight.    Denies kadi/hallucinations/psychosis/VI/HI/SI.    Recent Substance Use:    Alcohol- social drinking  Tobacco- vapes nicotine daily- using to wean off cigarettes   Caffeine- none  Cannabis- smokes daily up to 4 grams      Opioids- none           Narcan Kit- NA   Other illicit drugs- none    SOCIAL  and FAMILY HISTORY                                                 per pt report         Family Hx:  denies history of mental health in the family, though endorses two cousins  by suicide     Social Hx:  Financial Support- social security disability, Living Situation - rental apartment, Children - see below, Social Support - mother (dementia, blind), the mother of his children, aunties (not local), kids, Trauma History - see below, Legal History - incarceration ([id number]), and Feels Safe at Home- yes    Five Children. Youngest two live with him intermittently. Mood improves when children are around. When they leave depression increases.   Works in construction and in a warehouse. Currently not able to work due to mental health (anxiety).    Trauma History: Was un-housed for three years. ([id number]) Spent three years in assisted, including solitary ([id number]).    PAST PSYCH and SUBSTANCE USE HISTORY                      Psych:  No additional psychiatric history.    Substance Use:  No additional substance use history.  Past Use - Tobacco-  15 year pack per day history    Treatment - None    Legal Consequences - incarceration ; [id number]; 1957-2110    MEDICAL HISTORY     Patient Active Problem List   Diagnosis    S/P craniotomy    Oligodendroglioma, WHO grade II (H)    Primary hypertension    Adjustment disorder with mixed anxiety and depressed mood    Right hemiparesis (H)       ALLERGIES: Patient has no known allergies.     MEDICAL REVIEW OF SYSTEMS                                                                  none in addition to that documented above    CURRENT MEDS       Current Outpatient Medications   Medication Sig Dispense Refill    mirtazapine (REMERON) 15 MG tablet Take 1 tablet (15 mg) by mouth at bedtime. 90 tablet 1       VITALS                                                                                              There were no vitals taken for this visit.    MENTAL STATUS EXAM                                                              Alertness: alert  and oriented  Appearance: casually groomed  Behavior/Demeanor: cooperative, pleasant, and calm, with good  eye contact   Speech: normal  Language: intact  Psychomotor: normal or unremarkable  Mood: anxious  Affect: full range; congruent to: mood- yes, content- no  Thought Process/Associations: unremarkable  Thought Content:  Reports none;  Denies suicidal & violent ideation and delusions  Perception:  Reports none;  Denies hallucinations  Insight: good  Judgment: good  Cognition: does  appear grossly intact; formal cognitive testing was not done  oriented: time, person, and place  attention span: intact  concentration: intact  recent memory: intact  remote memory: intact  fund of knowledge: appropriate  Gait and Station: N/A (telehealth)    LABS and DATA         7/13/2023     2:30 PM 3/9/2024     6:59 AM 12/12/2024     7:44 AM   PHQ   PHQ-9 Total Score 15 12 16    Q9: Thoughts of better off dead/self-harm past 2 weeks Not at all Not at all  Not at all        Patient-reported       Recent Labs   Lab Test 05/20/24  1002 04/30/24  1511 04/12/24  1045   CR 1.15 1.12 1.04   GFRESTIMATED 79 82 89     Recent Labs   Lab Test 05/20/24  1002 04/30/24  1511   AST 15 20   ALT 8 10   ALKPHOS 75 68       PSYCHOTROPIC DRUG INTERACTIONS   none    MANAGEMENT:  N/A    Psychiatry Individual Psychotherapy Note   Psychotherapy start time - 0910  Psychotherapy end time - 0940  Date treatment plan last reviewed with patient - 12/12/24  Subjective: This supportive psychotherapy session addressed issues related to goals of therapy and current psychosocial stressors. Patient's reaction: Pre-contemplation in the context of mental status appropriate for ambulatory setting.    Interactive complexity indicated? No  Plan: RTC in timeframe noted above  Psychotherapy services during this visit included myself and the patient.   Treatment Plan      SYMPTOMS; PROBLEMS    MEASURABLE GOALS;    FUNCTIONAL IMPROVEMENT / GAINS INTERVENTIONS DISCHARGE CRITERIA   Trauma Related: fear, intrusive memories, trauma trigger psychological / physiological response, angry outbursts, and mood dysregulation   learn best practices for sleep, make a plan to manage 2-3 anxiety-provoking situations, develop 2 strategies to cope with trauma triggers/intrusive memories, and walk away from situations that trigger strong emotions Supportive / psychodynamic marked symptom improvement        RISK STATEMENT for SAFETY   Joss Coreas did not appear to be an imminent safety risk to self or others.    TREATMENT RISK STATEMENT:  The risks, benefits, alternatives and potential adverse effects have been discussed and are understood by the pt. The pt understands the risks of using street drugs or alcohol. There are no medical contraindications, the pt agrees to treatment with the ability to do so. The pt knows to call the clinic for any problems or to access emergency care if needed.  Medical and substance use concerns are documented above.  Psychotropic drug interaction check was done, including changes made today.    PROVIDER:  TITA Jaramillo CNP       MEDICAL DECISION MAKING        ([facility])   Level of Medical Decision Making:   - At least 1 chronic problem that is not stable  - Engaged in prescription drug management during visit (discussed any medication benefits, side effects, alternatives, etc.)       The longitudinal plan of care for the diagnosis(es)/condition(s) as documented were addressed during this visit. Due to the added complexity in care, I will continue to support Joss in the subsequent management and with ongoing continuity of care.

## 2024-12-12 NOTE — PATIENT INSTRUCTIONS
PLAN                                                                                                       1) Meds  START Mirtazapine 15 mg daily at bedtime     2) Other: None today  3) RTC: 1/7 at 8:30 am  4) CRISIS Numbers:     **For crisis resources, please see the information at the end of this document**     Patient Education    Thank you for coming to the [facility].     Lab Testing:  If you had lab testing today and your results are reassuring or normal they will be mailed to you or sent through [employer] within 7 days. If the lab tests need quick action we will call you with the results. The phone number we will call with results is # [phone]. If this is not the best number please call our clinic and change the number.     Medication Refills:  If you need any refills please call your pharmacy and they will contact us. Our fax number for refills is [phone].   Three business days of notice are needed for general medication refill requests.   Five business days of notice are needed for controlled substance refill requests.   If you need to change to a different pharmacy, please contact the new pharmacy directly. The new pharmacy will help you get your medications transferred.     Contact Us:  Please call [phone] during business hours (8-5:00 M-F).   If you have medication related questions after clinic hours, or on the weekend, please call [phone].     Financial Assistance [phone]   Medical Records [phone]       MENTAL HEALTH CRISIS RESOURCES:  For a emergency help, please call 911 or go to the nearest Emergency Department.     Emergency Walk-In Options:   EmPATH Unit @ [facility] ([place]): [phone] - Specialized mental health emergency area designed to be calming  [facility] ([place]): [phone]  [facility] ([place]): [phone]  [facility]):  [phone]    [facility] Crisis Information:   [place]: [phone]  [place]: [phone]  Leeann ([place]) - Adult: [phone]     Child: [phone]  [place] - Adult: [phone]     Child: [phone]  [place]: [phone]  List of all [facility] resources:   https://[place].gov/dhs/people-we-serve/adults/health-care/mental-health/resources/crisis-contacts.jsp    National Crisis Information:   Crisis Text Line: Text  [place]  to 274962  Suicide & Crisis Lifeline: 988  National Suicide Prevention Lifeline: 6-454-493-TALK (1-985.160.6643)       For online chat options, visit https://suicidepreventionlifeline.org/chat/  Poison Control Center: 1-176.622.4895  Trans Lifeline: 1-769.184.1972 - Hotline for transgender people of all ages  The [employer]: 2-923-523-8535 - Hotline for LGBT youth     For Non-Emergency Support:   Fast Tracker: Mental Health & Substance Use Disorder Resources -   https://www.[employer].org/

## 2024-12-12 NOTE — NURSING NOTE
Current patient location: [address]  [place] [address]    Is the patient currently in the state of [place]? YES    Visit mode:VIDEO    If the visit is dropped, the patient can be reconnected by:VIDEO VISIT: Text to cell phone:   Telephone Information:   Mobile [phone]       Will anyone else be joining the visit? NO  (If patient encounters technical issues they should call [phone])    Are changes needed to the allergy or medication list? Yes please remove med flagged for removal: escitalopram (LEXAPRO) 5 MG tablet   -no current medications for pt    Are refills needed on medications prescribed by this physician? NO    Rooming Documentation  Questionnaire(s) completed  High PHQ.    Reason for visit: Consult    Gayathri VELARDE

## 2024-12-26 ENCOUNTER — VIRTUAL VISIT (OUTPATIENT)
Dept: PALLIATIVE CARE | Facility: CLINIC | Age: 49
End: 2024-12-26
Attending: PSYCHIATRY & NEUROLOGY
Payer: COMMERCIAL

## 2024-12-26 ENCOUNTER — HOSPITAL ENCOUNTER (EMERGENCY)
Facility: CLINIC | Age: 49
Discharge: HOME | End: 2024-12-26
Attending: EMERGENCY MEDICINE | Admitting: EMERGENCY MEDICINE
Payer: COMMERCIAL

## 2024-12-26 VITALS
SYSTOLIC BLOOD PRESSURE: 135 MMHG | OXYGEN SATURATION: 100 % | RESPIRATION RATE: 14 BRPM | TEMPERATURE: 98.3 F | HEART RATE: 68 BPM | DIASTOLIC BLOOD PRESSURE: 93 MMHG

## 2024-12-26 VITALS — HEIGHT: 71 IN | BODY MASS INDEX: 20.3 KG/M2 | WEIGHT: 145 LBS

## 2024-12-26 DIAGNOSIS — F33.2 SEVERE EPISODE OF RECURRENT MAJOR DEPRESSIVE DISORDER, WITHOUT PSYCHOTIC FEATURES (H): ICD-10-CM

## 2024-12-26 DIAGNOSIS — C71.9 OLIGODENDROGLIOMA, WHO GRADE II (H): Primary | ICD-10-CM

## 2024-12-26 DIAGNOSIS — F43.10 PTSD (POST-TRAUMATIC STRESS DISORDER): ICD-10-CM

## 2024-12-26 DIAGNOSIS — M54.41 ACUTE RIGHT-SIDED LOW BACK PAIN WITH RIGHT-SIDED SCIATICA: ICD-10-CM

## 2024-12-26 DIAGNOSIS — F41.1 GAD (GENERALIZED ANXIETY DISORDER): ICD-10-CM

## 2024-12-26 PROCEDURE — 99284 EMERGENCY DEPT VISIT MOD MDM: CPT

## 2024-12-26 PROCEDURE — 250N000013 HC RX MED GY IP 250 OP 250 PS 637: Performed by: EMERGENCY MEDICINE

## 2024-12-26 RX ORDER — LIDOCAINE 4 G/G
1 PATCH TOPICAL ONCE
Status: DISCONTINUED | OUTPATIENT
Start: 2024-12-26 | End: 2024-12-26 | Stop reason: HOSPADM

## 2024-12-26 RX ORDER — HYDROCODONE BITARTRATE AND ACETAMINOPHEN 5; 325 MG/1; MG/1
2 TABLET ORAL ONCE
Status: COMPLETED | OUTPATIENT
Start: 2024-12-26 | End: 2024-12-26

## 2024-12-26 RX ORDER — CYCLOBENZAPRINE HCL 10 MG
10 TABLET ORAL ONCE
Status: COMPLETED | OUTPATIENT
Start: 2024-12-26 | End: 2024-12-26

## 2024-12-26 RX ORDER — METHYLPREDNISOLONE 4 MG/1
TABLET ORAL
Qty: 21 TABLET | Refills: 0 | Status: SHIPPED | OUTPATIENT
Start: 2024-12-26

## 2024-12-26 RX ORDER — IBUPROFEN 600 MG/1
600 TABLET, FILM COATED ORAL ONCE
Status: COMPLETED | OUTPATIENT
Start: 2024-12-26 | End: 2024-12-26

## 2024-12-26 RX ORDER — HYDROCODONE BITARTRATE AND ACETAMINOPHEN 5; 325 MG/1; MG/1
1-2 TABLET ORAL EVERY 4 HOURS PRN
Qty: 15 TABLET | Refills: 0 | Status: SHIPPED | OUTPATIENT
Start: 2024-12-26

## 2024-12-26 RX ORDER — CYCLOBENZAPRINE HCL 10 MG
10 TABLET ORAL 3 TIMES DAILY PRN
Qty: 20 TABLET | Refills: 0 | Status: SHIPPED | OUTPATIENT
Start: 2024-12-26 | End: 2025-01-02

## 2024-12-26 RX ADMIN — LIDOCAINE 1 PATCH: 4 PATCH TOPICAL at 19:16

## 2024-12-26 RX ADMIN — IBUPROFEN 600 MG: 600 TABLET ORAL at 19:16

## 2024-12-26 RX ADMIN — CYCLOBENZAPRINE 10 MG: 10 TABLET, FILM COATED ORAL at 19:16

## 2024-12-26 RX ADMIN — HYDROCODONE BITARTRATE AND ACETAMINOPHEN 2 TABLET: 5; 325 TABLET ORAL at 19:16

## 2024-12-26 ASSESSMENT — COLUMBIA-SUICIDE SEVERITY RATING SCALE - C-SSRS
6. HAVE YOU EVER DONE ANYTHING, STARTED TO DO ANYTHING, OR PREPARED TO DO ANYTHING TO END YOUR LIFE?: NO
1. IN THE PAST MONTH, HAVE YOU WISHED YOU WERE DEAD OR WISHED YOU COULD GO TO SLEEP AND NOT WAKE UP?: NO
2. HAVE YOU ACTUALLY HAD ANY THOUGHTS OF KILLING YOURSELF IN THE PAST MONTH?: NO

## 2024-12-26 ASSESSMENT — ACTIVITIES OF DAILY LIVING (ADL)
ADLS_ACUITY_SCORE: 52

## 2024-12-26 ASSESSMENT — PAIN SCALES - GENERAL: PAINLEVEL_OUTOF10: WORST PAIN (10)

## 2024-12-26 NOTE — PROGRESS NOTES
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; progression late 2022 s/p chemoradiotherapy with temozolomide   6/2024 remains on maintenance temozolomide; MYNOR  9/2024 we met him: \"Long term complications of a cluster of symptoms and concerns that I suspect are: depression and anxiety, grief, existential losses, loneliness and boredom; plus neurocognitive impairments that are long-term after his surgery -- poor concentration, memory, and executive functioning; personality changes also which may be depression or 'organic'--more shy, less social, more socially sensitive to 'judgment'/persecution from others, etc.\"   12/2024 established with psychiatry (Leslee) who diagnosed him with PTSD, alongside his MDD ROSALBA.      Social - Lives alone. 9 & 10 yo (in 2024) kids live in [place] with their mother. He has 3 older kids also. Joss's mother lives here in a NH & has dementia.  [employer] life --on disability since shortly after his surgery.  Hx of incarceration and being unhoused.     Care Planning -         History:  History gathered today from: patient, medical chart      I met him a few months ago as above. Rec'd psychotherapy and neuro rehab OT. He did OT briefly. He took escitalopram for a while but stopped it/no help.  He established with Migdalia Camilo psychiatry NP this month who diagnosed him with PTSD alongside MDD, ROSALBA and started mirtazapine. He sees her next month.    He feels the mirtazapine has helped somewhat; at least his sleep is better now. Not sure if anxiety is better overall but glad the sleep is improved. Some dry mouth; otherwise no issues.    He continues to see Denilson Ulloa LP.     He talks about his mother who has dementia: 'she treats me like I'm my dad' in a way that bugs him. His father was not good to his mother and it sounds like their relationship contained a lot of two-way manipulation/etc and it agitates him " "when his mother treats him this way. \"I raise my voice and I feel bad about that.\" He does not feel like he is at risk of losing control over his behavior; but he feels guilt and shame about raising his voice at her.     PE: There were no vitals taken for this visit.   Wt Readings from Last 3 Encounters:   10/29/24 68 kg (150 lb)   10/28/24 69.6 kg (153 lb 6.4 oz)   09/06/24 67 kg (147 lb 12.8 oz)     Alert NAD  Clear sensorium    Data reviewed:  I reviewed recent labs and imaging, my comments:  Oct MRI showed MYNOR/post tx changes        Impression & Recommendations:  48 yo with treated oligodendroglioma currently in remission; MDD, ROSALBA, PTSD    Grateful he has established with Migdalia Camilo NP and for her care and diagnosis of PTSD: it makes sense. Also glad he is continuing with Denilson Ulloa LP.     I encouraged him to talk with Denilson about his anger at his mother when she treats him like she did his father, as well as his guilt and shame about getting upset with her. I met him last fall when Dr Park referred him for help with his mood; I think at this point I no longer have any particular role going forward and I discussed that with Joss today. I of course can and will see him back any time he wants or is referred back but otherwise recommended to him he continue on with LIZ Camilo and ANATOLY Ulloa and of course Dr Park for follow-up for his brain tumor.      Over 30 minutes spent on the date of the encounter doing chart review, history and exam, patient education & counseling, documentation and other activities as noted above.     Thank you for involving us in the patient's care.   Florin Baig MD / Palliative Medicine / Text me via Nearway  This note may have been composed with voice recognition software and there may be mistranscriptions.    Video-Visit Details  Video Start Time:  1038a  Video End Time:10:52 AM  Originating Location (pt. Location): Home  Distant Location (provider location):  Off-site  Platform used for " Video Visit: Estuardo

## 2024-12-26 NOTE — PROGRESS NOTES
"Virtual Visit Details    Type of service:  Video Visit   Video Start Time: {video visit start/end time for provider to select:893639}  Video End Time:{video visit start/end time for provider to select:802836}    Originating Location (pt. Location): {video visit patient location:807932::\"Home\"}  {PROVIDER LOCATION On-site should be selected for visits conducted from your clinic location or adjoining [facility] hospital, academic office, or other nearby [facility] building. Off-site should be selected for all other provider locations, including home:917718}  Distant Location (provider location):  {virtual location provider:753676}  Platform used for Video Visit: {Virtual Visit Platforms:487727::\"Salorix\"}    "

## 2024-12-26 NOTE — PATIENT INSTRUCTIONS
Thank you for meeting with us in the [facility].    How to get a hold of us:  MyChart is good for concerns that are not urgent (questions that can take a couple days to answer).    For more urgent matters, or if you prefer not to use MyChart, call our clinic nurse Ana María Contreras RN at [phone].     We have an on-call number for evenings and weekends. Please call this only if you are having uncontrolled symptoms or serious side effects from your medicines: [phone].     For scheduling, call [phone]    For refills, please give us a week (5 working days) notice. We don't always have providers available everyday to do refills. If you call the day you run out of your medicine, we may not be able to refill it in time, so call 5 days in advance!

## 2024-12-26 NOTE — ED TRIAGE NOTES
Pt reports he coughed 4 days ago and threw out his R lower back. Pt reports he tried to rest but pain has gotten worse. Pt woke up this morning and reports it is to painful to walk.      Triage Assessment (Adult)       Row Name 12/26/24 1526          Triage Assessment    Airway WDL WDL        Respiratory WDL    Respiratory WDL WDL        Cardiac WDL    Cardiac WDL WDL        Cognitive/Neuro/Behavioral WDL    Cognitive/Neuro/Behavioral WDL WDL

## 2024-12-26 NOTE — NURSING NOTE
Current patient location: [address]  [place] [address]    Is the patient currently in the state of [place]? YES    Visit mode:VIDEO    If the visit is dropped, the patient can be reconnected by:VIDEO VISIT: Text to cell phone:   Telephone Information:   Mobile [phone]       Will anyone else be joining the visit? NO  (If patient encounters technical issues they should call [phone]    Are changes needed to the allergy or medication list? No    Are refills needed on medications prescribed by this physician? NO    Rooming Documentation:  Questionnaire(s) not done per department protocol    Reason for visit: SHAKEEL VELARDE

## 2024-12-26 NOTE — LETTER
"12/26/2024       RE: Lang Collins Jr.  [address]  [place] [address]     Dear Colleague,    Thank you for referring your patient, Lang Collins Jr., [facility]. Please see a copy of my visit note below.    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; progression late 2022 s/p chemoradiotherapy with temozolomide   6/2024 remains on maintenance temozolomide; MYNOR  9/2024 we met him: \"Long term complications of a cluster of symptoms and concerns that I suspect are: depression and anxiety, grief, existential losses, loneliness and boredom; plus neurocognitive impairments that are long-term after his surgery -- poor concentration, memory, and executive functioning; personality changes also which may be depression or 'organic'--more shy, less social, more socially sensitive to 'judgment'/persecution from others, etc.\"   12/2024 established with psychiatry (Leslee) who diagnosed him with PTSD, alongside his MDD ROSALBA.      Social - Lives alone. 9 & 10 yo (in 2024) kids live in [place] with their mother. He has 3 older kids also. Joss's mother lives here in a NH & has dementia.  [employer] life --on disability since shortly after his surgery.  Hx of incarceration and being unhoused.     Care Planning -         History:  History gathered today from: patient, medical chart      I met him a few months ago as above. Rec'd psychotherapy and neuro rehab OT. He did OT briefly. He took escitalopram for a while but stopped it/no help.  He established with Migdalia Camilo psychiatry NP this month who diagnosed him with PTSD alongside MDD, ROSALBA and started mirtazapine. He sees her next month.    He feels the mirtazapine has helped somewhat; at least his sleep is better now. Not sure if anxiety is better overall but glad the sleep is " "improved. Some dry mouth; otherwise no issues.    He continues to see Denilson Ulloa LP.     He talks about his mother who has dementia: 'she treats me like I'm my dad' in a way that bugs him. His father was not good to his mother and it sounds like their relationship contained a lot of two-way manipulation/etc and it agitates him when his mother treats him this way. \"I raise my voice and I feel bad about that.\" He does not feel like he is at risk of losing control over his behavior; but he feels guilt and shame about raising his voice at her.     PE: There were no vitals taken for this visit.   Wt Readings from Last 3 Encounters:   10/29/24 68 kg (150 lb)   10/28/24 69.6 kg (153 lb 6.4 oz)   09/06/24 67 kg (147 lb 12.8 oz)     Alert NAD  Clear sensorium    Data reviewed:  I reviewed recent labs and imaging, my comments:  Oct MRI showed MYNOR/post tx changes        Impression & Recommendations:  48 yo with treated oligodendroglioma currently in remission; MDD, ROSALBA, PTSD    Grateful he has established with Migdalia Camilo NP and for her care and diagnosis of PTSD: it makes sense. Also glad he is continuing with Denilson Ulloa LP.     I encouraged him to talk with Denilson about his anger at his mother when she treats him like she did his father, as well as his guilt and shame about getting upset with her. I met him last fall when Dr Park referred him for help with his mood; I think at this point I no longer have any particular role going forward and I discussed that with Joss today. I of course can and will see him back any time he wants or is referred back but otherwise recommended to him he continue on with LIZ Camilo and ANATOLY Ulloa and of course Dr Park for follow-up for his brain tumor.      Over 30 minutes spent on the date of the encounter doing chart review, history and exam, patient education & counseling, documentation and other activities as noted above.     Thank you for involving us in the patient's care.   Florin Baig " MD / Palliative Medicine / Text me via SPO  This note may have been composed with voice recognition software and there may be mistranscriptions.    Video-Visit Details  Video Start Time:  1038a  Video End Time:10:52 AM  Originating Location (pt. Location): Home  Distant Location (provider location):  Off-site  Platform used for Video Visit: Estuardo        Again, thank you for allowing me to participate in the care of your patient.      Sincerely,    Florin Baig MD

## 2024-12-27 NOTE — ED PROVIDER NOTES
Emergency Department Note      History of Present Illness     Chief Complaint   Back Pain      HPI   Lang Collins Jr. is a 49 year old male with a history of Oligodendroglioma and hypertension who presents with back pain. Patient notes the onset of his right lower back pain as four days ago when he coughed intensely. He endorses a shooting pain down his right lower extremity and some left hip pain with this at times as well. He reports having some muscle spasm sensations in the emergency department waiting room. Lang states that his pain is mostly alleviated when he is sitting down and exacerbated when standing. He endorses a history of craniotomy two years ago for his Oligodendroglioma and last had a round of chemotherapy five months ago. He states that he has an upcoming round of chemotherapy in two months. He denies taking any medications for pain management. He denies any fevers. He denies abdominal pain. He denies any abnormal urinary output, dysuria, or hematuria. He also denies any stool incontinence or urinary incontinence. He denies any numbness or tingling in his lower extremities or any weakness.     Independent Historian   None    Review of External Notes   I reviewed patient's note from their virtual palliative visit from today regarding his Oligodendroglioma    Past Medical History     Medical History and Problem List   Depressive disorder  Hypertension  Oligodendroglioma, WHO grade II (H)  Primary hypertension  S/P craniotomy  Right hemiparesis    Medications   Remeron    Surgical History   Past Surgical History:   Procedure Laterality Date    OPTICAL TRACKING SYSTEM CRANIOTOMY, EXCISE TUMOR WITH MAPPING, COMBINED Left 11/12/2021    Procedure: Left frontal craniotomy for tumor resection with asleep motor mapping;  Surgeon: Kuldeep Beltrán MD;  Location:  OR    ORTHOPEDIC SURGERY         Physical Exam     Patient Vitals for the past 24 hrs:   BP Temp Temp src Pulse Resp SpO2   12/26/24 1525  (!) 135/93 -- -- 68 -- 100 %   12/26/24 1524 -- 98.3  F (36.8  C) Temporal -- 14 --     Physical Exam  Nursing note and vitals reviewed.  Constitutional:  Oriented to person, place, and time. Cooperative.   HENT:   Nose:    Nose normal.   Mouth/Throat:   Mucous membranes are normal.   Eyes:    Conjunctivae normal and EOM are normal.      Pupils are equal, round, and reactive to light.   Neck:    Trachea normal.   Cardiovascular:  Normal rate, regular rhythm, normal heart sounds and normal pulses. No murmur heard.  Pulmonary/Chest:  Effort normal and breath sounds normal.   Abdominal:   Soft. Normal appearance and bowel sounds are normal.      There is no tenderness.      There is no rebound and no CVA tenderness.   Musculoskeletal:  Some reproducible tenderness to palpation to the right low back region.  Extremities atraumatic x 4.   Lymphadenopathy:  No cervical adenopathy.   Neurological:   Alert and oriented to person, place, and time. Normal strength.      No cranial nerve deficit or sensory deficit. GCS eye subscore is 4. GCS verbal subscore is 5. GCS motor subscore is 6. 5/5 strength in all muscle groups of the lower extremities.  Sensation intact to light touch distally. DTRs equal and symmetric in the lower extremities. Straight leg raises negative bilaterally.  Skin:    Skin is intact. No rash noted.   Psychiatric:   Normal mood and affect.      Diagnostics     Lab Results   Labs Ordered and Resulted from Time of ED Arrival to Time of ED Departure - No data to display    Imaging   No orders to display     Independent Interpretation   None    ED Course      Medications Administered   Medications   HYDROcodone-acetaminophen (NORCO) 5-325 MG per tablet 2 tablet (2 tablets Oral $Given 12/26/24 1916)   ibuprofen (ADVIL/MOTRIN) tablet 600 mg (600 mg Oral $Given 12/26/24 1916)   cyclobenzaprine (FLEXERIL) tablet 10 mg (10 mg Oral $Given 12/26/24 1916)       Procedures   Procedures     Discussion of Management    None    ED Course   ED Course as of 12/27/24 0259   Thu Dec 26, 2024   1830 I obtained history and examined the patient as noted above       Additional Documentation  None    Medical Decision Making / Diagnosis     CMS Diagnoses: None    MIPS       None    MDM   Lang Collins Jr. is a 49 year old male who came in for further evaluation of right low back pain.  He does have some radiation of the pain into the right upper leg at times, which would be consistent with a lumbar radiculopathy.  He does not have any worrisome symptoms or exam findings to suggest he has cauda equina syndrome or discitis.  Therefore I do not feel that he warrants or needs advanced imaging right now with CT or MRI.  I also considered however the possibility that this could be something pathologic or metastasis from his brain cancer.  However I still do not feel that he requires emergent imaging with CT or MRI.  He was provided the above medications here, and I will send him home with prescriptions for Medrol Dosepak, Flexeril, and Norco.  I recommended he use ibuprofen and Tylenol as well, and he should also  the over-the-counter lidocaine patches if the one we placed helps him.  I recommended close outpatient follow-up in case he needs anything further such as a referral to a spine specialist, an MRI, injection therapy, physical therapy, or anything further.  He knows to return here with any concerns or worsening symptoms.    Disposition   The patient was discharged.     Diagnosis     ICD-10-CM    1. Acute right-sided low back pain with right-sided sciatica  M54.41            Discharge Medications   Discharge Medication List as of 12/26/2024  7:05 PM        START taking these medications    Details   cyclobenzaprine (FLEXERIL) 10 MG tablet Take 1 tablet (10 mg) by mouth 3 times daily as needed for muscle spasms., Disp-20 tablet, R-0, Local Print      HYDROcodone-acetaminophen (NORCO) 5-325 MG tablet Take 1-2 tablets by mouth every  4 hours as needed for severe pain., Disp-15 tablet, R-0, Local Print      methylPREDNISolone (MEDROL DOSEPAK) 4 MG tablet therapy pack Follow Package Directions, Disp-21 tablet, R-0, Local Print               Scribe Disclosure:  I, Lacie Edouard, am serving as a scribe at 6:35 PM on 12/26/2024 to document services personally performed by Armando Howe MD based on my observations and the provider's statements to me.        Armando Howe MD  12/27/24 0309

## 2024-12-27 NOTE — DISCHARGE INSTRUCTIONS
If the lidocaine patch that we place on your back helps, you should  the over-the-counter version of it which is essentially the same strength.  You should also use ice or heat as well as ibuprofen, in addition to all the medications I am providing to you.  If you develop any muscle weakness or loss of control of your bowels or bladder, you should return immediately.  I want you to follow-up with your physician as soon as possible as well in case you need anything further.

## 2025-01-07 ENCOUNTER — VIRTUAL VISIT (OUTPATIENT)
Dept: PSYCHIATRY | Facility: CLINIC | Age: 50
End: 2025-01-07
Attending: PSYCHIATRY & NEUROLOGY
Payer: COMMERCIAL

## 2025-01-07 DIAGNOSIS — F41.1 GAD (GENERALIZED ANXIETY DISORDER): ICD-10-CM

## 2025-01-07 DIAGNOSIS — F43.10 PTSD (POST-TRAUMATIC STRESS DISORDER): Primary | ICD-10-CM

## 2025-01-07 DIAGNOSIS — F33.2 SEVERE EPISODE OF RECURRENT MAJOR DEPRESSIVE DISORDER, WITHOUT PSYCHOTIC FEATURES (H): ICD-10-CM

## 2025-01-07 PROCEDURE — 98014 SYNCH AUDIO-ONLY EST MOD 30: CPT | Performed by: PSYCHIATRY & NEUROLOGY

## 2025-01-07 PROCEDURE — G2211 COMPLEX E/M VISIT ADD ON: HCPCS | Mod: 95 | Performed by: PSYCHIATRY & NEUROLOGY

## 2025-01-07 RX ORDER — PRAZOSIN HYDROCHLORIDE 1 MG/1
CAPSULE ORAL
Qty: 57 CAPSULE | Refills: 0 | Status: SHIPPED | OUTPATIENT
Start: 2025-01-07 | End: 2025-02-06

## 2025-01-07 ASSESSMENT — PAIN SCALES - GENERAL: PAINLEVEL_OUTOF10: NO PAIN (0)

## 2025-01-07 NOTE — NURSING NOTE
Current patient location: [address] [place] [address] [address]  [facility] [address]    Is the patient currently in the state of [place]? YES    Visit mode:VIDEO    If the visit is dropped, the patient can be reconnected by:VIDEO VISIT: Text to cell phone:   Telephone Information:   Mobile [phone]       Will anyone else be joining the visit? NO  (If patient encounters technical issues they should call [phone]    Are changes needed to the allergy or medication list? No    Are refills needed on medications prescribed by this physician? NO    Rooming Documentation:  Not applicable    Reason for visit: SHAKEEL OSUNAF

## 2025-01-07 NOTE — PATIENT INSTRUCTIONS
PLAN                                                                                                       1) Meds    CONTINUE Mirtazapine 15 mg daily at bedtime     START Prazosin 1 mg for three days, on day four, increase to 2 mg. Take at bedtime.     2) Other: None today    3) RTC: 1/21 at 8:00 am    4) CRISIS Numbers:     **For crisis resources, please see the information at the end of this document**     Patient Education      Thank you for coming to the [facility] [place] [facility].     Lab Testing:  If you had lab testing today and your results are reassuring or normal they will be mailed to you or sent through Comeet within 7 days. If the lab tests need quick action we will call you with the results. The phone number we will call with results is # [phone]. If this is not the best number please call our clinic and change the number.     Medication Refills:  If you need any refills please call your pharmacy and they will contact us. Our fax number for refills is [phone].   Three business days of notice are needed for general medication refill requests.   Five business days of notice are needed for controlled substance refill requests.   If you need to change to a different pharmacy, please contact the new pharmacy directly. The new pharmacy will help you get your medications transferred.     Contact Us:  Please call [phone] during business hours (8-5:00 M-F).   If you have medication related questions after clinic hours, or on the weekend, please call [phone].     Financial Assistance [phone]   Medical Records [phone]       MENTAL HEALTH CRISIS RESOURCES:  For a emergency help, please call 911 or go to the nearest Emergency Department.     Emergency Walk-In Options:   [facility] Unit @ [facility] ([place]): [phone] - Specialized mental health emergency area designed to be calming  [facility] ([place]):  [phone]  [facility] ([place]): [phone]  [facility] ([place]): [phone]    [facility] Crisis Information:   [place]: [phone]  [place]: [phone]  Leeann ([place]) - Adult: [phone]     Child: [phone]  [place] - Adult: [phone]     Child: [phone]  [place]: [phone]  List of all [facility] resources:   https://[place].gov/dhs/people-we-serve/adults/health-care/mental-health/resources/crisis-contacts.jsp    National Crisis Information:   Crisis Text Line: Text  [place]  to 580231  Suicide & Crisis Lifeline: 988  National Suicide Prevention Lifeline: 9-703-845-TALK (1-532.450.1504)       For online chat options, visit https://suicidepreventionlifeline.org/chat/  Poison Control Center: 1-194.687.9909  Trans Lifeline: 6-663-224-8880 - Hotline for transgender people of all ages  The [employer] Project: 8-008-894-8977 - Hotline for LGBT youth     For Non-Emergency Support:   Fast Tracker: Mental Health & Substance Use Disorder Resources -   https://www.beneSoln.org/

## 2025-01-07 NOTE — PROGRESS NOTES
Virtual Visit Details    Type of service:  Video Visit   Video Start Time:  0830  Video End Time: 0902    Originating Location (pt. Location): Home    Distant Location (provider location):  On-site  Platform used for Video Visit: [facility]  Psychiatry Clinic  PSYCHIATRY PROGRESS NOTE     CARE TEAM:  PCP- No Ref-Primary, Physician   Oncology- Elva Park MD, Palliative- Florin Baig MD, and Therapist- Denilson Ulloa LP Shanita Coreas is a 49 year old who prefers the name Joss and uses pronouns he, him.     DIAGNOSES                                                                                        MAJOR DEPRESSIVE DISORDER   GENERALIZED ANXIETY DISORDER   PTSD    ASSESSMENT                                                                                          Mr. Collins presented today at intake with symptoms that support a diagnosis of PTSD. He also has a history of MAJOR DEPRESSIVE DISORDER and GENERALIZED ANXIETY DISORDER, which are both currently active and poorly controlled. He has minimal psychiatric history. Reports a diagnosis of ADHD in childhood and treatment with Ritalin, but no treatment in adulthood. His depression and anxiety worsened after he was diagnosed with oligodendoglioma in 2021. He is s/p resection and radiation. He is currently on Temozolimide treatment. He reports occasional nausea, but no other physical concerns today. Mr. 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 Today, he endorses hypervigilance, nightmares, flashbacks, exaggerated startle response, and emotional dysregulation related to experiences in senior care. He possesses many strengths including good insight, help seeking behaviors, motivation to work on his mental health and return to the workforce, and an admirable dedication to his five children.     In the past, Mr. Collins has tried and failed two SSRIs (sertraline and escitalopram). He  "experienced nausea and found them minimally helpful at best. He has not tried any other mental health medications in adulthood. He currently self-medicates anxiety with up to 4 grams of marijuana per day. He acknowledges that sometimes this is helpful, and at times some THC products have worsened his anxiety. He has no other substance use history.     He reports he would like to continue with both medication management and supportive psychotherapy in these appointments.     Today, collaboratively agreed to continue mirtazapine 15 mg daily at bedtime to target sleep, mood, and anxiety. This has been helpful for mood, sleep, and appetite. Unclear if it has helped anxiety. Some dry mouth since starting, but manageable.   Also agreed to trial prazosin 1 mg x3 days followed by an increase to 2 mg for ongoing nightmares. May also help target anxiety.   Follow-up in two weeks for a longer appointment to include supportive psychotherapy.     No social or safety concerns identified today. No referrals placed.     [facility] review was not needed today.    PLAN                                                                                                       1) Meds  CONTINUE Mirtazapine 15 mg daily at bedtime     START Prazosin 1 mg for three days, on day four, increase to 2 mg. Take at bedtime.     2) Other: None today  3) RTC: 1/21 at 8:00 am  4) CRISIS Numbers:   Provided routinely in AVS        CHIEF CONCERN                              \" Following-up \"    PERTINENT BACKGROUND                                         [initial eval 12/12/24]   Mental Health History:  Diagnosis of ADHD in school age years; was treated with Ritalin as a child.     Oncology History:   Hx of oligodendoglioma s/p resection on 11/12/21.   Worsening anxiety, emotional regulation, memory, cognitive fatigue over the last year in the setting of disease progression.     Psych pertinent item history includes trauma hx    Past Medication Trials: " "Sertraline 100 mg; escitalopram 5 mg (could not tolerate nausea)    HISTORY OF PRESENT ILLNESS                                                      Since the last visit, Mr. Collins reports \"everything is fine.\"    He started mirtazapine after our last visit. Reports improvement in sleep, appetite, and mood. Unclear if it has helped his anxiety, but states \"I haven't really tested it yet.\" Meaning he hasn't put himself in a highly anxiety-inducing situation since we last met.   Spent time with family over the holidays. Was able to enjoy himself and enjoyed \"getting out of the house.\" Did not cause increase in anxiety being around family. Anxiety is highest when he has to do new things with new people.  Hurt his back coughing. Received a prescription for a medrol pack and hydrocodone-acetaminophen. Did not take medrol pack. Does not like the way steroids make him feel. Back pain is largely resolved now. No longer needing hydrocodone-acetaminophen.    PTSD = ongoing challenges with PTSD activation; endorses nightmares, hypervigilance, exaggerated startle response, flashbacks, anger/emotional outbursts, difficulty with emotional regulation.    Mood = endorses ongoing emotional dysregulation, lability, sadness, but improving since starting mirtazapine. Now has intermittent \"bursts of feeling good.\" Still endorses feeling tearful frequently.   Anxiety = ongoing  Panic = no panic attacks since the last appointment.     Sleep = now sleeping 8 hours since starting mirtazapine. Going to bed closer to 2200 and sleeping until 0700. Wakes in the middle of the night with occasional nightmares. Is able to get back to sleep. Continues daytime napping some days.     Energy = improved since sleep improved. Not back to baseline, but please with significant improvement.   Appetite = improved, eating three meals and a few small snacks. Nausea resolved. Thinks he has gained some weight, but has not checked.     Denies " kadi/hallucinations/psychosis/VI/HI/SI.    Recent Substance Use:    Alcohol- social drinking  Tobacco- vapes nicotine daily- using to wean off cigarettes   Caffeine- none  Cannabis- smokes daily up to 4 grams      Opioids- none           Narcan Kit- NA   Other illicit drugs- none    SOCIAL and FAMILY HISTORY                                                 per pt report         Family Hx:  denies history of mental health in the family, though endorses two cousins  by suicide     Social Hx:  Financial Support- social security disability, Living Situation - rental apartment, Children - see below, Social Support - mother (dementia, blind), the mother of his children, aunties (not local), kids, Trauma History - see below, Legal History - incarceration ([id number]), and Feels Safe at Home- yes    Five Children. Youngest two live with him intermittently. Mood improves when children are around. When they leave depression increases.   Works in construction and in a warehouse. Currently not able to work due to mental health (anxiety).    Trauma History: Was un-housed for three years. 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    PAST PSYCH and SUBSTANCE USE HISTORY                      Psych:  No additional psychiatric history.    Substance Use:  No additional substance use history.  Past Use - Tobacco-  15 year pack per day history    Treatment - None    Legal Consequences - incarceration ; 4721-3175; 4920-9451    MEDICAL HISTORY     Patient Active Problem List   Diagnosis    S/P craniotomy    Oligodendroglioma, WHO grade II (H)    Primary hypertension    Adjustment disorder with mixed anxiety and depressed mood    Right hemiparesis (H)    Severe episode of recurrent major depressive disorder, without psychotic features (H)    ROSALBA (generalized anxiety disorder)    PTSD (post-traumatic stress disorder)       ALLERGIES: Patient has no known allergies.     MEDICAL REVIEW OF SYSTEMS                                                                   none in addition to that documented above    CURRENT MEDS       Current Outpatient Medications   Medication Sig Dispense Refill    HYDROcodone-acetaminophen (NORCO) 5-325 MG tablet Take 1-2 tablets by mouth every 4 hours as needed for severe pain. 15 tablet 0    mirtazapine (REMERON) 15 MG tablet Take 1 tablet (15 mg) by mouth at bedtime. 90 tablet 1    prazosin (MINIPRESS) 1 MG capsule Take 1 capsule (1 mg) by mouth at bedtime for 3 days, THEN 2 capsules (2 mg) at bedtime for 27 days. 57 capsule 0       VITALS                                                                                              There were no vitals taken for this visit.    MENTAL STATUS EXAM                                                             Alertness: alert  and oriented  Appearance: casually groomed  Behavior/Demeanor: cooperative, pleasant, and calm, with good  eye contact   Speech: normal  Language: intact  Psychomotor: normal or unremarkable  Mood: anxious  Affect: full range; congruent to: mood- yes, content- no  Thought Process/Associations: unremarkable  Thought Content:  Reports none;  Denies suicidal & violent ideation and delusions  Perception:  Reports none;  Denies hallucinations  Insight: good  Judgment: good  Cognition: does  appear grossly intact; formal cognitive testing was not done  oriented: time, person, and place  attention span: intact  concentration: intact  recent memory: intact  remote memory: intact  fund of knowledge: appropriate  Gait and Station: N/A ([facility])    LABS and DATA         7/13/2023     2:30 PM 3/9/2024     6:59 AM 12/12/2024     7:44 AM   PHQ   PHQ-9 Total Score 15 12 16    Q9: Thoughts of better off dead/self-harm past 2 weeks Not at all Not at all Not at all       Patient-reported       Recent Labs   Lab Test 05/20/24  1002 04/30/24  1511 04/12/24  1045   CR 1.15 1.12 1.04   GFRESTIMATED 79 82 89     Recent Labs   Lab Test  05/20/24  1002 04/30/24  1511   AST 15 20   ALT 8 10   ALKPHOS 75 68       PSYCHOTROPIC DRUG INTERACTIONS   none    MANAGEMENT:  N/A    Psychiatry Individual Psychotherapy Note   Psychotherapy start time - none today  Psychotherapy end time -   Date treatment plan last reviewed with patient - 12/12/24  Subjective: This supportive psychotherapy session addressed issues related to goals of therapy and current psychosocial stressors. Patient's reaction: Pre-contemplation in the context of mental status appropriate for ambulatory setting.    Interactive complexity indicated? No  Plan: RTC in timeframe noted above  Psychotherapy services during this visit included myself and the patient.   Treatment Plan      SYMPTOMS; PROBLEMS   MEASURABLE GOALS;    FUNCTIONAL IMPROVEMENT / GAINS INTERVENTIONS DISCHARGE CRITERIA   Trauma Related: fear, intrusive memories, trauma trigger psychological / physiological response, angry outbursts, and mood dysregulation   learn best practices for sleep, make a plan to manage 2-3 anxiety-provoking situations, develop 2 strategies to cope with trauma triggers/intrusive memories, and walk away from situations that trigger strong emotions Supportive / psychodynamic marked symptom improvement        RISK STATEMENT for SAFETY   Joss Joss did not appear to be an imminent safety risk to self or others.    TREATMENT RISK STATEMENT:  The risks, benefits, alternatives and potential adverse effects have been discussed and are understood by the pt. The pt understands the risks of using street drugs or alcohol. There are no medical contraindications, the pt agrees to treatment with the ability to do so. The pt knows to call the clinic for any problems or to access emergency care if needed.  Medical and substance use concerns are documented above.  Psychotropic drug interaction check was done, including changes made today.    PROVIDER:  TITA Jaramillo CNP       MEDICAL DECISION MAKING         ([facility]   Level of Medical Decision Making:   - At least 1 chronic problem that is not stable  - Engaged in prescription drug management during visit (discussed any medication benefits, side effects, alternatives, etc.)       The longitudinal plan of care for the diagnosis(es)/condition(s) as documented were addressed during this visit. Due to the added complexity in care, I will continue to support Joss in the subsequent management and with ongoing continuity of care.

## 2025-01-20 ENCOUNTER — PATIENT OUTREACH (OUTPATIENT)
Dept: CARE COORDINATION | Facility: CLINIC | Age: 50
End: 2025-01-20
Payer: COMMERCIAL

## 2025-01-20 ASSESSMENT — PATIENT HEALTH QUESTIONNAIRE - PHQ9
SUM OF ALL RESPONSES TO PHQ QUESTIONS 1-9: 6
SUM OF ALL RESPONSES TO PHQ QUESTIONS 1-9: 6
10. IF YOU CHECKED OFF ANY PROBLEMS, HOW DIFFICULT HAVE THESE PROBLEMS MADE IT FOR YOU TO DO YOUR WORK, TAKE CARE OF THINGS AT HOME, OR GET ALONG WITH OTHER PEOPLE: SOMEWHAT DIFFICULT

## 2025-01-20 NOTE — PROGRESS NOTES
Social Work - Follow-Up  [facility]    Data/Intervention:  Patient Name: Lang Collisn Jr. Goes By: Joss    /Age: 1975 (49 year old)    Reason for Follow-Up:  This clinician received outreach from schedulers that Joss wanted to connect with this clinician     Intervention/Education/Resources Provided:  Joss reported that he had wanted to connect with social work regarding his lost access to [facility], has been able to reestablish this prior to therapy appointment tomorrow, which he is relieved by.     Joss reports that he is sleeping better, but continues to struggle with anxiety. Finds connection with therapy helpful, and is hopeful about engaging around different strategies for assistance in this area.     No other concerns or needs reported today.     Assessment/Plan:  Previously provided patient/family with writer's contact information and availability.    SHAYY Sharma, LICSW, OSW-C  Clinical - Adult Oncology  Phone: [phone]  She/Her/Hers  [facility]: Nancy HILL  8am-4:30pm  [facility]: BRET Goodson F 8am-4:30pm   Support Groups at [facility]: Social Work Services for Cancer Patients ([email or web address])

## 2025-01-21 ENCOUNTER — VIRTUAL VISIT (OUTPATIENT)
Dept: PSYCHIATRY | Facility: CLINIC | Age: 50
End: 2025-01-21
Attending: PSYCHIATRY & NEUROLOGY
Payer: COMMERCIAL

## 2025-01-21 DIAGNOSIS — F43.10 PTSD (POST-TRAUMATIC STRESS DISORDER): Primary | ICD-10-CM

## 2025-01-21 DIAGNOSIS — F33.41 RECURRENT MAJOR DEPRESSIVE DISORDER, IN PARTIAL REMISSION: ICD-10-CM

## 2025-01-21 DIAGNOSIS — F41.1 GAD (GENERALIZED ANXIETY DISORDER): ICD-10-CM

## 2025-01-21 NOTE — PATIENT INSTRUCTIONS
PLAN                                                                                                        1) Meds  CONTINUE Mirtazapine 15 mg daily at bedtime      START Prazosin 1 mg for three days, on day four, increase to 2 mg. Take at bedtime.      2) Other: None today  3) RTC: 2/4 and 2/18  4) CRISIS Numbers:      **For crisis resources, please see the information at the end of this document**     Patient Education      Thank you for coming to the [facility] [place] [facility].     Lab Testing:  If you had lab testing today and your results are reassuring or normal they will be mailed to you or sent through [place] within 7 days. If the lab tests need quick action we will call you with the results. The phone number we will call with results is # [phone]. If this is not the best number please call our clinic and change the number.     Medication Refills:  If you need any refills please call your pharmacy and they will contact us. Our fax number for refills is [phone].   Three business days of notice are needed for general medication refill requests.   Five business days of notice are needed for controlled substance refill requests.   If you need to change to a different pharmacy, please contact the new pharmacy directly. The new pharmacy will help you get your medications transferred.     Contact Us:  Please call [phone] during business hours (8-5:00 M-F).   If you have medication related questions after clinic hours, or on the weekend, please call [phone].     Financial Assistance [phone]   Medical Records [phone]       MENTAL HEALTH CRISIS RESOURCES:  For a emergency help, please call 911 or go to the nearest Emergency Department.     Emergency Walk-In Options:   EmPATH Unit @ [facility] ([place]): [phone] - Specialized mental health emergency area designed to be calming  [facility] ([place]):  [phone]  [facility] ([place]): [phone]  [facility] ([place]): [phone]    [facility] Crisis Information:   [place]: [phone]  [place]: [phone]  Leeann ([place]) - Adult: [phone]     Child: [phone]  [place] - Adult: [phone]     Child: [phone]  [place]: [phone]  List of all [facility] resources:   https://[place].gov/dhs/people-we-serve/adults/health-care/mental-health/resources/crisis-contacts.jsp    National Crisis Information:   Crisis Text Line: Text  [place]  to 657859  Suicide & Crisis Lifeline: 988  National Suicide Prevention Lifeline: 8-091-729-TALK (1-829.731.5800)       For online chat options, visit https://suicidepreventionlifeline.org/chat/  Poison Control Center: 1-305.494.4700  Trans Lifeline: 2-171-057-9502 - Hotline for transgender people of all ages  The [employer] Project: 3-278-776-2434 - Hotline for LGBT youth     For Non-Emergency Support:   Fast Tracker: Mental Health & Substance Use Disorder Resources -   https://www.dev9kn.org/
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
## 2025-01-21 NOTE — NURSING NOTE
Current patient location: [address] [place] [address] [address]  [facility] [address]    Is the patient currently in the state of [place]? YES    Visit mode: VIDEO    If the visit is dropped, the patient can be reconnected by:VIDEO VISIT: Text to cell phone:   Telephone Information:   Mobile [phone]       Will anyone else be joining the visit? NO  (If patient encounters technical issues they should call [phone])    Are changes needed to the allergy or medication list? Pt stated no changes to allergies and Pt stated no med changes    Are refills needed on medications prescribed by this physician? Discuss with provider    Rooming Documentation:  Questionnaire(s) completed    Reason for visit: SHAKEEL OSUNAF

## 2025-01-28 ENCOUNTER — PATIENT OUTREACH (OUTPATIENT)
Dept: CARE COORDINATION | Facility: CLINIC | Age: 50
End: 2025-01-28
Payer: COMMERCIAL

## 2025-01-28 ENCOUNTER — TELEPHONE (OUTPATIENT)
Dept: ONCOLOGY | Facility: CLINIC | Age: 50
End: 2025-01-28
Payer: COMMERCIAL

## 2025-01-28 NOTE — PROGRESS NOTES
Social Work Note - Incoming Call  [facility]    Data/Intervention:   Patient Name: Lang Collins Jr. Goes By: Joss    /Age: 1975 (49 year old)      Call From: Joss. 3 missed voicemails from today.  Reason for Call: Concerned about hand swelling and facial numbness.     Assessment:   SW acknowledged that triage RN would call shortly. Emotional support provided surrounding difficulty of sitting with uncertainty as to recommendations.      Joss denies other concerns or needs at present time.     Plan:   This clinician will continue to be available as needed for psychosocial support.       Summer Felix, SHAYY, LICSW, OSW-C  Clinical - Adult Oncology  Phone: [phone]  She/Her/Hers  [facility]: Nancy HILL  8am-4:30pm  [facility]: BRET Goodson F 8am-4:30pm   Support Groups at [facility]: Social Work Services for Cancer Patients ([email or web address])

## 2025-01-28 NOTE — CONFIDENTIAL NOTE
S-(situation): pt reports swelling of his right hand and the right side of his jaw, has since resolved    B-(background): pt being seen for Oligodendroglioma    A-(assessment): Reports that this morning his right hand started to feel swollen and became numb, this sensation traveled slightly up his right arm. He then noticed a similar feeling in the right side of his jaw, with swelling and numbness. Pt denies loss of consciousness, loss of mobility, vision changes, balance concerns, or impaired speech. These symptoms lasted for about 15 minutes and then spontaneously resolved. He is wondering if this could be a side effect of any of the medications that he is on, or if his care team has any other ideas of what might have caused it. Pt currently experiencing no symptoms at this time. Will monitor and proceed to ED if alarm symptoms.    R-(recommendations): Will defer to provider.

## 2025-01-28 NOTE — CONFIDENTIAL NOTE
Called pt with recommendations.     Cami Redding APRN CNP  You; Elva Park MD; Migdalia Camilo APRN CNP; Mylene Qureshi, JJ1 hour ago (12:42 PM)     CL  I have not see Joss since last April, but I just read though the last few notes and his recent ER visit.    Did he actually experience swelling in his hand and face or did it just feel that way?      He did see Migdalia Camilo and was started on Prazosin earlier this month.  It does looks like paresthesias can happen with this so maybe that is related?  I will add Migdalia to the message to see if this is something she sees on this med as I am not as familiar with it.    Thank you!    Elva Quiroz MD  You; Hayden Panda MD; Cami Redding APRN CNP; Mylene Qureshi, JJ1 hour ago (12:41 PM)       Regina Shearer,    This episode of transient altered sensation in the arm and face would not clearly localize to his brain cancer/ resection cavity. He has no new medications, so I am not thinking there is an association with medication side effects.    He has a history of seizures and Dr. Panda has weaned him off Keppra. I am including Dr. Panda to learn his opinion on if this event was thought to be epileptic.    Would recommend that Joss also reach out to Dr. Panda's care team to inquire.    Please have him monitor for recurrent episodes. If another episode is experienced, will need to move up imaging and appt with me.    Elva Park MD  Neuro-oncology  1/28/2025   Pt verbalized understanding and will contact Dr. Panda's office to inquire about symptoms. He will continue to monitor his symptoms, and if they return will contact the clinic so we can move up imaging and follow up appointment.

## 2025-02-03 ASSESSMENT — PATIENT HEALTH QUESTIONNAIRE - PHQ9
10. IF YOU CHECKED OFF ANY PROBLEMS, HOW DIFFICULT HAVE THESE PROBLEMS MADE IT FOR YOU TO DO YOUR WORK, TAKE CARE OF THINGS AT HOME, OR GET ALONG WITH OTHER PEOPLE: SOMEWHAT DIFFICULT
SUM OF ALL RESPONSES TO PHQ QUESTIONS 1-9: 6
SUM OF ALL RESPONSES TO PHQ QUESTIONS 1-9: 6

## 2025-02-04 ENCOUNTER — VIRTUAL VISIT (OUTPATIENT)
Dept: PSYCHIATRY | Facility: CLINIC | Age: 50
End: 2025-02-04
Attending: PSYCHIATRY & NEUROLOGY
Payer: COMMERCIAL

## 2025-02-04 DIAGNOSIS — F43.10 PTSD (POST-TRAUMATIC STRESS DISORDER): Primary | ICD-10-CM

## 2025-02-04 DIAGNOSIS — F41.1 GAD (GENERALIZED ANXIETY DISORDER): ICD-10-CM

## 2025-02-04 DIAGNOSIS — F33.41 RECURRENT MAJOR DEPRESSIVE DISORDER, IN PARTIAL REMISSION: ICD-10-CM

## 2025-02-04 ASSESSMENT — PAIN SCALES - GENERAL: PAINLEVEL_OUTOF10: NO PAIN (0)

## 2025-02-04 NOTE — PATIENT INSTRUCTIONS
PLAN                                                                                                       1) Meds  CONTINUE Mirtazapine 15 mg daily at bedtime     START Prazosin 1 mg for three days, on day four, increase to 2 mg. Take at bedtime.     2) Other: None today  3) RTC: 2/18  4) CRISIS Numbers:     **For crisis resources, please see the information at the end of this document**   Patient Education    Thank you for coming to the [facility] [place] [facility].     Lab Testing:  If you had lab testing today and your results are reassuring or normal they will be mailed to you or sent through [employer] within 7 days. If the lab tests need quick action we will call you with the results. The phone number we will call with results is # [phone]. If this is not the best number please call our clinic and change the number.     Medication Refills:  If you need any refills please call your pharmacy and they will contact us. Our fax number for refills is [phone].   Three business days of notice are needed for general medication refill requests.   Five business days of notice are needed for controlled substance refill requests.   If you need to change to a different pharmacy, please contact the new pharmacy directly. The new pharmacy will help you get your medications transferred.     Contact Us:  Please call [phone] during business hours (8-5:00 M-F).   If you have medication related questions after clinic hours, or on the weekend, please call [phone].     Financial Assistance [phone]   Medical Records [phone]       MENTAL HEALTH CRISIS RESOURCES:  For a emergency help, please call 911 or go to the nearest Emergency Department.     Emergency Walk-In Options:   [facility] Unit @ [facility] ([place]): [phone] - Specialized mental health emergency area designed to be calming  [facility] ([place]): [phone]  [facility] ([place]): [phone]  [facility] ([place]): [phone]    [facility] Crisis Information:   Mar[place]: [phone]  Gerardo: [phone]  Leeann (TOM) - Adult: [phone]     Child: [phone]  [place] - Adult: [phone]     Child: [phone]  [place]: [phone]  List of all [facility] resources:   https://[place].[email or web address]/dhs/people-we-serve/adults/health-care/mental-health/resources/crisis-contacts.jsp    National Crisis Information:   Crisis Text Line: Text  [place]  to 861838  Suicide & Crisis Lifeline: 988  National Suicide Prevention Lifeline: 4-265-880-TALK (1-714.595.7080)       For online chat options, visit https://suicidepreventionlifeline.org/chat/  Poison Control Center: 1-202.694.3229  Trans Lifeline: 1-257.239.6297 - Hotline for transgender people of all ages  The [employer]: 4-059-314-8863 - Hotline for LGBT youth     For Non-Emergency Support:   Fast Tracker: Mental Health & Substance Use Disorder Resources -   https://www.[employer].org/

## 2025-02-04 NOTE — PROGRESS NOTES
Virtual Visit Details    Type of service:  Video Visit   Video Start Time: 8:00 AM  Video End Time: 0830    Originating Location (pt. Location): Home    Distant Location (provider location):  On-site  Platform used for Video Visit: [facility]  Psychiatry Clinic  PSYCHIATRY PROGRESS NOTE     CARE TEAM:  PCP- No Ref-Primary, Physician   Oncology- Elva Park MD, Palliative- Florin Baig MD, and Therapist- Denilson Ulloa LP Shanita Coreas is a 49 year old who prefers the name Joss and uses pronouns he, him.     DIAGNOSES                                                                                        MAJOR DEPRESSIVE DISORDER   GENERALIZED ANXIETY DISORDER   PTSD    ASSESSMENT                                                                                          Mr. Collins presented at intake with symptoms that support a diagnosis of PTSD. He also has a history of MAJOR DEPRESSIVE DISORDER and GENERALIZED ANXIETY DISORDER, which are both currently active and poorly controlled. He has minimal psychiatric history. Reports a diagnosis of ADHD in childhood and treatment with Ritalin, but no treatment in adulthood. His depression and anxiety worsened after he was diagnosed with oligodendoglioma in 2021. He is s/p resection and radiation. He is currently on Temozolimide treatment. He reports occasional nausea, but no other physical concerns today. Mr. Collins has a significant trauma history, primarily stemming from his time in penitentiary. He endorses ongoing hypervigilance, nightmares, flashbacks, exaggerated startle response, and emotional dysregulation related to experiences in penitentiary. He possesses many strengths including good insight, help seeking behaviors, motivation to work on his mental health and return to the workforce, and an admirable dedication to his five children.     In the past, Mr. Collins has tried and failed two SSRIs (sertraline and escitalopram). He experienced  "nausea and found them minimally helpful at best. He has not tried any other mental health medications in adulthood. He currently self-medicates anxiety with up to 3 grams of marijuana per day. He acknowledges that sometimes this is helpful, and at times some THC products have worsened his anxiety. He has no other substance use history.     He reports he would like to continue with both medication management and supportive psychotherapy in these appointments.     Today, collaboratively agreed to continue mirtazapine 15 mg daily at bedtime to target sleep, mood, and anxiety. This has been helpful for mood, sleep, and appetite. Unclear if it has helped anxiety.   Two visits ago, Joss was prescribed prazosin 1 mg x3 days followed by an increase to 2 mg for ongoing nightmares. May also help target anxiety. Joss was not able to start it yet, but does plan to do so today. He confirmed he has picked it up from pharmacy. Denies questions or concerns. Reviewed instructions and possible side effects again. Joss has no concerns with the plan to start prazosin.   Follow-up in two weeks for a longer appointment to include supportive psychotherapy.     [facility] review was not needed today.    PLAN                                                                                                       1) Meds  CONTINUE Mirtazapine 15 mg daily at bedtime     START Prazosin 1 mg for three days, on day four, increase to 2 mg. Take at bedtime.     2) Other: None today  3) RTC: 2/18  4) CRISIS Numbers:   Provided routinely in AVS        CHIEF CONCERN                              \" Following-up \"    PERTINENT BACKGROUND                                         [initial eval 12/12/24]   Mental Health History:  Diagnosis of ADHD in school age years; was treated with Ritalin as a child.     Oncology History:   Hx of oligodendoglioma s/p resection on 11/12/21.   Worsening anxiety, emotional regulation, memory, cognitive fatigue over the last year " "in the setting of disease progression.     Psych pertinent item history includes trauma hx    Past Medication Trials: Sertraline 100 mg; escitalopram 5 mg (could not tolerate nausea)    HISTORY OF PRESENT ILLNESS                                                      Since the last visit, Mr. Collins reports he is \"alright.\"  Reports \"a little scare\" since his last visit. Had an episode of numbness in his right hand that progressed to right sided facial numbness.   Numbness is intermittent. Always on the right side. Only impacted face and hand.   Denies facial drooping or drooling. Was able to call a friend during facial numbness. Did not impact speech.   Reports episodes of numbness have occurred approximately 4 times in the past week. Episodes last approximately 20 minutes before spontaneously resolving.   Spoke with Dr. Park's office. They reassured him that they didn't think this was a stroke. Joss is working on getting a PCP to have this evaluated.   Has not smoked marijuana since this happened.   Endorses twice daily headaches. The pain is very fleeting and lasts only a few minutes. Pain is on the top of his head and forehead. These have been resolving on their own.     Mood = mood has been \"alright,\" enjoying connecting with his kids, \"I've been having a good attitude.\"   Anxiety = some increase in anxiety due to recent concern with numbness; occasional overwhelm  Panic = denies  Sleep = \"It's been good.\" Endorses ongoing nightmares every night. Has not started prazosin yet, but picked it up from the pharmacy and plans to. Getting 7-8 hours of sleep per night.   Appetite = overall stable, gained three pounds since starting mirtazapine and is happy about this.  Energy = A few days with lower energy, but \"all in all alright.\"   PTSD = Some increase in PTSD activation. Notes more intrusive thoughts. Slight increase in nightmares.     Some ongoing paranoia. No marijuana use in the past 4-5 days. Notes no significant " changes in mental health since stopping marijuana. Denies cravings.     Denies kadi/hallucinations/psychosis/VI/HI/SI.    Recent Substance Use:    Alcohol- social drinking  Tobacco- vapes nicotine daily- using to wean off cigarettes   Caffeine- none  Cannabis- smokes daily up to 3 grams      Opioids- none           Narcan Kit- NA   Other illicit drugs- none    SOCIAL and FAMILY HISTORY                                                 per pt report         Family Hx:  denies history of mental health in the family, though endorses two cousins  by suicide     Social Hx:  Financial Support- social security disability, Living Situation - rental apartment, Children - see below, Social Support - mother (dementia, blind), the mother of his children, aunties (not local), kids, Trauma History - see below, Legal History - incarceration (7097-5098), and Feels Safe at Home- yes    Five Children. Youngest two live with him intermittently. Mood improves when children are around. When they leave depression increases.   Works in construction and in a warehouse. Currently not able to work due to mental health (anxiety).    Trauma History: Was un-housed for three years. (3511-2249) Spent three years in residential, including solitary (1361-9104).    PAST PSYCH and SUBSTANCE USE HISTORY                      Psych:  No additional psychiatric history.    Substance Use:  No additional substance use history.  Past Use - Tobacco-  15 year pack per day history    Treatment - None    Legal Consequences - incarceration ; 8770-5017; [id number]    MEDICAL HISTORY     Patient Active Problem List   Diagnosis    S/P craniotomy    Oligodendroglioma, WHO grade II (H)    Primary hypertension    Adjustment disorder with mixed anxiety and depressed mood    Right hemiparesis (H)    Severe episode of recurrent major depressive disorder, without psychotic features (H)    ROSALBA (generalized anxiety disorder)    PTSD (post-traumatic stress disorder)        ALLERGIES: Patient has no known allergies.     MEDICAL REVIEW OF SYSTEMS                                                                  none in addition to that documented above    CURRENT MEDS       Current Outpatient Medications   Medication Sig Dispense Refill    mirtazapine (REMERON) 15 MG tablet Take 1 tablet (15 mg) by mouth at bedtime. 90 tablet 1    prazosin (MINIPRESS) 1 MG capsule Take 1 capsule (1 mg) by mouth at bedtime for 3 days, THEN 2 capsules (2 mg) at bedtime for 27 days. 57 capsule 0       VITALS                                                                                              There were no vitals taken for this visit.    MENTAL STATUS EXAM                                                             Alertness: alert  and oriented  Appearance: casually groomed  Behavior/Demeanor: cooperative, pleasant, and calm, with good  eye contact   Speech: normal  Language: intact  Psychomotor: normal or unremarkable  Mood: description consistent with euthymia, though some anxiety remains  Affect: full range; congruent to: mood- yes, content- yes  Thought Process/Associations: unremarkable  Thought Content:  Reports none;  Denies suicidal & violent ideation and delusions  Perception:  Reports none;  Denies hallucinations  Insight: good  Judgment: good  Cognition: does  appear grossly intact; formal cognitive testing was not done  oriented: time, person, and place  attention span: intact  concentration: intact  recent memory: intact  remote memory: intact  fund of knowledge: appropriate  Gait and Station: N/A ([facility])    LABS and DATA         12/12/2024     7:44 AM 1/20/2025    11:24 AM 2/3/2025     1:08 PM   PHQ   PHQ-9 Total Score 16  6  6    Q9: Thoughts of better off dead/self-harm past 2 weeks Not at all Not at all Not at all       Patient-reported       Recent Labs   Lab Test 05/20/24  1002 04/30/24  1511 04/12/24  1045   CR 1.15 1.12 1.04   GFRESTIMATED 79 82 89     Recent Labs    Lab Test 05/20/24  1002 04/30/24  1511   AST 15 20   ALT 8 10   ALKPHOS 75 68       PSYCHOTROPIC DRUG INTERACTIONS   none    MANAGEMENT:  N/A    Psychiatry Individual Psychotherapy Note   Psychotherapy start time - none today  Psychotherapy end time -   Date treatment plan last reviewed with patient - 12/12/24  Subjective: This supportive psychotherapy session addressed issues related to goals of therapy and current psychosocial stressors. Patient's reaction: Pre-contemplation in the context of mental status appropriate for ambulatory setting.    Interactive complexity indicated? No  Plan: RTC in timeframe noted above  Psychotherapy services during this visit included myself and the patient.   Treatment Plan      SYMPTOMS; PROBLEMS   MEASURABLE GOALS;    FUNCTIONAL IMPROVEMENT / GAINS INTERVENTIONS DISCHARGE CRITERIA   Trauma Related: fear, intrusive memories, trauma trigger psychological / physiological response, angry outbursts, and mood dysregulation   learn best practices for sleep, make a plan to manage 2-3 anxiety-provoking situations, develop 2 strategies to cope with trauma triggers/intrusive memories, and walk away from situations that trigger strong emotions Supportive / psychodynamic marked symptom improvement        RISK STATEMENT for SAFETY   Joss Joss did not appear to be an imminent safety risk to self or others.    TREATMENT RISK STATEMENT:  The risks, benefits, alternatives and potential adverse effects have been discussed and are understood by the pt. The pt understands the risks of using street drugs or alcohol. There are no medical contraindications, the pt agrees to treatment with the ability to do so. The pt knows to call the clinic for any problems or to access emergency care if needed.  Medical and substance use concerns are documented above.  Psychotropic drug interaction check was done, including changes made today.    PROVIDER:  TITA Jaramillo CNP       MEDICAL DECISION MAKING         ([facility]   Level of Medical Decision Making:   - At least 1 chronic problem that is not stable  - Engaged in prescription drug management during visit (discussed any medication benefits, side effects, alternatives, etc.)       The longitudinal plan of care for the diagnosis(es)/condition(s) as documented were addressed during this visit. Due to the added complexity in care, I will continue to support Joss in the subsequent management and with ongoing continuity of care.

## 2025-02-04 NOTE — NURSING NOTE
Is the patient currently in the state of [place]? YES    Current patient location:  At a friends house in [place].    Visit mode:Video    If the visit is dropped, the patient can be reconnected by: VIDEO VISIT:  Send e-mail to at [email or web address]    Will anyone else be joining the visit? No  (If patient encounters technical issues they should call [phone])    Are changes needed to the allergy or medication list? Pt stated no changes to allergies and Pt stated no med changes    Are refills needed on medications prescribed by this physician? No    Rooming Documentation: Questionnaire(s) completed.    Reason for visit: SHAKEEL Lui, VVF

## 2025-02-09 ENCOUNTER — NURSE TRIAGE (OUTPATIENT)
Dept: NURSING | Facility: CLINIC | Age: 50
End: 2025-02-09
Payer: COMMERCIAL

## 2025-02-09 NOTE — TELEPHONE ENCOUNTER
Nurse Triage SBAR  Dr. Elva Park oncology  no PCP    Is this a 2nd Level Triage? NO    Situation:   Spoke with 49 yr old Lang who c/o:    Worsening lack of energy and no appetite today.  Very fatigued.  In bed all day, doesn't feel like getting up.  Urinated today and has been drinking fluids.  Hasn't eaten very much in the past couple days.  2 weeks ago reported altered sensation in the arm and face that has since resolved.  Stopped taking anxiety medication.    Background:   Level 2 L oligodendroglioma    Assessment: evaluation    Protocol Recommended Disposition:   See HCP Within 4 Hours (Or PCP Triage)    Recommendation:   Patient intends to go to [facility] ED for evaluation.  Mony Anderson RN  [facility]     Reason for Disposition   [1] MODERATE weakness (i.e., interferes with work, school, normal activities) AND [2] cause unknown  (Exceptions: Weakness from acute minor illness or poor fluid intake; weakness is chronic and not worse.)    Additional Information   Negative: SEVERE difficulty breathing (e.g., struggling for each breath, speaks in single words)   Negative: Shock suspected (e.g., cold/pale/clammy skin, too weak to stand, low BP, rapid pulse)   Negative: Difficult to awaken or acting confused (e.g., disoriented, slurred speech)   Negative: [1] Fainted > 15 minutes ago AND [2] still feels too weak or dizzy to stand   Negative: [1] SEVERE weakness (i.e., unable to walk or barely able to walk, requires support) AND [2] new-onset or worsening   Negative: Sounds like a life-threatening emergency to the triager   Negative: Weakness of the face, arm or leg on one side of the body   Negative: [1] Diabetes mellitus AND [2] weakness from low blood sugar (i.e., < 60 mg/dl or 3.5 mmol/l)   Negative: Heat exhaustion suspected (i.e., dehydration from heat exposure)   Negative: Chest pain   Negative: Vomiting is main symptom   Negative: Diarrhea is main symptom   Negative: Difficulty  "breathing   Negative: Heart beating < 50 beats per minute OR > 140 beats per minute   Negative: Extra heartbeats, irregular heart beating, or heart is beating very fast  (i.e., \"palpitations\")   Negative: Follows large amount of bleeding (e.g., from vomiting, rectum, vagina  (Exception: Small brief weakness from sight of a small amount blood.)   Negative: Black or tarry bowel movements   Negative: [1] Drinking very little AND [2] dehydration suspected (e.g., no urine > 12 hours, very dry mouth, very lightheaded)   Negative: Patient sounds very sick or weak to the triager    Protocols used: Weakness (Generalized) and Fatigue-A-AH    "

## 2025-02-10 NOTE — TELEPHONE ENCOUNTER
Called patient for follow up. States he did not go to the ED due to improved sx. He thinks he was wiped out from shoveling snow. He had to do this twice and felt fatigued afterwards. Currently, he is up and active and feeling well. He started eating yesterday, tolerating food and fluids. Denies fever, chills, pain, rash, n/v/c/d. Reports no concerns at this time.    He will continue to monitor sx and call back if he develops any new sx.     Routing update to care team.     Rachael Prasad, RN, BSN, PHN, OCN  [facility]

## 2025-02-18 ENCOUNTER — VIRTUAL VISIT (OUTPATIENT)
Dept: PSYCHIATRY | Facility: CLINIC | Age: 50
End: 2025-02-18
Attending: PSYCHIATRY & NEUROLOGY
Payer: COMMERCIAL

## 2025-02-18 DIAGNOSIS — F43.10 PTSD (POST-TRAUMATIC STRESS DISORDER): ICD-10-CM

## 2025-02-18 DIAGNOSIS — F33.41 RECURRENT MAJOR DEPRESSIVE DISORDER, IN PARTIAL REMISSION: ICD-10-CM

## 2025-02-18 DIAGNOSIS — F41.1 GAD (GENERALIZED ANXIETY DISORDER): Primary | ICD-10-CM

## 2025-02-18 PROCEDURE — 98006 SYNCH AUDIO-VIDEO EST MOD 30: CPT | Performed by: PSYCHIATRY & NEUROLOGY

## 2025-02-18 PROCEDURE — 90836 PSYTX W PT W E/M 45 MIN: CPT | Mod: 95 | Performed by: PSYCHIATRY & NEUROLOGY

## 2025-02-18 PROCEDURE — G2211 COMPLEX E/M VISIT ADD ON: HCPCS | Mod: 95 | Performed by: PSYCHIATRY & NEUROLOGY

## 2025-02-18 RX ORDER — MIRTAZAPINE 7.5 MG/1
22.5 TABLET, FILM COATED ORAL AT BEDTIME
Qty: 90 TABLET | Refills: 3 | Status: SHIPPED | OUTPATIENT
Start: 2025-02-18

## 2025-02-18 ASSESSMENT — PATIENT HEALTH QUESTIONNAIRE - PHQ9
10. IF YOU CHECKED OFF ANY PROBLEMS, HOW DIFFICULT HAVE THESE PROBLEMS MADE IT FOR YOU TO DO YOUR WORK, TAKE CARE OF THINGS AT HOME, OR GET ALONG WITH OTHER PEOPLE: SOMEWHAT DIFFICULT
SUM OF ALL RESPONSES TO PHQ QUESTIONS 1-9: 5
SUM OF ALL RESPONSES TO PHQ QUESTIONS 1-9: 5

## 2025-02-18 ASSESSMENT — PAIN SCALES - GENERAL: PAINLEVEL_OUTOF10: NO PAIN (0)

## 2025-02-18 NOTE — PATIENT INSTRUCTIONS
PLAN                                                                                                       1) Meds  INCREASE Mirtazapine to 22.5 mg daily at bedtime (Take three 7.5 mg tablets to total 22.5 mg)    DISCONTINUE Prazosin     2) Other: None today  3) RTC: 3/11/25 8:00AM  4) CRISIS Numbers:   Provided routinely in AVS       **For crisis resources, please see the information at the end of this document**   Patient Education    Thank you for coming to the [facility] [place] [facility].     Lab Testing:  If you had lab testing today and your results are reassuring or normal they will be mailed to you or sent through [facility] within 7 days. If the lab tests need quick action we will call you with the results. The phone number we will call with results is # [phone]. If this is not the best number please call our clinic and change the number.     Medication Refills:  If you need any refills please call your pharmacy and they will contact us. Our fax number for refills is [phone].   Three business days of notice are needed for general medication refill requests.   Five business days of notice are needed for controlled substance refill requests.   If you need to change to a different pharmacy, please contact the new pharmacy directly. The new pharmacy will help you get your medications transferred.     Contact Us:  Please call [phone] during business hours (8-5:00 M-F).   If you have medication related questions after clinic hours, or on the weekend, please call [phone].     Financial Assistance [phone]   Medical Records [phone]       MENTAL HEALTH CRISIS RESOURCES:  For a emergency help, please call 911 or go to the nearest Emergency Department.     Emergency Walk-In Options:   Em[facility] Unit @ [facility] ([place]): [phone] - Specialized mental health emergency area designed to be calming  [facility] ([place]):  [phone]  [facility] ([place]): [phone]  [facility] ([place]): [phone]    [facility] Crisis Information:   Marli: [phone]  Gerardo: [phone]  Leeann (TOM) - Adult: [phone]     Child: [phone]  [place] - Adult: [phone]     Child: [phone]  [place]: [phone]  List of all [facility] resources:   https://[place].gov/dhs/people-we-serve/adults/health-care/mental-health/resources/crisis-contacts.jsp    National Crisis Information:   Crisis Text Line: Text  [place]  to 460266  Suicide & Crisis Lifeline: 988  National Suicide Prevention Lifeline: 0-346-794-TALK (1-363.466.8851)       For online chat options, visit https://suicidepreventionlifeline.org/chat/  Poison Control Center: 1-775.948.5789  Trans Lifeline: 1-685-835-9353 - Hotline for transgender people of all ages  The [employer]: 6-498-235-2782 - Hotline for LGBT youth     For Non-Emergency Support:   Fast Tracker: Mental Health & Substance Use Disorder Resources -   https://www.[employer].org/

## 2025-02-18 NOTE — NURSING NOTE
Current patient location: [address]  [place] [address]    Is the patient currently in the state of [place]? YES    Visit mode: VIDEO    If the visit is dropped, the patient can be reconnected by:VIDEO VISIT: Text to cell phone:   Telephone Information:   Mobile [phone]       Will anyone else be joining the visit? NO  (If patient encounters technical issues they should call [phone])    Are changes needed to the allergy or medication list? Pt stated no changes to allergies and Pt stated no med changes  Patient stated they are taking two medications, mirtazapine and prazosin    Are refills needed on medications prescribed by this physician? Discuss with provider    Rooming Documentation:  Questionnaire(s) completed    Reason for visit: RECHECK    Britni VELARDE

## 2025-02-18 NOTE — PROGRESS NOTES
Virtual Visit Details    Type of service:  Video Visit   Video Start Time: 0800  Video End Time: 0859    Originating Location (pt. Location): Home    Distant Location (provider location):  Off-site  Platform used for Video Visit: [facility]  Psychiatry Clinic  PSYCHIATRY PROGRESS NOTE     CARE TEAM:  PCP- No Ref-Primary, Physician   Oncology- Elva Park MD, Palliative- Florin Baig MD, and Therapist- Denilson Ulloa LP Shanita Coreas is a 50 year old who prefers the name Joss and uses pronouns he, him.     DIAGNOSES                                                                                        MAJOR DEPRESSIVE DISORDER   GENERALIZED ANXIETY DISORDER   PTSD    ASSESSMENT                                                                                          Mr. Collins presented at intake with symptoms that support a diagnosis of PTSD. He also has a history of MAJOR DEPRESSIVE DISORDER and GENERALIZED ANXIETY DISORDER, which are both currently active and poorly controlled. He has minimal psychiatric history. Reports a diagnosis of ADHD in childhood and treatment with Ritalin, but no treatment in adulthood. His depression and anxiety worsened after he was diagnosed with oligodendoglioma in 2021. He is s/p resection and radiation. He is currently on Temozolimide treatment. He reports occasional nausea, but no other physical concerns today. Mr. Collins has a significant trauma history, primarily stemming from his time in [employer]. He endorses ongoing hypervigilance, nightmares, flashbacks, exaggerated startle response, and emotional dysregulation related to experiences in [employer]. He possesses many strengths including good insight, help seeking behaviors, motivation to work on his mental health and return to the workforce, and an admirable dedication to his five children.     In the past, Mr. Collins has tried and failed two SSRIs (sertraline and escitalopram). He experienced  "nausea and found them minimally helpful at best. He has not tried any other mental health medications in adulthood. He reports he would like to continue with both medication management and supportive psychotherapy in these appointments.     2/4/25: Collaboratively agreed to continue mirtazapine 15 mg daily at bedtime to target sleep, mood, and anxiety. This has been helpful for mood, sleep, and appetite. Unclear if it has helped anxiety. Two visits ago, Joss was prescribed prazosin 1 mg x3 days followed by an increase to 2 mg for ongoing nightmares. May also help target anxiety. Joss was not able to start it yet, but does plan to do so today. He confirmed he has picked it up from pharmacy. Denies questions or concerns. Reviewed instructions and possible side effects again. Joss has no concerns with the plan to start prazosin.     2/18/25: Today, collaboratively agreed to increase mirtazapine dose from 15mg to 22.5 mg daily at bedtime. Joss reports that he has been experiencing good moods and doesn't feel depressed. He does endorse ongoing anxiety, especially related to social situations, and continued impact on sleep and energy. Joss agreed that there is room to improve mood, sleep, and appetite. Also collaboratively agreed to discontinue prazosin. Joss reported that he tried prazosin for 3 days and did not like the way he felt waking up when taking it. He reported the way he felt on the medication (\"dizzy,\" \"like a surge or chill going through my body\") was more bothersome than nightmares/dreams. Discussed strategies for autonomic nervous system regulation when in public places and practicing strategies when in safe environments and with safe people. Discussed strategies for med management when staying overnight at friend's place so he doesn't miss doses. Next appointment scheduled for 3/3/25 and will add Joss to the waitlist to be seen sooner for continued psychotherapy and med management.     [facility] review was not " "needed today.    PLAN                                                                                                       1) Meds  INCREASE Mirtazapine to 22.5 mg daily at bedtime (Take three 7.5 mg tablets to total 22.5 mg)    DISCONTINUE Prazosin 1 mg for three days, on day four, increase to 2 mg. Take at bedtime.     2) Other: None today  3) RTC: 3/11/25 8:00AM  4) CRISIS Numbers:   Provided routinely in AVS        CHIEF CONCERN                              \" Following-up \"    PERTINENT BACKGROUND                                         [initial eval 12/12/24]   Mental Health History:  Diagnosis of ADHD in school age years; was treated with Ritalin as a child.     Oncology History:   Hx of oligodendoglioma s/p resection on 11/12/21.   Worsening anxiety, emotional regulation, memory, cognitive fatigue over the last year in the setting of disease progression.     Psych pertinent item history includes trauma hx    Past Medication Trials: Sertraline 100 mg; escitalopram 5 mg (could not tolerate nausea)    HISTORY OF PRESENT ILLNESS                                                      Joss reports that things have been going \"alright\" since last appointment on 2/4/25. He has started a new relationship and reports this going well, specifically sharing an instance where his \"new friend\" noticed him getting anxious in a public space and engaged with him to help him feel safe. He shared that this felt new and supportive to him. He has been staying overnight at his friend's house occasionally and reported difficulty with sleep and medication adherence when there. Discussed nervous system regulation strategies for moments of increased anxiety and compassionate and curious self talk. Also discussed strategies for medication management to avoid missed doses. Joss identified his new friend, kids, mother of his children, and his mom as safe people he can practice his regulation and connection strategies with. Joss reported no " "new instances of numbness since last visit and no changes in headaches. He is following up with a primary care provider on 3/6/25.     Mood: \"alright, been having good moods\"; \"ain't been depressed, but not going out too much\"  Anxiety: had anxiety about experiences of numbness but has been worrying less after numbness subsided; \"know that it's [anxiety] there\"; he shared hypotheticals connected to social anxiety; reported that trying breathing strategies increased anxiety because of concerns that people noticed chest movements  Sleep: \"Alright, some hiccups\"; \"get off pattern a couple times\"; reported challenges with falling back to sleep after waking in the middle of the night a  few times; reports that sleep challenges have been primarily when trying to sleep at new friend's place (\"still not comfortable sleeping at my friend's place\"  Appetite: Skipping breakfast and lunch and then eating a large dinner; \"don't want to get up and cook, would rather go out\"; reported change in cooking happened a few weeks ago when he was experiencing numbness; wants to return to cooking noting decreased cost and healthier choices  Energy: -5/10; \"gets in the way of going places sometimes\"    Denies kadi/hallucinations/psychosis/VI/HI/SI.    Recent Substance Use:    Alcohol- social drinking; \"don't drink too much\"  Tobacco- vapes nicotine daily- using to wean off cigarettes   Caffeine- none  Cannabis- Reports recent use of THC gummies (25mg) and that he has stopped smoking marijuana; reports gummies made him feel relaxed and no other adverse effects  Opioids- none           Narcan Kit- NA   Other illicit drugs- none    SOCIAL and FAMILY HISTORY                                                 per pt report         Family Hx:  denies history of mental health in the family, though endorses two cousins  by suicide     Social Hx:  Financial Support- social security disability, Living Situation - rental apartment, Children - see " below, Social Support - mother (dementia, blind), the mother of his children, aunties (not local), kids, Trauma History - see below, Legal History - incarceration (8500-6300), and Feels Safe at Home- yes    Five Children. Youngest two live with him intermittently. Mood improves when children are around. When they leave depression increases.   Works in construction and in a warehouse. Currently not able to work due to mental health (anxiety).    Trauma History: Was un-housed for three years. 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    PAST PSYCH and SUBSTANCE USE HISTORY                      Psych:  No additional psychiatric history.    Substance Use:  No additional substance use history.  Past Use - Tobacco-  15 year pack per day history    Treatment - None    Legal Consequences - incarceration [id number]; [id number]; [id number]    MEDICAL HISTORY     Patient Active Problem List   Diagnosis    S/P craniotomy    Oligodendroglioma, WHO grade II (H)    Primary hypertension    Adjustment disorder with mixed anxiety and depressed mood    Right hemiparesis (H)    Severe episode of recurrent major depressive disorder, without psychotic features (H)    ROSALBA (generalized anxiety disorder)    PTSD (post-traumatic stress disorder)       ALLERGIES: Patient has no known allergies.     MEDICAL REVIEW OF SYSTEMS                                                                  none in addition to that documented above    CURRENT MEDS       Current Outpatient Medications   Medication Sig Dispense Refill    mirtazapine (REMERON) 7.5 MG tablet Take 3 tablets (22.5 mg) by mouth at bedtime. 90 tablet 3       VITALS                                                                                              There were no vitals taken for this visit.    MENTAL STATUS EXAM                                                             Alertness: alert  and oriented  Appearance: casually groomed  Behavior/Demeanor:  cooperative, pleasant, and calm, with good  eye contact   Speech: normal  Language: intact  Psychomotor: normal or unremarkable  Mood: description consistent with euthymia, though some anxiety remains  Affect: full range; congruent to: mood- yes, content- yes  Thought Process/Associations: unremarkable  Thought Content:  Reports none;  Denies suicidal & violent ideation and delusions  Perception:  Reports none;  Denies hallucinations  Insight: good  Judgment: good  Cognition: does  appear grossly intact; formal cognitive testing was not done  oriented: time, person, and place  attention span: intact  concentration: intact  recent memory: intact  remote memory: intact  fund of knowledge: appropriate  Gait and Station: N/A (telehealth)    LABS and DATA         1/20/2025    11:24 AM 2/3/2025     1:08 PM 2/18/2025     7:54 AM   PHQ   PHQ-9 Total Score 6  6  5    Q9: Thoughts of better off dead/self-harm past 2 weeks Not at all Not at all Not at all        Patient-reported    Proxy-reported       Recent Labs   Lab Test 05/20/24  1002 04/30/24  1511 04/12/24  1045   CR 1.15 1.12 1.04   GFRESTIMATED 79 82 89     Recent Labs   Lab Test 05/20/24  1002 04/30/24  1511   AST 15 20   ALT 8 10   ALKPHOS 75 68       PSYCHOTROPIC DRUG INTERACTIONS   none    MANAGEMENT:  N/A    Psychiatry Individual Psychotherapy Note   Psychotherapy start time - 0805  Psychotherapy end time - 0846  Date treatment plan last reviewed with patient - 12/12/24  Subjective: This supportive psychotherapy session addressed issues related to goals of therapy and current psychosocial stressors. Patient's reaction: Pre-contemplation in the context of mental status appropriate for ambulatory setting.    Interactive complexity indicated? No  Plan: RTC in timeframe noted above  Psychotherapy services during this visit included myself and the patient.   Treatment Plan      SYMPTOMS; PROBLEMS   MEASURABLE GOALS;    FUNCTIONAL IMPROVEMENT / GAINS INTERVENTIONS  DISCHARGE CRITERIA   Trauma Related: fear, intrusive memories, trauma trigger psychological / physiological response, angry outbursts, and mood dysregulation   learn best practices for sleep, make a plan to manage 2-3 anxiety-provoking situations, develop 2 strategies to cope with trauma triggers/intrusive memories, and walk away from situations that trigger strong emotions Supportive / psychodynamic marked symptom improvement        RISK STATEMENT for SAFETY   Joss did not appear to be an imminent safety risk to self or others.    TREATMENT RISK STATEMENT:  The risks, benefits, alternatives and potential adverse effects have been discussed and are understood by the pt. The pt understands the risks of using street drugs or alcohol. There are no medical contraindications, the pt agrees to treatment with the ability to do so. The pt knows to call the clinic for any problems or to access emergency care if needed.  Medical and substance use concerns are documented above.  Psychotropic drug interaction check was done, including changes made today.    PROVIDER:  TITA Jaramillo CNP       MEDICAL DECISION MAKING        [facility]   Level of Medical Decision Making:   - At least 1 chronic problem that is not stable  - Engaged in prescription drug management during visit (discussed any medication benefits, side effects, alternatives, etc.)       The longitudinal plan of care for the diagnosis(es)/condition(s) as documented were addressed during this visit. Due to the added complexity in care, I will continue to support Joss in the subsequent management and with ongoing continuity of care.

## 2025-03-02 SDOH — HEALTH STABILITY: PHYSICAL HEALTH: ON AVERAGE, HOW MANY DAYS PER WEEK DO YOU ENGAGE IN MODERATE TO STRENUOUS EXERCISE (LIKE A BRISK WALK)?: 1 DAY

## 2025-03-02 SDOH — HEALTH STABILITY: PHYSICAL HEALTH: ON AVERAGE, HOW MANY MINUTES DO YOU ENGAGE IN EXERCISE AT THIS LEVEL?: 10 MIN

## 2025-03-02 ASSESSMENT — SOCIAL DETERMINANTS OF HEALTH (SDOH): HOW OFTEN DO YOU GET TOGETHER WITH FRIENDS OR RELATIVES?: ONCE A WEEK

## 2025-03-03 ENCOUNTER — ONCOLOGY VISIT (OUTPATIENT)
Dept: ONCOLOGY | Facility: CLINIC | Age: 50
End: 2025-03-03
Attending: PSYCHIATRY & NEUROLOGY
Payer: COMMERCIAL

## 2025-03-03 ENCOUNTER — TUMOR CONFERENCE (OUTPATIENT)
Dept: ONCOLOGY | Facility: CLINIC | Age: 50
End: 2025-03-03
Payer: COMMERCIAL

## 2025-03-03 ENCOUNTER — ANCILLARY PROCEDURE (OUTPATIENT)
Dept: MRI IMAGING | Facility: CLINIC | Age: 50
End: 2025-03-03
Attending: PSYCHIATRY & NEUROLOGY
Payer: COMMERCIAL

## 2025-03-03 VITALS
WEIGHT: 154.1 LBS | OXYGEN SATURATION: 99 % | RESPIRATION RATE: 16 BRPM | SYSTOLIC BLOOD PRESSURE: 125 MMHG | BODY MASS INDEX: 21.49 KG/M2 | TEMPERATURE: 98.3 F | HEART RATE: 73 BPM | DIASTOLIC BLOOD PRESSURE: 86 MMHG

## 2025-03-03 DIAGNOSIS — C71.9 OLIGODENDROGLIOMA, WHO GRADE II (H): Primary | ICD-10-CM

## 2025-03-03 DIAGNOSIS — C71.9 OLIGODENDROGLIOMA, WHO GRADE II (H): ICD-10-CM

## 2025-03-03 PROCEDURE — 70553 MRI BRAIN STEM W/O & W/DYE: CPT | Mod: GC | Performed by: RADIOLOGY

## 2025-03-03 PROCEDURE — A9585 GADOBUTROL INJECTION: HCPCS | Performed by: RADIOLOGY

## 2025-03-03 PROCEDURE — G0463 HOSPITAL OUTPT CLINIC VISIT: HCPCS | Performed by: PSYCHIATRY & NEUROLOGY

## 2025-03-03 RX ORDER — GADOBUTROL 604.72 MG/ML
7.5 INJECTION INTRAVENOUS ONCE
Status: COMPLETED | OUTPATIENT
Start: 2025-03-03 | End: 2025-03-03

## 2025-03-03 RX ADMIN — GADOBUTROL 6.5 ML: 604.72 INJECTION INTRAVENOUS at 08:56

## 2025-03-03 ASSESSMENT — PAIN SCALES - GENERAL: PAINLEVEL_OUTOF10: NO PAIN (0)

## 2025-03-03 NOTE — NURSING NOTE
"Oncology Rooming Note    March 3, 2025 9:59 AM   Lang Collins Jr. is a 50 year old male who presents for:    Chief Complaint   Patient presents with    Oncology Clinic Visit     Oligodendroglioma     Initial Vitals: /86 (BP Location: Right arm, Patient Position: Sitting, Cuff Size: Adult Regular)   Pulse 73   Temp 98.3  F (36.8  C) (Oral)   Resp 16   Wt 69.9 kg (154 lb 1.6 oz)   SpO2 99%   BMI 21.49 kg/m   Estimated body mass index is 21.49 kg/m  as calculated from the following:    Height as of 12/26/24: 1.803 m (5' 11\").    Weight as of this encounter: 69.9 kg (154 lb 1.6 oz). Body surface area is 1.87 meters squared.  No Pain (0) Comment: Data Unavailable   No LMP for male patient.  Allergies reviewed: Yes  Medications reviewed: Yes    Medications: Medication refills not needed today.  Pharmacy name entered into [facility]:    [id number] - [place], [place] - [address] [place], [place] - [address]  [facility] [place] [place] [facility] [address] [id number] - [place], [place] - [address] [id number] - [place], [place] - [address] AT [address] [place] [address]    Frailty Screening:   Is the patient here for a new oncology consult visit in cancer care? 2. No    PHQ9:  Did this patient require a PHQ9?: No      Clinical concerns: none      Alea Meade            "

## 2025-03-03 NOTE — PROGRESS NOTES
"NEURO-ONCOLOGY VISIT  Mar 3, 2025    CHIEF COMPLAINT: Mr. 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     He was monitored on imaging surveillance until imaging in 12/2022 showed a subtle change concerning for cancer progression. As a result, initiation of cancer-directed therapy was recommended.     He completed radiation with concurrent temozolomide on 8/8/2023. Post-radiation imaging demonstrated no concerning findings with a subtle positive initial response to treatment. Joss was initiated on adjuvant-dosed temozolomide in 10/2023 and he remains on this treatment. Repeat imaging in 2/2024 and again in 6/2024 showed overall stability with evidence of evolving post-radiation injury.      Imaging in 10/2024 and again in 3/2025 showed continued overall stability with no new concerns.     Joss is presenting in follow-up today.    HISTORY OF PRESENT ILLNESS:  -Joss had a breakthrough seizure in January; onset of right hand numbness that traveled up the arm and into the face.    The abnormal sensation last for 10-15 minutes. No associated weakness.    Since then, he has experienced recurrent episodes of hand cramping and facial numbness that lasts for several minutes and then, spontaneously resolves. These events can occur daily.   -Emotionally, Joss has been depressed. Lacking motivation. He feels that he has lost a sense of purpose.    Planning to visit his kids in [place] this week!   Feeling fatigued.    Bored, limited social interactions.    More forgetful.  -More frequent headaches.    No medications needed. Pain resolves in about 10 minutes with rest.   -Still having problems with function of the right foot/leg; stable.   -Not having any vision changes.      MEDICATIONS   Current Outpatient Medications   Medication Sig Dispense Refill    mirtazapine " (REMERON) 7.5 MG tablet Take 3 tablets (22.5 mg) by mouth at bedtime. 90 tablet 3     DRUG ALLERGIES No Known Allergies     IMMUNIZATIONS   Immunization History   Administered Date(s) Administered    COVID-19 MONOVALENT 12+ (Pfizer) 11/05/2021    TDAP (Adacel,Boostrix) 12/03/2021       ONCOLOGIC HISTORY  -8/8/2021 PRESENTATION: New onset seizure; generalized event. Started on Keppra.  -8/2021 MR brain imaging with a 2.1cm left frontal non-enhancing mass in the superior frontal gyrus in the expected region of the supplementary motor area.  -9/8/2021 MR brain imaging with no significant change in the nonenhancing T2 hyperintense mass.  -11/12/21 SURGERY: Left frontal-parietal craniotomy for mass resection.   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  Post-operative imaging with interval left frontal craniotomy for resection of abnormal lesion within the left parafalcine frontal lobe.  Hemiparesis, discharged to ARU.  -12/7/2021 NEURO-ONC: Recommending imaging surveillance given low risk factors and need to continue to focus on ongoing rehab. Referral to Dr. Chiang for optimizing rehab. Referral to [facility]/ epilepsy for seizure risk management in relation to future employment. Trial of flexeril 10mg at bedtime for headaches.   -3/8/2022 NEURO-ONC/ MRB: Clinically well; improved from prior. Restarting Keppra; second referral to [facility]. Imaging with post-surgical changes. Continue imaging surveillance.   -6/7/2022 NEURO-ONC/ MRB: Clinically well. Imaging with continued healing post-surgery. Continue imaging surveillance.   -12/6/2022 NEURO-ONC/ MRB: Clinically with no new/ progressive neurological symptoms. Imaging with a subtle increase in T2 FLAIR about the posterior aspect of the resection cavity. Continue imaging surveillance at a shortened interval of 3 months.   -2/28/2023 NEURO-ONC/ MRB: Clinically with no new/ progressive neurological symptoms. Imaging  largely stable, but close interval imaging was recommended.   -5/30/2023 NEURO-ONC/ MRB: Worsened mood, concentration; starting Zoloft. Imaging with subtle progression noted when comparing to imaging 6 months ago; recommending chemoradiotherapy with referral to radiation oncology. Social work involvement. Primary care referral for management of hypertension.   -6/16/2023 NEURO-ONC: Started Zoloft, stopped r/t feeling jittery. Imaging with subtle progression noted when comparing to imaging 6 months ago; planned start date is 6/27 for chemoradiotherapy. Ongoing social work involvement.   -7/21/2023 NEURO-ONC: Tolerating chemoradiation well.  Tolerating Zoloft with improved mood.  Continues to follow with psychology and social work.  -8/8/2023 CHEMORADS: Completed 5400 cGy in 30 fractions with concurrent temozolomide.   -10/17/2023 NEURO-ONC/ MRB/ CHEMO: Dizziness; low vitamin D. Anxiety; increase Zoloft. Imaging with no concerns; initial subtle positive treatment response. Starting adjuvant temozolomide 150mg/m2 (280mg), cycle 1.  -12/5/2023 NEURO-ONC/ CHEMO: Dizziness; low vitamin D. Anxiety; increase Zoloft. Imaging with no concerns; initial subtle positive treatment response. Continuing adjuvant temozolomide 200mg/m2 (360mg), cycle 2.  -1/10/2024 CHEMO: Continuing adjuvant temozolomide 150mg/m2 (180mg), cycle 3.  -2/13/2024 NEURO-ONC/ MRB/ CHEMO: Headaches, low mood at times, right left weakness. Imaging stable. Needing to have a tooth pulled; holding adjuvant temozolomide 150mg/m2 (280mg), cycle 4 until after dental procedure.  -3/26/2024 NEURO-ONC/ CHEMO: Continue adjuvant temozolomide 150mg/m2 (280mg), cycle 5.  -4/30/2024 NEURO-ONC/ CHEMO: Doing well with no new changes.  Stable right leg weakness.  Continue adjuvant temozolomide 150mg/m2 (280mg), cycle 6.  -6/28/2024 NEURO-ONC/ MRB: No neurological concerns, but notes low mood with increased anxiety and paranoia, cognitive concerns, and low appetite. Imaging  "stable with evolving post-radiation changes.  -10/28/2024 NEURO-ONC/ MRB: No neurological concerns; stable degree of poor coordination in his right foot/ leg. Notes low mood with increased anxiety/ PTSD and boredom. Following with palliative care, referral to Dr. Ulloa, [facility] engagement for community involvement. Imaging stable with evolving post-radiation changes. Discussed the option of starting Voranigo; holding for now.   -3/3/2025 NEURO-ONC/ MRB: Breakthrough and recurrent simple sensory seizure; Communicated with Dr. Panda. Stable degree of poor coordination in his right foot/ leg. Mood remains depressed. Imaging stable.      PHYSICAL EXAMINATION  /86 (BP Location: Right arm, Patient Position: Sitting, Cuff Size: Adult Regular)   Pulse 73   Temp 98.3  F (36.8  C) (Oral)   Resp 16   Wt 69.9 kg (154 lb 1.6 oz)   SpO2 99%   BMI 21.49 kg/m     Wt Readings from Last 2 Encounters:   03/03/25 69.9 kg (154 lb 1.6 oz)   12/26/24 65.8 kg (145 lb)      Ht Readings from Last 2 Encounters:   12/26/24 1.803 m (5' 11\")   10/29/24 1.803 m (5' 11\")     KPS: 100    -Generally well appearing.  -Psychiatric: Normal mood and affect. Pleasant, talkative.  -Neurologic:   MENTAL STATUS:     Alert, oriented.    Recall: Intact.    Speech fluent.    Comprehension intact.     CRANIAL NERVES:      Symmetric facial movements.   Hearing intact.   With normal phonation, no dysfunction of the palate or tongue.   GAIT: Steady, unassisted.         MEDICAL RECORDS  Personally reviewed; Clinic visit from mental health provider.     LABS  Personally reviewed all available lab results; No recent results.    IMAGING  Personally reviewed MR brain imaging from today and compared to prior imaging.    Formal read; \"Stable postsurgical changes of left frontal oligodendroglioma resection with unchanged T2 hyperintensity and linear enhancement, likely representing posttreatment changes. No evidence of progression.\"     Imaging was shown to and " results were reviewed with Joss.       IMPRESSION  On date of service, 45 minutes was spent in clinic and 11 minutes was spent preparing for the visit through extensive chart review and coordinating care for this high complexity visit. The following is in explanation for the recommendations used to define the plan.       Joss had called into clinic in January to report an event that seems most consistent with a breakthrough seizure. He had experienced a sudden onset of right hand numbness that traveled up the arm and into the face. This abnormal sensation last for 10-15 minutes and was not associated weakness. Over the past 6 weeks, he has experienced recurrent episodes of hand cramping and facial numbness that lasts for several minutes and then, spontaneously resolves. These events can occur daily. I am concerned that he is experiencing recurrent simple sensory seizures. I have personally communicated this concern with Dr. Panda and asked that he see him in follow-up.     Of note, especially is Keppra is restarted as this medication can worsen depression; emotionally, Joss remains depressed. He is lacking motivation and is still struggling to find a sense of purpose. Joss working closely with Social Work, mental health, and palliative care.     Otherwise, his right leg weakness is stable. He is noting recurrent headaches that self-resolve with rest.     Imaging as detailed above was stable as compare to his last scan, but shows a slight increase in T2 FLAIR as compared to his scan performed a year ago. This finding can be anticipated and related to evolving post-radiation changes. I personally reviewed Joss's imaging and case at Brain Tumor Conference and all in attendance were in agreement with this impression. Therefore, the plan is to continue imaging surveillance per NCCN guidelines of every 3-6 months for the first 5 years (at least through 10/2028) and then, every 6 months as clinically indicated. At this time,  will repeat imaging in 6 months. In the meantime, Joss knows to call the clinic with any concerns and appointments can be moved up if needed.    PROBLEM LIST  Oligodendroglioma  Hemiparesis, right  Tension headache  Seizure  Fatigue  Hypertension  Depressed mood, anxiety, irritability    PLAN  -CANCER DIRECTED THERAPY-  -Continue imaging surveillance; Repeat imaging in 6 months.      -STEROIDS-  -Currently off dexamethasone.    -SEIZURE MANAGEMENT-  -Off Keppra.   -Following with Dr. Panda. I communicated my concerns for recurrent simple sensory seizures.     -Quality of life/ MOOD/ HEADACHE/ FATIGUE-  -Vitamin D deficiency.   Recommend starting daily supplementation with vitamin D 200-400 international unit(s).  -Following with palliative care; Dr. Baig.     Lexapro with limited to no benefit.    Self-discontinued Zoloft due to intolerable side effects.   -Recommending scheduling an appointment with Denilson Ulloa Psy.D., LP   Licensed Psychologist with [facility]  [email or web address]  [phone]  -Using marijuana for anxiety.   -Social work following.     -HEMIPARESIS-  -Following with Dr. Chiang.     -HYPERTENSION-  -Following with primary care.    -TOBACCO DEPENDENCE-  -Previously encouraged continued abstinence from smoking.  He states today that he continues to be abstinent and is congratulated on this.    Return to clinic in 9/2025 + MRI.    The longitudinal plan of care for the diagnosis(es)/condition(s) as documented were addressed during this visit. Due to the added complexity in care, I will continue to support Joss in the subsequent management and with ongoing continuity of care.     Elva Park MD  Neuro-oncology

## 2025-03-03 NOTE — LETTER
"3/3/2025      Lang Collins Jr.  [address]  [facility] [address]      Dear Colleague,    Thank you for referring your patient, Lang Collins Jr., to the [facility]. Please see a copy of my visit note below.    NEURO-ONCOLOGY VISIT  Mar 3, 2025    CHIEF COMPLAINT: Mr. 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     He was monitored on imaging surveillance until imaging in 12/2022 showed a subtle change concerning for cancer progression. As a result, initiation of cancer-directed therapy was recommended.     He completed radiation with concurrent temozolomide on 8/8/2023. Post-radiation imaging demonstrated no concerning findings with a subtle positive initial response to treatment. Joss was initiated on adjuvant-dosed temozolomide in 10/2023 and he remains on this treatment. Repeat imaging in 2/2024 and again in 6/2024 showed overall stability with evidence of evolving post-radiation injury.      Imaging in 10/2024 and again in 3/2025 showed continued overall stability with no new concerns.     Joss is presenting in follow-up today.    HISTORY OF PRESENT ILLNESS:  -Joss had a breakthrough seizure in January; onset of right hand numbness that traveled up the arm and into the face.    The abnormal sensation last for 10-15 minutes. No associated weakness.    Since then, he has experienced recurrent episodes of hand cramping and facial numbness that lasts for several minutes and then, spontaneously resolves. These events can occur daily.   -Emotionally, Joss has been depressed. Lacking motivation. He feels that he has lost a sense of purpose.    Planning to visit his kids in [place] this week!   Feeling fatigued.    Bored, limited social interactions.    More forgetful.  -More frequent headaches.    No medications " needed. Pain resolves in about 10 minutes with rest.   -Still having problems with function of the right foot/leg; stable.   -Not having any vision changes.      MEDICATIONS   Current Outpatient Medications   Medication Sig Dispense Refill     mirtazapine (REMERON) 7.5 MG tablet Take 3 tablets (22.5 mg) by mouth at bedtime. 90 tablet 3     DRUG ALLERGIES No Known Allergies     IMMUNIZATIONS   Immunization History   Administered Date(s) Administered     COVID-19 MONOVALENT 12+ (Pfizer) 11/05/2021     TDAP (Adacel,Boostrix) 12/03/2021       ONCOLOGIC HISTORY  -8/8/2021 PRESENTATION: New onset seizure; generalized event. Started on Keppra.  -8/2021 MR brain imaging with a 2.1cm left frontal non-enhancing mass in the superior frontal gyrus in the expected region of the supplementary motor area.  -9/8/2021 MR brain imaging with no significant change in the nonenhancing T2 hyperintense mass.  -11/12/21 SURGERY: Left frontal-parietal craniotomy for mass resection.   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  Post-operative imaging with interval left frontal craniotomy for resection of abnormal lesion within the left parafalcine frontal lobe.  Hemiparesis, discharged to ARU.  -12/7/2021 NEURO-ONC: Recommending imaging surveillance given low risk factors and need to continue to focus on ongoing rehab. Referral to Dr. Chiang for optimizing rehab. Referral to [facility]/ epilepsy for seizure risk management in relation to future employment. Trial of flexeril 10mg at bedtime for headaches.   -3/8/2022 NEURO-ONC/ MRB: Clinically well; improved from prior. Restarting Keppra; second referral to [facility]. Imaging with post-surgical changes. Continue imaging surveillance.   -6/7/2022 NEURO-ONC/ MRB: Clinically well. Imaging with continued healing post-surgery. Continue imaging surveillance.   -12/6/2022 NEURO-ONC/ MRB: Clinically with no new/ progressive neurological symptoms.  Imaging with a subtle increase in T2 FLAIR about the posterior aspect of the resection cavity. Continue imaging surveillance at a shortened interval of 3 months.   -2/28/2023 NEURO-ONC/ MRB: Clinically with no new/ progressive neurological symptoms. Imaging largely stable, but close interval imaging was recommended.   -5/30/2023 NEURO-ONC/ MRB: Worsened mood, concentration; starting Zoloft. Imaging with subtle progression noted when comparing to imaging 6 months ago; recommending chemoradiotherapy with referral to radiation oncology. Social work involvement. Primary care referral for management of hypertension.   -6/16/2023 NEURO-ONC: Started Zoloft, stopped r/t feeling jittery. Imaging with subtle progression noted when comparing to imaging 6 months ago; planned start date is 6/27 for chemoradiotherapy. Ongoing social work involvement.   -7/21/2023 NEURO-ONC: Tolerating chemoradiation well.  Tolerating Zoloft with improved mood.  Continues to follow with psychology and social work.  -8/8/2023 CHEMORADS: Completed 5400 cGy in 30 fractions with concurrent temozolomide.   -10/17/2023 NEURO-ONC/ MRB/ CHEMO: Dizziness; low vitamin D. Anxiety; increase Zoloft. Imaging with no concerns; initial subtle positive treatment response. Starting adjuvant temozolomide 150mg/m2 (280mg), cycle 1.  -12/5/2023 NEURO-ONC/ CHEMO: Dizziness; low vitamin D. Anxiety; increase Zoloft. Imaging with no concerns; initial subtle positive treatment response. Continuing adjuvant temozolomide 200mg/m2 (360mg), cycle 2.  -1/10/2024 CHEMO: Continuing adjuvant temozolomide 150mg/m2 (180mg), cycle 3.  -2/13/2024 NEURO-ONC/ MRB/ CHEMO: Headaches, low mood at times, right left weakness. Imaging stable. Needing to have a tooth pulled; holding adjuvant temozolomide 150mg/m2 (280mg), cycle 4 until after dental procedure.  -3/26/2024 NEURO-ONC/ CHEMO: Continue adjuvant temozolomide 150mg/m2 (280mg), cycle 5.  -4/30/2024 NEURO-ONC/ CHEMO: Doing well with no  "new changes.  Stable right leg weakness.  Continue adjuvant temozolomide 150mg/m2 (280mg), cycle 6.  -6/28/2024 NEURO-ONC/ MRB: No neurological concerns, but notes low mood with increased anxiety and paranoia, cognitive concerns, and low appetite. Imaging stable with evolving post-radiation changes.  -10/28/2024 NEURO-ONC/ MRB: No neurological concerns; stable degree of poor coordination in his right foot/ leg. Notes low mood with increased anxiety/ PTSD and boredom. Following with palliative care, referral to Dr. Ulloa,  engagement for community involvement. Imaging stable with evolving post-radiation changes. Discussed the option of starting Voranigo; holding for now.   -3/3/2025 NEURO-ONC/ MRB: Breakthrough and recurrent simple sensory seizure; Communicated with Dr. Panda. Stable degree of poor coordination in his right foot/ leg. Mood remains depressed. Imaging stable.      PHYSICAL EXAMINATION  /86 (BP Location: Right arm, Patient Position: Sitting, Cuff Size: Adult Regular)   Pulse 73   Temp 98.3  F (36.8  C) (Oral)   Resp 16   Wt 69.9 kg (154 lb 1.6 oz)   SpO2 99%   BMI 21.49 kg/m     Wt Readings from Last 2 Encounters:   03/03/25 69.9 kg (154 lb 1.6 oz)   12/26/24 65.8 kg (145 lb)      Ht Readings from Last 2 Encounters:   12/26/24 1.803 m (5' 11\")   10/29/24 1.803 m (5' 11\")     KPS: 100    -Generally well appearing.  -Psychiatric: Normal mood and affect. Pleasant, talkative.  -Neurologic:   MENTAL STATUS:     Alert, oriented.    Recall: Intact.    Speech fluent.    Comprehension intact.     CRANIAL NERVES:      Symmetric facial movements.   Hearing intact.   With normal phonation, no dysfunction of the palate or tongue.   GAIT: Steady, unassisted.         MEDICAL RECORDS  Personally reviewed; Clinic visit from mental health provider.     LABS  Personally reviewed all available lab results; No recent results.    IMAGING  Personally reviewed MR brain imaging from today and compared to prior " "imaging.    Formal read; \"Stable postsurgical changes of left frontal oligodendroglioma resection with unchanged T2 hyperintensity and linear enhancement, likely representing posttreatment changes. No evidence of progression.\"     Imaging was shown to and results were reviewed with Joss.       IMPRESSION  On date of service, 45 minutes was spent in clinic and 11 minutes was spent preparing for the visit through extensive chart review and coordinating care for this high complexity visit. The following is in explanation for the recommendations used to define the plan.       Joss had called into clinic in January to report an event that seems most consistent with a breakthrough seizure. He had experienced a sudden onset of right hand numbness that traveled up the arm and into the face. This abnormal sensation last for 10-15 minutes and was not associated weakness. Over the past 6 weeks, he has experienced recurrent episodes of hand cramping and facial numbness that lasts for several minutes and then, spontaneously resolves. These events can occur daily. I am concerned that he is experiencing recurrent simple sensory seizures. I have personally communicated this concern with Dr. Panda and asked that he see him in follow-up.     Of note, especially is Keppra is restarted as this medication can worsen depression; emotionally, Joss remains depressed. He is lacking motivation and is still struggling to find a sense of purpose. Joss working closely with Social Work, mental health, and palliative care.     Otherwise, his right leg weakness is stable. He is noting recurrent headaches that self-resolve with rest.     Imaging as detailed above was stable as compare to his last scan, but shows a slight increase in T2 FLAIR as compared to his scan performed a year ago. This finding can be anticipated and related to evolving post-radiation changes. I personally reviewed Joss's imaging and case at Brain Tumor Conference and all in " attendance were in agreement with this impression. Therefore, the plan is to continue imaging surveillance per NCCN guidelines of every 3-6 months for the first 5 years (at least through 10/2028) and then, every 6 months as clinically indicated. At this time, will repeat imaging in 6 months. In the meantime, Joss knows to call the clinic with any concerns and appointments can be moved up if needed.    PROBLEM LIST  Oligodendroglioma  Hemiparesis, right  Tension headache  Seizure  Fatigue  Hypertension  Depressed mood, anxiety, irritability    PLAN  -CANCER DIRECTED THERAPY-  -Continue imaging surveillance; Repeat imaging in 6 months.      -STEROIDS-  -Currently off dexamethasone.    -SEIZURE MANAGEMENT-  -Off Keppra.   -Following with Dr. Panda. I communicated my concerns for recurrent simple sensory seizures.     -Quality of life/ MOOD/ HEADACHE/ FATIGUE-  -Vitamin D deficiency.   Recommend starting daily supplementation with vitamin D 200-400 international unit(s).  -Following with palliative care; Dr. Baig.     Lexapro with limited to no benefit.    Self-discontinued Zoloft due to intolerable side effects.   -Recommending scheduling an appointment with Denilson Ulloa Psy.D., LP   Licensed Psychologist with [facility]  [email or web address]  [phone]  -Using marijuana for anxiety.   -Social work following.     -HEMIPARESIS-  -Following with Dr. Chiang.     -HYPERTENSION-  -Following with primary care.    -TOBACCO DEPENDENCE-  -Previously encouraged continued abstinence from smoking.  He states today that he continues to be abstinent and is congratulated on this.    Return to clinic in 9/2025 + MRI.    The longitudinal plan of care for the diagnosis(es)/condition(s) as documented were addressed during this visit. Due to the added complexity in care, I will continue to support Joss in the subsequent management and with ongoing continuity of care.     Elva Park,  MD  Neuro-oncology      Again, thank you for allowing me to participate in the care of your patient.        Sincerely,        Elva Park MD    Electronically signed

## 2025-03-06 ENCOUNTER — OFFICE VISIT (OUTPATIENT)
Dept: FAMILY MEDICINE | Facility: CLINIC | Age: 50
End: 2025-03-06
Payer: COMMERCIAL

## 2025-03-06 VITALS
TEMPERATURE: 98 F | HEART RATE: 71 BPM | HEIGHT: 71 IN | OXYGEN SATURATION: 98 % | DIASTOLIC BLOOD PRESSURE: 89 MMHG | BODY MASS INDEX: 21.6 KG/M2 | WEIGHT: 154.3 LBS | SYSTOLIC BLOOD PRESSURE: 134 MMHG | RESPIRATION RATE: 18 BRPM

## 2025-03-06 DIAGNOSIS — R03.0 ELEVATED BLOOD PRESSURE READING WITHOUT DIAGNOSIS OF HYPERTENSION: ICD-10-CM

## 2025-03-06 DIAGNOSIS — I10 BENIGN ESSENTIAL HYPERTENSION: ICD-10-CM

## 2025-03-06 DIAGNOSIS — Z12.5 SCREENING PSA (PROSTATE SPECIFIC ANTIGEN): ICD-10-CM

## 2025-03-06 DIAGNOSIS — E78.5 HYPERLIPIDEMIA LDL GOAL <100: ICD-10-CM

## 2025-03-06 DIAGNOSIS — Z11.59 SCREENING FOR VIRAL DISEASE: ICD-10-CM

## 2025-03-06 DIAGNOSIS — E07.9 DISORDER OF THYROID: ICD-10-CM

## 2025-03-06 DIAGNOSIS — F43.23 ADJUSTMENT DISORDER WITH MIXED ANXIETY AND DEPRESSED MOOD: ICD-10-CM

## 2025-03-06 DIAGNOSIS — Z12.11 SPECIAL SCREENING FOR MALIGNANT NEOPLASMS, COLON: ICD-10-CM

## 2025-03-06 DIAGNOSIS — Z00.00 ROUTINE GENERAL MEDICAL EXAMINATION AT A HEALTH CARE FACILITY: Primary | ICD-10-CM

## 2025-03-06 DIAGNOSIS — C71.9 OLIGODENDROGLIOMA, WHO GRADE II (H): ICD-10-CM

## 2025-03-06 DIAGNOSIS — G81.91 RIGHT HEMIPARESIS (H): ICD-10-CM

## 2025-03-06 DIAGNOSIS — R07.89 ATYPICAL CHEST PAIN: ICD-10-CM

## 2025-03-06 DIAGNOSIS — Z87.891 HISTORY OF SMOKING: ICD-10-CM

## 2025-03-06 DIAGNOSIS — F43.10 PTSD (POST-TRAUMATIC STRESS DISORDER): ICD-10-CM

## 2025-03-06 LAB
ALBUMIN SERPL BCG-MCNC: 4.5 G/DL (ref 3.5–5.2)
ALP SERPL-CCNC: 73 U/L (ref 40–150)
ALT SERPL W P-5'-P-CCNC: 10 U/L (ref 0–70)
ANION GAP SERPL CALCULATED.3IONS-SCNC: 9 MMOL/L (ref 7–15)
AST SERPL W P-5'-P-CCNC: 18 U/L (ref 0–45)
BILIRUB SERPL-MCNC: 0.4 MG/DL
BUN SERPL-MCNC: 16.2 MG/DL (ref 6–20)
CALCIUM SERPL-MCNC: 9.6 MG/DL (ref 8.8–10.4)
CHLORIDE SERPL-SCNC: 106 MMOL/L (ref 98–107)
CHOLEST SERPL-MCNC: 160 MG/DL
CREAT SERPL-MCNC: 1.21 MG/DL (ref 0.67–1.17)
EGFRCR SERPLBLD CKD-EPI 2021: 74 ML/MIN/1.73M2
ERYTHROCYTE [DISTWIDTH] IN BLOOD BY AUTOMATED COUNT: 12.1 % (ref 10–15)
FASTING STATUS PATIENT QL REPORTED: NO
FASTING STATUS PATIENT QL REPORTED: NO
GLUCOSE SERPL-MCNC: 90 MG/DL (ref 70–99)
HCO3 SERPL-SCNC: 29 MMOL/L (ref 22–29)
HCT VFR BLD AUTO: 42.2 % (ref 40–53)
HCV AB SERPL QL IA: NONREACTIVE
HDLC SERPL-MCNC: 39 MG/DL
HGB BLD-MCNC: 13.4 G/DL (ref 13.3–17.7)
HIV 1+2 AB+HIV1 P24 AG SERPL QL IA: NONREACTIVE
LDLC SERPL CALC-MCNC: 99 MG/DL
MCH RBC QN AUTO: 29.7 PG (ref 26.5–33)
MCHC RBC AUTO-ENTMCNC: 31.8 G/DL (ref 31.5–36.5)
MCV RBC AUTO: 94 FL (ref 78–100)
NONHDLC SERPL-MCNC: 121 MG/DL
PLATELET # BLD AUTO: 213 10E3/UL (ref 150–450)
POTASSIUM SERPL-SCNC: 4.1 MMOL/L (ref 3.4–5.3)
PROT SERPL-MCNC: 7.1 G/DL (ref 6.4–8.3)
PSA SERPL DL<=0.01 NG/ML-MCNC: 2.29 NG/ML (ref 0–2.5)
RBC # BLD AUTO: 4.51 10E6/UL (ref 4.4–5.9)
SODIUM SERPL-SCNC: 144 MMOL/L (ref 135–145)
TRIGL SERPL-MCNC: 111 MG/DL
TSH SERPL DL<=0.005 MIU/L-ACNC: 1.71 UIU/ML (ref 0.3–4.2)
WBC # BLD AUTO: 4.5 10E3/UL (ref 4–11)

## 2025-03-06 RX ORDER — NICOTINE 21 MG/24HR
1 PATCH, TRANSDERMAL 24 HOURS TRANSDERMAL EVERY 24 HOURS
Qty: 31 PATCH | Refills: 0 | Status: SHIPPED | OUTPATIENT
Start: 2025-03-06

## 2025-03-06 RX ORDER — AMLODIPINE BESYLATE 5 MG/1
5 TABLET ORAL DAILY
COMMUNITY
Start: 2025-03-06

## 2025-03-06 ASSESSMENT — PAIN SCALES - GENERAL: PAINLEVEL_OUTOF10: NO PAIN (0)

## 2025-03-06 NOTE — PROGRESS NOTES
Preventive Care Visit  [facility] EUSEBIO Patiño MD, Internal Medicine  Mar 6, 2025      Assessment & Plan     Routine general medical examination at a health care facility  This is a new patient to me  I have reviewed oncology notes from neuro-oncology  Patient does not want to take vaccinations but is open to colon exam and tobacco cessation  He does not exercise but he will start doing that and his diet is good    Benign essential hypertension  He will continue amlodipine and report blood pressure from home because he does have whitecoat hypertension  - CBC with Platelets  ; Future  - amLODIPine (NORVASC) 5 MG tablet; Take 1 tablet (5 mg) by mouth daily.  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      He was monitored on imaging surveillance until imaging in 12/2022 showed a subtle change concerning for cancer progression. As a result, initiation of cancer-directed therapy was recommended.      He completed radiation with concurrent temozolomide on 8/8/2023. Post-radiation imaging demonstrated no concerning findings with a subtle positive initial response to treatment. Joss was initiated on adjuvant-dosed temozolomide in 10/2023 and he remains on this treatment. Repeat imaging in 2/2024 and again in 6/2024 showed overall stability with evidence of evolving post-radiation injury.       Imaging in 10/2024 and again in 3/2025 showed continued overall stability with no new concerns  S/p resection and chemotherapy and no more seizures  - PRIMARY CARE FOLLOW-UP SCHEDULING; Future    Atypical chest pain  We will work this up further at twelve-lead EKG by my review is normal today  - Echocardiogram Exercise Stress; Future  - EKG 12-lead complete w/read - Clinics    Right hemiparesis (H)  Patient does  have some residual symptoms but no new issues on MRI which I have reviewed  - PRIMARY CARE FOLLOW-UP SCHEDULING; Future    Adjustment disorder with mixed anxiety and depressed mood  Brain tumor caused PTSD and anxiety and depression and he takes Remeron  He is not a good candidate for Wellbutrin because of the seizure  - PRIMARY CARE FOLLOW-UP SCHEDULING; Future    PTSD (post-traumatic stress disorder)      Elevated blood pressure reading without diagnosis of hypertension  Continue home readings    Screening PSA (prostate specific antigen)  We will start to check PSA now and then at age 50  - PROSTATE SPEC ANTIGEN SCREEN; Future  - PROSTATE SPEC ANTIGEN SCREEN    Hyperlipidemia LDL goal <100  We will check's lipid profile because of smoking  - Comprehensive metabolic panel; Future  - Lipid panel reflex to direct LDL Fasting; Future  - PRIMARY CARE FOLLOW-UP SCHEDULING; Future  - Comprehensive metabolic panel  - Lipid panel reflex to direct LDL Fasting    Screening for viral disease    - HIV Antigen Antibody Combo; Future  - Hepatitis C antibody; Future  - HIV Antigen Antibody Combo  - Hepatitis C antibody    History of smoking a CT chest will be needed from age 50 onwards  - nicotine (NICODERM CQ) 21 MG/24HR 24 hr patch; Place 1 patch over 24 hours onto the skin every 24 hours.    Disorder of thyroid  Patient's thyroid is enlarged  - US Thyroid; Future  - TSH with free T4 reflex; Future  - TSH with free T4 reflex    Special screening for malignant neoplasms, colon    - Colonoscopy Screening  Referral; Future      We discussed about tobacco cessation and he is not open to taking Chantix which will cause depression and Wellbutrin is contraindicated so we will do nicotine patch    The longitudinal plan of care for the diagnosis(es)/condition(s) as documented were addressed during this visit. Due to the added complexity in care, I will continue to support Joss in the subsequent management and with ongoing  continuity of care.    Counseling  Appropriate preventive services were addressed with this patient via screening, questionnaire, or discussion as appropriate for fall prevention, nutrition, physical activity, Tobacco-use cessation, social engagement, weight loss and cognition.  Checklist reviewing preventive services available has been given to the patient.  Reviewed patient's diet, addressing concerns and/or questions.   He is at risk for lack of exercise and has been provided with information to increase physical activity for the benefit of his well-being.   The patient was instructed to see the dentist every 6 months.   He is at risk for psychosocial distress and has been provided with information to reduce risk.           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      He was monitored on imaging surveillance until imaging in 12/2022 showed a subtle change concerning for cancer progression. As a result, initiation of cancer-directed therapy was recommended.      He completed radiation with concurrent temozolomide on 8/8/2023. Post-radiation imaging demonstrated no concerning findings with a subtle positive initial response to treatment. Joss was initiated on adjuvant-dosed temozolomide in 10/2023 and he remains on this treatment. Repeat imaging in 2/2024 and again in 6/2024 showed overall stability with evidence of evolving post-radiation injury.       Imaging in 10/2024 and again in 3/2025 showed continued overall stability with no new concerns  He does have some  residual right-sided hemiparesis symptoms but no weakness  He continues to use nicotine vaping but does not smoke cigarettes  He wishes to quit  He does not want to take immunizations but is open to colonoscopy    He at times has chest heaviness and not sure if it is exertional  He has hypertension and checks blood pressure at neighbor's house also  Physical ([facility], looking for pcp to establish care with)        3/6/2025     2:18 PM   Additional Questions   Roomed by colleen MIKE           Advance Care Planning  Patient does not have a Health Care Directive: Patient states has Advance Directive and will bring in a copy to clinic.      3/2/2025   General Health   How would you rate your overall physical health? (!) FAIR   Feel stress (tense, anxious, or unable to sleep) Rather much   (!) STRESS CONCERN      3/2/2025   Nutrition   Three or more servings of calcium each day? (!) NO   Diet: Regular (no restrictions)   How many servings of fruit and vegetables per day? (!) 0-1   How many sweetened beverages each day? 0-1         3/2/2025   Exercise   Days per week of moderate/strenous exercise 1 day   Average minutes spent exercising at this level 10 min   (!) EXERCISE CONCERN      3/2/2025   Social Factors   Frequency of gathering with friends or relatives Once a week   Worry food won't last until get money to buy more No   Food not last or not have enough money for food? Yes   Do you have housing? (Housing is defined as stable permanent housing and does not include staying ouside in a car, in a tent, in an abandoned building, in an overnight shelter, or couch-surfing.) Yes   Are you worried about losing your housing? No   Lack of transportation? No   Unable to get utilities (heat,electricity)? No   (!) FOOD SECURITY CONCERN PRESENT      3/2/2025   Dental   Dentist two times every year? (!) NO                    3/2/2025   Substance Use   Alcohol more than 3/day or more than 7/wk No   Do  you use any other substances recreationally? (!) CANNABIS PRODUCTS     Social History     Tobacco Use    Smoking status: Former     Current packs/day: 1.00     Average packs/day: 1 pack/day for 10.8 years (10.8 ttl pk-yrs)     Types: Cigarettes     Start date: 6/21/2021     Passive exposure: Never    Smokeless tobacco: Never    Tobacco comments:     Patient try to quit smoking, down to 0.5 ppd.- updated 7/21/23   Vaping Use    Vaping status: Every Day    Devices: Refillable tank   Substance Use Topics    Alcohol use: Yes     Comment: socially    Drug use: Yes     Types: Marijuana           3/2/2025   STI Screening   New sexual partner(s) since last STI/HIV test? No   ASCVD Risk   The 10-year ASCVD risk score (Mike VICTORIA, et al., 2019) is: 8.4%    Values used to calculate the score:      Age: 50 years      Sex: Male      Is Non- : Yes      Diabetic: No      Tobacco smoker: No      Systolic Blood Pressure: 134 mmHg      Is BP treated: Yes      HDL Cholesterol: 45 mg/dL      Total Cholesterol: 165 mg/dL        3/2/2025   Contraception/Family Planning   Questions about contraception or family planning No        Reviewed and updated as needed this visit by Provider     Meds  Problems  Med Hx   Fam Hx            BP Readings from Last 3 Encounters:   03/06/25 134/89   03/03/25 125/86   12/26/24 (!) 135/93    Wt Readings from Last 3 Encounters:   03/06/25 70 kg (154 lb 4.8 oz)   03/03/25 69.9 kg (154 lb 1.6 oz)   12/26/24 65.8 kg (145 lb)                  Patient Active Problem List   Diagnosis    S/P craniotomy    Oligodendroglioma, WHO grade II (H)    Primary hypertension    Adjustment disorder with mixed anxiety and depressed mood    Right hemiparesis (H)    Severe episode of recurrent major depressive disorder, without psychotic features (H)    ROSALBA (generalized anxiety disorder)    PTSD (post-traumatic stress disorder)     Past Surgical History:   Procedure Laterality Date    OPTICAL  TRACKING SYSTEM CRANIOTOMY, EXCISE TUMOR WITH MAPPING, COMBINED Left 11/12/2021    Procedure: Left frontal craniotomy for tumor resection with asleep motor mapping;  Surgeon: Kuldeep Beltrán MD;  Location: [facility] OR    ORTHOPEDIC SURGERY         Social History     Tobacco Use    Smoking status: Former     Current packs/day: 1.00     Average packs/day: 1 pack/day for 10.8 years (10.8 ttl pk-yrs)     Types: Cigarettes     Start date: 6/21/2021     Passive exposure: Never    Smokeless tobacco: Never    Tobacco comments:     Patient try to quit smoking, down to 0.5 ppd.- updated 7/21/23   Substance Use Topics    Alcohol use: Yes     Comment: socially     Family History   Problem Relation Age of Onset    Diabetes Mother     Hypertension Mother     Alcoholism Father     Depression Sister     Anxiety Disorder Sister     Other Cancer Maternal Grandfather         Black lung    Lung Cancer Paternal Grandfather         mets to brain    Breast Cancer Maternal Aunt     Breast Cancer Cousin     Mental Illness Cousin     Substance Abuse Cousin     Obesity Cousin          Current Outpatient Medications   Medication Sig Dispense Refill    amLODIPine (NORVASC) 5 MG tablet Take 1 tablet (5 mg) by mouth daily.      mirtazapine (REMERON) 7.5 MG tablet Take 3 tablets (22.5 mg) by mouth at bedtime. 90 tablet 3    nicotine (NICODERM CQ) 21 MG/24HR 24 hr patch Place 1 patch over 24 hours onto the skin every 24 hours. 31 patch 0     No Known Allergies  Recent Labs   Lab Test 05/20/24  1002 04/30/24  1511 04/12/24  1045 11/24/23  1012 10/17/23  1124 06/16/23  1506 11/17/21  0650   A1C  --   --   --   --   --   --  4.9   LDL  --   --   --   --   --   --  101*   HDL  --   --   --   --   --   --  45   TRIG  --   --   --   --   --   --  95   ALT 8 10 12   < >  --    < >  --    CR 1.15 1.12 1.04   < >  --    < > 0.84   GFRESTIMATED 79 82 89   < >  --    < > >90   POTASSIUM 4.3 4.6 4.8   < >  --    < > 4.0   TSH  --   --   --   --  1.27  --   --      "< > = values in this interval not displayed.          Review of Systems  Constitutional, HEENT, cardiovascular, pulmonary, GI, , musculoskeletal, neuro, skin, endocrine and psych systems are negative, except as otherwise noted.     Objective    Exam  /89   Pulse 71   Temp 98  F (36.7  C) (Temporal)   Resp 18   Ht 1.803 m (5' 11\")   Wt 70 kg (154 lb 4.8 oz)   SpO2 98%   BMI 21.52 kg/m     Estimated body mass index is 21.52 kg/m  as calculated from the following:    Height as of this encounter: 1.803 m (5' 11\").    Weight as of this encounter: 70 kg (154 lb 4.8 oz).    Physical Exam  GENERAL: alert and no distress  EYES: Eyes grossly normal to inspection, PERRL and conjunctivae and sclerae normal  HENT: Patient has cosmetic dental caps   ear canals and TM's normal, nose and mouth without ulcers or lesions  NECK: no adenopathy, no asymmetry, masses, or scars  RESP: lungs clear to auscultation - no rales, rhonchi or wheezes  CV: regular rate and rhythm, normal S1 S2, no S3 or S4, no murmur, click or rub, no peripheral edema  ABDOMEN: soft, nontender, no hepatosplenomegaly, no masses and bowel sounds normal   (male): normal male genitalia without lesions or urethral discharge, no hernia  MS: no gross musculoskeletal defects noted, no edema  SKIN: no suspicious lesions or rashes  NEURO: Normal strength and tone, mentation intact and speech normal  PSYCH: mentation appears normal, affect normal/bright        Signed Electronically by: Felisha Patiño MD    "

## 2025-03-07 ENCOUNTER — HOSPITAL ENCOUNTER (OUTPATIENT)
Dept: ULTRASOUND IMAGING | Facility: CLINIC | Age: 50
Discharge: HOME | End: 2025-03-07
Attending: INTERNAL MEDICINE | Admitting: INTERNAL MEDICINE
Payer: COMMERCIAL

## 2025-03-07 DIAGNOSIS — E07.9 DISORDER OF THYROID: ICD-10-CM

## 2025-03-07 PROCEDURE — 76536 US EXAM OF HEAD AND NECK: CPT | Mod: 26 | Performed by: STUDENT IN AN ORGANIZED HEALTH CARE EDUCATION/TRAINING PROGRAM

## 2025-03-07 PROCEDURE — 76536 US EXAM OF HEAD AND NECK: CPT

## 2025-03-11 ENCOUNTER — TELEPHONE (OUTPATIENT)
Dept: GASTROENTEROLOGY | Facility: CLINIC | Age: 50
End: 2025-03-11
Payer: COMMERCIAL

## 2025-03-11 ENCOUNTER — HOSPITAL ENCOUNTER (OUTPATIENT)
Facility: CLINIC | Age: 50
End: 2025-03-11
Attending: INTERNAL MEDICINE | Admitting: INTERNAL MEDICINE
Payer: COMMERCIAL

## 2025-03-11 NOTE — TELEPHONE ENCOUNTER
"Endoscopy Scheduling Screen    Have you had any respiratory illness or flu-like symptoms in the last 10 days?  No    What is your communication preference for Instructions and/or Bowel Prep?   MyChart    What insurance is in the chart?  Other:  [facility]     Ordering/Referring Provider:     CHAKA ALVAREZ      (If ordering provider performs procedure, schedule with ordering provider unless otherwise instructed. )    BMI: Estimated body mass index is 21.52 kg/m  as calculated from the following:    Height as of 3/6/25: 1.803 m (5' 11\").    Weight as of 3/6/25: 70 kg (154 lb 4.8 oz).     Sedation Ordered  moderate sedation.   If patient BMI > 50 do not schedule in [facility].    If patient BMI > 45 do not schedule at [facility].    Are you taking methadone or Suboxone?  NO, No RN review required.    Have you been diagnosed and are being treated for severe PTSD or severe anxiety?  YES, Schedule with MAC. (If patient has additional questions please InBasket to RN pool for .)    Are you taking any prescription medications for pain 3 or more times per week?   NO, No RN review required.    Do you have a history of malignant hyperthermia?  No    (Females) Are you currently pregnant?   No     Have you been diagnosed or told you have pulmonary hypertension?   No    Do you have an LVAD?  No    Have you been told you have moderate to severe sleep apnea?  No.    Have you been told you have COPD, asthma, or any other lung disease?  No    Has your doctor ordered any cardiac tests like echo, angiogram, stress test, ablation, or EKG, that you have not completed yet?  No    Do you  have a history of any heart conditions?  No     Have you ever had or are you waiting for an organ transplant?  No. Continue scheduling, no site restrictions.    Have you had a stroke or transient ischemic attack (TIA aka \"mini stroke\") in the last 2 years?   No.    Have you been diagnosed with or been told you have cirrhosis of the liver?   No.    Are you currently " "on dialysis?   No    Do you need assistance transferring?   No    BMI: Estimated body mass index is 21.52 kg/m  as calculated from the following:    Height as of 3/6/25: 1.803 m (5' 11\").    Weight as of 3/6/25: 70 kg (154 lb 4.8 oz).     Is patients BMI > 40 and scheduling location UP?  No    Do you take an injectable or oral medication for weight loss or diabetes (excluding insulin)?  No    Do you take the medication Naltrexone?  No    Do you take blood thinners?  No       Prep   Are you currently on dialysis or do you have chronic kidney disease?  No    Do you have a diagnosis of diabetes?  No    Do you have a diagnosis of cystic fibrosis (CF)?  No    On a regular basis do you go 3 -5 days between bowel movements?  No    BMI > 40?  No    Preferred Pharmacy:    [id number] [address] & [address] [address]  Phone: [phone] Fax: [phone]      Final Scheduling Details     Procedure scheduled  Colonoscopy    Surgeon:  Uma      Date of procedure:  04/01/2025     Pre-OP / PAC:   No - Not required for this site.    Location  [facility] - Per order.    Sedation   Moderate Sedation - Per order.      Patient Reminders:   You will receive a call from a Nurse to review instructions and health history.  This assessment must be completed prior to your procedure.  Failure to complete the Nurse assessment may result in the procedure being cancelled.      On the day of your procedure, please designate an adult(s) who can drive you home stay with you for the next 24 hours. The medicines used in the exam will make you sleepy. You will not be able to drive.      You cannot take public transportation, ride share services, or non-medical taxi service without a responsible caregiver.  Medical transport services are allowed with the requirement that a responsible caregiver will receive you at your destination.  We require that drivers and " caregivers are confirmed prior to your procedure.

## 2025-03-12 ENCOUNTER — PATIENT OUTREACH (OUTPATIENT)
Dept: CARE COORDINATION | Facility: CLINIC | Age: 50
End: 2025-03-12
Payer: COMMERCIAL

## 2025-03-12 NOTE — PROGRESS NOTES
Social Work Note - Incoming Call  [facility]    Data/Intervention:   Patient Name: Lang Collins Jr. Goes By: Joss    /Age: 1975 (50 year old)      Call From: Joss  Reason for Call: Reporting fall last night in which he hit his head    SARAH consulted with nurse clinic manager and nurse care coordinator who advised Joss go to ED for evaluation. SARAH updated Joss with medical team recommendations to go to ED for evaluation today.     Plan: Joss to go to ED for evaluation.     SHAYY Sharma, LICSW, OSW-C  Clinical - Adult Oncology  Phone: [phone]  She/Her/Hers  [facility]: Nancy HILL  8am-4:30pm  [facility]: BRET Goodson F 8am-4:30pm   Support Groups at [facility]: Social Work Services for Cancer Patients ([email or web address])

## 2025-03-13 ENCOUNTER — PATIENT OUTREACH (OUTPATIENT)
Dept: ONCOLOGY | Facility: CLINIC | Age: 50
End: 2025-03-13
Payer: COMMERCIAL

## 2025-03-13 NOTE — PROGRESS NOTES
Attempted to reach patient to follow up fall from yesterday. LVM     Mylene Qureshi, RN, BSN  Specialty Care Coordinator  [facility]  [phone]

## 2025-03-17 ENCOUNTER — TELEPHONE (OUTPATIENT)
Dept: GASTROENTEROLOGY | Facility: CLINIC | Age: 50
End: 2025-03-17
Payer: COMMERCIAL

## 2025-03-17 NOTE — TELEPHONE ENCOUNTER
FUTURE VISIT INFORMATION      SURGERY INFORMATION:  Date: 4/1/25  Location:  GI   Surgeon:  [place] MD   Anesthesia Type:  MAC   Procedure: Colonoscopy     RECORDS REQUESTED FROM:       Primary Care Provider:Felisha Patiño MD @[facility]    Pertinent Medical History:Hypertension , Primary hypertension     Most recent EKG+ Tracing:3/6/25

## 2025-03-17 NOTE — TELEPHONE ENCOUNTER
Rescheduled Colonoscopy  Due to: needed MAC sedation.    Attempted to contact patient in order to complete pre assessment questions.     No answer. Left message to return call to [phone] option 3.    Callback communication sent via [employer].      Procedure details:    Patient scheduled for Colonoscopy on 4/1/25.     Arrival time: 1200. Procedure time 1300    Facility location: [facility]; [facility] [place] [address]. Check in location: [facility]st [facility].     Sedation type: MAC - severe anxiety and PTSD    Pre op exam needed? Yes. Pre op exam is scheduled on 3/31/25 with Venessa Garcia PA-C in PAC.    Indication for procedure: Screening      Chart review:     Electronic implanted devices? No    Recent diagnosis of diverticulitis within the last 6 weeks? No      Medication review:    Diabetic? No    Anticoagulants? No    Weight loss medication/injectable? No GLP-1 medication per patient's medication list. Nursing to verify with pre-assessment call.    Other medication HOLDING recommendations:  Cannabis/Marijuana: Stop night before procedure.      Prep for procedure:     Bowel prep recommendation: Standard Miralax.   Due to: standard bowel prep    Procedure information and instructions sent via [employer].       Gayathri Lee RN  Endoscopy Procedure Pre-Assessment RN  [phone], option 3

## 2025-03-17 NOTE — TELEPHONE ENCOUNTER
"Patient is currently scheduled for a colonoscopy on 4/1/25 with conscious/moderate sedation at [facility].      Upon review of chart, patient needs MAC due to:  per 3/11/25 scheduling questions: \"Have you been diagnosed and are being treated for severe PTSD or severe anxiety?  YES, Schedule with MAC. (If patient has additional questions please InBasket to RN pool for .)\"  RN also confirmed that hx severe anxiety and PTSD are noted in chart. Last documented ROSALBA-7 score was 17 (severe anxiety). Patient follows with psychiatry.    Message sent to endoscopy scheduling team with request to contact patient and reschedule with MAC.     Patient will need an in-person pre op exam within 30 days of procedure if rescheduling at [facility].    Endoscopy scheduling to notify endoscopy pre assessment nurse pool if rescheduled within 2 weeks.       Gayathri Lee RN  Endoscopy Procedure Pre-Assessment RN  [phone], option 3  "

## 2025-03-31 ENCOUNTER — PRE VISIT (OUTPATIENT)
Dept: SURGERY | Facility: CLINIC | Age: 50
End: 2025-03-31

## 2025-04-10 ENCOUNTER — VIRTUAL VISIT (OUTPATIENT)
Dept: PSYCHIATRY | Facility: CLINIC | Age: 50
End: 2025-04-10
Attending: PSYCHIATRY & NEUROLOGY
Payer: COMMERCIAL

## 2025-04-10 DIAGNOSIS — F43.10 PTSD (POST-TRAUMATIC STRESS DISORDER): Primary | ICD-10-CM

## 2025-04-10 DIAGNOSIS — F33.41 RECURRENT MAJOR DEPRESSIVE DISORDER, IN PARTIAL REMISSION: ICD-10-CM

## 2025-04-10 DIAGNOSIS — F41.1 GAD (GENERALIZED ANXIETY DISORDER): ICD-10-CM

## 2025-04-10 RX ORDER — MIRTAZAPINE 7.5 MG/1
22.5 TABLET, FILM COATED ORAL AT BEDTIME
Qty: 90 TABLET | Refills: 3 | Status: SHIPPED | OUTPATIENT
Start: 2025-04-10
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
## 2025-04-10 NOTE — NURSING NOTE
Current patient location: [address]  [place] [address]    Is the patient currently in the state of [place]? YES    Visit mode: VIDEO    If the visit is dropped, the patient can be reconnected by:VIDEO VISIT: Text to cell phone:   Telephone Information:   Mobile [phone]       Will anyone else be joining the visit? NO  (If patient encounters technical issues they should call [phone]    Are changes needed to the allergy or medication list? No    Are refills needed on medications prescribed by this physician? NO    Rooming Documentation:  Questionnaire(s) completed    Reason for visit: RECHECK    Tanika OSUNAF

## 2025-04-10 NOTE — PATIENT INSTRUCTIONS
"PLAN                                                                                                       1) Meds  CONTINUE Mirtazapine 22.5 mg daily at bedtime (Take three 7.5 mg tablets to total 22.5 mg)    2) Other:   Information on the \"freeze\" response: https://www.Raumfeldday.com/articles/fight-flight-or-freeze-response#coping-strategies    Progressive muscle relaxation example videos (The voice of the person guiding the activity is an important factor, so if these do not resonate with you, consider searching for others that are a good fit):   https://www.Cloudvue Technologies.com/watch?v=74KAsH7IrBt  https://www.youAnyPresenceube.com/watch?v=1nZEdqcGVzo    3) RTC: 4-6 weeks  4) CRISIS Numbers:   Provided routinely in AVS           **For crisis resources, please see the information at the end of this document**   Patient Education    Thank you for coming to the [facility] [place] CLINIC.     Lab Testing:  If you had lab testing today and your results are reassuring or normal they will be mailed to you or sent through [employer] within 7 days. If the lab tests need quick action we will call you with the results. The phone number we will call with results is # [phone]. If this is not the best number please call our clinic and change the number.     Medication Refills:  If you need any refills please call your pharmacy and they will contact us. Our fax number for refills is [phone].   Three business days of notice are needed for general medication refill requests.   Five business days of notice are needed for controlled substance refill requests.   If you need to change to a different pharmacy, please contact the new pharmacy directly. The new pharmacy will help you get your medications transferred.     Contact Us:  Please call [phone] during business hours (8-5:00 M-F).   If you have medication related questions after clinic hours, or on the weekend, please call [phone]. "     Financial Assistance [phone]   Medical Records [phone]       MENTAL HEALTH CRISIS RESOURCES:  For a emergency help, please call 911 or go to the nearest Emergency Department.     Emergency Walk-In Options:   EmPATH Unit @ [facility] ([place]): [phone] - Specialized mental health emergency area designed to be calming  [facility] ([place]): [phone]  [facility] ([place]): [phone]  [facility] ([place]): [phone]    [facility] Crisis Information:   [place]: [phone]  [place]: [phone]  [place] ([place]) - Adult: [phone]     Child: [phone]  [place] - Adult: [phone]     Child: [phone]  [place]: [phone]  List of all [facility] resources:   https://[place].[facility]/dhs/people-we-serve/adults/health-care/mental-health/resources/crisis-contacts.jsp    National Crisis Information:   Crisis Text Line: Text  [place]  to 894607  Suicide & Crisis Lifeline: 988  National Suicide Prevention Lifeline: 8-248-442-TALK (1-428.598.2247)       For online chat options, visit https://suicidepreventionlifeline.org/chat/  Poison Control Center: 6-556-109-0544  Trans Lifeline: 3-909-017-3793 - Hotline for transgender people of all ages  The Kiko Project: 3-129-921-4311 - Hotline for LGBT youth     For Non-Emergency Support:   Fast Tracker: Mental Health & Substance Use Disorder Resources -   https://www.BoatsGotrackAwarepointn.org/

## 2025-04-10 NOTE — PROGRESS NOTES
Virtual Visit Details    Type of service:  Video Visit   Video Start Time:  1300  Video End Time: 1350    Originating Location (pt. Location): Home    Distant Location (provider location):  Off-site  Platform used for Video Visit: [employer]

## 2025-04-16 ENCOUNTER — QUESTIONNAIRE SERIES SUBMISSION (OUTPATIENT)
Dept: OTHER | Age: 50
End: 2025-04-16

## 2025-05-08 ENCOUNTER — VIRTUAL VISIT (OUTPATIENT)
Dept: PSYCHIATRY | Facility: CLINIC | Age: 50
End: 2025-05-08
Attending: PSYCHIATRY & NEUROLOGY
Payer: COMMERCIAL

## 2025-05-08 VITALS — HEIGHT: 71 IN | WEIGHT: 155 LBS | BODY MASS INDEX: 21.7 KG/M2

## 2025-05-08 DIAGNOSIS — F41.1 GAD (GENERALIZED ANXIETY DISORDER): Primary | ICD-10-CM

## 2025-05-08 DIAGNOSIS — F33.41 RECURRENT MAJOR DEPRESSIVE DISORDER, IN PARTIAL REMISSION: ICD-10-CM

## 2025-05-08 DIAGNOSIS — F43.10 PTSD (POST-TRAUMATIC STRESS DISORDER): ICD-10-CM

## 2025-05-08 NOTE — PATIENT INSTRUCTIONS
"PLAN                                                                                                       1) Meds  CONTINUE Mirtazapine 22.5 mg daily at bedtime (Take three 7.5 mg tablets to total 22.5 mg)    2) Other:   Follow up on thyroid lab with your primary care provider.     Information on the \"freeze\" response: https://www.Steak & Hoagie Shopday.com/articles/fight-flight-or-freeze-response#coping-strategies    Progressive muscle relaxation example videos (The voice of the person guiding the activity is an important factor, so if these do not resonate with you, consider searching for others that are a good fit):   https://www.Dejour Energy.com/watch?v=13AWoO9AqZs  https://www.youtube.com/watch?v=1nZEdqcGVzo    3) RTC: 4-6 weeks  4) CRISIS Numbers:   Provided routinely in AVS       **For crisis resources, please see the information at the end of this document**   Patient Education    Thank you for coming to the [facility] MENTAL HEALTH & ADDI[facility] [place] CLINIC.     Lab Testing:  If you had lab testing today and your results are reassuring or normal they will be mailed to you or sent through "[employer]" within 7 days. If the lab tests need quick action we will call you with the results. The phone number we will call with results is # [phone]. If this is not the best number please call our clinic and change the number.     Medication Refills:  If you need any refills please call your pharmacy and they will contact us. Our fax number for refills is [phone].   Three business days of notice are needed for general medication refill requests.   Five business days of notice are needed for controlled substance refill requests.   If you need to change to a different pharmacy, please contact the new pharmacy directly. The new pharmacy will help you get your medications transferred.     Contact Us:  Please call [phone] during business hours (8-5:00 M-F).   If you have medication related questions after clinic " hours, or on the weekend, please call [phone].     Financial Assistance [phone]   Medical Records [phone]       MENTAL HEALTH CRISIS RESOURCES:  For a emergency help, please call 911 or go to the nearest Emergency Department.     Emergency Walk-In Options:   EmPATH Unit @ [facility] ([place]): [phone] - Specialized mental health emergency area designed to be calming  [facility] ([place]): [phone]  [facility] ([place]): [phone]  [facility]): [phone]    [facility] Crisis Information:   [place]: [phone]  [place]: [phone]  [place] ([place]) - Adult: [phone]     Child: [phone]  [place] - Adult: [phone]     Child: [phone]  [place]: [phone]  List of all [facility] resources:   https://[place].gov/dhs/people-we-serve/adults/health-care/mental-health/resources/crisis-contacts.jsp    National Crisis Information:   Crisis Text Line: Text  [place]  to 686187  Suicide & Crisis Lifeline: 988  National Suicide Prevention Lifeline: 5-812-391-TALK (1-220.403.3150)       For online chat options, visit https://suicidepreventionlifeline.org/chat/  Poison Control Center: 1-744.359.7551  Trans Lifeline: 6-289-819-1935 - Hotline for transgender people of all ages  The Kiko Project: 4-025-791-6800 - Hotline for LGBT youth     For Non-Emergency Support:   Fast Tracker: Mental Health & Substance Use Disorder Resources -   https://www.Connectv.comn.org/

## 2025-05-08 NOTE — NURSING NOTE
Current patient location: [address]  [place] [address]    Is the patient currently in the state of [place]? YES    Visit mode: VIDEO    If the visit is dropped, the patient can be reconnected by:VIDEO VISIT: Text to cell phone:   Telephone Information:   Mobile [phone]       Will anyone else be joining the visit? NO  (If patient encounters technical issues they should call [phone]    Are changes needed to the allergy or medication list? No    Are refills needed on medications prescribed by this physician? NO    Rooming Documentation:  Questionnaire(s) not pre-assigned    Reason for visit: RECHECK    Olga Lidia OSUNAF

## 2025-05-08 NOTE — PROGRESS NOTES
Virtual Visit Details    Type of service:  Video Visit   Video Start Time: 1300  Video End Time:1331    Originating Location (pt. Location): Home    Distant Location (provider location):  Off-site  Platform used for Video Visit: [facility]  Psychiatry Clinic  PSYCHIATRY PROGRESS NOTE     CARE TEAM:  PCP- No Ref-Primary, Physician   Oncology- Elva Park MD, Palliative- Florin Baig MD, and Therapist- Denilson Ulloa LPShanita Coreas is a 50 year old who prefers the name Joss and uses pronouns he, him.     DIAGNOSES                                                                                        MAJOR DEPRESSIVE DISORDER   GENERALIZED ANXIETY DISORDER   PTSD    ASSESSMENT                                                                                          Mr. Collins presented at intake with symptoms that support a diagnosis of PTSD. He also has a history of MAJOR DEPRESSIVE DISORDER and GENERALIZED ANXIETY DISORDER, which are both currently active and poorly controlled. He has minimal psychiatric history. Reports a diagnosis of ADHD in childhood and treatment with Ritalin, but no treatment in adulthood. His depression and anxiety worsened after he was diagnosed with oligodendoglioma in 2021. He is s/p resection and radiation. He is currently on Temozolimide treatment. He reports occasional nausea, but no other physical concerns today. Mr. Collins has a significant trauma history, primarily stemming from his time in long-term. He endorses ongoing hypervigilance, nightmares, flashbacks, exaggerated startle response, and emotional dysregulation related to experiences in long-term. He possesses many strengths including good insight, help seeking behaviors, motivation to work on his mental health and return to the workforce, and an admirable dedication to his five children.     In the past, Mr. Collins has tried and failed two SSRIs (sertraline and escitalopram). He experienced  "nausea and found them minimally helpful at best. He has not tried any other mental health medications in adulthood. He reports he would like to continue with both medication management and supportive psychotherapy in these appointments.     2/4/25: Collaboratively agreed to continue mirtazapine 15 mg daily at bedtime to target sleep, mood, and anxiety. This has been helpful for mood, sleep, and appetite. Unclear if it has helped anxiety. Two visits ago, Joss was prescribed prazosin 1 mg x3 days followed by an increase to 2 mg for ongoing nightmares. May also help target anxiety. Joss was not able to start it yet, but does plan to do so today. He confirmed he has picked it up from pharmacy. Denies questions or concerns. Reviewed instructions and possible side effects again. Joss has no concerns with the plan to start prazosin.     2/18/25: Collaboratively agreed to increase mirtazapine dose from 15mg to 22.5 mg daily at bedtime. Joss reports that he has been experiencing good moods and doesn't feel depressed. He does endorse ongoing anxiety, especially related to social situations, and continued impact on sleep and energy. Joss agreed that there is room to improve mood, sleep, and appetite. Also collaboratively agreed to discontinue prazosin. Joss reported that he tried prazosin for 3 days and did not like the way he felt waking up when taking it. He reported the way he felt on the medication (\"dizzy,\" \"like a surge or chill going through my body\") was more bothersome than nightmares/dreams. Discussed strategies for autonomic nervous system regulation when in public places and practicing strategies when in safe environments and with safe people. Discussed strategies for med management when staying overnight at friend's place so he doesn't miss doses. Next appointment scheduled for 3/3/25 and will add Joss to the waitlist to be seen sooner for continued psychotherapy and med management.     4/10/25: Collaboratively " "agreed to make no medication changes. Joss reports that mirtazapine 22.5 mg daily is supporting his mental health well. He has been using breathing techniques and finger tapping to help regulate his nervous system when in social situations or feeling activation. He reports noticing he is able to stay present in conversations and new social situations for up to 5-10 minutes whereas previously he sometimes felt too activated to engage. Discussed stress response, especially freeze response. Provided psychoeducation and discussed 3 recent examples of the freeze response. Discussed repair strategies in relationship with his daughter. Joss shared he has gone without vaping for 30 days. Utilizing 50 mg of THC gummies most days and connects this usage to feelings of boredom. Discussed utilizing progressive muscle relaxation in these times prior to utilizing gummies to see how this feels for him. Joss had a recent PCP appointment on 3/6/25 and per chart review had an enlarged thyroid; TSH within normal limits (1.71); Cr slightly elevated (1.21). Continue to monitor results. Follow up in 4-6 weeks.     5/8/25: Collaboratively agreed to make no medication changes. Joss reports that his mood is improved, anxiety levels have decreased, motivation and activity have increased, and he has \"more confidence.\" Joss shared that his long-term goal is to reduce/discontinue use of medication, though he did not want to make a dose change today (\"if it ain't broke, don't change it\"). Plan to revisit dose reduction to 15 mg at next follow up appointment in 4-6 weeks. Revisited information about the freeze response and continuing to utilize skills of compassionate self talk, breathing, and movement/muscle relaxation. No new updates on thyroid level since last appointment. Joss plans to follow up with his PCP about thyroid labs.     MNPMP review was not needed today.    PLAN                                                                       " "                                1) Meds  CONTINUE Mirtazapine 22.5 mg daily at bedtime (Take three 7.5 mg tablets to total 22.5 mg)    2) Other:   Follow up on thyroid lab with your primary care provider.     Information on the \"freeze\" response: https://www.Inveniday.com/articles/fight-flight-or-freeze-response#coping-strategies    Progressive muscle relaxation example videos (The voice of the person guiding the activity is an important factor, so if these do not resonate with you, consider searching for others that are a good fit):   https://www.MusicIP.com/watch?v=33RPxI0NjCh  https://www.youtube.com/watch?v=1nZEdqcGVzo    3) RTC: 4-6 weeks  4) CRISIS Numbers:   Provided routinely in AVS        CHIEF CONCERN                              \" Following-up \"    PERTINENT BACKGROUND                                         [initial eval 12/12/24]   Mental Health History:  Diagnosis of ADHD in school age years; was treated with Ritalin as a child.     Oncology History:   Hx of oligodendoglioma s/p resection on 11/12/21.   Worsening anxiety, emotional regulation, memory, cognitive fatigue over the last year in the setting of disease progression.     Psych pertinent item history includes trauma hx    Past Medication Trials: Sertraline 100 mg; escitalopram 5 mg (could not tolerate nausea)    HISTORY OF PRESENT ILLNESS                                                      Since the last visit (4/10/25), Joss reports he has been \"alright\" and \"real good\". He shared that he has had an increase in energy and motivation and has been calling and visiting friends and getting outside. He shared that he recently grilled dinner for himself and that he plans to go to a baseball game next week. Joss shared a specific example of decreased anxiety related to going to a restaurant and talking to someone he didn't know about the menu; he reflected that in the past he would have felt very scared to do this, but this time he felt " "excited and it went well. He reports that he has not vaped or smoked cigars and has also stopped utilizing THC gummies since our last appointment. He shares that he thinks this has helped with his energy and motivation. Joss reports that he has mostly taken mirtazapine consistently, though missed a few doses because he was feeling better. Discussed Joss's long-term goal to reduce dose or stop taking medication completely and how we can make a plan to do this gradually.     Mood: \"Haven't been depressed or stressed\";  \"getting up and moving around\"; \"not sitting around moping\", \"I feel bored\"; increased motivation to be up/out doing things  Anxiety: \"so and so\"; \"it has been better\"; \"not as in intense\"; \"starting to feel some confidence\"  Sleep: \"bout the same\"; \"on a schedule\"; \"a few bad dreams\"  Appetite: \"alright\"; \"same\"  Energy: increased  Safety: no SI or SIB    Recent Substance Use:    Alcohol- social drinking; \"don't drink too much\"  Tobacco- Reports not vaping in 60+ days. Reports no smoking of cigars.   Caffeine- none  Cannabis- Reports no THC gummy use since our last visit; no other marijuana use  Opioids- none           Narcan Kit- NA   Other illicit drugs- none    SOCIAL and FAMILY HISTORY                                                 per pt report         Family Hx:  denies history of mental health in the family, though endorses two cousins  by suicide     Social Hx:  Financial Support- social security disability, Living Situation - rental apartment, Children - see below, Social Support - mother (dementia, blind), the mother of his children, aunties (not local), kids, Trauma History - see below, Legal History - incarceration ([id number]), and Feels Safe at Home- yes    Five Children. Youngest two live with him intermittently. Mood improves when children are around. When they leave depression increases.   Works in construction and in a warehouse. Currently not able to work due to mental health " "(anxiety).    Trauma History: Was un-housed for three years. (5197-1104) Spent three years in [facility], including solitary ([id number]).    PAST PSYCH and SUBSTANCE USE HISTORY                      Psych:  No additional psychiatric history.    Substance Use:  No additional substance use history.  Past Use - Tobacco- 15 year pack per day history   Treatment - None    Legal Consequences - incarceration [id number]; [id number]; [id number]    MEDICAL HISTORY     Patient Active Problem List   Diagnosis    S/P craniotomy    Oligodendroglioma, WHO grade II (H)    Primary hypertension    Adjustment disorder with mixed anxiety and depressed mood    Right hemiparesis (H)    Severe episode of recurrent major depressive disorder, without psychotic features (H)    ROSALBA (generalized anxiety disorder)    PTSD (post-traumatic stress disorder)       ALLERGIES: Patient has no known allergies.     MEDICAL REVIEW OF SYSTEMS                                                                  none in addition to that documented above    CURRENT MEDS       Current Outpatient Medications   Medication Sig Dispense Refill    amLODIPine (NORVASC) 5 MG tablet Take 1 tablet (5 mg) by mouth daily.      mirtazapine (REMERON) 7.5 MG tablet Take 3 tablets (22.5 mg) by mouth at bedtime. 90 tablet 3    nicotine (NICODERM CQ) 21 MG/24HR 24 hr patch Place 1 patch over 24 hours onto the skin every 24 hours. 31 patch 0       VITALS                                                                                              Ht 1.803 m (5' 11\")   Wt 70.3 kg (155 lb)   BMI 21.62 kg/m      MENTAL STATUS EXAM                                                             Alertness: alert  and oriented  Appearance: casually groomed  Behavior/Demeanor: cooperative, pleasant, and calm, with good  eye contact   Speech: normal  Language: intact  Psychomotor: normal or unremarkable  Mood: description consistent with euthymia, though some anxiety remains  Affect: full range; " congruent to: mood- yes, content- yes  Thought Process/Associations: unremarkable  Thought Content:  Reports none;  Denies suicidal & violent ideation and delusions  Perception:  Reports none;  Denies hallucinations  Insight: good  Judgment: good  Cognition: does  appear grossly intact; formal cognitive testing was not done  oriented: time, person, and place  attention span: intact  concentration: intact  recent memory: intact  remote memory: intact  fund of knowledge: appropriate  Gait and Station: N/A (telehealth)    LABS and DATA         2/3/2025     1:08 PM 2/18/2025     7:54 AM 4/9/2025     5:05 PM   PHQ   PHQ-9 Total Score 6  5  6    Q9: Thoughts of better off dead/self-harm past 2 weeks Not at all Not at all  Not at all       Patient-reported    Proxy-reported       Recent Labs   Lab Test 03/06/25  1528 05/20/24  1002 04/30/24  1511   CR 1.21* 1.15 1.12   GFRESTIMATED 74 79 82     Recent Labs   Lab Test 03/06/25  1528 05/20/24  1002   AST 18 15   ALT 10 8   ALKPHOS 73 75       PSYCHOTROPIC DRUG INTERACTIONS   none    MANAGEMENT:  N/A    Psychiatry Individual Psychotherapy Note   Psychotherapy start time - 1300  Psychotherapy end time - 1325  Date treatment plan last reviewed with patient - 12/12/24  Subjective: This supportive psychotherapy session addressed issues related to goals of therapy and current psychosocial stressors. Patient's reaction: Pre-contemplation in the context of mental status appropriate for ambulatory setting.    Interactive complexity indicated? No  Plan: RTC in timeframe noted above  Psychotherapy services during this visit included myself and the patient.   Treatment Plan      SYMPTOMS; PROBLEMS   MEASURABLE GOALS;    FUNCTIONAL IMPROVEMENT / GAINS INTERVENTIONS DISCHARGE CRITERIA   Trauma Related: fear, intrusive memories, trauma trigger psychological / physiological response, angry outbursts, and mood dysregulation   learn best practices for sleep, make a plan to manage 2-3  anxiety-provoking situations, develop 2 strategies to cope with trauma triggers/intrusive memories, and walk away from situations that trigger strong emotions Supportive / psychodynamic marked symptom improvement        RISK STATEMENT for SAFETY   Joss did not appear to be an imminent safety risk to self or others.    TREATMENT RISK STATEMENT:  The risks, benefits, alternatives and potential adverse effects have been discussed and are understood by the pt. The pt understands the risks of using street drugs or alcohol. There are no medical contraindications, the pt agrees to treatment with the ability to do so. The pt knows to call the clinic for any problems or to access emergency care if needed.  Medical and substance use concerns are documented above.  Psychotropic drug interaction check was done, including changes made today.    PROVIDER:  TITA Jaramillo CNP       MEDICAL DECISION MAKING        ([facility]   Level of Medical Decision Making:   - At least 1 chronic problem that is not stable  - Engaged in prescription drug management during visit (discussed any medication benefits, side effects, alternatives, etc.)       The longitudinal plan of care for the diagnosis(es)/condition(s) as documented were addressed during this visit. Due to the added complexity in care, I will continue to support Joss in the subsequent management and with ongoing continuity of care.

## 2025-07-31 ENCOUNTER — HOSPITAL ENCOUNTER (OUTPATIENT)
Dept: CARDIOLOGY | Facility: CLINIC | Age: 50
End: 2025-07-31
Attending: INTERNAL MEDICINE
Payer: COMMERCIAL

## 2025-07-31 DIAGNOSIS — R07.89 ATYPICAL CHEST PAIN: ICD-10-CM

## 2025-07-31 PROCEDURE — 93325 DOPPLER ECHO COLOR FLOW MAPG: CPT | Mod: TC

## 2025-08-01 ENCOUNTER — RESULTS FOLLOW-UP (OUTPATIENT)
Dept: INTERNAL MEDICINE | Facility: CLINIC | Age: 50
End: 2025-08-01
Payer: COMMERCIAL

## (undated) DEVICE — PACK NEURO SNE15SNFSA

## (undated) DEVICE — DRAPE MAYO STAND 23X54 8337

## (undated) DEVICE — GOWN XLG DISP 9545

## (undated) DEVICE — SOL WATER IRRIG 1000ML BOTTLE 2F7114

## (undated) DEVICE — SPONGE COTTONOID 1X3" 80-1408

## (undated) DEVICE — GLOVE PROTEXIS W/NEU-THERA 6.5  2D73TE65

## (undated) DEVICE — PERFORATOR 14MM CODMAN

## (undated) DEVICE — ESU GROUND PAD UNIVERSAL W/O CORD

## (undated) DEVICE — SYR 10ML FINGER CONTROL W/O NDL 309695

## (undated) DEVICE — BLADE CLIPPER 4412A

## (undated) DEVICE — Device

## (undated) DEVICE — NDL 25GA 1.5" 305127

## (undated) DEVICE — SPONGE SURGIFOAM 100 1974

## (undated) DEVICE — PREP DURAPREP 06ML APL 8635

## (undated) DEVICE — TUBING SUCTION SOFT 20'X3/16" 0036570

## (undated) DEVICE — STPL SKIN 35W ROTATING HEAD PRW35

## (undated) DEVICE — SOL NACL 0.9% IRRIG 1000ML BOTTLE 2F7124

## (undated) DEVICE — SPONGE COTTONOID 1/4X1/4" 80-1399

## (undated) DEVICE — PIN SKULL MAYFIELD ADULT TITANIUM 3/PK A1120

## (undated) DEVICE — SU VICRYL 2-0 CT-2 CR 8X18" J726D

## (undated) DEVICE — SU NUROLON 4-0 TF CR 8X18" C584D

## (undated) DEVICE — LINEN TOWEL PACK X5 5464

## (undated) DEVICE — TIP ASPIRATOR SONOPET IQ MICRO 5500-25S-309

## (undated) DEVICE — TOOL DISSECT MIDAS MR8 9CM MATCH HEAD 3MM DI MR8-9MH30

## (undated) DEVICE — TOOL DISSECT MIDAS MR8 14CM MATCH HEAD 3MM MR8-14MH30

## (undated) DEVICE — BATTERY PACK FOR POWERED DRIVER 05.000.007.01S

## (undated) DEVICE — GLOVE PROTEXIS W/NEU-THERA 7.5  2D73TE75

## (undated) DEVICE — BLADE KNIFE SURG 15 371115

## (undated) DEVICE — DRSG TELFA 3X8" 1238

## (undated) DEVICE — DRSG KERLIX FLUFFS X5

## (undated) DEVICE — IOM SUPPLIES

## (undated) DEVICE — MANIFOLD NEPTUNE 4 PORT 700-20

## (undated) DEVICE — CASSETTE SONOPET IRRIG SUCTION STRL 5500-573-000

## (undated) DEVICE — GLOVE PROTEXIS BLUE W/NEU-THERA 6.5  2D73EB65

## (undated) RX ORDER — LABETALOL HYDROCHLORIDE 5 MG/ML
INJECTION, SOLUTION INTRAVENOUS
Status: DISPENSED
Start: 2021-11-12

## (undated) RX ORDER — HYDROMORPHONE HYDROCHLORIDE 1 MG/ML
INJECTION, SOLUTION INTRAMUSCULAR; INTRAVENOUS; SUBCUTANEOUS
Status: DISPENSED
Start: 2021-11-12

## (undated) RX ORDER — PROPOFOL 10 MG/ML
INJECTION, EMULSION INTRAVENOUS
Status: DISPENSED
Start: 2021-11-12

## (undated) RX ORDER — REMIFENTANIL HYDROCHLORIDE 1 MG/ML
INJECTION, POWDER, LYOPHILIZED, FOR SOLUTION INTRAVENOUS
Status: DISPENSED
Start: 2021-11-12

## (undated) RX ORDER — CEFAZOLIN SODIUM 1 G/3ML
INJECTION, POWDER, FOR SOLUTION INTRAMUSCULAR; INTRAVENOUS
Status: DISPENSED
Start: 2021-11-12

## (undated) RX ORDER — FENTANYL CITRATE 50 UG/ML
INJECTION, SOLUTION INTRAMUSCULAR; INTRAVENOUS
Status: DISPENSED
Start: 2021-11-12

## (undated) RX ORDER — CEFAZOLIN SODIUM 2 G/100ML
INJECTION, SOLUTION INTRAVENOUS
Status: DISPENSED
Start: 2021-11-12

## (undated) RX ORDER — GINSENG 100 MG
CAPSULE ORAL
Status: DISPENSED
Start: 2021-11-12

## (undated) RX ORDER — BUPIVACAINE HYDROCHLORIDE 5 MG/ML
INJECTION, SOLUTION EPIDURAL; INTRACAUDAL
Status: DISPENSED
Start: 2021-11-12